# Patient Record
Sex: MALE | Race: WHITE | Employment: OTHER | URBAN - METROPOLITAN AREA
[De-identification: names, ages, dates, MRNs, and addresses within clinical notes are randomized per-mention and may not be internally consistent; named-entity substitution may affect disease eponyms.]

---

## 2021-12-20 ENCOUNTER — HOSPITAL ENCOUNTER (EMERGENCY)
Age: 61
Discharge: HOME | End: 2021-12-20
Attending: EMERGENCY MEDICINE
Payer: MEDICAID

## 2021-12-20 ENCOUNTER — APPOINTMENT (OUTPATIENT)
Dept: GENERAL RADIOLOGY | Age: 61
End: 2021-12-20
Attending: NURSE PRACTITIONER
Payer: MEDICAID

## 2021-12-20 VITALS
SYSTOLIC BLOOD PRESSURE: 167 MMHG | WEIGHT: 135 LBS | HEIGHT: 66 IN | TEMPERATURE: 98.1 F | BODY MASS INDEX: 21.69 KG/M2 | HEART RATE: 84 BPM | RESPIRATION RATE: 16 BRPM | OXYGEN SATURATION: 98 % | DIASTOLIC BLOOD PRESSURE: 75 MMHG

## 2021-12-20 DIAGNOSIS — M79.672 LEFT FOOT PAIN: Primary | ICD-10-CM

## 2021-12-20 DIAGNOSIS — M72.2 PLANTAR FASCIITIS OF LEFT FOOT: ICD-10-CM

## 2021-12-20 PROCEDURE — 99282 EMERGENCY DEPT VISIT SF MDM: CPT

## 2021-12-20 PROCEDURE — 73630 X-RAY EXAM OF FOOT: CPT

## 2021-12-20 NOTE — ED PROVIDER NOTES
EMERGENCY DEPARTMENT HISTORY AND PHYSICAL EXAM    Date: 12/20/2021  Patient Name: Zeke Novoa    History of Presenting Illness     Chief Complaint   Patient presents with    Foot Pain         History Provided By: patient      Additional History (Context): Zeke Novoa is a 11year-old male with no significant past medical history presents to the ER with complaints of ongoing foot pain on the left seems worse first thing in the morning and at the end of the day. He does work as a . He denies any injury, trauma, or fall. No swelling, redness, or overlying skin changes. No paresthesia. Taking ibuprofen with little improvement. PCP: None        Past History     Past Medical History:  History reviewed. No pertinent past medical history. Past Surgical History:  History reviewed. No pertinent surgical history. Family History:  History reviewed. No pertinent family history. Social History:  Social History     Tobacco Use    Smoking status: Light Tobacco Smoker    Smokeless tobacco: Never Used   Substance Use Topics    Alcohol use: Yes    Drug use: Not Currently       Allergies:  No Known Allergies      Review of Systems     Review of Systems   Constitutional: Negative for chills and fever. HENT: Negative for nasal congestion, sore throat, rhinorrhea  Eyes: Negative. Respiratory: Negative for cough and for shortness of breath. Cardiovascular: Negative for chest pain and palpitations. Gastrointestinal: Negative for abdominal pain, constipation, diarrhea, nausea and vomiting. Genitourinary: Negative. Negative for difficulty urinating and flank pain. Musculoskeletal: Positive for left foot pain. Negative for back pain. Negative for gait problem and neck pain. Skin: Negative. Allergic/Immunologic: Negative. Neurological: Negative for dizziness, weakness, numbness and headaches. Psychiatric/Behavioral: Negative. All other systems reviewed and are negative.   All Other Systems Negative    Physical Exam     Vitals:    12/20/21 1034 12/20/21 1038   BP: 92/82 (!) 167/75   Pulse: 84    Resp: 16    Temp: 98.1 °F (36.7 °C)    SpO2: 98%    Weight: 61.2 kg (135 lb)    Height: 5' 6\" (1.676 m)      Physical Exam  Vitals and nursing note reviewed. Constitutional:       General: He is not in acute distress. Appearance: Normal appearance. He is not ill-appearing, toxic-appearing or diaphoretic. HENT:      Head: Normocephalic and atraumatic. Nose: Nose normal. No congestion or rhinorrhea. Mouth/Throat:      Mouth: Mucous membranes are moist.      Pharynx: Oropharynx is clear. No oropharyngeal exudate or posterior oropharyngeal erythema. Eyes:      General: Vision grossly intact. Gaze aligned appropriately. No scleral icterus. Right eye: No discharge. Left eye: No discharge. Conjunctiva/sclera: Conjunctivae normal.   Cardiovascular:      Rate and Rhythm: Normal rate and regular rhythm. Pulses: Normal pulses. Heart sounds: Normal heart sounds. No murmur heard. No gallop. Pulmonary:      Effort: Pulmonary effort is normal. No respiratory distress. Breath sounds: Normal breath sounds. No stridor. No wheezing, rhonchi or rales. Chest:      Chest wall: No tenderness. Abdominal:      General: Abdomen is flat. Palpations: Abdomen is soft. Tenderness: There is no abdominal tenderness. There is no right CVA tenderness, left CVA tenderness, guarding or rebound. Musculoskeletal:         General: Normal range of motion. Cervical back: Full passive range of motion without pain, normal range of motion and neck supple. No rigidity or tenderness. Left foot: Normal range of motion and normal capillary refill. Tenderness (plantar) present. No swelling, deformity or foot drop. Normal pulse. Lymphadenopathy:      Cervical: No cervical adenopathy. Skin:     General: Skin is warm and dry.       Capillary Refill: Capillary refill takes less than 2 seconds. Findings: No rash. Neurological:      General: No focal deficit present. Mental Status: He is alert and oriented to person, place, and time. Psychiatric:         Mood and Affect: Mood normal.           Diagnostic Study Results     Labs -   No results found for this or any previous visit (from the past 12 hour(s)). Radiologic Studies -   XR FOOT LT MIN 3 V   Final Result   Findings/impression:      No osseous coalition. Lisfranc joint preserved. No fracture or dislocation. CT Results  (Last 48 hours)    None        CXR Results  (Last 48 hours)    None            Medical Decision Making   I am the first provider for this patient. I reviewed the vital signs, available nursing notes, past medical history, past surgical history, family history and social history. Vital Signs-Reviewed the patient's vital signs. Records Reviewed: Nursing notes, old medical records and any previous labs, imaging, visits, consultations pertinent to patient care    Procedures:  Procedures    ED Course: Progress Notes, Reevaluation, and Consults:  10:39 AM  Initial assessment performed. The patients presenting problems have been discussed, and they/their family are in agreement with the care plan formulated and outlined with them. I have encouraged them to ask questions as they arise throughout their visit. 12:03 PM  The radiological findings were discussed in detail with the patient  X-ray negative for acute process per my interpretation of images. No evidence of compartment syndrome as pain is not out of proportion and there is no swelling. Appropriate for outpatient management. Will discuss supportive treatment, NSAIDS, RICE and podiatry follow-up. Symptoms consistent with plantar fasciitis. Discussed supportive treatment. Discussed treatment plan, return precautions, symptomatic relief, and expected time to improvement. All questions answered.  Patient is stable for discharge and outpatient management. The patient was educated extensively on mandatory return criteria as well as was instructed both verbally and written to follow-up with podiatry within 1 week. The patient verbalized understanding of all instruction provided and agrees with the plan of care. Provider Notes (Medical Decision Making):     Differential diagnosis: Sprain/strain, dislocation, fracture    Patient is a 26-year-old male who presents with complaints of ongoing foot pain in the plantar region of the left foot for the last 6 months. Seems to wax and wane. He does work as a  wearing boots. He denies any injury, trauma, or fall and is neurovascularly intact. He is significantly tender over the plantar fascia consistent with plantar fasciitis. We will obtain an x-ray. MED RECONCILIATION:  No current facility-administered medications for this encounter. No current outpatient medications on file. Disposition:  Home in stable condition    DISCHARGE NOTE:     Patient has been reexamined. Patient has no new complaints, changes, or physical findings. Care plan outlined and precautions discussed. Discussed proper way to take medications. Discussed treatment plan, return precautions, symptomatic relief, and expected time to improvement. All questions answered. Patient is stable for discharge and outpatient management. Patient is ready to go home. Follow-up Information     Follow up With Specialties Details Why Contact Info    Darwyn Brittle, DPM Podiatry Schedule an appointment as soon as possible for a visit  Follow-up from the Emergency Department [address] [address]  [phone]      [facility] EMERGENCY DEPT Emergency Medicine  As needed, If symptoms worsen [address] [place] [address]  [phone]          There are no discharge medications for this patient. Diagnosis     Clinical Impression:   1. Left foot pain    2.  Plantar fasciitis of left foot          Dictation disclaimer:  Please note that this dictation was completed with [employer], the computer voice recognition software. Quite often unanticipated grammatical, syntax, homophones, and other interpretive errors are inadvertently transcribed by the computer software. Please disregard these errors. Please excuse any errors that have escaped final proofreading.

## 2021-12-20 NOTE — Clinical Note
[address] [place] [address] Dr Antony Martines [facility] EMERGENCY DEPT  [id number] [facility] [address] [address] [phone]    Work/School Note    Date: 12/20/2021    To Whom It May concern:    Vita Martinez was seen and treated today in the emergency room by the following provider(s):  Attending Provider: Janie Polanco MD  Nurse Practitioner: PATTI Knutson. Vita Martinez is excused from work/school on 12/20/21 and 12/21/21. He is medically clear to return to work/school on 12/22/2021.        Sincerely,          PATTI Hunter

## 2022-02-09 ENCOUNTER — TRANSCRIBE ORDER (OUTPATIENT)
Dept: SCHEDULING | Age: 62
End: 2022-02-09

## 2022-02-09 DIAGNOSIS — M72.2 PLANTAR FASCIITIS: Primary | ICD-10-CM

## 2022-02-28 ENCOUNTER — HOSPITAL ENCOUNTER (OUTPATIENT)
Dept: MRI IMAGING | Age: 62
Discharge: HOME | End: 2022-02-28
Attending: PODIATRIST
Payer: MEDICAID

## 2022-02-28 DIAGNOSIS — M72.2 PLANTAR FASCIITIS: ICD-10-CM

## 2022-02-28 PROCEDURE — 73721 MRI JNT OF LWR EXTRE W/O DYE: CPT

## 2022-03-07 ENCOUNTER — OFFICE VISIT (OUTPATIENT)
Dept: INTERNAL MEDICINE CLINIC | Age: 62
End: 2022-03-07
Payer: MEDICAID

## 2022-03-07 ENCOUNTER — HOSPITAL ENCOUNTER (OUTPATIENT)
Dept: LAB | Age: 62
Discharge: HOME | End: 2022-03-07

## 2022-03-07 ENCOUNTER — HOSPITAL ENCOUNTER (OUTPATIENT)
Dept: LAB | Age: 62
Discharge: HOME | End: 2022-03-07
Payer: MEDICAID

## 2022-03-07 VITALS
RESPIRATION RATE: 16 BRPM | WEIGHT: 121.6 LBS | BODY MASS INDEX: 19.54 KG/M2 | SYSTOLIC BLOOD PRESSURE: 174 MMHG | DIASTOLIC BLOOD PRESSURE: 84 MMHG | HEIGHT: 66 IN | OXYGEN SATURATION: 96 % | HEART RATE: 93 BPM | TEMPERATURE: 97.5 F

## 2022-03-07 DIAGNOSIS — B19.20 HEPATITIS C TEST POSITIVE: ICD-10-CM

## 2022-03-07 DIAGNOSIS — Z76.89 ENCOUNTER TO ESTABLISH CARE: Primary | ICD-10-CM

## 2022-03-07 DIAGNOSIS — Z98.49: ICD-10-CM

## 2022-03-07 DIAGNOSIS — I10 PRIMARY HYPERTENSION: ICD-10-CM

## 2022-03-07 DIAGNOSIS — F12.90 MARIJUANA SMOKER: ICD-10-CM

## 2022-03-07 DIAGNOSIS — H26.9 CATARACT, UNSPECIFIED CATARACT TYPE, UNSPECIFIED LATERALITY: ICD-10-CM

## 2022-03-07 DIAGNOSIS — E87.5 HYPERKALEMIA: ICD-10-CM

## 2022-03-07 DIAGNOSIS — F17.200 CURRENT SMOKER: ICD-10-CM

## 2022-03-07 DIAGNOSIS — Z78.9 DAILY CONSUMPTION OF ALCOHOL: ICD-10-CM

## 2022-03-07 DIAGNOSIS — G89.29 CHRONIC PAIN IN LEFT FOOT: ICD-10-CM

## 2022-03-07 DIAGNOSIS — M79.672 CHRONIC PAIN IN LEFT FOOT: ICD-10-CM

## 2022-03-07 DIAGNOSIS — F79: ICD-10-CM

## 2022-03-07 DIAGNOSIS — R79.89 ELEVATED LFTS: ICD-10-CM

## 2022-03-07 LAB — XX-LABCORP SPECIMEN COL,LCBCF: NORMAL

## 2022-03-07 PROCEDURE — 99204 OFFICE O/P NEW MOD 45 MIN: CPT | Performed by: NURSE PRACTITIONER

## 2022-03-07 PROCEDURE — 99001 SPECIMEN HANDLING PT-LAB: CPT

## 2022-03-07 PROCEDURE — 93005 ELECTROCARDIOGRAM TRACING: CPT

## 2022-03-07 RX ORDER — LISINOPRIL 20 MG/1
20 TABLET ORAL DAILY
Qty: 90 TABLET | Refills: 0 | Status: SHIPPED | OUTPATIENT
Start: 2022-03-07 | End: 2022-05-23 | Stop reason: SDUPTHER

## 2022-03-07 NOTE — PROGRESS NOTES
Chief Complaint   Patient presents with   [employer] Establish Care    Foot Pain     pt c/o bottom left foot pain, starting back 6 months ago     1. \"Have you been to the ER, urgent care clinic since your last visit? Hospitalized since your last visit? \" n/a    2. \"Have you seen or consulted any other health care providers outside of the [address] [place] [address] since your last visit? \" n/a     3. For patients aged 39-70: Has the patient had a colonoscopy / FIT/ Cologuard? No      If the patient is female:    4. For patients aged 41-77: Has the patient had a mammogram within the past 2 years? NA - based on age or sex      11. For patients aged 21-65: Has the patient had a pap smear?  NA - based on age or sex

## 2022-03-07 NOTE — PROGRESS NOTES
[facility]   [place], [address]  [phone] [id number]/[phone] fax    3/7/2022    Benjie Aslton 1960 is a pleasant WHITE/NON- male. He is a . He lives with a roommate. He is accompanied by his younger brother, Pierre Johnson. His brother assists him with his needs to include transportation and appointments along with medical management. Past Medical History:   Diagnosis Date    Cataract 3/7/2022    Cataract extraction status of eye, unspecified laterality 3/7/2022    Chronic pain in left foot 3/7/2022    Current smoker 3/7/2022    Daily consumption of alcohol 3/7/2022    Marijuana smoker 3/7/2022    Mental subnormality 3/7/2022    Primary hypertension 3/7/2022     Past Surgical History:   Procedure Laterality Date    HX CATARACT REMOVAL Right     4 years ago     Current Outpatient Medications   Medication Sig    lisinopriL (PRINIVIL, ZESTRIL) 20 mg tablet Take 1 Tablet by mouth daily. For blood pressure     No current facility-administered medications for this visit. Allergies and Intolerances:   No Known Allergies  Family History:   History reviewed. No pertinent family history. Social History:   He  reports that he has been smoking. He started smoking about 40 years ago. He has been smoking about 1.00 pack per day. He has never used smokeless tobacco.   Social History     Substance and Sexual Activity   Alcohol Use Yes    Alcohol/week: 14.0 standard drinks    Types: 14 Cans of beer per week    Comment: 2 beers daily     Immunization History: There is no immunization history on file for this patient. Todays concerns:  1. Establish care  2. Left foot pain. Started 6 months ago. Discomfort located on bottom of foot. Feels like needles, burning at times. Worse in the morning, improves throughout the day. Uses a water bottle daily to roll on his foot to help relieve the pain. Sees Dr Orquidea Hilario, podiatry.  Received 2 cortisone injections recently with no improvement of sx.   3. Cataract. Removal from Rt eye 4 years ago. Developed in left eye. Seeking referral to eye MD.       Review of Systems:   As above included in HPI. Otherwise 11 point review of systems negative including constitutional, skin, HENT, eyes, respiratory, cardiovascular, gastrointestinal, genitourinary, musculoskeletal, endocrine, hematologic, allergy, and neurologic. Review of Data:  Pt going to [facility] lab to have labs drawn and EKG completed    Physical:   Visit Vitals  BP (!) 174/84   Pulse 93   Temp 97.5 °F (36.4 °C) (Temporal)   Resp 16   Ht 5' 6\" (1.676 m)   Wt 121 lb 9.6 oz (55.2 kg)   SpO2 96%   BMI 19.63 kg/m²      Wt Readings from Last 3 Encounters:   03/07/22 121 lb 9.6 oz (55.2 kg)   12/20/21 135 lb (61.2 kg)       Exam:   Physical Exam  Constitutional:       Appearance: Normal appearance. Comments: Thin build   HENT:      Head: Normocephalic and atraumatic. Right Ear: Tympanic membrane, ear canal and external ear normal.      Left Ear: Tympanic membrane, ear canal and external ear normal.   Eyes:      Extraocular Movements: Extraocular movements intact. Conjunctiva/sclera: Conjunctivae normal.   Cardiovascular:      Rate and Rhythm: Normal rate and regular rhythm. Pulses: Normal pulses. Heart sounds: Normal heart sounds. Comments: No edema noted to ender LEs  Pulmonary:      Effort: Pulmonary effort is normal. No respiratory distress. Breath sounds: Normal breath sounds. No wheezing. Abdominal:      General: Abdomen is flat. Bowel sounds are normal. There is no distension. Palpations: Abdomen is soft. Tenderness: There is no abdominal tenderness. Musculoskeletal:         General: Normal range of motion. Cervical back: Normal range of motion. Comments: Gait stable, no limitations noted. Skin:     General: Skin is warm and dry.    Neurological:      Mental Status: He is alert and oriented to person, place, and time. Comments: Brother has to help pt with answering some questions. Pt is alert and oriented, appears to be mentally slow. He is slow to answer questions at times. Psychiatric:         Attention and Perception: Attention normal.         Mood and Affect: Mood normal.         Speech: Speech normal.         Behavior: Behavior normal.      Comments: Appears to be mentally slow (mild)        Body mass index is 19.63 kg/m². Plan:      ICD-10-CM ICD-9-CM    1. Encounter to establish care  Z76.89 V65.8 HEMOGLOBIN A1C WITH EAG      LIPID PANEL      PSA, DIAGNOSTIC (PROSTATE SPECIFIC AG)      CBC WITH AUTOMATED DIFF   2. Primary hypertension  I10 401.9 lisinopriL (PRINIVIL, ZESTRIL) 20 mg tablet      METABOLIC PANEL, COMPREHENSIVE      EKG, 12 LEAD, INITIAL   3. Chronic pain in left foot  M79.672 729.5     G89.29 338.29    4. Cataract extraction status of eye, unspecified laterality  Z98.49 V45.61 REFERRAL TO OPHTHALMOLOGY   5. Cataract, unspecified cataract type, unspecified laterality  H26.9 366.9 REFERRAL TO OPHTHALMOLOGY   6. Mental subnormality  F79 319    7. Current smoker  F17.200 305.1    8. Marijuana smoker  F12.90 305.20    9. Daily consumption of alcohol  Z78.9 V49.89 HEPATITIS PANEL, ACUTE     -Encounter establish care  Unfortunately he has minimal information in EMR. Reviewed previous ED visits  Unable to review records from Dr. Evie Anguiano, podiatry  Patient has not seen a PCP in many years.    -Primary hypertension (new diagnosis)  start, Lisinopril 20 mg daily. Educated patient and brother i.e. possible side effects of medication. Take medical attention at the nearest ED if side effects become evident.   Limit sodium in diet to 2g/d  Avoid pork  Initiate cardio exercise  Weight reduction  Increase water intake  Smoking cessation encouraged  Check BP and report readings greater than 139/89 to office    -Chronic pain left foot  2/2022 MRI revealed normal left foot; MRI revealed possible strain to left ankle. Plantar fasciitis ruled out on MRI  He received 2 cortisone injections they are podiatry, Dr. Radha Rodas to follow-up with podiatrist  Questionable neuropathy-alcohol induced? ?    -History of cataract removal right eye    -Cataract left eye (patient reported)  Referral placed to ophthalmology    -Mental subnormality  Appears to manage his own care  Relies on brother for transportation, appointments, medical care. Chronic use of alcohol/marijuana impacting mentality? ?    -Current smoker  Smoking cessation encouraged  1 pack/day since age 25  Will plan on an LDCT in the near future    -Marijuana smoker  Smoking cessation encouraged    -Daily consumption of alcohol      Reviewed medication and completed medication reconciliation with the patient. Reviewed side effects of medications with the patient. Questions were answered and patient verb understanding. Follow up 3 months no labs      Dr. Nora Nair, JUNP-C, DNP  [facility]       The total time 45 minutes. At least 50% of that time was spent in counseling and/or coordination of care. My summary of patient counseling and coordination of care includes reviewing medical record, assessing patient, placing orders, and discussing plan of care with patient.  Prior to seeing this patient, I reviewed records, including previously completed appointments/records, reviewed specialty records in [facility], and updated visits from other providers since I saw the patient last.

## 2022-03-08 LAB
ATRIAL RATE: 87 BPM
CALCULATED P AXIS, ECG09: 76 DEGREES
CALCULATED R AXIS, ECG10: -37 DEGREES
CALCULATED T AXIS, ECG11: 55 DEGREES
DIAGNOSIS, 93000: NORMAL
P-R INTERVAL, ECG05: 120 MS
Q-T INTERVAL, ECG07: 346 MS
QRS DURATION, ECG06: 72 MS
QTC CALCULATION (BEZET), ECG08: 416 MS
VENTRICULAR RATE, ECG03: 87 BPM

## 2022-03-09 PROBLEM — B19.20 HEPATITIS C TEST POSITIVE: Status: ACTIVE | Noted: 2022-03-09

## 2022-03-09 PROBLEM — R79.89 ELEVATED LFTS: Status: ACTIVE | Noted: 2022-03-09

## 2022-03-09 PROBLEM — E87.5 HYPERKALEMIA: Status: ACTIVE | Noted: 2022-03-09

## 2022-03-09 LAB
ALBUMIN SERPL-MCNC: 4.1 G/DL (ref 3.8–4.9)
ALBUMIN/GLOB SERPL: 1.1 {RATIO} (ref 1.2–2.2)
ALP SERPL-CCNC: 80 IU/L (ref 44–121)
ALT SERPL-CCNC: 77 IU/L (ref 0–44)
AST SERPL-CCNC: 97 IU/L (ref 0–40)
BASOPHILS # BLD AUTO: 0.1 X10E3/UL (ref 0–0.2)
BASOPHILS NFR BLD AUTO: 1 %
BILIRUB SERPL-MCNC: 0.6 MG/DL (ref 0–1.2)
BUN SERPL-MCNC: 12 MG/DL (ref 8–27)
BUN/CREAT SERPL: 14 (ref 10–24)
CALCIUM SERPL-MCNC: 9.8 MG/DL (ref 8.6–10.2)
CHLORIDE SERPL-SCNC: 97 MMOL/L (ref 96–106)
CHOLEST SERPL-MCNC: 203 MG/DL (ref 100–199)
CO2 SERPL-SCNC: 23 MMOL/L (ref 20–29)
CREAT SERPL-MCNC: 0.85 MG/DL (ref 0.76–1.27)
DIAGNOSTIC IMP SPEC-IMP: NORMAL
EGFR: 99 ML/MIN/1.73
EOSINOPHIL # BLD AUTO: 1 X10E3/UL (ref 0–0.4)
EOSINOPHIL NFR BLD AUTO: 12 %
ERYTHROCYTE [DISTWIDTH] IN BLOOD BY AUTOMATED COUNT: 12.6 % (ref 11.6–15.4)
EST. AVERAGE GLUCOSE BLD GHB EST-MCNC: 111 MG/DL
GLOBULIN SER CALC-MCNC: 3.6 G/DL (ref 1.5–4.5)
GLUCOSE SERPL-MCNC: 104 MG/DL (ref 65–99)
HAV IGM SERPL QL IA: NEGATIVE
HBA1C MFR BLD: 5.5 % (ref 4.8–5.6)
HBV CORE IGM SERPL QL IA: NEGATIVE
HBV SURFACE AG SERPL QL IA: NEGATIVE
HCT VFR BLD AUTO: 46.4 % (ref 37.5–51)
HCV AB S/CO SERPL IA: >11 S/CO RATIO (ref 0–0.9)
HCV RNA SERPL NAA+PROBE-ACNC: NORMAL IU/ML
HCV RNA SERPL NAA+PROBE-LOG IU: 6.83 LOG10 IU/ML
HDLC SERPL-MCNC: 67 MG/DL
HGB BLD-MCNC: 16.7 G/DL (ref 13–17.7)
IMM GRANULOCYTES # BLD AUTO: 0 X10E3/UL (ref 0–0.1)
IMM GRANULOCYTES NFR BLD AUTO: 0 %
IMP & REVIEW OF LAB RESULTS: NORMAL
INTERPRETATION: NORMAL
LDLC SERPL CALC-MCNC: 125 MG/DL (ref 0–99)
LYMPHOCYTES # BLD AUTO: 1.5 X10E3/UL (ref 0.7–3.1)
LYMPHOCYTES NFR BLD AUTO: 18 %
MCH RBC QN AUTO: 32.7 PG (ref 26.6–33)
MCHC RBC AUTO-ENTMCNC: 36 G/DL (ref 31.5–35.7)
MCV RBC AUTO: 91 FL (ref 79–97)
MONOCYTES # BLD AUTO: 1 X10E3/UL (ref 0.1–0.9)
MONOCYTES NFR BLD AUTO: 12 %
MORPHOLOGY BLD-IMP: ABNORMAL
NEUTROPHILS # BLD AUTO: 4.7 X10E3/UL (ref 1.4–7)
NEUTROPHILS NFR BLD AUTO: 57 %
PLATELET # BLD AUTO: ABNORMAL X10E3/UL
POTASSIUM SERPL-SCNC: 5.3 MMOL/L (ref 3.5–5.2)
PROT SERPL-MCNC: 7.7 G/DL (ref 6–8.5)
PSA SERPL-MCNC: 1.6 NG/ML (ref 0–4)
RBC # BLD AUTO: 5.1 X10E6/UL (ref 4.14–5.8)
REF LAB TEST REF RANGE: NORMAL
SODIUM SERPL-SCNC: 137 MMOL/L (ref 134–144)
TRIGL SERPL-MCNC: 60 MG/DL (ref 0–149)
VLDLC SERPL CALC-MCNC: 11 MG/DL (ref 5–40)
WBC # BLD AUTO: 8.4 X10E3/UL (ref 3.4–10.8)

## 2022-03-10 ENCOUNTER — TELEPHONE (OUTPATIENT)
Dept: INTERNAL MEDICINE CLINIC | Age: 62
End: 2022-03-10

## 2022-03-10 NOTE — TELEPHONE ENCOUNTER
----- Message from Cherry Fabian DNP sent at 3/9/2022  9:30 PM EST -----  Please inform pt of the following:  +Hep C. I have placed referral to Gastro  Elevated LFTs  Cholesterol slightly elevated. Reduce fried fatty foods. Hyperkalemia.  Needs to have labs drawn week of 3/28/22 [facility] or [facility]  Thank you

## 2022-03-10 NOTE — TELEPHONE ENCOUNTER
Pts brother aware of message below and verbalized understanding. No further questions or concerns from pts brother at this time.

## 2022-03-10 NOTE — PROGRESS NOTES
Please inform pt of the following:  +Hep C. I have placed referral to Gastro  Elevated LFTs  Cholesterol slightly elevated. Reduce fried fatty foods. Hyperkalemia.  Needs to have labs drawn week of 3/28/22 [facility] or [facility]  Thank you

## 2022-03-18 PROBLEM — Z98.49: Status: ACTIVE | Noted: 2022-03-07

## 2022-03-18 PROBLEM — F17.200 CURRENT SMOKER: Status: ACTIVE | Noted: 2022-03-07

## 2022-03-18 PROBLEM — F12.90 MARIJUANA SMOKER: Status: ACTIVE | Noted: 2022-03-07

## 2022-03-19 PROBLEM — F79: Status: ACTIVE | Noted: 2022-03-07

## 2022-03-19 PROBLEM — Z78.9 DAILY CONSUMPTION OF ALCOHOL: Status: ACTIVE | Noted: 2022-03-07

## 2022-03-19 PROBLEM — B19.20 HEPATITIS C TEST POSITIVE: Status: ACTIVE | Noted: 2022-03-09

## 2022-03-19 PROBLEM — H26.9 CATARACT: Status: ACTIVE | Noted: 2022-03-07

## 2022-03-19 PROBLEM — R79.89 ELEVATED LFTS: Status: ACTIVE | Noted: 2022-03-09

## 2022-03-19 PROBLEM — E87.5 HYPERKALEMIA: Status: ACTIVE | Noted: 2022-03-09

## 2022-03-20 PROBLEM — I10 PRIMARY HYPERTENSION: Status: ACTIVE | Noted: 2022-03-07

## 2022-03-20 PROBLEM — G89.29 CHRONIC PAIN IN LEFT FOOT: Status: ACTIVE | Noted: 2022-03-07

## 2022-03-20 PROBLEM — M79.672 CHRONIC PAIN IN LEFT FOOT: Status: ACTIVE | Noted: 2022-03-07

## 2022-03-21 ENCOUNTER — TELEPHONE (OUTPATIENT)
Dept: INTERNAL MEDICINE CLINIC | Age: 62
End: 2022-03-21

## 2022-03-21 NOTE — TELEPHONE ENCOUNTER
----- Message from Kleber Pimentelocco sent at 3/21/2022  2:13 PM EDT -----  Subject: Referral Request    QUESTIONS   Reason for referral request? pt brother Brianna Thomas also wants a referral for a   new foot dr for pt, said current one is not giving him enough care. Is   requesting a neurosurgeon because his pain is from the hip to the foot. Has the physician seen you for this condition before? No   Preferred Specialist (if applicable)? Do you already have an appointment scheduled? No  Additional Information for Provider?   ---------------------------------------------------------------------------  --------------  CALL BACK INFO  What is the best way for the office to contact you? OK to leave message on   voicemail  Preferred Call Back Phone Number?  [phone]

## 2022-03-31 ENCOUNTER — HOSPITAL ENCOUNTER (OUTPATIENT)
Dept: LAB | Age: 62
Discharge: HOME | End: 2022-03-31

## 2022-03-31 LAB — XX-LABCORP SPECIMEN COL,LCBCF: NORMAL

## 2022-03-31 PROCEDURE — 99001 SPECIMEN HANDLING PT-LAB: CPT

## 2022-04-01 LAB
ALBUMIN SERPL-MCNC: 4.3 G/DL (ref 3.8–4.9)
ALBUMIN/GLOB SERPL: 1.3 {RATIO} (ref 1.2–2.2)
ALP SERPL-CCNC: 69 IU/L (ref 44–121)
ALT SERPL-CCNC: 93 IU/L (ref 0–44)
AST SERPL-CCNC: 99 IU/L (ref 0–40)
BILIRUB SERPL-MCNC: 0.6 MG/DL (ref 0–1.2)
BUN SERPL-MCNC: 8 MG/DL (ref 8–27)
BUN/CREAT SERPL: 10 (ref 10–24)
CALCIUM SERPL-MCNC: 9.5 MG/DL (ref 8.6–10.2)
CHLORIDE SERPL-SCNC: 94 MMOL/L (ref 96–106)
CO2 SERPL-SCNC: 24 MMOL/L (ref 20–29)
CREAT SERPL-MCNC: 0.84 MG/DL (ref 0.76–1.27)
EGFR: 100 ML/MIN/1.73
GLOBULIN SER CALC-MCNC: 3.2 G/DL (ref 1.5–4.5)
GLUCOSE SERPL-MCNC: 83 MG/DL (ref 65–99)
POTASSIUM SERPL-SCNC: 5 MMOL/L (ref 3.5–5.2)
PROT SERPL-MCNC: 7.5 G/DL (ref 6–8.5)
SODIUM SERPL-SCNC: 133 MMOL/L (ref 134–144)

## 2022-04-07 ENCOUNTER — TELEPHONE (OUTPATIENT)
Dept: INTERNAL MEDICINE CLINIC | Age: 62
End: 2022-04-07

## 2022-04-07 NOTE — TELEPHONE ENCOUNTER
----- Message from Rosa Chen sent at 4/7/2022  3:55 PM EDT -----  Subject: Refill Request    QUESTIONS  Name of Medication? lisinopriL (PRINIVIL, ZESTRIL) 20 mg tablet  Patient-reported dosage and instructions? one per day   How many days do you have left? 0  Preferred Pharmacy? [id number]. Pharmacy phone number (if available)? [phone]  ---------------------------------------------------------------------------  --------------  CALL BACK INFO  What is the best way for the office to contact you? OK to leave message on   voicemail  Preferred Call Back Phone Number? [phone]  ---------------------------------------------------------------------------  --------------  SCRIPT ANSWERS  Relationship to Patient? Other  Representative Name? Ramandeep Zohra   Is the Representative on the appropriate HIPAA document in Epic?  Yes

## 2022-04-07 NOTE — TELEPHONE ENCOUNTER
LVM for patient to return call. RE:   Pt should have refills left at pharmacy. She sent in 90 d/s on 3/7/2022.

## 2022-04-08 NOTE — TELEPHONE ENCOUNTER
Patient reached and made aware I spoke with pharmacy and he does have additional refills left at pharmacy for lisinopril. Patient verbalized understanding.

## 2022-04-15 ENCOUNTER — TRANSCRIBE ORDER (OUTPATIENT)
Dept: SCHEDULING | Age: 62
End: 2022-04-15

## 2022-04-15 DIAGNOSIS — B19.20 UNSPECIFIED VIRAL HEPATITIS C WITHOUT HEPATIC COMA: Primary | ICD-10-CM

## 2022-04-19 ENCOUNTER — TRANSCRIBE ORDER (OUTPATIENT)
Dept: SCHEDULING | Age: 62
End: 2022-04-19

## 2022-04-19 DIAGNOSIS — B19.20 HEPATITIS C VIRUS INFECTION WITHOUT HEPATIC COMA, UNSPECIFIED CHRONICITY: Primary | ICD-10-CM

## 2022-05-03 ENCOUNTER — HOSPITAL ENCOUNTER (OUTPATIENT)
Dept: ULTRASOUND IMAGING | Age: 62
Discharge: HOME | End: 2022-05-03
Payer: MEDICAID

## 2022-05-03 ENCOUNTER — HOSPITAL ENCOUNTER (OUTPATIENT)
Dept: LAB | Age: 62
Discharge: HOME | End: 2022-05-03

## 2022-05-03 DIAGNOSIS — B19.20 HEPATITIS C VIRUS INFECTION WITHOUT HEPATIC COMA, UNSPECIFIED CHRONICITY: ICD-10-CM

## 2022-05-03 LAB — XX-LABCORP SPECIMEN COL,LCBCF: NORMAL

## 2022-05-03 PROCEDURE — 99001 SPECIMEN HANDLING PT-LAB: CPT

## 2022-05-03 PROCEDURE — 76705 ECHO EXAM OF ABDOMEN: CPT

## 2022-05-23 ENCOUNTER — OFFICE VISIT (OUTPATIENT)
Dept: INTERNAL MEDICINE CLINIC | Age: 62
End: 2022-05-23
Payer: MEDICAID

## 2022-05-23 VITALS
BODY MASS INDEX: 18.32 KG/M2 | HEART RATE: 64 BPM | OXYGEN SATURATION: 100 % | SYSTOLIC BLOOD PRESSURE: 196 MMHG | TEMPERATURE: 97.4 F | HEIGHT: 66 IN | DIASTOLIC BLOOD PRESSURE: 87 MMHG | WEIGHT: 114 LBS | RESPIRATION RATE: 16 BRPM

## 2022-05-23 DIAGNOSIS — G89.29 CHRONIC PAIN IN LEFT FOOT: ICD-10-CM

## 2022-05-23 DIAGNOSIS — R09.89 ABDOMINAL BRUIT: ICD-10-CM

## 2022-05-23 DIAGNOSIS — Z78.9 DAILY CONSUMPTION OF ALCOHOL: ICD-10-CM

## 2022-05-23 DIAGNOSIS — H26.20 CATARACT WITH COMPLICATION OF LEFT EYE, UNSPECIFIED COMPLICATED CATARACT TYPE: ICD-10-CM

## 2022-05-23 DIAGNOSIS — B18.2 CHRONIC HEPATITIS C WITHOUT HEPATIC COMA (HCC): ICD-10-CM

## 2022-05-23 DIAGNOSIS — I70.90 ATHEROSCLEROTIC VASCULAR DISEASE: ICD-10-CM

## 2022-05-23 DIAGNOSIS — E78.5 HYPERLIPIDEMIA, UNSPECIFIED HYPERLIPIDEMIA TYPE: ICD-10-CM

## 2022-05-23 DIAGNOSIS — M79.672 CHRONIC PAIN IN LEFT FOOT: ICD-10-CM

## 2022-05-23 DIAGNOSIS — F17.200 CURRENT SMOKER: ICD-10-CM

## 2022-05-23 DIAGNOSIS — I16.0 HYPERTENSIVE URGENCY: Primary | ICD-10-CM

## 2022-05-23 PROCEDURE — 99214 OFFICE O/P EST MOD 30 MIN: CPT | Performed by: NURSE PRACTITIONER

## 2022-05-23 RX ORDER — MELOXICAM 15 MG/1
TABLET ORAL
COMMUNITY
Start: 2022-05-17 | End: 2022-06-21

## 2022-05-23 RX ORDER — AMLODIPINE BESYLATE 10 MG/1
10 TABLET ORAL DAILY
Qty: 90 TABLET | Refills: 0 | Status: SHIPPED | OUTPATIENT
Start: 2022-05-23 | End: 2022-05-23 | Stop reason: CLARIF

## 2022-05-23 RX ORDER — LISINOPRIL 20 MG/1
20 TABLET ORAL DAILY
Qty: 90 TABLET | Refills: 0 | Status: SHIPPED | OUTPATIENT
Start: 2022-05-23 | End: 2022-05-23 | Stop reason: CLARIF

## 2022-05-23 RX ORDER — LISINOPRIL 40 MG/1
40 TABLET ORAL DAILY
Qty: 90 TABLET | Refills: 0 | Status: SHIPPED | OUTPATIENT
Start: 2022-05-23 | End: 2022-08-12 | Stop reason: SDUPTHER

## 2022-05-23 NOTE — Clinical Note
Call pts brother Nixon Snow and inform him I have sent in atorvastatin. This treats cholesterol and helps with aneurysm if present.  Thank you

## 2022-05-23 NOTE — PROGRESS NOTES
[facility] of [id number] [place] [address], [address]  [phone] [facility]/[phone] fax    5/23/2022    Yue Lobato 1960 is a pleasant WHITE/NON- male. He is a . He lives with a roommate. He is accompanied by his younger brother, Sonya Perry. His brother assists him with his needs to include transportation and appointments along with medical management. Past Medical History:   Diagnosis Date    Cataract 3/7/2022    Cataract extraction status of eye, unspecified laterality 3/7/2022    Chronic pain in left foot 3/7/2022    Current smoker 3/7/2022    Daily consumption of alcohol 3/7/2022    Elevated LFTs 3/9/2022    Hepatitis C test positive 3/9/2022    Hyperkalemia 3/9/2022    Marijuana smoker 3/7/2022    Mental subnormality 3/7/2022    Primary hypertension 3/7/2022     Past Surgical History:   Procedure Laterality Date    HX CATARACT REMOVAL Right     4 years ago     Current Outpatient Medications   Medication Sig    atorvastatin (LIPITOR) 20 mg tablet Take 1 Tablet by mouth daily. Indications: excessive fat in the blood, AAA    meloxicam (MOBIC) 15 mg tablet take 1 tablet by mouth once daily with food    lisinopriL (PRINIVIL, ZESTRIL) 40 mg tablet Take 1 Tablet by mouth daily. Indications: high blood pressure     No current facility-administered medications for this visit. Allergies and Intolerances:   No Known Allergies  Family History:   No family history on file. Social History:   He  reports that he has been smoking. He started smoking about 40 years ago. He has been smoking about 1.00 pack per day. He has never used smokeless tobacco.   Social History     Substance and Sexual Activity   Alcohol Use Yes    Alcohol/week: 14.0 standard drinks    Types: 14 Cans of beer per week    Comment: 2 beers daily     Immunization History: There is no immunization history on file for this patient. Todays concerns:  1.  Left foot pain. Started Nov 2021. Discomfort located on bottom of foot. Feels like needles, burning at times. Worse in the morning, improves throughout the day. Uses a water bottle daily to roll on his foot to help relieve the pain. Sees Dr Darnell Bañuelos, podiatry. Received 3 cortisone injections with no improvement of sx. Completed EMG 4/12/22, awaiting results. 2. Hep C. Seeing YARIEL Malcolm, who order Fibrosure and \"Hep C medication\" Pt has not completed fibrosure test due to confusion on who is to order the test, GI or PCP. Will be obtaining \"Hep C medication\" tomorrow. Unsure whether to start tx or not. Brother to call GI office to confirm. 3. Heart beat in his belly. GI told pt and brother there is something wrong in his belly and it is not their specialty so to follow up with PCP. 4. Hypertension. Taking lisinopril everyday. Not monitoring sodium intake. Denies H/A, CP, Sob, fatigue. 5. Continues to smoke cigs and weed. Consumes 4-5 cans of beer per day. 6. Cataract Left eye. Scheduled for surgery 5/27/2022      Review of Systems:   As above included in HPI. Review of Data:  N/a  Requested results of EMG LLE from Dr Cintia Neely, neuro, completed 4/2022    Physical:   Visit Vitals  BP (!) 196/87   Pulse 64   Temp 97.4 °F (36.3 °C) (Temporal)   Resp 16   Ht 5' 6\" (1.676 m)   Wt 114 lb (51.7 kg)   SpO2 100%   BMI 18.40 kg/m²      Wt Readings from Last 3 Encounters:   05/23/22 114 lb (51.7 kg)   03/07/22 121 lb 9.6 oz (55.2 kg)   12/20/21 135 lb (61.2 kg)       Exam:   Physical Exam  Constitutional:       Appearance: Normal appearance. Comments: Thin build   HENT:      Head: Normocephalic and atraumatic. Right Ear: External ear normal.      Left Ear: External ear normal.   Eyes:      Extraocular Movements: Extraocular movements intact. Conjunctiva/sclera: Conjunctivae normal.   Cardiovascular:      Rate and Rhythm: Normal rate and regular rhythm. Heart sounds: Normal heart sounds. Comments: No edema noted to ender LEs  Pulmonary:      Effort: Pulmonary effort is normal. No respiratory distress. Breath sounds: Normal breath sounds. No wheezing. Abdominal:      General: Abdomen is flat. Bowel sounds are normal. There is no distension. Palpations: Abdomen is soft. Tenderness: There is no abdominal tenderness. Comments: Bruit noted    Musculoskeletal:         General: Normal range of motion. Cervical back: Normal range of motion and neck supple. Skin:     General: Skin is warm and dry. Neurological:      General: No focal deficit present. Mental Status: He is alert and oriented to person, place, and time. Comments: Brother has to help pt with answering some questions. Pt is alert and oriented, appears to be mentally slow. He is slow to answer questions at times. Psychiatric:         Attention and Perception: Attention normal.         Mood and Affect: Mood normal.         Speech: Speech normal.         Behavior: Behavior normal.      Comments: Appears mentally slow (mild)        Body mass index is 18.4 kg/m². Plan:      ICD-10-CM ICD-9-CM    1. Hypertensive urgency  I16.0 401.9 CTA CHEST ABD W WO CONT      lisinopriL (PRINIVIL, ZESTRIL) 40 mg tablet      DISCONTINUED: amLODIPine (NORVASC) 10 mg tablet      DISCONTINUED: lisinopriL (PRINIVIL, ZESTRIL) 20 mg tablet   2. Hyperlipidemia, unspecified hyperlipidemia type  E78.5 272.4 atorvastatin (LIPITOR) 20 mg tablet   3. Abdominal bruit  R09.89 785.9 CTA CHEST ABD W WO CONT      atorvastatin (LIPITOR) 20 mg tablet   4. Chronic pain in left foot  M79.672 729.5     G89.29 338.29    5. Chronic hepatitis C without hepatic coma (HCC)  B18.2 070.54    6. Cataract with complication of left eye, unspecified complicated cataract type  H26.20 366.30    7. Daily consumption of alcohol  Z78.9 V49.89    8.  Current smoker  F17.200 305.1        -Hypertensive Urgency  BP not controlled  Increase Lisinopril 40mg every day  Discussed initiating Amlodipine but will hold off until AAA is ruled out. Limit sodium in diet to 2g/d  Increase water intake  Smoking cessation encouraged  Pt does not have access to a BP machine     -Hyperlipidemia  Initiate atorvastatin  Originally pt was to work on diet until ABD bruit discovered.    -Abdominal Bruit  Discussed possible complications to include death if AAA is not discovered and managed. Instructed pt and brother on the importance of controlling BP  Discussed importance of smoking and alcohol cessation  Initiate statin therapy  Wanted to schedule AAA stat but brother wants to wait til next week because pt has waited sometime to his his Lt cataract removed. Again expressed the importance of obtaining imaging ASAP. Scheduled for 6/1/22.     -Chronic pain left foot  2/2022 MRI revealed normal left foot; MRI revealed possible strain to left ankle. Plantar fasciitis ruled out on MRI  He received 3 cortisone injections they are podiatry, Dr. Pa Luis   4/2022 EMG completed by Dr Amrita Casas, neuro, awaiting results  Continue to follow-up with podiatrist  DDx:  Alcoholic neuropathy  PAD  Spinal radiculopathy     -Cataract left eye (patient reported)  Scheduled for cataract removal on 5/27/22  Strongly discouraged having any procedures done until after AAA testing    -Hep C  F/u with GI  Call GI in reference to getting Fibrosure scan ordered and scheduled  Call GI office and inquire about initiating \"Hep C med. \"    -Current smoker  Smoking cessation encouraged  1 pack/day since age 25    -Daily consumption of alcohol  Reduction/Cessation discussed    Reviewed medication and completed medication reconciliation with the patient. Reviewed side effects of medications with the patient. Questions were answered and patient verb understanding.       Follow up 3 months no labs      Dr. Agustin Cook, AGNP-C, DNP  [facility] of [address] [place] [address]

## 2022-05-23 NOTE — PROGRESS NOTES
Chief Complaint   Patient presents with    Hypertension     3 month f/u     1. \"Have you been to the ER, urgent care clinic since your last visit? Hospitalized since your last visit? \" No    2. \"Have you seen or consulted any other health care providers outside of the [address] [place] [address] since your last visit? \" yes, GI. and podiatry    3. For patients aged 39-70: Has the patient had a colonoscopy / FIT/ Cologuard? No      If the patient is female:    4. For patients aged 41-77: Has the patient had a mammogram within the past 2 years? NA - based on age or sex      11. For patients aged 21-65: Has the patient had a pap smear?  NA - based on age or sex

## 2022-05-24 ENCOUNTER — TELEPHONE (OUTPATIENT)
Dept: INTERNAL MEDICINE CLINIC | Age: 62
End: 2022-05-24

## 2022-05-24 RX ORDER — ATORVASTATIN CALCIUM 20 MG/1
20 TABLET, FILM COATED ORAL DAILY
Qty: 90 TABLET | Refills: 3 | Status: SHIPPED | OUTPATIENT
Start: 2022-05-24 | End: 2022-06-03 | Stop reason: SDUPTHER

## 2022-05-24 NOTE — TELEPHONE ENCOUNTER
Pts brother calling stating pt was instructed to start amlodipine along with lisinopril at appointment yesterday. Pt states pharmacy told them you cancelled the amlodipine rx. Please advise what medication pt should be taking currently.

## 2022-05-24 NOTE — TELEPHONE ENCOUNTER
Patient's brother reached and made aware Lisinopril was increased to 40 mg and atorvastatin were called into pharmacy per Dr. Mayra Rocha. Pt's brother verbalized understanding.

## 2022-05-27 ENCOUNTER — TELEPHONE (OUTPATIENT)
Dept: INTERNAL MEDICINE CLINIC | Age: 62
End: 2022-05-27

## 2022-05-27 NOTE — TELEPHONE ENCOUNTER
GUANAKITOM ezio Carmona to return call to office. Per OV note, Dr. Jackelin Diaz recommended no appt until AAA testing is complete, which pt has scheduled on 5/31/2022.  Pt can be added to schedule anytime after 5/31.        -Cataract left eye (patient reported)  Scheduled for cataract removal on 5/27/22  Strongly discouraged having any procedures done until after AAA testing

## 2022-05-27 NOTE — TELEPHONE ENCOUNTER
Frances Nichole with [facility] Dr Michelle's office for pre opt clearance appt.      Pt is scheduled for left cataract surgery 07/14/2022    Please advise, no appts available    Return call to [phone] x [phone]

## 2022-05-30 DIAGNOSIS — I16.0 HYPERTENSIVE URGENCY: ICD-10-CM

## 2022-05-30 DIAGNOSIS — R09.89 ABDOMINAL BRUIT: ICD-10-CM

## 2022-05-31 ENCOUNTER — HOSPITAL ENCOUNTER (OUTPATIENT)
Dept: CT IMAGING | Age: 62
Discharge: HOME | End: 2022-05-31
Attending: NURSE PRACTITIONER
Payer: MEDICAID

## 2022-05-31 DIAGNOSIS — R09.89 ABDOMINAL BRUIT: ICD-10-CM

## 2022-05-31 DIAGNOSIS — I16.0 HYPERTENSIVE URGENCY: ICD-10-CM

## 2022-05-31 LAB — CREAT UR-MCNC: 1 MG/DL (ref 0.6–1.3)

## 2022-05-31 PROCEDURE — 74011000636 HC RX REV CODE- 636: Performed by: NURSE PRACTITIONER

## 2022-05-31 PROCEDURE — 74174 CTA ABD&PLVS W/CONTRAST: CPT

## 2022-05-31 PROCEDURE — 82565 ASSAY OF CREATININE: CPT

## 2022-05-31 RX ADMIN — IOPAMIDOL 100 ML: 755 INJECTION, SOLUTION INTRAVENOUS at 15:50

## 2022-06-03 ENCOUNTER — TELEPHONE (OUTPATIENT)
Dept: INTERNAL MEDICINE CLINIC | Age: 62
End: 2022-06-03

## 2022-06-03 RX ORDER — ATORVASTATIN CALCIUM 20 MG/1
40 TABLET, FILM COATED ORAL DAILY
Qty: 90 TABLET | Refills: 0
Start: 2022-06-03 | End: 2022-06-13 | Stop reason: SDUPTHER

## 2022-06-03 NOTE — PROGRESS NOTES
Pls inform pt/brother his CTA does not reveal an aneurysm but does reveal extensive atherosclerotic disease. This could be some cause for his left foot pain. He needs to increase atorvastatin to 40mg (2 tabs daily) plus aspirin 81mg every day. Smoking cessation. I have placed a referral to vascular for consult.

## 2022-06-03 NOTE — TELEPHONE ENCOUNTER
----- Message from Lauren Dean DNP sent at 6/3/2022  8:04 AM EDT -----  Pls inform pt/brother his CTA does not reveal an aneurysm but does reveal extensive atherosclerotic disease. This could be some cause for his left foot pain. He needs to increase atorvastatin to 40mg (2 tabs daily) plus aspirin 81mg every day. Smoking cessation. I have placed a referral to vascular for consult.

## 2022-06-06 NOTE — TELEPHONE ENCOUNTER
Patient's brother reached and made aware of results and message per Dr. Shaunna Humphries. Pt's brother verbalized understanding.

## 2022-06-13 DIAGNOSIS — E78.5 HYPERLIPIDEMIA, UNSPECIFIED HYPERLIPIDEMIA TYPE: ICD-10-CM

## 2022-06-13 DIAGNOSIS — R09.89 ABDOMINAL BRUIT: ICD-10-CM

## 2022-06-13 RX ORDER — ATORVASTATIN CALCIUM 40 MG/1
40 TABLET, FILM COATED ORAL DAILY
Qty: 90 TABLET | Refills: 1 | Status: SHIPPED | OUTPATIENT
Start: 2022-06-13 | End: 2022-06-21 | Stop reason: ALTCHOICE

## 2022-06-13 RX ORDER — ATORVASTATIN CALCIUM 20 MG/1
40 TABLET, FILM COATED ORAL DAILY
Qty: 90 TABLET | Refills: 0 | Status: CANCELLED | OUTPATIENT
Start: 2022-06-13

## 2022-06-13 NOTE — TELEPHONE ENCOUNTER
----- Message from [address] sent at 6/13/2022  9:21 AM EDT -----  Subject: Message to Provider    QUESTIONS  Information for Provider? Brother Moises Wayne states patient has not heard from   Vascular doctor yet. Please call to advise  ---------------------------------------------------------------------------  --------------  CALL BACK INFO  What is the best way for the office to contact you? OK to leave message on   voicemail  Preferred Call Back Phone Number? [phone]  ---------------------------------------------------------------------------  --------------  SCRIPT ANSWERS  Relationship to Patient? Other  Representative Name? Phani Tabares  Is the Representative on the appropriate HIPAA document in Epic?  Yes

## 2022-06-13 NOTE — TELEPHONE ENCOUNTER
ICD-10-CM ICD-9-CM    1. Abdominal bruit  R09.89 785.9    2.  Hyperlipidemia, unspecified hyperlipidemia type  E78.5 272.4 atorvastatin (LIPITOR) 40 mg tablet

## 2022-06-15 ENCOUNTER — TELEPHONE (OUTPATIENT)
Dept: INTERNAL MEDICINE CLINIC | Age: 62
End: 2022-06-15

## 2022-06-15 NOTE — TELEPHONE ENCOUNTER
Juan Nicolas, I sent in a refill for atorvastatin 40mg 90 tabs plus 1 refill on 6/13/2022. He needs to reach out to his pharmacy. I referred pt to Dr Bala Banegas. They dont take his insurance. Please correct referral for [employer] vascular.      Thank you

## 2022-06-15 NOTE — TELEPHONE ENCOUNTER
Jaren White, brother called. Eileen Conn he has been trying to get a refill for Atorvastatin sent in. Said AN had pt double up on the dosage and pt ran out on Monday. Also, vascular doctor to whom pt was referred doesn't take patient's insurance.      Please advise Frances Higgins at [phone]

## 2022-06-16 NOTE — TELEPHONE ENCOUNTER
Reached patient and informed him I contacted pharmacy and rx for atorvastatin 40 mg is ready for pickup. Referral was sent to [facility]. Patient states he will call back to write down info because he was out at the moment.     You have been referred to:  Hansel Tesfaye  [address] [place] [address],[address]  [address] [place] [address]  Phone: [phone]

## 2022-06-21 ENCOUNTER — OFFICE VISIT (OUTPATIENT)
Dept: INTERNAL MEDICINE CLINIC | Age: 62
End: 2022-06-21
Payer: MEDICAID

## 2022-06-21 VITALS
BODY MASS INDEX: 18.64 KG/M2 | TEMPERATURE: 97.8 F | WEIGHT: 116 LBS | HEIGHT: 66 IN | RESPIRATION RATE: 16 BRPM | HEART RATE: 67 BPM | OXYGEN SATURATION: 100 % | DIASTOLIC BLOOD PRESSURE: 79 MMHG | SYSTOLIC BLOOD PRESSURE: 182 MMHG

## 2022-06-21 DIAGNOSIS — G89.29 CHRONIC PAIN OF BOTH LOWER EXTREMITIES: ICD-10-CM

## 2022-06-21 DIAGNOSIS — I10 PRIMARY HYPERTENSION: Primary | ICD-10-CM

## 2022-06-21 DIAGNOSIS — I70.90 ATHEROSCLEROSIS OF ARTERIES: ICD-10-CM

## 2022-06-21 DIAGNOSIS — F17.200 CURRENT SMOKER: ICD-10-CM

## 2022-06-21 DIAGNOSIS — M79.604 CHRONIC PAIN OF BOTH LOWER EXTREMITIES: ICD-10-CM

## 2022-06-21 DIAGNOSIS — M79.605 CHRONIC PAIN OF BOTH LOWER EXTREMITIES: ICD-10-CM

## 2022-06-21 PROCEDURE — 99213 OFFICE O/P EST LOW 20 MIN: CPT | Performed by: NURSE PRACTITIONER

## 2022-06-21 RX ORDER — TRAMADOL HYDROCHLORIDE 50 MG/1
50 TABLET ORAL
Qty: 30 TABLET | Refills: 0 | Status: SHIPPED | OUTPATIENT
Start: 2022-06-21 | End: 2022-07-07 | Stop reason: SDUPTHER

## 2022-06-21 RX ORDER — ROSUVASTATIN CALCIUM 40 MG/1
40 TABLET, COATED ORAL
Qty: 90 TABLET | Refills: 1 | Status: SHIPPED | OUTPATIENT
Start: 2022-06-21 | End: 2022-08-29 | Stop reason: SDUPTHER

## 2022-06-21 NOTE — Clinical Note
Please call brother to schedule a nurse visit for the patient to check his blood pressure 7/7/2022 if possible. Need to confirm his medication is effective for reducing his blood pressure before clearing him for cataract surgery on 7/14/2022.   Thank you

## 2022-06-21 NOTE — PROGRESS NOTES
Chief Complaint   Patient presents with   Renea France Pre-op Exam     Left Eye Cataract Extraction 7/14 Dr. Ayad Croft     1. \"Have you been to the ER, urgent care clinic since your last visit? Hospitalized since your last visit? \" No    2. \"Have you seen or consulted any other health care providers outside of the [address] [place] [address] since your last visit? \" No     3. For patients aged 39-70: Has the patient had a colonoscopy / FIT/ Cologuard? No      If the patient is female:    4. For patients aged 41-77: Has the patient had a mammogram within the past 2 years? NA - based on age or sex      11. For patients aged 21-65: Has the patient had a pap smear?  NA - based on age or sex

## 2022-06-21 NOTE — Clinical Note
Please call Dr Parker Field office and request April (?) note and EMG results completed that time.  Thank you

## 2022-06-22 ENCOUNTER — TELEPHONE (OUTPATIENT)
Dept: INTERNAL MEDICINE CLINIC | Age: 62
End: 2022-06-22

## 2022-06-22 RX ORDER — AMLODIPINE BESYLATE 10 MG/1
10 TABLET ORAL DAILY
Qty: 90 TABLET | Refills: 1 | Status: SHIPPED | OUTPATIENT
Start: 2022-06-22 | End: 2022-08-12 | Stop reason: SDUPTHER

## 2022-06-22 NOTE — TELEPHONE ENCOUNTER
Austin Choi called (is on Perm to Release), he has some questions about medications that he wants to clarify the instructions for. He also wanted to clarify if Patient is supposed to be taking amlodipine or not, he states he has a note about it but there is not an active prescription for it. He is requesting a return call from a nurse and can be reached at [phone].   Please advise, thank you

## 2022-06-22 NOTE — TELEPHONE ENCOUNTER
Patient's brother reached and made aware to double up on the atorvastatin 40 mg until he runs out and then to start taking the rosuvastatin. He is to also take lisinopril 40 mg daily along with the amlodipine 10 mg she has sent into today. Pt's brother verbalized understanding.

## 2022-06-23 NOTE — PROGRESS NOTES
Subjective:   I am seeing Brigette Griffith 1960  is a pleasant WHITE/NON- male for preop exam.     Planned surgery: ***Cataract ***Glaucoma {RIGHT/LEFT:20690}. Date ***. Provider ***.    ROS: No recent infections, has been feeling well other than presenting surgical complaint. No CNS, cardiorespiratory or GI symptoms otherwise. Past Medical History:   Diagnosis Date    Cataract 3/7/2022    Cataract extraction status of eye, unspecified laterality 3/7/2022    Chronic pain in left foot 3/7/2022    Current smoker 3/7/2022    Daily consumption of alcohol 3/7/2022    Elevated LFTs 3/9/2022    Hepatitis C test positive 3/9/2022    Hyperkalemia 3/9/2022    Marijuana smoker 3/7/2022    Mental subnormality 3/7/2022    Primary hypertension 3/7/2022         Current Outpatient Medications on File Prior to Visit   Medication Sig Dispense Refill    lisinopriL (PRINIVIL, ZESTRIL) 40 mg tablet Take 1 Tablet by mouth daily. Indications: high blood pressure 90 Tablet 0     No current facility-administered medications on file prior to visit. No Known Allergies      Objective:   Visit Vitals  Visit Vitals  BP (!) 182/79   Pulse 67   Temp 97.8 °F (36.6 °C) (Temporal)   Resp 16   Ht 5' 6\" (1.676 m)   Wt 116 lb (52.6 kg)   SpO2 100%   BMI 18.72 kg/m²        The physical exam is unremarkable. Patient appears well, alert and oriented, pleasant and cooperative. Vitals as noted. Lungs are clear to auscultation. Heart sounds are normal.      No preop labs or imaging required. Assessment/Plan:       ICD-10-CM ICD-9-CM    1. Chronic pain of both lower extremities  M79.604 729.5 traMADoL (ULTRAM) 50 mg tablet    M79.605 338.29     G89.29     2. Atherosclerosis of arteries  I70.90 440.8 rosuvastatin (CRESTOR) 40 mg tablet   3. Primary hypertension  I10 401.9 amLODIPine (NORVASC) 10 mg tablet       Felt to be average risk for the planned procedure. No contraindications noted.   Routine post prophylaxis as per protocol. Patient {Actions; may/not:30491} proceed with planned surgery. All questions to be answered, risks and benefits to be explained, and consent to be obtained via surgeon and their staff. Total time: 20 minutes spent with the patient on counseling, answering questions, and/or coordination of care.        Dr Martha Burris, [facility]  [facility] of [address] [place] [address]

## 2022-06-23 NOTE — PROGRESS NOTES
[facility] of [id number] [place] [address], [address] [facility]  [phone] CRXMNG/[phone] fax    6/21/2022    Paige Malave 1960 is a pleasant WHITE/NON- male. He is a . He lives with a roommate. He is accompanied by his younger brother, Romeo Cervantes. His brother assists him with his needs to include transportation and appointments along with medical management. Past Medical History:   Diagnosis Date    Cataract 3/7/2022    Cataract extraction status of eye, unspecified laterality 3/7/2022    Chronic pain in left foot 3/7/2022    Current smoker 3/7/2022    Daily consumption of alcohol 3/7/2022    Elevated LFTs 3/9/2022    Hepatitis C test positive 3/9/2022    Hyperkalemia 3/9/2022    Marijuana smoker 3/7/2022    Mental subnormality 3/7/2022    Primary hypertension 3/7/2022     Past Surgical History:   Procedure Laterality Date    HX CATARACT REMOVAL Right     4 years ago     Current Outpatient Medications   Medication Sig    amLODIPine (NORVASC) 10 mg tablet Take 1 Tablet by mouth daily.  traMADoL (ULTRAM) 50 mg tablet Take 1 Tablet by mouth nightly as needed for Pain for up to 30 days. Max Daily Amount: 50 mg. Indications: neuropathic pain    rosuvastatin (CRESTOR) 40 mg tablet Take 1 Tablet by mouth nightly.  lisinopriL (PRINIVIL, ZESTRIL) 40 mg tablet Take 1 Tablet by mouth daily. Indications: high blood pressure     No current facility-administered medications for this visit. Allergies and Intolerances:   No Known Allergies  Family History:   History reviewed. No pertinent family history. Social History:   He  reports that he has been smoking. He started smoking about 40 years ago. He has been smoking about 1.00 pack per day.  He has never used smokeless tobacco.   Social History     Substance and Sexual Activity   Alcohol Use Yes    Alcohol/week: 14.0 standard drinks    Types: 14 Cans of beer per week    Comment: 2 beers daily Immunization History: There is no immunization history on file for this patient. Todays concerns:  1. Left foot pain/Ender Leg pain. Left foot pain started Nov 2021. Discomfort located on bottom of foot. Feels like needles, burning at times. Worse in the morning, improves throughout the day. Sometimes pain will start when walking but will resolve with rest. Saw Dr Erika David, podiatry. Received 3 cortisone injections with no improvement of sx. Completed EMG 4/12/22 with Dr Hipolito Brock. 2. Hypertension. Taking lisinopril everyday. Denies H/A, CP, Sob, fatigue. 3. Continues to smoke cigs and weed. Consumes 4-5 cans of beer per day. 4. Cataract Left eye. 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 Scheduled for surgery 7/14/22. Needs preop today. 5. Arthrosclerosis. Taking lipitor 40mg 2 tabs daily. Vascular does not take insurance. Have not rec'd an alternate vascular provider name or number. 6. Hep C. Seeing [facility]. Review of Systems:   As above included in HPI. Review of Data:  N/a    Physical:   Visit Vitals  BP (!) 182/79   Pulse 67   Temp 97.8 °F (36.6 °C) (Temporal)   Resp 16   Ht 5' 6\" (1.676 m)   Wt 116 lb (52.6 kg)   SpO2 100%   BMI 18.72 kg/m²      Wt Readings from Last 3 Encounters:   06/21/22 116 lb (52.6 kg)   05/23/22 114 lb (51.7 kg)   03/07/22 121 lb 9.6 oz (55.2 kg)       Exam:   Physical Exam  Constitutional:       Appearance: Normal appearance. Comments: Thin build   HENT:      Head: Normocephalic and atraumatic. Right Ear: External ear normal.      Left Ear: External ear normal.   Eyes:      Extraocular Movements: Extraocular movements intact. Conjunctiva/sclera: Conjunctivae normal.   Neck:      Vascular: No carotid bruit. Cardiovascular:      Rate and Rhythm: Normal rate and regular rhythm. Heart sounds: Normal heart sounds.       Comments: No edema noted to ender LEs  Pulmonary:      Effort: Pulmonary effort is normal. No respiratory distress. Breath sounds: Normal breath sounds. No wheezing. Musculoskeletal:         General: Normal range of motion. Cervical back: Normal range of motion and neck supple. Skin:     General: Skin is warm and dry. Neurological:      General: No focal deficit present. Mental Status: He is alert and oriented to person, place, and time. Comments: Brother has to help pt with answering some questions. Pt is alert and oriented, appears to be mentally slow. He is slow to answer questions at times. Psychiatric:         Attention and Perception: Attention normal.         Mood and Affect: Mood normal.         Speech: Speech normal.         Behavior: Behavior normal.      Comments: Appears mentally slow (mild)        Body mass index is 18.72 kg/m². Plan:      ICD-10-CM ICD-9-CM    1. Primary hypertension  I10 401.9 amLODIPine (NORVASC) 10 mg tablet   2. Chronic pain of both lower extremities  M79.604 729.5 traMADoL (ULTRAM) 50 mg tablet    M79.605 338.29     G89.29     3. Atherosclerosis of arteries  I70.90 440.8 rosuvastatin (CRESTOR) 40 mg tablet   4. Current smoker  F17.200 305.1        -Hypertension  BP not controlled  Continue Lisinopril 40mg every day  Initiate amlodipine 10mg every day   Limit sodium in diet to 2g/d  Increase water intake  Smoking and alcohol cessation encouraged  Pt does not have access to a BP machine     -Abdominal Bruit  Ruled out by CTA    -Arthrosclerosis  Continue Lipitor 40mg 2 tabs til all gone then start crestor 40mg.  Pts insurance does not cover lipitor 80mg   Continue ASA therapy daily  Smoking cessation discussed  I suspect pt may be experiencing claudication to his LEs  Pts brother was given contact info for [facility]    Considered treatment with Pletal for possible claudication, in hopes to relieve some of patient's pain to his bilateral lower extremities, until patient consults with vascular but hesitant to do this as we did not know the name of patient's hep C treatment medication. Attempting to obtain this information but unable to locate under Care Everywhere. We will hold off on this treatment until patient sees vascular. 5/31/2022 CTA impression:  1. Normal caliber thoracic and abdominal aorta. No evidence of aneurysm or dissection. Measurements provided above.   2.  Diffuse peripheral predominantly wall calcification with mild degree of atheromatous plaque of the aorta. -Mild celiac trunk narrowing with mild to moderate proximal splenic artery narrowing.  -Moderate diameter segmental stenosis proximal superior mesenteric artery as above. -Mild left and at least mild right proximal renal artery narrowing as above. 3. Extensive atherosclerotic irregularities of the iliac and partially included femoral vessels as above. -Multifocal common iliac stenosis. -Severe proximal right external iliac and at least moderate left external iliac stenosis. -Additional variable internal and external iliac arterial stenosis multifocal  otherwise as above. -Mild to moderate right and multifocal moderate left superficial femoral artery narrowing due to mixed atherosclerotic disease. 4. Hepatic steatosis. -Chronic pain Ariel LEs  2/2022 MRI revealed normal left foot; possible strain to left ankle. Plantar fasciitis ruled out on MRI  He received 3 cortisone injections via podiatry, Dr. Angel Garcia   4/2022 EMG completed by Dr Ceasar Anguiano, neuro, requested results again. Continue to follow-up with podiatrist   Initiate Tramadol. Instructed brother and pt ie scheduled 2 drug. Discussed addiction properties if taken for an extended period of time. Will initiate for short term, until pt sees vascular. Dont consume alcohol and take tramadol due to possible side effects.      DDx:  Alcoholic neuropathy  PAD  Claudication  Spinal radiculopathy     -Cataract left eye (patient reported)  Scheduled for cataract removal on 7/14/22  Need to get BP under control  Patient needs to return to the office for nurse visit in approximately 2 weeks for a blood pressure recheck.     -Current smoker  Smoking cessation encouraged    -Hepatitis C  Unsure what treatment patient is currently under. Brother does not know the name of medication. I reviewed GI notes available in [address] [place] [address], unable to locate name of medication. Reviewed medication and completed medication reconciliation with the patient. Reviewed side effects of medications with the patient. Questions were answered and patient verb understanding. Follow up 3 months no labs      UPDATE 7/7/2022:  BP today (nurse visit) 139/71. Pt is cleared for Left eye cataract with Topical/Mac anesthesia for Phaco w/IOL under Dr Dilcia Stephens on 7/14/2022.       Dr. Deon Thapa, AGNP-C, DNP  [facility] of [address] [place] [address]

## 2022-06-24 ENCOUNTER — TELEPHONE (OUTPATIENT)
Dept: INTERNAL MEDICINE CLINIC | Age: 62
End: 2022-06-24

## 2022-06-24 NOTE — TELEPHONE ENCOUNTER
----- Message from Vanideanna Rudd sent at 6/24/2022 12:10 PM EDT -----  Subject: Appointment Request    Reason for Call: Routine Existing Condition Follow Up    QUESTIONS  Type of Appointment? Established Patient  Reason for appointment request? No appointments available during search  Additional Information for Provider? Pt needs a blood pressure check before 07/14 due to his surgery and he needs to be cleared would like to be seen on 07/12, they have him scheduled 08/12 but that is after his surgery. ---------------------------------------------------------------------------  --------------  Cuauhtemoc Pitman INFO  What is the best way for the office to contact you? OK to leave message on   voicemail  Preferred Call Back Phone Number? [phone]  ---------------------------------------------------------------------------  --------------  SCRIPT ANSWERS  Relationship to Patient? Other  Representative Name? Yolanda Alfonso   Additional information verified (besides Name and Date of Birth)? Address  Is this follow up request related to routine Diabetes Management? No  Have you been diagnosed with COVID-19 in the past 10 days? No  (Service Expert - click yes below to proceed with Yi De As Usual   Scheduling)?  Yes

## 2022-06-24 NOTE — TELEPHONE ENCOUNTER
Patient's brother reached and made aware he needs to be taking the lisinopril and amlodipine daily and recording the readings. Pt's brother states he doesn't live with his brother nor does his brother own a BP cuff. Informed brother he could buy a wrist cuff for pt to take and monitor BP. Patients brother stated he will records his reading whenever he can and let us know to see if the meds are effective. He wants to be sure his brother is going to be cleared for surgery on next month.

## 2022-06-24 NOTE — TELEPHONE ENCOUNTER
----- Message from Kavitha Shukla sent at 6/24/2022  9:22 AM EDT -----  Subject: Message to Provider    QUESTIONS  Information for Provider? Pt brotheer called in wanting to know what his   blood pressure has to be in order for him to get surgery. Yesterday it was   177/80. Please call them back to let them know. He just started taking   amlodipine he is taking it 1x in the morning.  ---------------------------------------------------------------------------  --------------  CALL BACK INFO  What is the best way for the office to contact you? OK to leave message on   voicemail  Preferred Call Back Phone Number? [phone]  ---------------------------------------------------------------------------  --------------  SCRIPT ANSWERS  Relationship to Patient? Sibling  Representative Name? Christen  Is the Representative on the appropriate HIPAA document in Epic?  Yes

## 2022-07-07 ENCOUNTER — CLINICAL SUPPORT (OUTPATIENT)
Dept: INTERNAL MEDICINE CLINIC | Age: 62
End: 2022-07-07

## 2022-07-07 VITALS — HEART RATE: 80 BPM | DIASTOLIC BLOOD PRESSURE: 71 MMHG | OXYGEN SATURATION: 99 % | SYSTOLIC BLOOD PRESSURE: 139 MMHG

## 2022-07-07 DIAGNOSIS — M79.605 CHRONIC PAIN OF BOTH LOWER EXTREMITIES: ICD-10-CM

## 2022-07-07 DIAGNOSIS — G89.29 CHRONIC PAIN OF BOTH LOWER EXTREMITIES: ICD-10-CM

## 2022-07-07 DIAGNOSIS — M79.604 CHRONIC PAIN OF BOTH LOWER EXTREMITIES: ICD-10-CM

## 2022-07-07 DIAGNOSIS — I10 PRIMARY HYPERTENSION: Primary | ICD-10-CM

## 2022-07-07 RX ORDER — TRAMADOL HYDROCHLORIDE 50 MG/1
50 TABLET ORAL
Qty: 30 TABLET | Refills: 0 | Status: SHIPPED | OUTPATIENT
Start: 2022-07-07 | End: 2022-07-08 | Stop reason: SDUPTHER

## 2022-07-07 NOTE — PROGRESS NOTES
Pt continues to suffer with ender leg pain. EMG was negative. He was given tramadol 50mg 6/21/22 to take 1 tab at night prn. Due to pain level, pt doubled the dose to 2 tabs at night. He is seeking a refill of tramadol. Will give an additional 30 tabs to take 1 tab NIGHTLY as needed. No more tramadol will be prescribed. He has a f/u appt with vascular 8/9/22. Requested Prescriptions     Signed Prescriptions Disp Refills    traMADoL (ULTRAM) 50 mg tablet 30 Tablet 0     Sig: Take 1 Tablet by mouth nightly as needed for Pain for up to 30 days. Max Daily Amount: 50 mg.  Indications: neuropathic pain

## 2022-07-08 ENCOUNTER — DOCUMENTATION ONLY (OUTPATIENT)
Dept: INTERNAL MEDICINE CLINIC | Age: 62
End: 2022-07-08

## 2022-07-08 ENCOUNTER — TELEPHONE (OUTPATIENT)
Dept: INTERNAL MEDICINE CLINIC | Age: 62
End: 2022-07-08

## 2022-07-08 DIAGNOSIS — M79.604 CHRONIC PAIN OF BOTH LOWER EXTREMITIES: ICD-10-CM

## 2022-07-08 DIAGNOSIS — M79.605 CHRONIC PAIN OF BOTH LOWER EXTREMITIES: ICD-10-CM

## 2022-07-08 DIAGNOSIS — G89.29 CHRONIC PAIN OF BOTH LOWER EXTREMITIES: ICD-10-CM

## 2022-07-08 RX ORDER — TRAMADOL HYDROCHLORIDE 50 MG/1
50 TABLET ORAL
Qty: 30 TABLET | Refills: 0 | Status: SHIPPED | OUTPATIENT
Start: 2022-07-08 | End: 2022-08-03 | Stop reason: SDUPTHER

## 2022-07-08 NOTE — PROGRESS NOTES
Alisha Hensley [place] Madelyn Mow Pls forward note to eye MD-surgical clearance. Thank you     -------------    Pre op Noted faxed to [facility] with confirmation received.

## 2022-07-08 NOTE — TELEPHONE ENCOUNTER
Increased tramadol to 2x/d prn. Pt has an appt with vascular in Aug. Will refill x1 if pt runs out of med prior to his vascular appt. Requested Prescriptions     Signed Prescriptions Disp Refills    traMADoL (ULTRAM) 50 mg tablet 30 Tablet 0     Sig: Take 1 Tablet by mouth two (2) times daily as needed for Pain for up to 30 days. Max Daily Amount: 100 mg.  Indications: neuropathic pain     Authorizing Provider: Sheeba Magdaleno

## 2022-07-08 NOTE — TELEPHONE ENCOUNTER
Brother calling says pharmacy won't fill the tramadol saying it is too early. Wants to know if AN will rewrite the rx to say 2 a day. Says he has been taking 2 a day since pain is so bad.

## 2022-07-19 ENCOUNTER — OFFICE VISIT (OUTPATIENT)
Dept: VASCULAR SURGERY | Age: 62
End: 2022-07-19
Payer: MEDICAID

## 2022-07-19 VITALS
SYSTOLIC BLOOD PRESSURE: 122 MMHG | WEIGHT: 115.96 LBS | HEIGHT: 66 IN | DIASTOLIC BLOOD PRESSURE: 62 MMHG | BODY MASS INDEX: 18.64 KG/M2 | OXYGEN SATURATION: 98 % | HEART RATE: 70 BPM

## 2022-07-19 DIAGNOSIS — I73.9 PAD (PERIPHERAL ARTERY DISEASE) (HCC): Primary | ICD-10-CM

## 2022-07-19 DIAGNOSIS — F17.200 SMOKER: ICD-10-CM

## 2022-07-19 PROCEDURE — 99204 OFFICE O/P NEW MOD 45 MIN: CPT | Performed by: SURGERY

## 2022-07-19 NOTE — PROGRESS NOTES
1. Have you been to the ER, urgent care clinic since your last visit? No      Hospitalized since your last visit? No     2. Have you seen or consulted any other health care providers outside of the [address] [place] [address] since your last visit? Include any pap smears or colon screening.   No

## 2022-07-19 NOTE — PROGRESS NOTES
Vascular Surgery Office Visit    61 y/o M referred by his PCM initially for an abdominal bruit, found to have extensive aortoiliac disease on CTA. He reports LLE rest pain. He works as a  and is unable to work due to pain and need for frequent breaks. No wounds. He is a smoker. He is accompanied by his brother who assists him with his medical care. The pt recently lost his job and his brother has been assisting with possibly obtaining disability benefits. Past Medical History:   Diagnosis Date    Cataract 3/7/2022    Cataract extraction status of eye, unspecified laterality 3/7/2022    Chronic pain in left foot 3/7/2022    Current smoker 3/7/2022    Daily consumption of alcohol 3/7/2022    Elevated LFTs 3/9/2022    Hepatitis C test positive 3/9/2022    Hyperkalemia 3/9/2022    Marijuana smoker 3/7/2022    Mental subnormality 3/7/2022    Primary hypertension 3/7/2022     Past Surgical History:   Procedure Laterality Date    HX CATARACT REMOVAL Right     4 years ago       No current facility-administered medications on file prior to encounter. Current Outpatient Medications on File Prior to Encounter   Medication Sig Dispense Refill    traMADoL (ULTRAM) 50 mg tablet Take 1 Tablet by mouth two (2) times daily as needed for Pain for up to 30 days. Max Daily Amount: 100 mg. Indications: neuropathic pain 30 Tablet 0    amLODIPine (NORVASC) 10 mg tablet Take 1 Tablet by mouth daily. 90 Tablet 1    rosuvastatin (CRESTOR) 40 mg tablet Take 1 Tablet by mouth nightly. 90 Tablet 1    lisinopriL (PRINIVIL, ZESTRIL) 40 mg tablet Take 1 Tablet by mouth daily. Indications: high blood pressure 90 Tablet 0     No Known Allergies  Social History     Tobacco Use    Smoking status: Every Day     Packs/day: 1.00     Types: Cigarettes     Start date: 1982    Smokeless tobacco: Never   Substance Use Topics    Alcohol use:  Yes     Alcohol/week: 14.0 standard drinks     Types: 14 Cans of beer per week     Comment: 2 beers daily Drug use: Yes     Types: Marijuana     History reviewed. No pertinent family history. PE:   Height 5' 6\" (1.676 m), weight 111 lb 11.2 oz (50.7 kg). NAD  Thin M  A&O x 3  BLE warm  Palpable, diminished, R femoral pulse  No pedal pulses appreciated  L foot with dependent rubor  No wounds or tissue loss. There is atrophy bilaterally    Imaging:   LLE arterial duplex: Likely tandem stenoses of the SFA. AK pop short segment occlusion with reconstitution. TP trunk occlusion. L NAVJOT 0.31, toe pressure 14. CTA a/p: Extensive aortoiliac disease with tandem moderate stenoses of the bilateral JENNY and EIA. IMPRESSION:   Critical LLE ischemia with progressive rest pain, with expected multilevel aortoiliac, fempop, and tibial disease. Active smoker. PLAN:   Aortogram with LLE angiography, possible intervention. Risks, benefits and alternatives were discussed with the patient including but not limited to bleeding, infection, injury to surrounding structures including nerves, distal embolization requiring further procedures, recurrent disease requiring repeat intervention especially in the setting of continued smoking. The patient expressed understanding after the opportunity to ask questions, and consented to the procedure. Chao Santizo MD  Vascular Surgeon      I spent approximately 45 minutes on this patient encounter, which includes but is not limited to performing a history and physical exam, reviewing primary care and consultant documentation, reviewing imaging/studies, and speaking with the patient and his brother regarding my impression and plan.

## 2022-07-21 ENCOUNTER — TELEPHONE (OUTPATIENT)
Dept: INTERNAL MEDICINE CLINIC | Age: 62
End: 2022-07-21

## 2022-07-21 RX ORDER — VELPATASVIR AND SOFOSBUVIR 100; 400 MG/1; MG/1
TABLET, FILM COATED ORAL
COMMUNITY
Start: 2022-06-13 | End: 2022-08-30

## 2022-07-21 NOTE — TELEPHONE ENCOUNTER
Pt's brother César Lemos on phi calling to check pt's medication list.     He said pt is also taking the following hepititis C medication prescribe about 2 months ago by [facility] Dr Coleen Garcia. It you want to add it to pt's med list    Kentmarybel taking the generic.  He things the mg is 400mg/100mg

## 2022-07-22 ENCOUNTER — HOSPITAL ENCOUNTER (OUTPATIENT)
Age: 62
Setting detail: OBSERVATION
Discharge: HOME | End: 2022-07-23
Attending: SURGERY | Admitting: SURGERY
Payer: MEDICAID

## 2022-07-22 DIAGNOSIS — I73.9 PAD (PERIPHERAL ARTERY DISEASE) (HCC): ICD-10-CM

## 2022-07-22 LAB
ACT BLD: 196 SECS (ref 79–138)
ACT BLD: 289 SECS (ref 79–138)
ACT BLD: 294 SECS (ref 79–138)
GLUCOSE BLD STRIP.AUTO-MCNC: 105 MG/DL (ref 70–110)
HISTORY CHECKED?,CKHIST: NORMAL

## 2022-07-22 PROCEDURE — 85347 COAGULATION TIME ACTIVATED: CPT

## 2022-07-22 PROCEDURE — 74011000250 HC RX REV CODE- 250: Performed by: SURGERY

## 2022-07-22 PROCEDURE — G0378 HOSPITAL OBSERVATION PER HR: HCPCS

## 2022-07-22 PROCEDURE — 99152 MOD SED SAME PHYS/QHP 5/>YRS: CPT | Performed by: SURGERY

## 2022-07-22 PROCEDURE — 77030004561 HC CATH ANGI DX COBRA ANGI -B: Performed by: SURGERY

## 2022-07-22 PROCEDURE — 37223 HC PLC STNT ILIAC +/- PTA ADDL: CPT | Performed by: SURGERY

## 2022-07-22 PROCEDURE — C1887 CATHETER, GUIDING: HCPCS | Performed by: SURGERY

## 2022-07-22 PROCEDURE — 96374 THER/PROPH/DIAG INJ IV PUSH: CPT

## 2022-07-22 PROCEDURE — C1725 CATH, TRANSLUMIN NON-LASER: HCPCS | Performed by: SURGERY

## 2022-07-22 PROCEDURE — 74011250636 HC RX REV CODE- 250/636: Performed by: SURGERY

## 2022-07-22 PROCEDURE — 96376 TX/PRO/DX INJ SAME DRUG ADON: CPT

## 2022-07-22 PROCEDURE — 37224 HC PTA FEMPOP UNI: CPT | Performed by: SURGERY

## 2022-07-22 PROCEDURE — C1894 INTRO/SHEATH, NON-LASER: HCPCS | Performed by: SURGERY

## 2022-07-22 PROCEDURE — 77030013519 HC DEV INFL BASIX MRTM -B: Performed by: SURGERY

## 2022-07-22 PROCEDURE — C1760 CLOSURE DEV, VASC: HCPCS | Performed by: SURGERY

## 2022-07-22 PROCEDURE — 77030013744: Performed by: SURGERY

## 2022-07-22 PROCEDURE — 74011250637 HC RX REV CODE- 250/637: Performed by: SURGERY

## 2022-07-22 PROCEDURE — 74011000636 HC RX REV CODE- 636: Performed by: SURGERY

## 2022-07-22 PROCEDURE — C1769 GUIDE WIRE: HCPCS | Performed by: SURGERY

## 2022-07-22 PROCEDURE — 75710 ARTERY X-RAYS ARM/LEG: CPT | Performed by: SURGERY

## 2022-07-22 PROCEDURE — C1874 STENT, COATED/COV W/DEL SYS: HCPCS | Performed by: SURGERY

## 2022-07-22 PROCEDURE — 99153 MOD SED SAME PHYS/QHP EA: CPT | Performed by: SURGERY

## 2022-07-22 PROCEDURE — 86920 COMPATIBILITY TEST SPIN: CPT

## 2022-07-22 PROCEDURE — 76937 US GUIDE VASCULAR ACCESS: CPT | Performed by: SURGERY

## 2022-07-22 PROCEDURE — 82962 GLUCOSE BLOOD TEST: CPT

## 2022-07-22 PROCEDURE — 75625 CONTRAST EXAM ABDOMINL AORTA: CPT | Performed by: SURGERY

## 2022-07-22 PROCEDURE — 86900 BLOOD TYPING SEROLOGIC ABO: CPT

## 2022-07-22 PROCEDURE — 37221 HC PLC STNT ILIAC +/- PTA INIT: CPT | Performed by: SURGERY

## 2022-07-22 DEVICE — VIABAHN BX BALLOON EXP ENDO 8MMX39MM 7FR 135CMCATH HEPARIN
Type: IMPLANTABLE DEVICE | Status: FUNCTIONAL
Brand: GORE VIABAHN VBX BALLOON EXPANDABLE ENDO

## 2022-07-22 DEVICE — VIABAHN SX ENDO HEPARIN 18 RO 7MMX10CM 7FR120CM CATH
Type: IMPLANTABLE DEVICE | Status: FUNCTIONAL
Brand: GORE VIABAHN ENDOPROSTHESIS WITH HEPARIN

## 2022-07-22 DEVICE — VIABAHN SX ENDO HEPARIN 18 RO 7MMX5CM 7FR 120CM CATH
Type: IMPLANTABLE DEVICE | Status: FUNCTIONAL
Brand: GORE VIABAHN ENDOPROSTHESIS WITH HEPARIN

## 2022-07-22 DEVICE — VIABAHN BX BALLOON EXP ENDO 7MMX39MM 7FR 135CMCATH HEPARIN
Type: IMPLANTABLE DEVICE | Status: FUNCTIONAL
Brand: GORE VIABAHN VBX BALLOON EXPANDABLE ENDO

## 2022-07-22 RX ORDER — NITROGLYCERIN 40 MG/100ML
INJECTION INTRAVENOUS
Status: COMPLETED | OUTPATIENT
Start: 2022-07-22 | End: 2022-07-22

## 2022-07-22 RX ORDER — CEFAZOLIN SODIUM 1 G/3ML
INJECTION, POWDER, FOR SOLUTION INTRAMUSCULAR; INTRAVENOUS AS NEEDED
Status: DISCONTINUED | OUTPATIENT
Start: 2022-07-22 | End: 2022-07-22 | Stop reason: HOSPADM

## 2022-07-22 RX ORDER — HYDROMORPHONE HYDROCHLORIDE 2 MG/ML
0.2 INJECTION, SOLUTION INTRAMUSCULAR; INTRAVENOUS; SUBCUTANEOUS
Status: DISCONTINUED | OUTPATIENT
Start: 2022-07-22 | End: 2022-07-22

## 2022-07-22 RX ORDER — HYDROMORPHONE HYDROCHLORIDE 2 MG/ML
1 INJECTION, SOLUTION INTRAMUSCULAR; INTRAVENOUS; SUBCUTANEOUS
Status: DISCONTINUED | OUTPATIENT
Start: 2022-07-22 | End: 2022-07-23 | Stop reason: HOSPADM

## 2022-07-22 RX ORDER — ROSUVASTATIN CALCIUM 20 MG/1
40 TABLET, COATED ORAL
Status: DISCONTINUED | OUTPATIENT
Start: 2022-07-22 | End: 2022-07-23 | Stop reason: HOSPADM

## 2022-07-22 RX ORDER — HYDROMORPHONE HYDROCHLORIDE 2 MG/ML
1 INJECTION, SOLUTION INTRAMUSCULAR; INTRAVENOUS; SUBCUTANEOUS ONCE
Status: COMPLETED | OUTPATIENT
Start: 2022-07-22 | End: 2022-07-22

## 2022-07-22 RX ORDER — FENTANYL CITRATE 50 UG/ML
INJECTION, SOLUTION INTRAMUSCULAR; INTRAVENOUS AS NEEDED
Status: DISCONTINUED | OUTPATIENT
Start: 2022-07-22 | End: 2022-07-22 | Stop reason: HOSPADM

## 2022-07-22 RX ORDER — HEPARIN SODIUM 200 [USP'U]/100ML
INJECTION, SOLUTION INTRAVENOUS
Status: COMPLETED | OUTPATIENT
Start: 2022-07-22 | End: 2022-07-22

## 2022-07-22 RX ORDER — GUAIFENESIN 100 MG/5ML
81 LIQUID (ML) ORAL DAILY
Status: DISCONTINUED | OUTPATIENT
Start: 2022-07-23 | End: 2022-07-23 | Stop reason: HOSPADM

## 2022-07-22 RX ORDER — AMLODIPINE BESYLATE 10 MG/1
10 TABLET ORAL DAILY
Status: DISCONTINUED | OUTPATIENT
Start: 2022-07-22 | End: 2022-07-23 | Stop reason: HOSPADM

## 2022-07-22 RX ORDER — CLOPIDOGREL 300 MG/1
300 TABLET, FILM COATED ORAL ONCE
Status: COMPLETED | OUTPATIENT
Start: 2022-07-22 | End: 2022-07-22

## 2022-07-22 RX ORDER — SODIUM CHLORIDE 9 MG/ML
125 INJECTION, SOLUTION INTRAVENOUS CONTINUOUS
Status: DISPENSED | OUTPATIENT
Start: 2022-07-22 | End: 2022-07-23

## 2022-07-22 RX ORDER — VELPATASVIR AND SOFOSBUVIR 100; 400 MG/1; MG/1
1 TABLET, FILM COATED ORAL
Status: DISCONTINUED | OUTPATIENT
Start: 2022-07-22 | End: 2022-07-23 | Stop reason: HOSPADM

## 2022-07-22 RX ORDER — CLOPIDOGREL BISULFATE 75 MG/1
75 TABLET ORAL DAILY
Status: DISCONTINUED | OUTPATIENT
Start: 2022-07-23 | End: 2022-07-23 | Stop reason: HOSPADM

## 2022-07-22 RX ORDER — MIDAZOLAM HYDROCHLORIDE 1 MG/ML
INJECTION, SOLUTION INTRAMUSCULAR; INTRAVENOUS AS NEEDED
Status: DISCONTINUED | OUTPATIENT
Start: 2022-07-22 | End: 2022-07-22 | Stop reason: HOSPADM

## 2022-07-22 RX ORDER — LIDOCAINE HYDROCHLORIDE 10 MG/ML
INJECTION, SOLUTION EPIDURAL; INFILTRATION; INTRACAUDAL; PERINEURAL AS NEEDED
Status: DISCONTINUED | OUTPATIENT
Start: 2022-07-22 | End: 2022-07-22 | Stop reason: HOSPADM

## 2022-07-22 RX ORDER — MORPHINE SULFATE 10 MG/ML
INJECTION, SOLUTION INTRAMUSCULAR; INTRAVENOUS AS NEEDED
Status: DISCONTINUED | OUTPATIENT
Start: 2022-07-22 | End: 2022-07-22 | Stop reason: HOSPADM

## 2022-07-22 RX ORDER — OXYCODONE HYDROCHLORIDE 5 MG/1
5 TABLET ORAL
Status: DISCONTINUED | OUTPATIENT
Start: 2022-07-22 | End: 2022-07-23 | Stop reason: HOSPADM

## 2022-07-22 RX ORDER — HEPARIN SODIUM 1000 [USP'U]/ML
INJECTION, SOLUTION INTRAVENOUS; SUBCUTANEOUS AS NEEDED
Status: DISCONTINUED | OUTPATIENT
Start: 2022-07-22 | End: 2022-07-22 | Stop reason: HOSPADM

## 2022-07-22 RX ORDER — LABETALOL HYDROCHLORIDE 5 MG/ML
INJECTION, SOLUTION INTRAVENOUS AS NEEDED
Status: DISCONTINUED | OUTPATIENT
Start: 2022-07-22 | End: 2022-07-22 | Stop reason: HOSPADM

## 2022-07-22 RX ADMIN — HYDROMORPHONE HYDROCHLORIDE 1 MG: 2 INJECTION, SOLUTION INTRAMUSCULAR; INTRAVENOUS; SUBCUTANEOUS at 17:08

## 2022-07-22 RX ADMIN — HYDROMORPHONE HYDROCHLORIDE 0.2 MG: 2 INJECTION, SOLUTION INTRAMUSCULAR; INTRAVENOUS; SUBCUTANEOUS at 12:42

## 2022-07-22 RX ADMIN — HYDROMORPHONE HYDROCHLORIDE 1 MG: 2 INJECTION, SOLUTION INTRAMUSCULAR; INTRAVENOUS; SUBCUTANEOUS at 13:40

## 2022-07-22 RX ADMIN — ROSUVASTATIN CALCIUM 40 MG: 20 TABLET, COATED ORAL at 21:16

## 2022-07-22 RX ADMIN — CLOPIDOGREL BISULFATE 300 MG: 300 TABLET, FILM COATED ORAL at 13:40

## 2022-07-22 RX ADMIN — SODIUM CHLORIDE 125 ML/HR: 9 INJECTION, SOLUTION INTRAVENOUS at 17:00

## 2022-07-22 RX ADMIN — VELPATASVIR AND SOFOSBUVIR 1 TABLET: 100; 400 TABLET, FILM COATED ORAL at 21:17

## 2022-07-22 RX ADMIN — HYDROMORPHONE HYDROCHLORIDE 1 MG: 2 INJECTION, SOLUTION INTRAMUSCULAR; INTRAVENOUS; SUBCUTANEOUS at 21:24

## 2022-07-22 NOTE — PROGRESS NOTES
Bedside and Verbal shift change report received from [address] [place] [address] (offgoing nurse). Report included the following information SBAR, Kardex, Intake/Output, Recent Results, and Cardiac Rhythm NSR .      2000: AOX4, on room air, resting in bed, IVF infusing well; roa discontinued as ordered; v/s checked, shift assessment done; dressing on right groin dry and intact, no swelling, no bleeding noted; instructed to call at all times with needs    2124: pain med given as requested for c/o left foot 8/10, characterized as aching    2309: v/s checked; reassessment done; no changes noted from previous assessment    0230: sleeping; needs within reach    0356: pain med given- see flowsheet    0420: resting comfortably    0620: needs within reach; left leg warm, pulses noted    0700: Bedside and Verbal shift change report given to Gigi Michaud RN (oncoming nurse) by Tc Jean Baptiste RN (offgoing nurse). Report included the following information SBAR, Kardex, Procedure Summary, Recent Results, and Cardiac Rhythm NSR .       Wound Prevention Checklist    Patient: Jeannie Mojica (53 y.o. male)  Date: 7/23/2022  Diagnosis: PAD (peripheral artery disease) ([facility]) [I73.9]  PVD (peripheral vascular disease) ([facility] [I73.9] <principal problem not specified>    Precautions:         []  Heel prevention boots placed on patient    [x]  Patient turned q2h during shift    []  Lift team ordered    [x]  Patient on [facility] bed/Specialty bed    []  Each Wound is documented during shift (Stage, Color, drainage, odor, measurements, and dressings)    [x]  Dual skin checks done at bedside during shift report with Gigi Jean Baptiste RN

## 2022-07-22 NOTE — Clinical Note
Left femoral artery. Accessed unsuccessfully. Micropuncture needle used. Using fluoro and ultrasound guidance. Number of attempts =  1. Attempted access on left femoral artery but had a hard time advancing 4F Micropuncture sheath.

## 2022-07-22 NOTE — Clinical Note
Vessel: left iliac, CFA, PFA, SFA, popliteal, PTA, peroneal, EDDIE and dorsalis pedis. Power injection to the artery. Multiple views taken. PSI = 800. Rate of rise = 0.5 sec. Injection rate = 4 mL/sec. Total injected volume = 8 mL.

## 2022-07-22 NOTE — Clinical Note
Contrast Dose Calculator:   Patient's age: 62.   Patient's sex: Male. Patient weight (kg) = 51. Creatinine level (mg/dL) = 1.12. Creatinine clearance (mL/min): 50.6. Max Contrast dose per Creatinine Cl calculator = 113.84 mL.

## 2022-07-22 NOTE — PROGRESS NOTES
TRANSFER - IN REPORT:    Verbal report received from GUILLERMO Barkley(name) on Lety Half  being received from Cath Lab(unit) for routine post - op      Report consisted of patients Situation, Background, Assessment and   Recommendations(SBAR). Information from the following report(s) SBAR, Procedure Summary, Intake/Output, and MAR was reviewed with the receiving nurse. Opportunity for questions and clarification was provided. Assessment completed upon patients arrival to unit and care assumed.

## 2022-07-22 NOTE — Clinical Note
Vessel: left popliteal, PTA, peroneal, EDDIE and dorsalis pedis. Hand injection to the artery. Multiple views taken.

## 2022-07-22 NOTE — ROUTINE PROCESS
[id number]  Cath holding summary     Patient escorted to cath holding from waiting area ambulatory, alert and oriented x 4, voicing no complaints. Changed into gown and placed on monitor. NPO since MN. Lab results, med rec and H&P reviewed on chart. PIV x 1 inserted without difficulty. Family to bedside. <-------- Click here to INCLUDE CoVID-19 Discharge Instructions

## 2022-07-22 NOTE — Clinical Note
Vessel: bilateral iliac, CFA and PFAiliac, CFA and PFA. Hand injection to the artery. Single view taken.

## 2022-07-22 NOTE — PROGRESS NOTES
Problem:  Moderate Sedation (Adult)  Goal: *Patent airway  Outcome: Resolved/Met  Goal: *Adequate oxygenation  Outcome: Resolved/Met  Goal: *Absence of aspiration  Outcome: Resolved/Met  Goal: *Hemodynamically stable  Outcome: Resolved/Met  Goal: *Optimal pain control at patient's stated goal  Outcome: Resolved/Met  Goal: *Absence of nausea/vomiting  Outcome: Resolved/Met  Goal: *Anxiety reduced or absent  Outcome: Resolved/Met  Goal: *Absence of injury  Outcome: Resolved/Met  Goal: *Level of consciousness returns to baseline  Outcome: Resolved/Met  Goal: Interventions  Outcome: Resolved/Met

## 2022-07-22 NOTE — Clinical Note
Sheath #1: Sheath: inserted. Sheath inserted/placed in the right femoral  artery. Upon evaluation of the common femoral artery stick using fluoroscopy, the access site puncture was within the safe zone.  4FMicropuncture sheath inserted

## 2022-07-22 NOTE — Clinical Note
Sheath #1: sheath exchanged for [facility] W/GDWIRE [id number] -- BRITE TIP - ORDER AS EA. Upon evaluation of the common femoral artery stick using fluoroscopy, the access site puncture was within the safe zone.  4F Micropuncture sheath removed

## 2022-07-22 NOTE — Clinical Note
Left groin prepped with ChloraPrep Wet prep elapsed drying time: 3 mins.  Re-prepping for second access

## 2022-07-22 NOTE — Clinical Note
Power injection to the AO, artery. Single view taken. PSI = 1000. Rate of rise = 0.5 sec. Injection rate = 8 mL/sec. Total injected volume = 12 mL.

## 2022-07-22 NOTE — PROGRESS NOTES
TRANSFER - OUT REPORT:    Verbal report given to April, RN(name) on Max Olivares  being transferred to CVT Stepdown(unit) for routine progression of care       Report consisted of patients Situation, Background, Assessment and   Recommendations(SBAR). Information from the following report(s) SBAR, Procedure Summary, Intake/Output, and MAR was reviewed with the receiving nurse. Lines:       Opportunity for questions and clarification was provided.       Patient transported with:   [facility]

## 2022-07-22 NOTE — Clinical Note
TRANSFER - IN REPORT:     Verbal report received from: Jose F Panda RN. Report consisted of patient's Situation, Background, Assessment and   Recommendations(SBAR). Opportunity for questions and clarification was provided. Assessment completed upon patient's arrival to unit and care assumed. Patient transported with a Cardiac Cath Tech / Patient Care Tech.

## 2022-07-22 NOTE — Clinical Note
Sheath #1: sheath exchanged for SHEATH GUID 7FR L45CM HYDRPHLC COAT L35CM S STL COIL STR. Upon evaluation of the common femoral artery stick using fluoroscopy, the access site puncture was within the safe zone.

## 2022-07-22 NOTE — H&P
Vascular Surgery    61 y/o M with LLE rest pain. He works as a  and is unable to work due to pain and need for frequent breaks. No wounds. He is a smoker. Past Medical History:   Diagnosis Date    Cataract 3/7/2022    Cataract extraction status of eye, unspecified laterality 3/7/2022    Chronic pain in left foot 3/7/2022    Current smoker 3/7/2022    Daily consumption of alcohol 3/7/2022    Elevated LFTs 3/9/2022    Hepatitis C test positive 3/9/2022    Hyperkalemia 3/9/2022    Marijuana smoker 3/7/2022    Mental subnormality 3/7/2022    Primary hypertension 3/7/2022     Past Surgical History:   Procedure Laterality Date    HX CATARACT REMOVAL Right     4 years ago       No current facility-administered medications on file prior to encounter. Current Outpatient Medications on File Prior to Encounter   Medication Sig Dispense Refill    traMADoL (ULTRAM) 50 mg tablet Take 1 Tablet by mouth two (2) times daily as needed for Pain for up to 30 days. Max Daily Amount: 100 mg. Indications: neuropathic pain 30 Tablet 0    amLODIPine (NORVASC) 10 mg tablet Take 1 Tablet by mouth daily. 90 Tablet 1    rosuvastatin (CRESTOR) 40 mg tablet Take 1 Tablet by mouth nightly. 90 Tablet 1    lisinopriL (PRINIVIL, ZESTRIL) 40 mg tablet Take 1 Tablet by mouth daily. Indications: high blood pressure 90 Tablet 0     No Known Allergies  Social History     Tobacco Use    Smoking status: Every Day     Packs/day: 1.00     Types: Cigarettes     Start date: 1982    Smokeless tobacco: Never   Substance Use Topics    Alcohol use: Yes     Alcohol/week: 14.0 standard drinks     Types: 14 Cans of beer per week     Comment: 2 beers daily    Drug use: Yes     Types: Marijuana     History reviewed. No pertinent family history. PE:   Height 5' 6\" (1.676 m), weight 111 lb 11.2 oz (50.7 kg).   NAD  Thin M  A&O x 3  BLE warm  Palpable, diminished, R femoral pulse  No pedal pulses appreciated  L foot with dependent rubor  No wounds or tissue loss.   There is atrophy bilaterally    Imaging:   LLE arterial duplex: Likely tandem stenoses of the SFA. AK pop short segment occlusion with reconstitution. TP trunk occlusion. L NAVJOT 0.31, toe pressure 14. CTA a/p: Extensive aortoiliac disease with tandem moderate stenoses of the bilateral JENNY and EIA. IMPRESSION:   Critical LLE ischemia with progressive rest pain, with expected multilevel aortoiliac, fempop, and tibial disease. Active smoker. PLAN:   Aortogram with LLE angiography, possible intervention. Risks, benefits and alternatives were discussed with the patient including but not limited to bleeding, infection, injury to surrounding structures including nerves, distal embolization requiring further procedures, recurrent disease requiring repeat intervention especially in the setting of continued smoking. The patient expressed understanding after the opportunity to ask questions, and consented to the procedure.      Nichole Liriano MD  Vascular Surgeon

## 2022-07-22 NOTE — PROGRESS NOTES
Vascular Surgery    R groin sheath removed at bedside. ACT was 194. After removal, pt was in severe pain and was bearing down. I was holding pressure, however a moderate-sized cutaneous hematoma began to develop. We administered 1 mg dilaudid and tried to calm the patient. His SBP was in the 70-90s for about 5 minutes. HR remained normal, however he did receive a dose of labetalol intraop. Once he relaxed I was able to inspect the groin. No further hematoma was developing. It all seemed cutaneous and was resolving with manual pressure. His SBP returned to the 120s, and then 150s within a few minutes. A type and crossmatch for 1 unit PRBC was obtained. I held pressure for 35 minutes. No further hematoma was visible, although the previous cutaneous blood will likely track to the scrotal tissue and cause an ecchymosis. There was a strong palpable R femoral pulse. A DSD was applied. He will remain flat for 6 hours. After this, the roa will be removed and he is to ambulate as tolerated overnight.      Alina Teresa MD  Vascular Surgeon

## 2022-07-22 NOTE — PROGRESS NOTES
Patient's brother Miri Rowe has home medication for patient Hep C treatment. Very insistant patient receives medications immediately. This nurse has educated brother multiple times today that we cannot administer home meds with out an order and without the pharmacy verifying medications. Dr. Jc Flores to place an order for med and receiving nurse advised of brothers urgency. Brother reminded once again he will need to provide med to floor RN and it will be verified by pharmacy before being administered.

## 2022-07-22 NOTE — PROGRESS NOTES
Pt arrived on unit.         Wound Prevention Checklist    Patient: Christianne Espitia (89 y.o. male)  Date: 7/22/2022  Diagnosis: PAD (peripheral artery disease) ([facility]) [I73.9]  PVD (peripheral vascular disease) ([facility] [address] [I73.9] <principal problem not specified>    Precautions:         []  Heel prevention boots placed on patient    []  Patient turned q2h during shift    []  Lift team ordered    []  Patient on [place] bed/Specialty bed    []  Each Wound is documented during shift (Stage, Color, drainage, odor, measurements, and dressings)    [x]  Dual skin checks done at bedside during shift report with Vidya Rodriguez RN

## 2022-07-22 NOTE — BRIEF OP NOTE
Brief Postoperative Note    Patient: Nita Streeter  YOB: 1960  MRN: [id number]    Date of Procedure: 7/22/2022     Pre-Op Diagnosis: LLE Critical limb ischemia with rest pain    Post-Op Diagnosis: Same as preoperative diagnosis. Procedure(s):  Ultrasound guided access of the R CFA  Aortogram  LLE angioagraphy  Re-cannalization of a chronic popliteal artery occlusion  PTA of popliteal artery and tandem mid-distal SFA stenoses using a 4 x 15mm PTA balloon for the popliteal artery and SFA, followed by a 4 x 15 DCB and a 5 x 200 DCB. Placement of a 7 x 10 Viabahn stent in the L EIA, covering the occluded L hypogastric artery  Placement of an 8 x 39 VBX stent in the L JENNY  Placement of a 7 x 39 VBX stent in the R JENNY, post-dilated to 8mm  Placement of a 7 x 5 mm Viabahn stent in the R EIA, preserving the R hypogastric artery  7F sheath remained in the R groin at completion of the operation, with plans to remove when the ACT has normalized in recovery. Surgeon(s):  Alvino Foster MD    Surgical Assistant: None    Anesthesia: Con-Sed     Estimated Blood Loss (mL): 40 mL    IV contrast: 150 mL    UOP: 1 L (Oliva placed at completion of procedure)    Complications: None    Specimens: * No specimens in log *     Implants:   Implant Name Type Inv.  Item Serial No.  Lot No. LRB No. Used Action   GRAFT STNT EXP [id number] -- VIABAHN VBX - [de-identified]  GRAFT STNT EXP [id number] -- VIABAHN VBX   [employer]  Right 1 Implanted   STENT PERIPH L100MM DIA7MM CATH L120CM DIA7FR 0.035IN HEP - [id number]  STENT PERIPH L100MM DIA7MM CATH L120CM DIA7FR 0.035IN HEP   [employer]_  Right 1 Implanted   GRAFT STNT EXP [id number] -- VIABAHN VBX - [de-identified]  GRAFT STNT EXP [id number] -- VIABAHN VBX   [employer]  Right 1 Implanted   STENT ENDOPROS [id number] -- HPRNCOAT VIABAHN - [de-identified]  STENT ENDOPROS [id number] -- HPRNCOAT VIABAHN  WL [employer]  Right 1 Implanted       Drains: * No LDAs found *    Findings: Extensive aortoiliac disease with > 75% stenoses of the bilateral common iliac arteries. Approx 50% tandem stenoses of the bilateral EIAs. Occlusion of the L IIA. Patent but diseased R IIA. L CFA, PFA and proximal SFA patent with mild-moderate disease. Tandem stenoses of the L SFA with occlusion of prox-mid pop and recannalization at the distal pop artery. Patent proximal AT with occlusion in the mid-distal leg. Patent TP trunk with approx 50% stenosis. Patent PT which is the dominant artery onto the foot. Patent proxmial peroneal which appeared patent to the ankle, with slow flow. No change in runoff after recannalization of the popliteal artery. Following aortoiliac stent placement, there was robust flow in bilateral JENNY, EIA, and the R IIA also demonstrated improved flow. Disposition: to recovery in stable condition. Pt will be admitted overnight for observation for groin checks following sheath removal, and IV hydration.       Ulices Patel MD  Vascular Surgeon            Electronically Signed by Leeanna Carlisle MD on 7/22/2022 at 11:37 AM

## 2022-07-22 NOTE — PROGRESS NOTES
TRANSFER - OUT REPORT:    Verbal report given to GUILLERMO Eagle(name) on David Billing  being transferred to Cath Lab(unit) for ordered procedure       Report consisted of patients Situation, Background, Assessment and   Recommendations(SBAR). Information from the following report(s) SBAR, Intake/Output, MAR, and Pre Procedure Checklist was reviewed with the receiving nurse. Lines:       Opportunity for questions and clarification was provided.       Patient transported with:   [employer]

## 2022-07-22 NOTE — Clinical Note
TRANSFER - OUT REPORT:     Verbal report given to: Ludwig Danielle RN. Report consisted of patient's Situation, Background, Assessment and   Recommendations(SBAR). Opportunity for questions and clarification was provided. Patient transported with a Cardiac Cath Tech / Patient Care Tech. Patient transported to: holding area.

## 2022-07-22 NOTE — Clinical Note
Vessel: left SFA, popliteal, PTA, peroneal and EDDIE. Hand injection to the artery. Multiple views taken.

## 2022-07-23 VITALS
WEIGHT: 121 LBS | HEIGHT: 66 IN | TEMPERATURE: 98.2 F | BODY MASS INDEX: 19.44 KG/M2 | HEART RATE: 82 BPM | DIASTOLIC BLOOD PRESSURE: 65 MMHG | OXYGEN SATURATION: 97 % | RESPIRATION RATE: 15 BRPM | SYSTOLIC BLOOD PRESSURE: 131 MMHG

## 2022-07-23 PROCEDURE — 77030018842 HC SOL IRR SOD CL 9% BAXT -A

## 2022-07-23 PROCEDURE — G0378 HOSPITAL OBSERVATION PER HR: HCPCS

## 2022-07-23 PROCEDURE — 96376 TX/PRO/DX INJ SAME DRUG ADON: CPT

## 2022-07-23 PROCEDURE — 74011250637 HC RX REV CODE- 250/637: Performed by: SURGERY

## 2022-07-23 PROCEDURE — 74011250636 HC RX REV CODE- 250/636: Performed by: SURGERY

## 2022-07-23 PROCEDURE — 2709999900 HC NON-CHARGEABLE SUPPLY

## 2022-07-23 RX ORDER — CLOPIDOGREL BISULFATE 75 MG/1
75 TABLET ORAL DAILY
Qty: 90 TABLET | Refills: 3 | Status: SHIPPED | OUTPATIENT
Start: 2022-07-23

## 2022-07-23 RX ORDER — GUAIFENESIN 100 MG/5ML
81 LIQUID (ML) ORAL DAILY
Qty: 90 TABLET | Refills: 3 | Status: SHIPPED | OUTPATIENT
Start: 2022-07-23

## 2022-07-23 RX ADMIN — CLOPIDOGREL BISULFATE 75 MG: 75 TABLET ORAL at 09:26

## 2022-07-23 RX ADMIN — SODIUM CHLORIDE 125 ML/HR: 9 INJECTION, SOLUTION INTRAVENOUS at 02:00

## 2022-07-23 RX ADMIN — HYDROMORPHONE HYDROCHLORIDE 1 MG: 2 INJECTION, SOLUTION INTRAMUSCULAR; INTRAVENOUS; SUBCUTANEOUS at 03:56

## 2022-07-23 RX ADMIN — HYDROMORPHONE HYDROCHLORIDE 1 MG: 2 INJECTION, SOLUTION INTRAMUSCULAR; INTRAVENOUS; SUBCUTANEOUS at 09:27

## 2022-07-23 RX ADMIN — AMLODIPINE BESYLATE 10 MG: 10 TABLET ORAL at 09:26

## 2022-07-23 RX ADMIN — ASPIRIN 81 MG CHEWABLE TABLET 81 MG: 81 TABLET CHEWABLE at 09:26

## 2022-07-23 NOTE — DISCHARGE INSTRUCTIONS
Procedure Performed: Left lower extremity angiography and balloon angioplasty, stent placement both common and external iliac arteries. Surgeon: Dr. Jason Shin MD    Discharge Instructions  1. You may shower as needed. Allow the soap and water to run over your left leg incisions and gently pat dry. No tub bathing for 6-8 weeks. 2. Do not apply cream or ointment to the incisions. Keep a dry gauze over the access site if there is mild drainage. 3. If the access site becomes red or has worsening drainage, call the vascular clinic or seek emergency care immediately. 4. Follow up in vascular surgery clinic with Dr. Jaylen Sneed as scheduled. 5.  Call our clinic and/or seek emergency care if you develop a fever, chills, shortness of breath, worsening groin swelling, severe left foot pain, wound drainage, wound redness, or any other concerns. 6. You are being discharged with ASPIRIN and PLAVIX as new medications. Do not stop taking these unless you speak with your vascular surgeon. They will help keep your stents open.      Vascular Clinic Phone Number: [phone]

## 2022-07-23 NOTE — PROGRESS NOTES
Problem: Pressure Injury - Risk of  Goal: *Prevention of pressure injury  Description: Document Jacky Scale and appropriate interventions in the flowsheet.   Outcome: Progressing Towards Goal  Note: Pressure Injury Interventions:  Sensory Interventions: Assess changes in LOC, Check visual cues for pain, Keep linens dry and wrinkle-free, Monitor skin under medical devices, Pressure redistribution bed/mattress (bed type)    Moisture Interventions: Absorbent underpads, Maintain skin hydration (lotion/cream), Limit adult briefs    Activity Interventions: Pressure redistribution bed/mattress(bed type)    Mobility Interventions: HOB 30 degrees or less, Pressure redistribution bed/mattress (bed type)    Nutrition Interventions: Document food/fluid/supplement intake, Offer support with meals,snacks and hydration                     Problem: Patient Education: Go to Patient Education Activity  Goal: Patient/Family Education  Outcome: Progressing Towards Goal     Problem: Pain  Goal: *Control of Pain  Outcome: Progressing Towards Goal     Problem: Patient Education: Go to Patient Education Activity  Goal: Patient/Family Education  Outcome: Progressing Towards Goal

## 2022-07-23 NOTE — PROGRESS NOTES
Bedside and Verbal shift change report given to Casey Araiza (oncoming nurse) by Roula Pat RN (offgoing nurse). Report included the following information SBAR, Kardex, Intake/Output, MAR, and Cardiac Rhythm NSR/tachy .       Wound Prevention Checklist    Patient: Max Olivares (85 y.o. male)  Date: 7/23/2022  Diagnosis: PAD (peripheral artery disease) ([facility]) [I73.9]  PVD [facility]peripheral vascular [facility]) [facility] [I73.9] <principal problem not specified>    Precautions:         []  Heel prevention boots placed on patient    []  Patient turned q2h during shift    []  Lift team ordered    []  Patient on [place] bed/Specialty bed    [x]  Each Wound is documented during shift (Stage, Color, drainage, odor, measurements, and dressings) post procedure site    [x]  Dual skin checks done at bedside during shift report with Nikki Hazy, RN Zina Dubin, RN

## 2022-07-25 LAB
ABO + RH BLD: NORMAL
BLD PROD TYP BPU: NORMAL
BLOOD GROUP ANTIBODIES SERPL: NORMAL
BPU ID: NORMAL
CROSSMATCH RESULT,%XM: NORMAL
SPECIMEN EXP DATE BLD: NORMAL
STATUS OF UNIT,%ST: NORMAL
UNIT DIVISION, %UDIV: 0

## 2022-08-02 DIAGNOSIS — G89.29 CHRONIC PAIN OF BOTH LOWER EXTREMITIES: ICD-10-CM

## 2022-08-02 DIAGNOSIS — M79.605 CHRONIC PAIN OF BOTH LOWER EXTREMITIES: ICD-10-CM

## 2022-08-02 DIAGNOSIS — M79.604 CHRONIC PAIN OF BOTH LOWER EXTREMITIES: ICD-10-CM

## 2022-08-02 NOTE — TELEPHONE ENCOUNTER
----- Message from Chris Holding sent at 8/2/2022  9:27 AM EDT -----  Subject: Refill Request    QUESTIONS  Name of Medication? traMADoL (ULTRAM) 50 mg tablet  Patient-reported dosage and instructions? Twice a day   How many days do you have left? 0  Preferred Pharmacy? [facility] in [place]  Pharmacy phone number (if available)? [phone]  ---------------------------------------------------------------------------  --------------  CALL BACK INFO  What is the best way for the office to contact you? OK to leave message on   voicemail  Preferred Call Back Phone Number? [phone]  ---------------------------------------------------------------------------  --------------  SCRIPT ANSWERS  Relationship to Patient? Sibling  Representative Name? Moody Ham   Is the Representative on the appropriate HIPAA document in Epic?  Yes

## 2022-08-03 NOTE — TELEPHONE ENCOUNTER
Last Visit: 6/21/22 with Dr. Roscoe Torres  Next Appointment: 8/16/22 with Dr. Roscoe Torres  Previous Refill Encounter(s): 7/8/22 #30    Requested Prescriptions     Pending Prescriptions Disp Refills    traMADoL (ULTRAM) 50 mg tablet 30 Tablet 0     Sig: Take 1 Tablet by mouth two (2) times daily as needed for Pain for up to 30 days. Max Daily Amount: 100 mg. Indications: neuropathic pain         For Pharmacy Admin Tracking Only    CPA in place:   Recommendation Provided To:    Intervention Detail: New Rx: 1, reason: Patient Preference  Gap Closed?:   Intervention Accepted By:   Time Spent (min): 5

## 2022-08-04 RX ORDER — TRAMADOL HYDROCHLORIDE 50 MG/1
50 TABLET ORAL
Qty: 30 TABLET | Refills: 0 | Status: SHIPPED | OUTPATIENT
Start: 2022-08-04 | End: 2022-09-03

## 2022-08-08 NOTE — PROGRESS NOTES
Vascular Surgery Office Visit    Sukhjinder Travis  Chief Complaint   Patient presents with    Leg Pain       History and Physical    63 y/o M smoker with PAD returns to clinic s/p LLE SFA PTA with DCB and bilateral JENNY/EIA stenting on 7/22/22 for LLE resting ischemic pain and severe RLE claudication. Postoperatively he developed a small R groin access site hematoma following sheath removal (no closure device used) and he was observed overnight. He was discharged on POD 1 without event, with ASA and plavix. He returns today with his brother, who assists him at home and with his medical care. The patient notes LLE swelling and pain of his R toe. He walked into clinic today with a cane, whereas preop he was pushed in a wheelchair by his brother. The pt is not sure if he is taking ASA/plavix, however he indicates that he is taking all of the medications he was sent home with upon discharge. He is cutting back on cigarettes. The pt's brother inquired about disability paperwork.      Past Medical History:   Diagnosis Date    Cataract 3/7/2022    Cataract extraction status of eye, unspecified laterality 3/7/2022    Chronic pain in left foot 3/7/2022    Current smoker 3/7/2022    Daily consumption of alcohol 3/7/2022    Elevated LFTs 3/9/2022    Hepatitis C test positive 3/9/2022    Hyperkalemia 3/9/2022    Marijuana smoker 3/7/2022    Mental subnormality 3/7/2022    Primary hypertension 3/7/2022     Patient Active Problem List   Diagnosis Code    Cataract extraction status of eye, unspecified laterality Z98.49    Cataract H26.9    Daily consumption of alcohol Z78.9    Marijuana smoker F12.90    Smoker F17.200    Chronic pain in left foot M79.672, G89.29    Primary hypertension I10    Mental subnormality F79    Elevated LFTs R79.89    Hepatitis C test positive B19.20    Hyperkalemia E87.5    Chronic hepatitis C without hepatic coma (HCC) B18.2    Abdominal bruit R09.89    PAD (peripheral artery disease) ([facility]) I73.9    PVD (peripheral vascular disease) ([facility]) I73.9     Past Surgical History:   Procedure Laterality Date    HX CATARACT REMOVAL Right     4 years ago     Current Outpatient Medications   Medication Sig Dispense Refill    traMADoL (ULTRAM) 50 mg tablet Take 1 Tablet by mouth two (2) times daily as needed for Pain for up to 30 days. Max Daily Amount: 100 mg. Indications: neuropathic pain 30 Tablet 0    aspirin 81 mg chewable tablet Take 1 Tablet by mouth in the morning. Indications: treatment to prevent peripheral artery thromboembolism 90 Tablet 3    clopidogreL (PLAVIX) 75 mg tab Take 1 Tablet by mouth in the morning. Indications: treatment to prevent peripheral artery thromboembolism 90 Tablet 3    sofosbuvir-velpatasvir (EPCLUSA) 400-100 mg tablet       rosuvastatin (CRESTOR) 40 mg tablet Take 1 Tablet by mouth nightly. 90 Tablet 1    amLODIPine (NORVASC) 10 mg tablet Take 1 Tablet by mouth in the morning. 90 Tablet 1    lisinopriL (PRINIVIL, ZESTRIL) 40 mg tablet Take 1 Tablet by mouth in the morning. Indications: high blood pressure 90 Tablet 1    citalopram (CeleXA) 10 mg tablet Take 1 Tablet by mouth in the morning. Indications: anxiousness associated with depression 90 Tablet 1     No Known Allergies   Social History     Tobacco Use    Smoking status: Every Day     Packs/day: 1.00     Types: Cigarettes     Start date: 1982    Smokeless tobacco: Never   Substance Use Topics    Alcohol use: Yes     Alcohol/week: 14.0 standard drinks     Types: 14 Cans of beer per week     Comment: 2 beers daily    Drug use: Yes     Types: Marijuana     History reviewed. No pertinent family history.   Review of Systems    General: negative for fever   Eyes: negative for vision loss   HENT: negative for cold symptoms   Respiratory negative for shortness of breath   Cardiac: negative for chest pain   Vascular negative for foot pain at night    Gastrointestinal: negative for abdominal pain   Genitourinary: negative for dysuria    Endocrine: negative for excessive thirst   Skin: negative for rash   Neurological: negative for paralysis   Psychiatric: negative for depression        Physical Exam:    Visit Vitals  /60 (BP 1 Location: Right arm, BP Patient Position: Sitting)   Pulse 67   Ht 5' 6\" (1.676 m)   Wt 121 lb 0.5 oz (54.9 kg)   SpO2 98%   BMI 19.54 kg/m²        Constitutional:  Patient is well developed, well nourished, and not distressed. HEENT: Atraumatic, normocephalic, wearing a mask. Eyes:   Cunjunctivae clear, no scleral icterus  Cardiovascular:  Normal rate, regular rhythm, normal heart sounds. Pulmonary/Chest: Effort normal and breath sounds normal.  he has no wheezes or no rales. Abdominal:   Soft, non-distended. No tenderness to palpation. Extremities:   BLE warm. LLE DP pulse palpable. There is moderate edema of hte L foot, extending to the mid leg. Motor and sensory are intact. Neurological:  he  is alert and oriented x3. Motor & sensory grossly intact in all 4 limbs. Psych: Appropriate mood and affect. Imaging/Studies:   NAVJOT from today: LLE 0.97, RLE 0.89. L toe pressure 78 (inc from 14) and RLE toe pressure 75mmHg. Impression:  S/p LLE SFA and popliteal artery recannalization with bilateral JENNY and EIA stent placement 2 weeks ago for LLE CLI and severe RLE claudication. Clinically doing well. LLE edema is due to reperfusion. RLE focal great toe pain of unclear etiology, however ddx includes gout and MSK diagnoses. Continued smoking. Plan:  Follow up in 3 mos with LLE arterial duplex, Aortoiliac duplex, and NAVJOT. Continue ASA and plavix indefinitely    Will refer to podiatry for eval of R great toe    Advised pt and his brother to follow up with PCM regarding possible initiation of disability paperwork. I will be available to help with additional paperwork if indicated. Encouraged complete smoking cessation.      Nichole Liriano MD  Vascular Surgeon      I spent approximately 35 minutes on this patient encounter, which includes but is not limited to performing a history and physical exam, reviewing primary care and consultant documentation, reviewing imaging/studies, and speaking with the patient and his brother regarding my impression and plan.

## 2022-08-09 ENCOUNTER — OFFICE VISIT (OUTPATIENT)
Dept: VASCULAR SURGERY | Age: 62
End: 2022-08-09
Payer: MEDICAID

## 2022-08-09 VITALS
HEART RATE: 67 BPM | DIASTOLIC BLOOD PRESSURE: 60 MMHG | SYSTOLIC BLOOD PRESSURE: 118 MMHG | HEIGHT: 66 IN | WEIGHT: 121.03 LBS | BODY MASS INDEX: 19.45 KG/M2 | OXYGEN SATURATION: 98 %

## 2022-08-09 DIAGNOSIS — F17.200 SMOKER: ICD-10-CM

## 2022-08-09 DIAGNOSIS — I73.9 PAD (PERIPHERAL ARTERY DISEASE) (HCC): Primary | ICD-10-CM

## 2022-08-09 DIAGNOSIS — M79.674 TOE PAIN, RIGHT: ICD-10-CM

## 2022-08-09 PROCEDURE — 99214 OFFICE O/P EST MOD 30 MIN: CPT | Performed by: SURGERY

## 2022-08-09 NOTE — PROGRESS NOTES
1. Have you been to the ER, urgent care clinic since your last visit? No      Hospitalized since your last visit? No     2. Have you seen or consulted any other health care providers outside of the [address] [place] [address] since your last visit? Include any pap smears or colon screening.   No

## 2022-08-12 ENCOUNTER — OFFICE VISIT (OUTPATIENT)
Dept: INTERNAL MEDICINE CLINIC | Age: 62
End: 2022-08-12
Payer: MEDICAID

## 2022-08-12 VITALS
OXYGEN SATURATION: 98 % | WEIGHT: 114.6 LBS | HEART RATE: 72 BPM | HEIGHT: 66 IN | SYSTOLIC BLOOD PRESSURE: 138 MMHG | DIASTOLIC BLOOD PRESSURE: 75 MMHG | BODY MASS INDEX: 18.42 KG/M2 | TEMPERATURE: 97.7 F | RESPIRATION RATE: 16 BRPM

## 2022-08-12 DIAGNOSIS — F41.9 ANXIETY AND DEPRESSION: ICD-10-CM

## 2022-08-12 DIAGNOSIS — F32.A ANXIETY AND DEPRESSION: ICD-10-CM

## 2022-08-12 DIAGNOSIS — R01.1 MURMUR: ICD-10-CM

## 2022-08-12 DIAGNOSIS — M72.2 PLANTAR FASCIITIS OF LEFT FOOT: ICD-10-CM

## 2022-08-12 DIAGNOSIS — I73.9 PVD (PERIPHERAL VASCULAR DISEASE) (HCC): ICD-10-CM

## 2022-08-12 DIAGNOSIS — I10 PRIMARY HYPERTENSION: Primary | ICD-10-CM

## 2022-08-12 DIAGNOSIS — F12.90 MARIJUANA SMOKER: ICD-10-CM

## 2022-08-12 DIAGNOSIS — M21.611 BUNION OF GREAT TOE OF RIGHT FOOT: ICD-10-CM

## 2022-08-12 DIAGNOSIS — B18.2 CHRONIC HEPATITIS C WITHOUT HEPATIC COMA (HCC): ICD-10-CM

## 2022-08-12 DIAGNOSIS — F17.200 CURRENT SMOKER: ICD-10-CM

## 2022-08-12 DIAGNOSIS — Z78.9 DAILY CONSUMPTION OF ALCOHOL: ICD-10-CM

## 2022-08-12 DIAGNOSIS — R94.31 ABNORMAL ECG: ICD-10-CM

## 2022-08-12 PROCEDURE — 99214 OFFICE O/P EST MOD 30 MIN: CPT | Performed by: NURSE PRACTITIONER

## 2022-08-12 RX ORDER — CITALOPRAM 20 MG/1
20 TABLET, FILM COATED ORAL DAILY
Qty: 90 TABLET | Refills: 1 | Status: CANCELLED | OUTPATIENT
Start: 2022-08-12

## 2022-08-12 RX ORDER — CITALOPRAM 10 MG/1
10 TABLET ORAL DAILY
Qty: 90 TABLET | Refills: 1 | Status: SHIPPED | OUTPATIENT
Start: 2022-08-12 | End: 2022-09-06 | Stop reason: SDUPTHER

## 2022-08-12 RX ORDER — AMLODIPINE BESYLATE 10 MG/1
10 TABLET ORAL DAILY
Qty: 90 TABLET | Refills: 1 | Status: SHIPPED | OUTPATIENT
Start: 2022-08-12

## 2022-08-12 RX ORDER — LISINOPRIL 40 MG/1
40 TABLET ORAL DAILY
Qty: 90 TABLET | Refills: 1 | Status: SHIPPED | OUTPATIENT
Start: 2022-08-12

## 2022-08-12 RX ORDER — MELOXICAM 15 MG/1
15 TABLET ORAL DAILY
COMMUNITY
End: 2022-08-12

## 2022-08-12 NOTE — PROGRESS NOTES
Chief Complaint   Patient presents with    Hypertension     Follow up        Foot Pain     Pt c/o bilateral foot pain     1. \"Have you been to the ER, urgent care clinic since your last visit? Hospitalized since your last visit? \" No    2. \"Have you seen or consulted any other health care providers outside of the [address] [place] [address] since your last visit? \"  Yes, Vascular and dentist      3. For patients aged 39-70: Has the patient had a colonoscopy / FIT/ Cologuard? N/A      If the patient is female:    4. For patients aged 41-77: Has the patient had a mammogram within the past 2 years? NA - based on age or sex      11. For patients aged 21-65: Has the patient had a pap smear?  NA - based on age or sex

## 2022-08-12 NOTE — PROGRESS NOTES
[facility]   Mohsen soto, [address]  [phone] QRACAW/[phone] fax    8/16/2022    Sukhjinder Travis 1960 is a pleasant WHITE/NON- male. Accompanied by his brother. Past Medical History:   Diagnosis Date    Cataract 3/7/2022    Cataract extraction status of eye, unspecified laterality 3/7/2022    Chronic pain in left foot 3/7/2022    Current smoker 3/7/2022    Daily consumption of alcohol 3/7/2022    Elevated LFTs 3/9/2022    Hepatitis C test positive 3/9/2022    Hyperkalemia 3/9/2022    Marijuana smoker 3/7/2022    Mental subnormality 3/7/2022    Primary hypertension 3/7/2022     Past Surgical History:   Procedure Laterality Date    HX CATARACT REMOVAL Right     4 years ago     Current Outpatient Medications   Medication Sig    amLODIPine (NORVASC) 10 mg tablet Take 1 Tablet by mouth in the morning. lisinopriL (PRINIVIL, ZESTRIL) 40 mg tablet Take 1 Tablet by mouth in the morning. Indications: high blood pressure    citalopram (CeleXA) 10 mg tablet Take 1 Tablet by mouth in the morning. Indications: anxiousness associated with depression    traMADoL (ULTRAM) 50 mg tablet Take 1 Tablet by mouth two (2) times daily as needed for Pain for up to 30 days. Max Daily Amount: 100 mg. Indications: neuropathic pain    aspirin 81 mg chewable tablet Take 1 Tablet by mouth in the morning. Indications: treatment to prevent peripheral artery thromboembolism    clopidogreL (PLAVIX) 75 mg tab Take 1 Tablet by mouth in the morning. Indications: treatment to prevent peripheral artery thromboembolism    sofosbuvir-velpatasvir (EPCLUSA) 400-100 mg tablet     rosuvastatin (CRESTOR) 40 mg tablet Take 1 Tablet by mouth nightly. No current facility-administered medications for this visit. Allergies and Intolerances:   No Known Allergies  Family History:   History reviewed. No pertinent family history.   Social History:   He  reports that he has been smoking cigarettes. He started smoking about 40 years ago. He has been smoking an average of 1 pack per day. He has never used smokeless tobacco.   Social History     Substance and Sexual Activity   Alcohol Use Yes    Alcohol/week: 14.0 standard drinks    Types: 14 Cans of beer per week    Comment: 2 beers daily     Immunization History: There is no immunization history on file for this patient. Todays concerns/HPI:  Ariel leg pain improved after having 4 stents placed in his aorta  Drinks 2 beers per week. Still smokes cigs/marijuana. Ariel foot pain. Rt foot bunion of Rt great toe. Left foot from heel to ball of foot. He rec'd 2 injections in his heels by Dr Nilsa Hinojosa with no relief. He is requiring a cane for stabilization while walking due to ariel foot pain. Taking Tramadol as needed. Mouth pain. Will have remaining 7 teeth pulled out of bottom jaw 9/6/22-[facility]. Not eating well due to mouth pain. Has gained 3lbs in the last week due to consuming protein shakes. Hep C. Continues treatment via GI. Anxiety/depression. Brother is seeking disability for patient due to pt not being able to continue to work with his poor health. Pt can no longer climb ladders or stand on a roof safely. [employer]ai is the only work he knows. Review of Systems:  Pertinent items are noted in HPI. Review of Data:  N/a    Physical:   Visit Vitals  /75   Pulse 72   Temp 97.7 °F (36.5 °C) (Temporal)   Resp 16   Ht 5' 6\" (1.676 m)   Wt 114 lb 9.6 oz (52 kg)   SpO2 98%   BMI 18.50 kg/m²      Wt Readings from Last 3 Encounters:   08/12/22 114 lb 9.6 oz (52 kg)   08/09/22 121 lb 0.5 oz (54.9 kg)   07/23/22 121 lb (54.9 kg)       Exam:   Physical Exam  Constitutional:       Appearance: Normal appearance. HENT:      Head: Normocephalic and atraumatic. Cardiovascular:      Rate and Rhythm: Normal rate and regular rhythm. Heart sounds: Murmur (?) heard.    Pulmonary:      Effort: Pulmonary effort is normal. No respiratory distress. Breath sounds: Normal breath sounds. No wheezing. Abdominal:      General: Abdomen is flat. Bowel sounds are normal.      Palpations: Abdomen is soft. Musculoskeletal:      Comments: Amb with standard cane. Rt bunion noted. No anomalies noted to Left foot  PPP ender feet   Skin:     General: Skin is warm and dry. Neurological:      General: No focal deficit present. Mental Status: He is alert and oriented to person, place, and time. Psychiatric:         Mood and Affect: Mood normal.         Behavior: Behavior normal.      Body mass index is 18.5 kg/m². Plan:    1. Primary hypertension  BP well controlled under current therapy    - amLODIPine (NORVASC) 10 mg tablet; Take 1 Tablet by mouth in the morning. Dispense: 90 Tablet; Refill: 1  - lisinopriL (PRINIVIL, ZESTRIL) 40 mg tablet; Take 1 Tablet by mouth in the morning. Indications: high blood pressure  Dispense: 90 Tablet; Refill: 1    2. Murmur (?)    - ECHO ADULT COMPLETE; Future    3. Anxiety and depression  Initiate low dose therapy  Check Hep panel in 1 week to verify med is not impacting liver.    -Conferenced with Dr Saritha Alejandro for second opinion on appropriate anti-depressant tx for those with active Hep C. He recommended lowest dose of Celexa and recheck LFTs 1 week. - citalopram (CeleXA) 10 mg tablet; Take 1 Tablet by mouth in the morning. Indications: anxiousness associated with depression  Dispense: 90 Tablet; Refill: 1  - HEPATIC FUNCTION PANEL; Future    4. PVD (peripheral vascular disease) ([facility] [address]  Had 4 stents placed in his aorta  Mush success  Pain free to his legs    7/22/2022 CLEMENTE Salazar (Vascular)   Pre-Op Diagnosis: LLE Critical limb ischemia with rest pain  Left lower extremity angiography and balloon angioplasty, stent placement both common and external iliac arteries.      Specialist managed condition is being evaluated/managed by Dr. Dr Jc Flores No acute findings today meriting change in management plan. 5. Chronic hepatitis C without hepatic coma [facility])  Specialist managed condition is being evaluated/managed by YARIEL Lopez. No acute findings today meriting change in management plan. 6. Bunion of great toe of right foot    7. Plantar fasciitis of left foot  Plantar fasciitis is inflammation tissue that runs across the bottom of the foot. Symptoms include stabbing pain near the heel. Pain might be worst in the morning and lessens as the morning goes on. Treatments include home therapy (Every morning take a tennis ball, place on the floor and run your foot across the ball to allow it to roll/massage the sole of the foot from the toes to the heel), shoe inserts, steroid injections, and surgery if the case is severe. 8. Current smoker  Strongly encouraged pt to quit smoking due to vascular complications. 9. Marijuana smoker    10. Daily consumption of alcohol  Strongly encouraged pt to stop consuming alcohol as this can impact Hep C therapy. Reviewed medication and completed medication reconciliation with the patient. Reviewed side effects of medications with the patient. Questions were answered and patient verb understanding.       Follow up 6m w/ labs (CMP, Lipid)      Dr. Mark Amaya, AGNP-C, DNP  [facility] of [address] [place] [address]

## 2022-08-12 NOTE — PROGRESS NOTES
Reason for Visit:    Chief Complaint   Patient presents with    Hypertension     Follow up        Foot Pain     Pt c/o bilateral foot pain     HPI:  Marly Baker presents today for CPE. Problem List:  Patient Active Problem List   Diagnosis Code    Cataract extraction status of eye, unspecified laterality Z98.49    Cataract H26.9    Daily consumption of alcohol Z78.9    Marijuana smoker F12.90    Smoker F17.200    Chronic pain in left foot M79.672, G89.29    Primary hypertension I10    Mental subnormality F79    Elevated LFTs R79.89    Hepatitis C test positive B19.20    Hyperkalemia E87.5    Chronic hepatitis C without hepatic coma (HCC) B18.2    Abdominal bruit R09.89    PAD (peripheral artery disease) (HCC) I73.9    PVD (peripheral vascular disease) ([address] I73.9       Past Medical History:    Past Medical History:   Diagnosis Date    Cataract 3/7/2022    Cataract extraction status of eye, unspecified laterality 3/7/2022    Chronic pain in left foot 3/7/2022    Current smoker 3/7/2022    Daily consumption of alcohol 3/7/2022    Elevated LFTs 3/9/2022    Hepatitis C test positive 3/9/2022    Hyperkalemia 3/9/2022    Marijuana smoker 3/7/2022    Mental subnormality 3/7/2022    Primary hypertension 3/7/2022       Past Surgical History:  Past Surgical History:   Procedure Laterality Date    HX CATARACT REMOVAL Right     4 years ago       Family History:  No family history on file. Immunizations: There is no immunization history on file for this patient. Allergies:  No Known Allergies    Current Medications:   Current Outpatient Medications:     meloxicam (MOBIC) 15 mg tablet, Take 15 mg by mouth in the morning., Disp: , Rfl:     traMADoL (ULTRAM) 50 mg tablet, Take 1 Tablet by mouth two (2) times daily as needed for Pain for up to 30 days. Max Daily Amount: 100 mg. Indications: neuropathic pain, Disp: 30 Tablet, Rfl: 0    aspirin 81 mg chewable tablet, Take 1 Tablet by mouth in the morning.  Indications: treatment to prevent peripheral artery thromboembolism, Disp: 90 Tablet, Rfl: 3    clopidogreL (PLAVIX) 75 mg tab, Take 1 Tablet by mouth in the morning. Indications: treatment to prevent peripheral artery thromboembolism, Disp: 90 Tablet, Rfl: 3    sofosbuvir-velpatasvir (EPCLUSA) 400-100 mg tablet, , Disp: , Rfl:     amLODIPine (NORVASC) 10 mg tablet, Take 1 Tablet by mouth daily. , Disp: 90 Tablet, Rfl: 1    rosuvastatin (CRESTOR) 40 mg tablet, Take 1 Tablet by mouth nightly., Disp: 90 Tablet, Rfl: 1    lisinopriL (PRINIVIL, ZESTRIL) 40 mg tablet, Take 1 Tablet by mouth daily. Indications: high blood pressure, Disp: 90 Tablet, Rfl: 0    Review of Systems:   {ros master:323296}    Vitals:  Visit Vitals  Temp 97.7 °F (36.5 °C) (Temporal)   Ht 5' 6\" (1.676 m)   Wt 114 lb 9.6 oz (52 kg)   BMI 18.50 kg/m²       Physical Examination:  {male adult master:781200}    Assessment:  Orders Placed This Encounter    meloxicam (MOBIC) 15 mg tablet       Plan:  1. Continue current meds as prescribed. Follow up:    2.   Colonoscopy due:  ***  3.  PSA due: ***  4.  Immunizations due: ***    Luis Ho DNP

## 2022-08-15 ENCOUNTER — TELEPHONE (OUTPATIENT)
Dept: INTERNAL MEDICINE CLINIC | Age: 62
End: 2022-08-15

## 2022-08-15 NOTE — TELEPHONE ENCOUNTER
Ryan Ajas, brother, called. Wanted to know if lab ordered is fasting and I told him no. He said on the paperwork he was giving it states it is fasting.     Please verify with him at [phone]

## 2022-08-15 NOTE — TELEPHONE ENCOUNTER
Spoke with patients brother he was confused I explained that the labs in 6 months will be fasting. He does not have to fast for the hepatic function test he will be doing later this week.

## 2022-08-17 PROBLEM — M79.674 TOE PAIN, RIGHT: Status: ACTIVE | Noted: 2022-08-17

## 2022-08-18 ENCOUNTER — TELEPHONE (OUTPATIENT)
Dept: INTERNAL MEDICINE CLINIC | Age: 62
End: 2022-08-18

## 2022-08-18 NOTE — TELEPHONE ENCOUNTER
Anna Garcia (is on PHI) called to request if Dr. Vinny Avila can put a referral for patient to see a podiatrist?     He states that Patient's vascular doctor, Dr Krystal Velasquez, had been trying to find a podiatrist for patient but couldn't get one that accepted his insurance. Mr. Kelley Burch is requesting a return call and can be reached at [phone].     Please advise ,thank you

## 2022-08-18 NOTE — TELEPHONE ENCOUNTER
Patient's brother reached, he states that he ended up reaching out to the podiatrist that Dr. Nicky Bai referred patient to and they said they would accept Patient there. He states that he will call us back if there ends up being an issue, he is aware that he would need to reach out to insurance to see what other podiatrists patient can see if needed. He has no further questions at this time. Thank you.

## 2022-08-18 NOTE — TELEPHONE ENCOUNTER
----- Message from Chio Huizarpatricia sent at 8/17/2022 12:08 PM EDT -----  Subject: Message to Provider    QUESTIONS  Information for Provider? Patient's brother is calling to get an update of   the podiatrist for the patient. Please call back to advise and discuss   ---------------------------------------------------------------------------  --------------  Ghazala Olivares INFO  [phone]; OK to leave message on voicemail  ---------------------------------------------------------------------------  --------------  SCRIPT ANSWERS  Relationship to Patient? Other  Representative Name? Claritza English  Is the Representative on the appropriate HIPAA document in Epic?  Yes

## 2022-08-18 NOTE — TELEPHONE ENCOUNTER
I have previously referred patient to a podiatrist for which they were not happy with the services. I am unaware of another podiatrist in the area that will take patient's insurance. Brother needs to reach out to the insurance to confirm which podiatrist patient can see. Brother needs to call this office with the name, phone number, address of the podiatrist that will accept his insurance.   Thank you

## 2022-08-19 ENCOUNTER — HOSPITAL ENCOUNTER (OUTPATIENT)
Dept: LAB | Age: 62
Discharge: HOME | End: 2022-08-19

## 2022-08-19 LAB — XX-LABCORP SPECIMEN COL,LCBCF: NORMAL

## 2022-08-19 PROCEDURE — 99001 SPECIMEN HANDLING PT-LAB: CPT

## 2022-08-20 LAB
ALBUMIN SERPL-MCNC: 4.4 G/DL (ref 3.8–4.9)
ALP SERPL-CCNC: 61 IU/L (ref 44–121)
ALT SERPL-CCNC: 22 IU/L (ref 0–44)
AST SERPL-CCNC: 29 IU/L (ref 0–40)
BILIRUB DIRECT SERPL-MCNC: 0.11 MG/DL (ref 0–0.4)
BILIRUB SERPL-MCNC: 0.3 MG/DL (ref 0–1.2)
PROT SERPL-MCNC: 7.9 G/DL (ref 6–8.5)

## 2022-08-24 ENCOUNTER — TELEPHONE (OUTPATIENT)
Dept: INTERNAL MEDICINE CLINIC | Age: 62
End: 2022-08-24

## 2022-08-24 NOTE — TELEPHONE ENCOUNTER
----- Message from [address] [facility] sent at 8/24/2022 12:30 PM EDT -----  Subject: Message to Provider    QUESTIONS  Information for Provider? Ericka Castañeda needs nurse to call him back regarding   help with referrals for pain management and rheumatologist. Fernando Anne wants   him to go to these providers. Ericka Castañeda wants to talk to nurse about these   referrals, he has names of providers. He would like help  ---------------------------------------------------------------------------  --------------  Deannie Sandhoff INFO  [phone]; OK to leave message on voicemail  ---------------------------------------------------------------------------  --------------  SCRIPT ANSWERS  Relationship to Patient? Other  Representative Name? Shayy Samuel  Is the Representative on the appropriate HIPAA document in Epic?  Yes

## 2022-08-25 NOTE — TELEPHONE ENCOUNTER
Patient's brother returned call, he is requesting to speak with Nurse Kim Cevallos at your earliest convenience. He can be reached at [phone].   Thank you

## 2022-08-25 NOTE — TELEPHONE ENCOUNTER
Patient reached and he states his podiatrist requested Dr. Robbin Veloz put a referral in for pain management and rheumatology. Patient's brother states the podiatrist did xray but cant explain why the pain is in 1 of his feet. I advised patient's brother to have the podiatrist fax over the office notes and xray results to our office so Dr. Robbin Veloz can review. Patient's brother also requesting that pt's lab results be faxed over to [facility] per there request. Results faxed over with confirmation received.

## 2022-08-29 ENCOUNTER — TELEPHONE (OUTPATIENT)
Dept: INTERNAL MEDICINE CLINIC | Age: 62
End: 2022-08-29

## 2022-08-29 DIAGNOSIS — I70.90 ATHEROSCLEROSIS OF ARTERIES: ICD-10-CM

## 2022-08-29 NOTE — TELEPHONE ENCOUNTER
Pt needs new rx for this sent to [facility] on [address]    He doesn't use [facility] [address] any longer

## 2022-08-29 NOTE — TELEPHONE ENCOUNTER
Travis Mackey, brother called. He needs to know what medication does the patient need to come off of because he is getting teeth pulled in about 7 days. He said he know needs to stop Cliopidogrel but anything else?     Please advise him at [phone]

## 2022-08-29 NOTE — TELEPHONE ENCOUNTER
Pt ,s brother Olaf Lea is calling afain he is asking if his brother should stop his blood thinner he is asking idf someone would call him back before the end of the day today   [phone]

## 2022-08-30 RX ORDER — ROSUVASTATIN CALCIUM 40 MG/1
40 TABLET, COATED ORAL
Qty: 90 TABLET | Refills: 1 | Status: SHIPPED | OUTPATIENT
Start: 2022-08-30

## 2022-08-30 NOTE — TELEPHONE ENCOUNTER
[facility] uses [facility] and [facility] there. Last Visit: 8/12/22 with Dr. Saundra Lea  Next Appointment: 2/7/23 with Dr. Saundra Lea    Requested Prescriptions     Pending Prescriptions Disp Refills    rosuvastatin (CRESTOR) 40 mg tablet 90 Tablet 1     Sig: Take 1 Tablet by mouth nightly. For [address] [facility] in place:   Recommendation Provided To:    Intervention Detail: New Rx: 1, reason: Patient Preference  Gap Closed?:   Intervention Accepted By:   Time Spent (min): 5

## 2022-09-01 ENCOUNTER — TELEPHONE (OUTPATIENT)
Dept: INTERNAL MEDICINE CLINIC | Age: 62
End: 2022-09-01

## 2022-09-01 ENCOUNTER — TELEPHONE (OUTPATIENT)
Dept: VASCULAR SURGERY | Age: 62
End: 2022-09-01

## 2022-09-01 NOTE — TELEPHONE ENCOUNTER
----- Message from Soham Singh sent at 9/1/2022  1:23 PM EDT -----  Foots very swollen turning black and blue  Says they cant come in until after October 6th, please call and advise   [phone] brother

## 2022-09-01 NOTE — TELEPHONE ENCOUNTER
Pt's brother Pao Raymundo called. Wanted to know why he hasn't heard from a rheumatologist and pain mgmt doctor. I told him that I don't see where we referred him to those specialists. He said Dr Neeru Dan at [address] [place] [address] was going to send info to [facility] so she could refer pt. I asked him if he has called their office to see if they referred pt or faxed us information. He said he would call them but he wants nurse to call him anyway. I provided him with our fax number.       Please call Pao Raymundo at [phone]

## 2022-09-01 NOTE — TELEPHONE ENCOUNTER
Called the brother back and per brother , the podiatrist stated that he needed a leg arterial preformed. Advised he just had one done and would fax to [facility], with last office note. Pt states that his leg is reddened and hot to touch advised that this is normal from a vascular standpoint , advised of symptoms he needed to look out for for compromised blood flow . He understood and would call .      Results faxed to [facility] foot and [facility]

## 2022-09-02 NOTE — TELEPHONE ENCOUNTER
Reached patients brother and informed him that Dr. Ruiz Sanabria will review the note when she returns next week and if appropriate will order the referrals to pain mgmt & rheumatology. Patient's brother verbalized understanding.

## 2022-09-06 ENCOUNTER — TELEPHONE (OUTPATIENT)
Dept: INTERNAL MEDICINE CLINIC | Age: 62
End: 2022-09-06

## 2022-09-06 DIAGNOSIS — G89.29 CHRONIC PAIN OF BOTH LOWER EXTREMITIES: ICD-10-CM

## 2022-09-06 DIAGNOSIS — G60.9 IDIOPATHIC PERIPHERAL NEUROPATHY: ICD-10-CM

## 2022-09-06 DIAGNOSIS — M79.672 CHRONIC PAIN IN LEFT FOOT: Primary | ICD-10-CM

## 2022-09-06 DIAGNOSIS — F41.9 ANXIETY AND DEPRESSION: ICD-10-CM

## 2022-09-06 DIAGNOSIS — B18.2 CHRONIC HEPATITIS C WITHOUT HEPATIC COMA (HCC): Primary | ICD-10-CM

## 2022-09-06 DIAGNOSIS — F32.A ANXIETY AND DEPRESSION: ICD-10-CM

## 2022-09-06 DIAGNOSIS — M79.604 CHRONIC PAIN OF BOTH LOWER EXTREMITIES: ICD-10-CM

## 2022-09-06 DIAGNOSIS — M79.89 SWELLING OF LEFT FOOT: ICD-10-CM

## 2022-09-06 DIAGNOSIS — M79.605 CHRONIC PAIN OF BOTH LOWER EXTREMITIES: ICD-10-CM

## 2022-09-06 DIAGNOSIS — G89.29 CHRONIC PAIN IN LEFT FOOT: Primary | ICD-10-CM

## 2022-09-06 RX ORDER — CITALOPRAM 10 MG/1
10 TABLET ORAL DAILY
Qty: 90 TABLET | Refills: 1 | Status: SHIPPED | OUTPATIENT
Start: 2022-09-06

## 2022-09-06 NOTE — TELEPHONE ENCOUNTER
Previous Rx was sent to [facility]. Patient is now asking for this to be sent to [facility].    Last Visit: 8/12/22 with Dr. Webster Shows  Next Appointment: 2/7/23 with Dr. Webster Shows    Requested Prescriptions     Pending Prescriptions Disp Refills    citalopram (CeleXA) 10 mg tablet 90 Tablet 1     Sig: Take 1 Tablet by mouth daily. Indications: anxiousness associated with depression         For Pharmacy [address] [address] in place:   Recommendation Provided To:    Intervention Detail: New Rx: 1, reason: Patient Preference  Gap Closed?:   Intervention Accepted By:   Time Spent (min): 5

## 2022-09-06 NOTE — TELEPHONE ENCOUNTER
Pt brother request can he have refill on this meds  citalopram (CeleXA) 10 mg tablet [[id number]]     Order Details  Dose: 10 mg Route: Oral.    Pharmacy: [facility] [address] [facility]. Also brother would like to know can he have sooner appt the 2/7/23? PT # [phone]    Please and Thank you.

## 2022-09-06 NOTE — TELEPHONE ENCOUNTER
Last Visit: 8/12/22 with Dr. Veronique White  Next Appointment: 2/7/23 with Dr. Veronique White  Previous Refill Encounter(s): 8/4/22 #30    Requested Prescriptions     Pending Prescriptions Disp Refills    traMADoL (ULTRAM) 50 mg tablet 30 Tablet 0     Sig: Take 1 Tablet by mouth two (2) times daily as needed for Pain for up to 30 days. Max Daily Amount: 100 mg. Indications: neuropathic pain         For Pharmacy Admin Tracking Only    CPA in place:   Recommendation Provided To:    Intervention Detail: New Rx: 1, reason: Patient Preference  Gap Closed?:   Intervention Accepted By:   Time Spent (min): 5

## 2022-09-06 NOTE — TELEPHONE ENCOUNTER
Requested Prescriptions     Signed Prescriptions Disp Refills    citalopram (CeleXA) 10 mg tablet 90 Tablet 1     Sig: Take 1 Tablet by mouth daily.  Indications: anxiousness associated with depression     Authorizing Provider: Greg Mcdonald

## 2022-09-07 RX ORDER — TRAMADOL HYDROCHLORIDE 50 MG/1
50 TABLET ORAL
Qty: 30 TABLET | Refills: 0 | Status: SHIPPED | OUTPATIENT
Start: 2022-09-07 | End: 2022-10-07

## 2022-09-12 ENCOUNTER — TELEPHONE (OUTPATIENT)
Dept: INTERNAL MEDICINE CLINIC | Age: 62
End: 2022-09-12

## 2022-09-12 NOTE — TELEPHONE ENCOUNTER
PA submitted awaiting approval.    [facility]ani has coupons available:  Tramadol 50 mg 30 tabs    [facility] - $8  [facility] - $8  [facility] - $10  [facility] - $15

## 2022-09-12 NOTE — TELEPHONE ENCOUNTER
Driss Bullard called requesting to speak with Nurse Madison Delaney regarding the prescription for Tramadol. He states that the pharmacy said they were waiting on something from the doctor. Called the pharmacy and they said they needed a prior auth. He is requesting a return call once done and can be reached at [phone].   Thank you

## 2022-09-15 NOTE — TELEPHONE ENCOUNTER
Pt's brother Clau stein. Stated they want to  the Tramadol as self pay at [address] [place] [address]. He said [facility] [address] told him they did not have the RX. He is now aware per July with the pharmacy there that they have the RX (it was on hold for PA) and will process as self pay with discount card.

## 2022-09-19 ENCOUNTER — TELEPHONE (OUTPATIENT)
Dept: INTERNAL MEDICINE CLINIC | Age: 62
End: 2022-09-19

## 2022-09-19 ENCOUNTER — TELEPHONE (OUTPATIENT)
Dept: VASCULAR SURGERY | Age: 62
End: 2022-09-19

## 2022-09-19 NOTE — TELEPHONE ENCOUNTER
Brother, Denise Yeboah called and wanted to know if we prescribed patient with B1 complex, it was not his PCP. Need refill.

## 2022-09-19 NOTE — TELEPHONE ENCOUNTER
Brother calling trying to refill b complex. he doesn't know the strength but says he thinks he has gotten it from [facility] before.  He is not with brother to have bottle

## 2022-09-19 NOTE — TELEPHONE ENCOUNTER
Patient's brother reached and made aware that Dr. Ryder Monday does not fill supplements, pt stated his Podiatrist did end up prescribing this. Patients brother also inquiring about his hepatic function lab results drawn on 8/19.

## 2022-09-20 NOTE — TELEPHONE ENCOUNTER
Spoke to brother and stated that he was able to get in touch with his podiatrist for the medication.

## 2022-10-11 DIAGNOSIS — I73.9 PAD (PERIPHERAL ARTERY DISEASE) (HCC): ICD-10-CM

## 2022-10-11 DIAGNOSIS — F17.200 SMOKER: ICD-10-CM

## 2022-11-11 ENCOUNTER — HOSPITAL ENCOUNTER (OUTPATIENT)
Dept: NON INVASIVE DIAGNOSTICS | Age: 62
Discharge: HOME | End: 2022-11-11
Attending: NURSE PRACTITIONER
Payer: MEDICAID

## 2022-11-11 VITALS
SYSTOLIC BLOOD PRESSURE: 138 MMHG | HEIGHT: 66 IN | WEIGHT: 114 LBS | BODY MASS INDEX: 18.32 KG/M2 | DIASTOLIC BLOOD PRESSURE: 75 MMHG

## 2022-11-11 DIAGNOSIS — R94.31 ABNORMAL ECG: Primary | ICD-10-CM

## 2022-11-11 PROCEDURE — 93306 TTE W/DOPPLER COMPLETE: CPT | Performed by: INTERNAL MEDICINE

## 2022-11-11 PROCEDURE — 93306 TTE W/DOPPLER COMPLETE: CPT

## 2022-11-15 DIAGNOSIS — I10 PRIMARY HYPERTENSION: ICD-10-CM

## 2022-11-15 RX ORDER — AMLODIPINE BESYLATE 10 MG/1
TABLET ORAL
Qty: 90 TABLET | Refills: 1 | OUTPATIENT
Start: 2022-11-15

## 2022-11-15 NOTE — TELEPHONE ENCOUNTER
8/12/22 amlodipine 90 d/s +1 refill.      Requested Prescriptions     Refused Prescriptions Disp Refills    amLODIPine (NORVASC) 10 mg tablet [Pharmacy Med Name: AMLODIPINE BESYLATE 10 MG TAB] 90 Tablet 1     Sig: TAKE 1 TABLET BY MOUTH EVERY DAY IN THE MORNING     Refused By: Shazia Thorne     Reason for Refusal: Patient has requested refill too soon

## 2022-11-16 DIAGNOSIS — I10 PRIMARY HYPERTENSION: ICD-10-CM

## 2022-11-16 RX ORDER — LISINOPRIL 40 MG/1
40 TABLET ORAL DAILY
Qty: 90 TABLET | Refills: 1 | OUTPATIENT
Start: 2022-11-16

## 2022-11-16 NOTE — TELEPHONE ENCOUNTER
Please reach out to patient's pharmacy as he should have refills until February 2023. Thank you    Requested Prescriptions     Refused Prescriptions Disp Refills    lisinopriL (PRINIVIL, ZESTRIL) 40 mg tablet 90 Tablet 1     Sig: Take 1 Tablet by mouth daily.  Indications: high blood pressure     Refused By: Sree Wray     Reason for Refusal: Patient has requested refill too soon

## 2022-11-16 NOTE — TELEPHONE ENCOUNTER
Brother calling to refill lisinopril. Says he cannot reach the pharmacy to refill so he needs a new rx called in. Then he stated they say the number on his bottle is no good. Requested Prescriptions     Pending Prescriptions Disp Refills    lisinopriL (PRINIVIL, ZESTRIL) 40 mg tablet 90 Tablet 1     Sig: Take 1 Tablet by mouth daily.  Indications: high blood pressure

## 2022-12-27 RX ORDER — VITAMIN B COMPLEX
1 CAPSULE ORAL DAILY
Qty: 90 CAPSULE | Refills: 1 | Status: SHIPPED | OUTPATIENT
Start: 2022-12-27

## 2022-12-27 NOTE — TELEPHONE ENCOUNTER
----- Message from Griselda Pence sent at 12/27/2022 10:05 AM EST -----  Subject: Refill Request    QUESTIONS  Name of Medication? Other - B-complex  Patient-reported dosage and instructions? 1x daily  How many days do you have left? 0  [id number] Pharmacy phone number (if available)? [phone]  ---------------------------------------------------------------------------  --------------  CALL BACK INFO  What is the best way for the office to contact you? OK to leave message on   voicemail  Preferred Call Back Phone Number? [phone]  ---------------------------------------------------------------------------  --------------  SCRIPT ANSWERS  Relationship to Patient? Other  Representative Name? Dinesh Gomez   Is the Representative on the appropriate HIPAA document in Epic?  Yes

## 2023-01-20 DIAGNOSIS — I10 PRIMARY HYPERTENSION: ICD-10-CM

## 2023-01-20 RX ORDER — LISINOPRIL 40 MG/1
TABLET ORAL
Qty: 90 TABLET | Refills: 1 | OUTPATIENT
Start: 2023-01-20

## 2023-01-20 RX ORDER — AMLODIPINE BESYLATE 10 MG/1
TABLET ORAL
Qty: 90 TABLET | Refills: 1 | OUTPATIENT
Start: 2023-01-20

## 2023-01-20 NOTE — TELEPHONE ENCOUNTER
Gave 6 months refills for lisinopril and amlodipine 8/12/2022. He should have enough medication to hold him until his appointment with me on 2/7/2023.   Thank you

## 2023-01-24 ENCOUNTER — TELEPHONE (OUTPATIENT)
Dept: INTERNAL MEDICINE CLINIC | Age: 63
End: 2023-01-24

## 2023-01-24 DIAGNOSIS — I10 PRIMARY HYPERTENSION: ICD-10-CM

## 2023-01-24 DIAGNOSIS — I73.9 PAD (PERIPHERAL ARTERY DISEASE) (HCC): ICD-10-CM

## 2023-01-24 DIAGNOSIS — Z78.9 DAILY CONSUMPTION OF ALCOHOL: ICD-10-CM

## 2023-01-24 DIAGNOSIS — Z78.9 DAILY CONSUMPTION OF ALCOHOL: Primary | ICD-10-CM

## 2023-01-24 NOTE — TELEPHONE ENCOUNTER
----- Message from Dasha Suarez sent at 1/24/2023 10:11 AM EST -----  Subject: Referral Request    Reason for referral request? Lab work that needs to be completed before   his visit on 2/7/23  Provider patient wants to be referred to(if known):     Provider Phone Number(if known): Additional Information for Provider?  Pls give Diony Arango a call  ---------------------------------------------------------------------------  --------------  Sol Tonychild INFO    [phone]; OK to leave message on voicemail  ---------------------------------------------------------------------------  --------------

## 2023-02-06 RX ORDER — AMLODIPINE BESYLATE 10 MG/1
10 TABLET ORAL DAILY
Qty: 90 TABLET | Refills: 1 | Status: CANCELLED | OUTPATIENT
Start: 2023-02-06

## 2023-02-07 ENCOUNTER — OFFICE VISIT (OUTPATIENT)
Dept: INTERNAL MEDICINE CLINIC | Age: 63
End: 2023-02-07
Payer: MEDICAID

## 2023-02-07 VITALS
OXYGEN SATURATION: 97 % | RESPIRATION RATE: 18 BRPM | WEIGHT: 122.2 LBS | DIASTOLIC BLOOD PRESSURE: 56 MMHG | HEIGHT: 66 IN | HEART RATE: 75 BPM | SYSTOLIC BLOOD PRESSURE: 114 MMHG | TEMPERATURE: 98.9 F | BODY MASS INDEX: 19.64 KG/M2

## 2023-02-07 DIAGNOSIS — M79.672 CHRONIC PAIN IN LEFT FOOT: ICD-10-CM

## 2023-02-07 DIAGNOSIS — Z23 ENCOUNTER FOR IMMUNIZATION: ICD-10-CM

## 2023-02-07 DIAGNOSIS — I70.90 ATHEROSCLEROSIS OF ARTERIES: ICD-10-CM

## 2023-02-07 DIAGNOSIS — F32.A ANXIETY AND DEPRESSION: ICD-10-CM

## 2023-02-07 DIAGNOSIS — F41.9 ANXIETY AND DEPRESSION: ICD-10-CM

## 2023-02-07 DIAGNOSIS — I10 PRIMARY HYPERTENSION: Primary | ICD-10-CM

## 2023-02-07 DIAGNOSIS — G89.29 CHRONIC PAIN IN LEFT FOOT: ICD-10-CM

## 2023-02-07 DIAGNOSIS — R76.8 ELEVATED RHEUMATOID FACTOR: ICD-10-CM

## 2023-02-07 DIAGNOSIS — B18.2 CHRONIC HEPATITIS C WITHOUT HEPATIC COMA (HCC): ICD-10-CM

## 2023-02-07 DIAGNOSIS — I73.9 PAD (PERIPHERAL ARTERY DISEASE) (HCC): ICD-10-CM

## 2023-02-07 PROBLEM — R79.89 ELEVATED LFTS: Status: RESOLVED | Noted: 2022-03-09 | Resolved: 2023-02-07

## 2023-02-07 PROBLEM — H26.9 CATARACT: Status: RESOLVED | Noted: 2022-03-07 | Resolved: 2023-02-07

## 2023-02-07 PROBLEM — E87.5 HYPERKALEMIA: Status: RESOLVED | Noted: 2022-03-09 | Resolved: 2023-02-07

## 2023-02-07 PROBLEM — Z98.49: Status: RESOLVED | Noted: 2022-03-07 | Resolved: 2023-02-07

## 2023-02-07 PROBLEM — B19.20 HEPATITIS C TEST POSITIVE: Status: RESOLVED | Noted: 2022-03-09 | Resolved: 2023-02-07

## 2023-02-07 PROCEDURE — 3074F SYST BP LT 130 MM HG: CPT | Performed by: NURSE PRACTITIONER

## 2023-02-07 PROCEDURE — 99214 OFFICE O/P EST MOD 30 MIN: CPT | Performed by: NURSE PRACTITIONER

## 2023-02-07 PROCEDURE — 90670 PCV13 VACCINE IM: CPT | Performed by: NURSE PRACTITIONER

## 2023-02-07 PROCEDURE — 3078F DIAST BP <80 MM HG: CPT | Performed by: NURSE PRACTITIONER

## 2023-02-07 RX ORDER — LISINOPRIL 40 MG/1
40 TABLET ORAL DAILY
Qty: 90 TABLET | Refills: 1 | Status: SHIPPED | OUTPATIENT
Start: 2023-02-07

## 2023-02-07 RX ORDER — CITALOPRAM 10 MG/1
10 TABLET ORAL DAILY
Qty: 90 TABLET | Refills: 1 | Status: SHIPPED | OUTPATIENT
Start: 2023-02-07

## 2023-02-07 RX ORDER — ROSUVASTATIN CALCIUM 40 MG/1
40 TABLET, COATED ORAL
Qty: 90 TABLET | Refills: 1 | Status: SHIPPED | OUTPATIENT
Start: 2023-02-07

## 2023-02-07 RX ORDER — AMLODIPINE BESYLATE 5 MG/1
5 TABLET ORAL DAILY
Qty: 90 TABLET | Refills: 1 | Status: SHIPPED | OUTPATIENT
Start: 2023-02-07

## 2023-02-07 NOTE — PROGRESS NOTES
[facility] [address]   [place], [address]  [phone] ANEIVB/[phone] fax    2/7/2023    Nilsa Peterson 1960 is a pleasant WHITE/NON- male. Todays concern/HPI:  Hep C. Completed tx  RA. Pain in legs. Vascular said no vein issues. RA labs elevated. Appt with RA specialist 2/14/23. HTN. Denies dizziness, nausea. No complaints or concerns. Denies CP, SOB, GI/ issues, dizziness, fatigue. Tolerating medications w/o side effects. Past Medical History:   Diagnosis Date    Cataract 3/7/2022    Cataract extraction status of eye, unspecified laterality 3/7/2022    Chronic pain in left foot 3/7/2022    Current smoker 3/7/2022    Daily consumption of alcohol 3/7/2022    Elevated LFTs 3/9/2022    Hepatitis C test positive 3/9/2022    Hyperkalemia 3/9/2022    Marijuana smoker 3/7/2022    Mental subnormality 3/7/2022    Primary hypertension 3/7/2022     Current Outpatient Medications   Medication Sig    citalopram (CeleXA) 10 mg tablet Take 1 Tablet by mouth daily. Indications: anxiousness associated with depression    lisinopriL (PRINIVIL, ZESTRIL) 40 mg tablet Take 1 Tablet by mouth daily. Indications: high blood pressure    rosuvastatin (CRESTOR) 40 mg tablet Take 1 Tablet by mouth nightly. amLODIPine (NORVASC) 5 mg tablet Take 1 Tablet by mouth daily. vitamin B complex (Vitamins B Complex) capsule Take 1 Capsule by mouth daily. aspirin 81 mg chewable tablet Take 1 Tablet by mouth in the morning. Indications: treatment to prevent peripheral artery thromboembolism    clopidogreL (PLAVIX) 75 mg tab Take 1 Tablet by mouth in the morning. Indications: treatment to prevent peripheral artery thromboembolism     No current facility-administered medications for this visit.        Physical:   Visit Vitals  BP (!) 114/56   Pulse 75   Temp 98.9 °F (37.2 °C) (Temporal)   Resp 18   Ht 5' 6\" (1.676 m)   Wt 122 lb 3.2 oz (55.4 kg)   SpO2 97%   BMI 19.72 kg/m²      Wt Readings from Last 3 Encounters:   02/07/23 122 lb 3.2 oz (55.4 kg)   11/11/22 114 lb (51.7 kg)   08/12/22 114 lb 9.6 oz (52 kg)       Exam:  Physical Exam  Constitutional:       Appearance: Normal appearance. Neck:      Vascular: No carotid bruit. Cardiovascular:      Rate and Rhythm: Normal rate and regular rhythm. Pulmonary:      Effort: Pulmonary effort is normal.      Breath sounds: Normal breath sounds. Musculoskeletal:      Comments: Amb with standard cane   Neurological:      Mental Status: He is alert and oriented to person, place, and time. Psychiatric:         Mood and Affect: Mood normal.      Comments: Quiet       Body mass index is 19.72 kg/m². Review of Data:  Did not complete labs prior to appt. Plan:    1. Primary hypertension  Assessment & Plan:  Noted BP low today  Cont lisinopril 40mg every day  Decrease amlodipine to 5mg every day  Check BP and report if 3 consecutive readings greater than 139/89 to office  Orders:  -     lisinopriL (PRINIVIL, ZESTRIL) 40 mg tablet; Take 1 Tablet by mouth daily. Indications: high blood pressure, Normal, Disp-90 Tablet, R-1  -     amLODIPine (NORVASC) 5 mg tablet; Take 1 Tablet by mouth daily. , Normal, Disp-90 Tablet, R-1  -     METABOLIC PANEL, BASIC; Future  2. Anxiety and depression  -     citalopram (CeleXA) 10 mg tablet; Take 1 Tablet by mouth daily. Indications: anxiousness associated with depression, Normal, Disp-90 Tablet, R-1  3. Atherosclerosis of arteries  -     rosuvastatin (CRESTOR) 40 mg tablet; Take 1 Tablet by mouth nightly., Normal, Disp-90 Tablet, R-1  -     LIPID PANEL; Future  4. Elevated rheumatoid factor  Assessment & Plan:   monitored by specialist. No acute findings meriting change in the plan   ? RA causing sx  Sed rate and RA factor elevated  Appt with RA 2/14/23  5. Encounter for immunization  -     PNEUMOCOCCAL, PCV-13, (AGE 6 WKS+), IM  -     UT IM ADM PRQ ID SUBQ/IM NJXS 1 VACCINE  6.  Chronic hepatitis C without hepatic coma ([facility] [address]  Assessment & Plan:   monitored by specialist. No acute findings meriting change in the plan   Completed Therapy  Reviewed labs-Hep C PCR neg  7. Chronic pain in left foot  Assessment & Plan:   monitored by specialist. No acute findings meriting change in the plan   ? RA causing sx  Sed rate and RA factor elevated  Appt with RA 2/14/23  8. PAD (peripheral artery disease) ([facility] [address]  Assessment & Plan:   monitored by specialist. No acute findings meriting change in the plan   Cont statin therapy  Smoking cessation needed    Reviewed medication and completed medication reconciliation with the patient. Reviewed side effects of medications with the patient. Questions were answered and patient verb understanding. Past Surgical History:   Procedure Laterality Date    HX CATARACT REMOVAL Right     4 years ago     Allergies and Intolerances:   No Known Allergies  Family History:   History reviewed. No pertinent family history. Social History:   He  reports that he has been smoking cigarettes. He started smoking about 41 years ago. He has been smoking an average of 1 pack per day. He has never used smokeless tobacco.   Social History     Substance and Sexual Activity   Alcohol Use Yes    Alcohol/week: 14.0 standard drinks    Types: 14 Cans of beer per week    Comment: 2 beers daily     Immunization History: There is no immunization history for the selected administration types on file for this patient. Follow-up and Dispositions    Return in about 6 months (around 8/7/2023) for Hypertension, Cholesterol, Lab fasting (CMP, lipid).          Dr. Juice Pickard, AGNP-C, DNP  [facility] [address] [place] [address]

## 2023-02-07 NOTE — PROGRESS NOTES
Chief Complaint   Patient presents with    Hypertension     6 month f/u     1. \"Have you been to the ER, urgent care clinic since your last visit? Hospitalized since your last visit? \" No    2. \"Have you seen or consulted any other health care providers outside of the [address] [place] [address] since your last visit? \"  Yes, Rheumatology and GI      3. For patients aged 39-70: Has the patient had a colonoscopy / FIT/ Cologuard? No      If the patient is female:    4. For patients aged 41-77: Has the patient had a mammogram within the past 2 years? NA - based on age or sex      11. For patients aged 21-65: Has the patient had a pap smear?  NA - based on age or sex

## 2023-02-07 NOTE — ASSESSMENT & PLAN NOTE
monitored by specialist. No acute findings meriting change in the plan   Cont statin therapy  Smoking cessation needed

## 2023-02-07 NOTE — ASSESSMENT & PLAN NOTE
Noted BP low today  Cont lisinopril 40mg every day  Decrease amlodipine to 5mg every day  Check BP and report if 3 consecutive readings greater than 139/89 to office

## 2023-02-07 NOTE — ASSESSMENT & PLAN NOTE
monitored by specialist. No acute findings meriting change in the plan   ? RA causing sx  Sed rate and RA factor elevated  Appt with RA 2/14/23

## 2023-02-07 NOTE — PATIENT INSTRUCTIONS
Vaccine Information Statement    Pneumococcal Conjugate Vaccine: What You Need to Know    Many vaccine information statements are available in [place] and other languages. See www.immunize.org/vis. Hojas de información sobre vacunas están disponibles en español y en muchos otros idiomas. Visite www.immunize.org/vis. 1. Why get vaccinated? Pneumococcal conjugate vaccine can prevent pneumococcal disease. Pneumococcal disease refers to any illness caused by pneumococcal bacteria. These bacteria can cause many types of illnesses, including pneumonia, which is an infection of the lungs. Pneumococcal bacteria are one of the most common causes of pneumonia. Besides pneumonia, pneumococcal bacteria can also cause:  Ear infections  Sinus infections  Meningitis (infection of the tissue covering the brain and spinal cord)  Bacteremia (infection of the blood)    Anyone can get pneumococcal disease, but children under 3years old, people with certain medical conditions or other risk factors, and adults 72 years or older are at the highest risk. Most pneumococcal infections are mild. However, some can result in long-term problems, such as brain damage or hearing loss. Meningitis, bacteremia, and pneumonia caused by pneumococcal disease can be fatal.     2. Pneumococcal conjugate vaccine     Pneumococcal conjugate vaccine helps protect against bacteria that cause pneumococcal disease. There are three pneumococcal conjugate vaccines (PCV13, PCV15, and PCV20). The different vaccines are recommended for different people based on their age and medical status. PCV13  Infants and young children usually need 4 doses of PCV13, at ages 3, 3, 10, and 12-15 months. Older children (through age 62 months) may be vaccinated with PCV13 if they did not receive the recommended doses.   Children and adolescents 7-21 years of age with certain medical conditions should receive a single dose of PCV13 if they did not already receive PCV13.    PCV15 or PCV20  Adults 23 through 59years old with certain medical conditions or other risk factors who have not already received a pneumococcal conjugate vaccine should receive either:  a single dose of PCV15 followed by a dose of pneumococcal polysaccharide vaccine (PPSV23), or   a single dose of PCV20. Adults 72 years or older who have not already received a pneumococcal conjugate vaccine should receive either:  a single dose of PCV15 followed by a dose of PPSV23, or   a single dose of PCV20. Your health care provider can give you more information. 3. Talk with your health care provider    Tell your vaccination provider if the person getting the vaccine:  Has had an allergic reaction after a previous dose of any type of pneumococcal conjugate vaccine (PCV13, PCV15, PCV20, or an earlier pneumococcal conjugate vaccine known as PCV7), or to any vaccine containing diphtheria toxoid (for example, DTaP), or has any severe, life-threatening allergies    In some cases, your health care provider may decide to postpone pneumococcal conjugate vaccination until a future visit. People with minor illnesses, such as a cold, may be vaccinated. People who are moderately or severely ill should usually wait until they recover. Your health care provider can give you more information. 4. Risks of a vaccine reaction    Redness, swelling, pain, or tenderness where the shot is given, and fever, loss of appetite, fussiness (irritability), feeling tired, headache, muscle aches, joint pain, and chills can happen after pneumococcal conjugate vaccination. [place] children may be at increased risk for seizures caused by fever after PCV13 if it is administered at the same time as inactivated influenza vaccine. Ask your health care provider for more information. People sometimes faint after medical procedures, including vaccination.  Tell your provider if you feel dizzy or have vision changes or ringing in the ears. As with any medicine, there is a very remote chance of a vaccine causing a severe allergic reaction, other serious injury, or death. 5. What if there is a serious problem? An allergic reaction could occur after the vaccinated person leaves the clinic. If you see signs of a severe allergic reaction (hives, swelling of the face and throat, difficulty breathing, a fast heartbeat, dizziness, or weakness), call 9-1-1 and get the person to the nearest hospital.    For other signs that concern you, call your health care provider. Adverse reactions should be reported to the Vaccine Adverse Event Reporting System (VAERS). Your health care provider will usually file this report, or you can do it yourself. Visit the VAERS website at www.vaers. hhs.gov or call 1-477.938.2367. VAERS is only for reporting reactions, and VAERS staff members do not give medical advice. 6. The National Vaccine Injury Compensation Program    The [facility] Vaccine Injury Compensation Program (VICP) is a federal program that was created to compensate people who may have been injured by certain vaccines. Claims regarding alleged injury or death due to vaccination have a time limit for filing, which may be as short as two years. Visit the VICP website at www.[facility].gov/vaccinecompensation or call 2-444.249.6319 to learn about the program and about filing a claim. 7. How can I learn more? Ask your health care provider. Call your local or state health department. Visit the website of the Food and Drug Administration (FDA) for vaccine package inserts and additional information at www.fda.gov/vaccines-blood-biologics/vaccines. Contact the Centers for Disease Control and Prevention (CDC): Call 4-166.485.6825 (1-800-CDC-INFO) or  Visit CDCs website at www.cdc.gov/vaccines. Vaccine Information Statement (Interim)  Pneumococcal Conjugate Vaccine  2/4/2022  42 . [facility] [id number]   Department of Health and Human Services  Centers for Disease Control and Prevention

## 2023-02-07 NOTE — ASSESSMENT & PLAN NOTE
monitored by specialist. No acute findings meriting change in the plan   Completed Therapy  Reviewed labs-Hep C PCR neg

## 2023-02-07 NOTE — PROGRESS NOTES
Alejandra Mares 1960 male who presents for routine immunizations. Patient denies any symptoms , reactions or allergies that would exclude them from being immunized today. Risks and adverse reactions were discussed and the VIS was given to them. All questions were addressed. Order placed for PCV13 in RIGHT deltoid, per Verbal Order from Arlyn Escalante, [address], with read back. Patient was observed for 15 min post injection. There were no reactions observed.     Ginette NAIK

## 2023-02-15 RX ORDER — AMLODIPINE BESYLATE 10 MG/1
TABLET ORAL
Qty: 90 TABLET | OUTPATIENT
Start: 2023-02-15

## 2023-02-20 DIAGNOSIS — I73.9 PVD (PERIPHERAL VASCULAR DISEASE) (HCC): Primary | ICD-10-CM

## 2023-02-20 RX ORDER — AMLODIPINE BESYLATE 10 MG/1
10 TABLET ORAL DAILY
COMMUNITY
End: 2023-03-22

## 2023-02-20 RX ORDER — AMLODIPINE BESYLATE 10 MG/1
TABLET ORAL
Qty: 90 TABLET | OUTPATIENT
Start: 2023-02-20

## 2023-02-21 ENCOUNTER — OFFICE VISIT (OUTPATIENT)
Age: 63
End: 2023-02-21
Payer: MEDICAID

## 2023-02-21 VITALS
WEIGHT: 122.14 LBS | DIASTOLIC BLOOD PRESSURE: 72 MMHG | OXYGEN SATURATION: 98 % | HEART RATE: 77 BPM | BODY MASS INDEX: 19.63 KG/M2 | HEIGHT: 66 IN | SYSTOLIC BLOOD PRESSURE: 122 MMHG

## 2023-02-21 DIAGNOSIS — G60.9 IDIOPATHIC PERIPHERAL NEUROPATHY: ICD-10-CM

## 2023-02-21 DIAGNOSIS — I73.9 PERIPHERAL VASCULAR DISEASE, UNSPECIFIED (HCC): Primary | ICD-10-CM

## 2023-02-21 PROCEDURE — 3078F DIAST BP <80 MM HG: CPT | Performed by: PHYSICIAN ASSISTANT

## 2023-02-21 PROCEDURE — 99214 OFFICE O/P EST MOD 30 MIN: CPT | Performed by: PHYSICIAN ASSISTANT

## 2023-02-21 PROCEDURE — 3074F SYST BP LT 130 MM HG: CPT | Performed by: PHYSICIAN ASSISTANT

## 2023-02-21 RX ORDER — GABAPENTIN 100 MG/1
100 CAPSULE ORAL 2 TIMES DAILY
Qty: 180 CAPSULE | Refills: 1 | Status: SHIPPED | OUTPATIENT
Start: 2023-02-21 | End: 2023-08-20

## 2023-02-21 ASSESSMENT — PATIENT HEALTH QUESTIONNAIRE - PHQ9
SUM OF ALL RESPONSES TO PHQ QUESTIONS 1-9: 0
SUM OF ALL RESPONSES TO PHQ9 QUESTIONS 1 & 2: 0
2. FEELING DOWN, DEPRESSED OR HOPELESS: 0
1. LITTLE INTEREST OR PLEASURE IN DOING THINGS: 0

## 2023-02-21 NOTE — PROGRESS NOTES
1. Have you been to the ER, urgent care clinic since your last visit? No     Hospitalized since your last visit? No     2. Have you seen or consulted any other health care providers outside of the [address] [place] [address] since your last visit? Include any pap smears or colon screening.   No

## 2023-02-23 DIAGNOSIS — I10 PRIMARY HYPERTENSION: Primary | ICD-10-CM

## 2023-02-24 NOTE — PROGRESS NOTES
Masoud [place]      Chief Complaint   Patient presents with    Leg Pain         History and Physical    Shauna Bergman is a pleasant 61 y/o male smoker with PAD returns to clinic s/p LLE SFA PTA with DCB and bilateral LAILA/EIA stenting on 7/22/22 for LLE resting ischemic pain and severe RLE claudication. Postoperatively he developed a small R groin access site hematoma following sheath removal (no closure device used) and he was observed overnight. He was discharged on POD 1 without event, with ASA and plavix. He returns today with his wife, who assists him at home and with his medical care. The patient notes LLE swelling and pain of his left foot and ankle. He walked into clinic today with a cane, whereas preop he was pushed in a wheelchair by his brother. Continues to increase his daily ambulation, denies any leg pain with short distance ambulation but does start to get discomfort at greater than 2-3 blocks. Denies any leg pain at rest.  Does continue to get some toe pain, episodic, and does occur at rest at times. The pt states that he is taking his aspirin as recommended from last visit. Rick Weber He continues cutting back on cigarettes, states that he is down to 4 a day. Overall patient states that since his last visit he has become more active, states that he is trying to make better lifestyle choices and is trying to increase his activity as tolerated.           Past Medical History:   Diagnosis Date    Cataract 3/7/2022    Cataract extraction status of eye, unspecified laterality 3/7/2022    Chronic pain in left foot 3/7/2022    Current smoker 3/7/2022    Daily consumption of alcohol 3/7/2022    Elevated LFTs 3/9/2022    Hepatitis C test positive 3/9/2022    Hyperkalemia 3/9/2022    Marijuana smoker 3/7/2022    Mental subnormality 3/7/2022    Primary hypertension 3/7/2022           Patient Active Problem List   Diagnosis Code    Cataract extraction status of eye, unspecified laterality Z98.49    Cataract H26.9 Daily consumption of alcohol Z78.9    Marijuana smoker F12.90    Smoker F17.200    Chronic pain in left foot M79.672, G89.29    Primary hypertension I10    Mental subnormality F79    Elevated LFTs R79.89    Hepatitis C test positive B19.20    Hyperkalemia E87.5    Chronic hepatitis C without hepatic coma (HCC) B18.2    Abdominal bruit R09.89    PAD (peripheral artery disease) (HCC) I73.9    PVD (peripheral vascular disease) (HCC) I73.9            Past Surgical History:   Procedure Laterality Date    HX CATARACT REMOVAL Right       4 years ago             Current Outpatient Medications   Medication Sig Dispense Refill    traMADoL (ULTRAM) 50 mg tablet Take 1 Tablet by mouth two (2) times daily as needed for Pain for up to 30 days. Max Daily Amount: 100 mg. Indications: neuropathic pain 30 Tablet 0    aspirin 81 mg chewable tablet Take 1 Tablet by mouth in the morning. Indications: treatment to prevent peripheral artery thromboembolism 90 Tablet 3    clopidogreL (PLAVIX) 75 mg tab Take 1 Tablet by mouth in the morning. Indications: treatment to prevent peripheral artery thromboembolism 90 Tablet 3    sofosbuvir-velpatasvir (EPCLUSA) 400-100 mg tablet          rosuvastatin (CRESTOR) 40 mg tablet Take 1 Tablet by mouth nightly. 90 Tablet 1    amLODIPine (NORVASC) 10 mg tablet Take 1 Tablet by mouth in the morning. 90 Tablet 1    lisinopriL (PRINIVIL, ZESTRIL) 40 mg tablet Take 1 Tablet by mouth in the morning. Indications: high blood pressure 90 Tablet 1    citalopram (CeleXA) 10 mg tablet Take 1 Tablet by mouth in the morning. Indications: anxiousness associated with depression 90 Tablet 1      No Known Allergies   Social History            Tobacco Use    Smoking status: Every Day       Packs/day: 1.00       Types: Cigarettes       Start date: 1982    Smokeless tobacco: Never   Substance Use Topics    Alcohol use:  Yes       Alcohol/week: 14.0 standard drinks       Types: 14 Cans of beer per week       Comment: 2 beers daily    Drug use: Yes       Types: Marijuana      History reviewed. No pertinent family history. Review of Systems    General: negative for fever   Eyes: negative for vision loss   HENT: negative for cold symptoms   Respiratory negative for shortness of breath   Cardiac: negative for chest pain   Vascular negative for foot pain at night    Gastrointestinal: negative for abdominal pain   Genitourinary: negative for dysuria    Endocrine: negative for excessive thirst   Skin: negative for rash   Neurological: negative for paralysis   Psychiatric: negative for depression         Physical Exam:    Visit Vitals  /60 (BP 1 Location: Right arm, BP Patient Position: Sitting)   Pulse 67   Ht 5' 6\" (1.676 m)   Wt 121 lb 0.5 oz (54.9 kg)   SpO2 98%   BMI 19.54 kg/m²         Constitutional:  Patient is well developed, well nourished, and not distressed. HEENT: Atraumatic, normocephalic, wearing a mask. Eyes:   Cunjunctivae clear, no scleral icterus  Cardiovascular:  Normal rate, regular rhythm, normal heart sounds. Pulmonary/Chest: Effort normal and breath sounds normal.  he has no wheezes or no rales. Abdominal:   Soft, non-distended. No tenderness to palpation. Extremities:   BLE warm. LLE DP pulse palpable. There is mild edema of left foot, extending to the mid leg. Motor and sensory are intact. No calf tenderness to palpation bilaterally. Easily obtain Doppler signals noted bilaterally. Neurological:  he  is alert and oriented x3. Motor & sensory grossly intact in all 4 limbs. Psych: Appropriate mood and affect.       Imaging/Studies:      PVD: Peripheral Vascular DZ, Unspecified   Dx: PVD (peripheral vascular disease) (HCC) [I73.9 (ICD-10-CM)]     Vitals    Weight Height BSA (Calculated - sq m) BP Pulse   122 lb 2.2 oz (55.4 kg) 5' 6\" (1.676 m) 1.61 sq meters 122/72 77     PACS Images     Show images for Vascular duplex aorta/IVC/iliac/bypass graft complete  Interpretation Summary         Patent bilateral common and external iliac arteries with stents with no evidence of stent stenosis. Patent distal aorta with no evidence of stenosis or aneurysmal change. Procedure Details    A gray scale, color Doppler imaging and spectral Doppler analysis ultrasound was performed. During the study longitudinal and transverse views were obtained. Pulsed wave doppler was performed. Overall the study quality was poor visualization. Study was technically difficult due to: acoustic shadowing and bowel gas. Abdominal Findings    Abdominal Aorta    Invasive Procedure History:   The patient has an invasive procedure history of left iliac PTA with stent and right iliac PTA with stent. The left iliac PTA with stent is patent. The right iliac PTA with stent is patent. Distal Aorta: Patent.      Abdominal Aorta Measurements     PSV AP TR   Dist Ao 81.8 cm/s        1.85 cm        1.67 cm            Iliac Artery Measurements     PSV Loco Ratio   Rt LAILA Prox 103.4 cm/s           Rt LAILA Mid 68.7 cm/s           Rt LAILA Dist 85.1 cm/s           Rt EIA Prox 125.3 cm/s        1.5          Rt EIA Mid 125.3 cm/s        1          Rt EIA Dist 216.6 cm/s        1.7          Lt EIA Dist 149.3 cm/s              Rt IIA 97.9 cm/s             Right Graft/Stent 1 Measurements     PSV   Name RCIA Stent          Inflow Artery 82 cm/s          Prox 103.4 cm/s          Mid 107 cm/s          Dist 83.3 cm/s           Right Graft/Stent 2 Measurements     PSV   Name REIA Stent          Inflow Artery 83 cm/s          Prox 118 cm/s          Mid 121.7 cm/s          Dist 193.3 cm/s                     Left Graft/Stent 1 Measurements     PSV   Name LCIA Stent          Inflow Artery 82 cm/s          Prox 92 cm/s          Mid 102 cm/s          Dist 83 cm/s          Outflow Vessel 114.4 cm/s           Left Graft/Stent 2 Measurements     PSV   Name HERMILA Stent          Inflow Artery 83 cm/s          Prox 114.4 cm/s          Mid 86.4 cm/s          Dist 141.5 cm/s          Outflow Vessel 146.7 cm/s                     Procedure Staff    Technologist/Clinician: Dwight Willis  Supporting Staff: None  Performing Physician/Midlevel: None     Exam Completion Date/Time: 2/21/23  8:19 AM    Above findings we will continue with observation and surveillance. Impression:  S/p LLE SFA and popliteal artery recannalization with bilateral LAILA and EIA stent placement 2 weeks ago for LLE CLI and  mild RLE claudication. Clinically doing well. LLE edema due to venous insufficiency. RLE focal great toe pain of unclear etiology, however ddx includes gout and MSK diagnoses. Continued smoking. Plan:  Follow up in 1 mos to check and see if the patient is getting any effect from new medication    Gabapentin 100 mg p.o. 3 times daily for neuropathic left lower extremity leg pain     Continue ASA and plavix indefinitely     Continue with podiatry for eval of R great toe     Encouraged complete smoking cessation. Patient educated on the major sequelae such as stroke, MI, acute limb loss if he continues to smoke in the face of vascular disease. Patient offered pharmacological and psychological assistance but declines and states he will continue to cut back on his own. Patient encouraged to do so. Patient states she understands above, is more than willing to proceed as planned. I will discuss details with my attending.

## 2023-03-21 ENCOUNTER — OFFICE VISIT (OUTPATIENT)
Age: 63
End: 2023-03-21
Payer: MEDICAID

## 2023-03-21 VITALS
SYSTOLIC BLOOD PRESSURE: 130 MMHG | HEIGHT: 66 IN | HEART RATE: 77 BPM | BODY MASS INDEX: 19.63 KG/M2 | DIASTOLIC BLOOD PRESSURE: 72 MMHG | WEIGHT: 122.14 LBS | OXYGEN SATURATION: 99 %

## 2023-03-21 DIAGNOSIS — I73.9 PVD (PERIPHERAL VASCULAR DISEASE) (HCC): Primary | ICD-10-CM

## 2023-03-21 PROCEDURE — 99214 OFFICE O/P EST MOD 30 MIN: CPT | Performed by: PHYSICIAN ASSISTANT

## 2023-03-21 PROCEDURE — 3075F SYST BP GE 130 - 139MM HG: CPT | Performed by: PHYSICIAN ASSISTANT

## 2023-03-21 PROCEDURE — 3078F DIAST BP <80 MM HG: CPT | Performed by: PHYSICIAN ASSISTANT

## 2023-03-21 ASSESSMENT — PATIENT HEALTH QUESTIONNAIRE - PHQ9
SUM OF ALL RESPONSES TO PHQ QUESTIONS 1-9: 0
SUM OF ALL RESPONSES TO PHQ QUESTIONS 1-9: 0
SUM OF ALL RESPONSES TO PHQ9 QUESTIONS 1 & 2: 0
SUM OF ALL RESPONSES TO PHQ QUESTIONS 1-9: 0
2. FEELING DOWN, DEPRESSED OR HOPELESS: 0
1. LITTLE INTEREST OR PLEASURE IN DOING THINGS: 0
SUM OF ALL RESPONSES TO PHQ QUESTIONS 1-9: 0

## 2023-03-21 NOTE — PROGRESS NOTES
1. Have you been to the ER, urgent care clinic since your last visit? No      Hospitalized since your last visit? No     2. Have you seen or consulted any other health care providers outside of the [address] [place] [address] since your last visit? Include any pap smears or colon screening.   No
Imaging/Studies:      PVD: Peripheral Vascular DZ, Unspecified   Dx: PVD (peripheral vascular disease) ([facility]) [I73.9 (ICD-10-CM)]     Vitals    Weight Height BSA (Calculated - sq m) BP Pulse   122 lb 2.2 oz (55.4 kg) 5' 6\" (1.676 m) 1.61 sq meters 122/72 77     PACS Images     Show images for Vascular duplex aorta/IVC/iliac/bypass graft complete  Interpretation Summary         Patent bilateral common and external iliac arteries with stents with no evidence of stent stenosis. Patent distal aorta with no evidence of stenosis or aneurysmal change. Procedure Details    A gray scale, color Doppler imaging and spectral Doppler analysis ultrasound was performed. During the study longitudinal and transverse views were obtained. Pulsed wave doppler was performed. Overall the study quality was poor visualization. Study was technically difficult due to: acoustic shadowing and bowel gas. Abdominal Findings    Abdominal Aorta    Invasive Procedure History:   The patient has an invasive procedure history of left iliac PTA with stent and right iliac PTA with stent. The left iliac PTA with stent is patent. The right iliac PTA with stent is patent. Distal Aorta: Patent.      Abdominal Aorta Measurements     PSV AP TR   Dist Ao 81.8 cm/s        1.85 cm        1.67 cm            Iliac Artery Measurements     PSV Loco Ratio   Rt LAILA Prox 103.4 cm/s           Rt LAILA Mid 68.7 cm/s           Rt LAILA Dist 85.1 cm/s           Rt EIA Prox 125.3 cm/s        1.5          Rt EIA Mid 125.3 cm/s        1          Rt EIA Dist 216.6 cm/s        1.7          Lt EIA Dist 149.3 cm/s              Rt IIA 97.9 cm/s             Right Graft/Stent 1 Measurements     PSV   Name RCIA Stent          Inflow Artery 82 cm/s          Prox 103.4 cm/s          Mid 107 cm/s          Dist 83.3 cm/s           Right Graft/Stent 2 Measurements     PSV   Name REIA Stent          Inflow Artery 83 cm/s          Prox 118 cm/s          Mid 121.7 cm/s

## 2023-03-22 RX ORDER — AMLODIPINE BESYLATE 10 MG/1
TABLET ORAL
Qty: 90 TABLET | Refills: 1 | Status: SHIPPED | OUTPATIENT
Start: 2023-03-22 | End: 2023-03-30 | Stop reason: DRUGHIGH

## 2023-03-29 ENCOUNTER — TELEPHONE (OUTPATIENT)
Age: 63
End: 2023-03-29

## 2023-03-29 DIAGNOSIS — I10 PRIMARY HYPERTENSION: Primary | ICD-10-CM

## 2023-03-30 RX ORDER — AMLODIPINE BESYLATE 5 MG/1
5 TABLET ORAL DAILY
Qty: 90 TABLET | Refills: 1 | Status: SHIPPED | OUTPATIENT
Start: 2023-03-30

## 2023-05-09 NOTE — Clinical Note
Diagnostic wire removed. Guidewire tip is intact. Consent (Scalp)/Introductory Paragraph: The rationale for Mohs was explained to the patient and consent was obtained. The risks, benefits and alternatives to therapy were discussed in detail. Specifically, the risks of changes in hair growth pattern secondary to repair, infection, scarring, bleeding, prolonged wound healing, incomplete removal, allergy to anesthesia, nerve injury and recurrence were addressed. Prior to the procedure, the treatment site was clearly identified and confirmed by the patient. All components of Universal Protocol/PAUSE Rule completed.

## 2023-05-23 ENCOUNTER — TELEPHONE (OUTPATIENT)
Age: 63
End: 2023-05-23

## 2023-05-23 RX ORDER — VITAMIN B COMPLEX
1 CAPSULE ORAL DAILY
Qty: 93 CAPSULE | Refills: 3 | Status: SHIPPED | OUTPATIENT
Start: 2023-05-23

## 2023-05-23 NOTE — TELEPHONE ENCOUNTER
Requested refill:    - vitamin B complex (Vitamins B Complex) capsule    Pharmacy: [facility] [address]

## 2023-05-30 ENCOUNTER — TELEPHONE (OUTPATIENT)
Age: 63
End: 2023-05-30

## 2023-05-30 DIAGNOSIS — I10 ESSENTIAL (PRIMARY) HYPERTENSION: ICD-10-CM

## 2023-05-30 DIAGNOSIS — I10 PRIMARY HYPERTENSION: Primary | ICD-10-CM

## 2023-05-30 RX ORDER — LISINOPRIL 40 MG/1
TABLET ORAL
Qty: 90 TABLET | Refills: 1 | OUTPATIENT
Start: 2023-05-30

## 2023-05-30 RX ORDER — LISINOPRIL 40 MG/1
40 TABLET ORAL DAILY
Qty: 90 TABLET | Refills: 0 | Status: SHIPPED | OUTPATIENT
Start: 2023-05-30

## 2023-05-30 RX ORDER — LISINOPRIL 40 MG/1
TABLET ORAL
Qty: 90 TABLET | Refills: 0 | OUTPATIENT
Start: 2023-05-30

## 2023-05-30 NOTE — TELEPHONE ENCOUNTER
I sent enough med with refills for 6 months for lisinopril in Feb. Not sure what the problem is. I sent another refill.     Requested Prescriptions     Signed Prescriptions Disp Refills    lisinopril (PRINIVIL;ZESTRIL) 40 MG tablet 90 tablet 0     Sig: Take 1 tablet by mouth daily     Authorizing Provider: Juana Ramirez

## 2023-05-30 NOTE — TELEPHONE ENCOUNTER
Please refill if appropriate or refuse medication if not appropriate.     PCP: Андрей Hyde DNP    Last appt: 2/7/2023  Last refill: 2/7/2023  90 tabs 1 refill    Future Appointments   Date Time Provider Sophia Adina   8/8/2023  9:00 AM Андрей Hyde DNP [facility] BS AMB   9/26/2023  9:00 AM [facility] IMAGING 1 [facility] BS AMB   9/26/2023 10:00 AM [facility] NONIMAGING [facility] BS AMB       Requested Prescriptions     Pending Prescriptions Disp Refills    lisinopril (PRINIVIL;ZESTRIL) 40 MG tablet [Pharmacy Med Name: LISINOPRIL 40 MG TABLET] 90 tablet 1     Sig: TAKE 1 TABLET BY MOUTH EVERY DAY FOR HIGH BLOOD PRESSURE

## 2023-05-30 NOTE — TELEPHONE ENCOUNTER
On 2/7/23 he was given Lisinopril 90 day plus 1 refill. He needs to call pharmacy for refill. I refilled Vit B on 5/23/23. Josh Dias sent this message today:     Patient brother called in stating that the pharmacy will no longer fill prescription because it is over the counter. Im assuming the medication she is referring is Vit B Complex as she did not note a medication name.

## 2023-05-30 NOTE — TELEPHONE ENCOUNTER
Called and left message for brother to verify what prescription he is talking about and if they are not able to obtain it over the counter.

## 2023-05-30 NOTE — TELEPHONE ENCOUNTER
Patient brother called in stating that the pharmacy will no longer fill prescription because it is over the counter. Please Advise      (brother)-[phone]

## 2023-05-31 ENCOUNTER — TELEPHONE (OUTPATIENT)
Age: 63
End: 2023-05-31

## 2023-05-31 NOTE — TELEPHONE ENCOUNTER
----- Message from Acunamagdalene Vazquez sent at 5/30/2023  4:34 PM EDT -----  Subject: Message to Provider    QUESTIONS  Information for Provider? Patients stats that he is having a hard time   getting his lisinopril, [facility]   [facility].  Please call patient on Thursday.  ---------------------------------------------------------------------------  --------------  Gregory SNEED  [phone]; OK to leave message on voicemail  ---------------------------------------------------------------------------  --------------  SCRIPT ANSWERS  undefined

## 2023-07-24 ENCOUNTER — HOSPITAL ENCOUNTER (OUTPATIENT)
Facility: HOSPITAL | Age: 63
Discharge: HOME | End: 2023-07-27

## 2023-07-24 LAB — LABCORP SPECIMEN COLLECTION: NORMAL

## 2023-07-25 LAB
BUN SERPL-MCNC: 14 MG/DL (ref 8–27)
BUN/CREAT SERPL: 13 (ref 10–24)
CHLORIDE SERPL-SCNC: 94 MMOL/L (ref 96–106)
CO2 SERPL-SCNC: 20 MMOL/L (ref 20–29)
CREAT SERPL-MCNC: 1.12 MG/DL (ref 0.76–1.27)
EGFRCR SERPLBLD CKD-EPI 2021: 75 ML/MIN/1.73
GLUCOSE SERPL-MCNC: 84 MG/DL (ref 70–99)
POTASSIUM SERPL-SCNC: 5 MMOL/L (ref 3.5–5.2)
SODIUM SERPL-SCNC: 134 MMOL/L (ref 134–144)

## 2023-08-08 ENCOUNTER — OFFICE VISIT (OUTPATIENT)
Age: 63
End: 2023-08-08
Payer: MEDICAID

## 2023-08-08 VITALS
OXYGEN SATURATION: 98 % | SYSTOLIC BLOOD PRESSURE: 194 MMHG | RESPIRATION RATE: 12 BRPM | HEIGHT: 66 IN | HEART RATE: 65 BPM | BODY MASS INDEX: 18.35 KG/M2 | WEIGHT: 114.2 LBS | TEMPERATURE: 98 F | DIASTOLIC BLOOD PRESSURE: 83 MMHG

## 2023-08-08 DIAGNOSIS — E78.5 DYSLIPIDEMIA: ICD-10-CM

## 2023-08-08 DIAGNOSIS — R63.4 WEIGHT LOSS: ICD-10-CM

## 2023-08-08 DIAGNOSIS — I10 PRIMARY HYPERTENSION: Primary | ICD-10-CM

## 2023-08-08 DIAGNOSIS — F10.10 ALCOHOL ABUSE: ICD-10-CM

## 2023-08-08 DIAGNOSIS — R09.89 BILATERAL CAROTID BRUITS: ICD-10-CM

## 2023-08-08 DIAGNOSIS — I70.90 ATHEROSCLEROSIS: ICD-10-CM

## 2023-08-08 DIAGNOSIS — F41.8 ANXIETY ASSOCIATED WITH DEPRESSION: ICD-10-CM

## 2023-08-08 DIAGNOSIS — I73.9 PAD (PERIPHERAL ARTERY DISEASE) (HCC): ICD-10-CM

## 2023-08-08 PROCEDURE — 3079F DIAST BP 80-89 MM HG: CPT | Performed by: NURSE PRACTITIONER

## 2023-08-08 PROCEDURE — 99214 OFFICE O/P EST MOD 30 MIN: CPT | Performed by: NURSE PRACTITIONER

## 2023-08-08 PROCEDURE — 3077F SYST BP >= 140 MM HG: CPT | Performed by: NURSE PRACTITIONER

## 2023-08-08 RX ORDER — ROSUVASTATIN CALCIUM 40 MG/1
40 TABLET, COATED ORAL DAILY
Qty: 90 TABLET | Refills: 0 | Status: SHIPPED | OUTPATIENT
Start: 2023-08-08

## 2023-08-08 RX ORDER — AMLODIPINE BESYLATE 10 MG/1
10 TABLET ORAL DAILY
Qty: 90 TABLET | Refills: 0 | Status: SHIPPED | OUTPATIENT
Start: 2023-08-08

## 2023-08-08 RX ORDER — LISINOPRIL 40 MG/1
40 TABLET ORAL DAILY
Qty: 90 TABLET | Refills: 0 | Status: SHIPPED | OUTPATIENT
Start: 2023-08-08

## 2023-08-08 RX ORDER — CITALOPRAM HYDROBROMIDE 10 MG/1
10 TABLET ORAL DAILY
Qty: 90 TABLET | Refills: 0 | Status: SHIPPED | OUTPATIENT
Start: 2023-08-08

## 2023-08-08 SDOH — ECONOMIC STABILITY: HOUSING INSECURITY
IN THE LAST 12 MONTHS, WAS THERE A TIME WHEN YOU DID NOT HAVE A STEADY PLACE TO SLEEP OR SLEPT IN A SHELTER (INCLUDING NOW)?: YES

## 2023-08-08 SDOH — ECONOMIC STABILITY: INCOME INSECURITY: HOW HARD IS IT FOR YOU TO PAY FOR THE VERY BASICS LIKE FOOD, HOUSING, MEDICAL CARE, AND HEATING?: HARD

## 2023-08-08 SDOH — ECONOMIC STABILITY: FOOD INSECURITY: WITHIN THE PAST 12 MONTHS, YOU WORRIED THAT YOUR FOOD WOULD RUN OUT BEFORE YOU GOT MONEY TO BUY MORE.: SOMETIMES TRUE

## 2023-08-08 SDOH — ECONOMIC STABILITY: FOOD INSECURITY: WITHIN THE PAST 12 MONTHS, THE FOOD YOU BOUGHT JUST DIDN'T LAST AND YOU DIDN'T HAVE MONEY TO GET MORE.: SOMETIMES TRUE

## 2023-08-08 ASSESSMENT — PATIENT HEALTH QUESTIONNAIRE - PHQ9
5. POOR APPETITE OR OVEREATING: 0
SUM OF ALL RESPONSES TO PHQ QUESTIONS 1-9: 0
8. MOVING OR SPEAKING SO SLOWLY THAT OTHER PEOPLE COULD HAVE NOTICED. OR THE OPPOSITE, BEING SO FIGETY OR RESTLESS THAT YOU HAVE BEEN MOVING AROUND A LOT MORE THAN USUAL: 0
9. THOUGHTS THAT YOU WOULD BE BETTER OFF DEAD, OR OF HURTING YOURSELF: 0
6. FEELING BAD ABOUT YOURSELF - OR THAT YOU ARE A FAILURE OR HAVE LET YOURSELF OR YOUR FAMILY DOWN: 0
7. TROUBLE CONCENTRATING ON THINGS, SUCH AS READING THE NEWSPAPER OR WATCHING TELEVISION: 0
SUM OF ALL RESPONSES TO PHQ QUESTIONS 1-9: 0
SUM OF ALL RESPONSES TO PHQ QUESTIONS 1-9: 0
1. LITTLE INTEREST OR PLEASURE IN DOING THINGS: 0
SUM OF ALL RESPONSES TO PHQ QUESTIONS 1-9: 0
SUM OF ALL RESPONSES TO PHQ9 QUESTIONS 1 & 2: 0
2. FEELING DOWN, DEPRESSED OR HOPELESS: 0
3. TROUBLE FALLING OR STAYING ASLEEP: 0
4. FEELING TIRED OR HAVING LITTLE ENERGY: 0

## 2023-08-08 NOTE — ASSESSMENT & PLAN NOTE
Discussed findings with pt and .   Placed order for duplex carotid bilateral  Cessation of alcohol and smoking needed

## 2023-08-08 NOTE — ASSESSMENT & PLAN NOTE
Tx for Hep C in the past  Continues to consume at least a 6 pack per day  Discussed importance of reducing/cessation of alcohol consumption

## 2023-08-08 NOTE — ASSESSMENT & PLAN NOTE
Down 6lbs in 6m  Weight fluctuates between 114-122.   I suspect weight loss cause from lack of food  Applying for food stamps today  Alcohol may also be a factor  Instructed pt on the importance of reducing alcohol intake

## 2023-08-08 NOTE — ASSESSMENT & PLAN NOTE
Monitored by specialist- no acute findings meriting change in the plan   Cont Plavix and ASA therapies  F/u with vascular

## 2023-08-08 NOTE — ASSESSMENT & PLAN NOTE
Uncontrolled, continue current medications, medication adherence emphasized and lifestyle modifications recommended   Instructed pt to take BP meds qd    2/2023 dc'd amlodipine 10mg d/t low BP reading  Dc amlodipine 5mg  Initiate amlodipine 10mg qd  Cont lisinopril 40mg qd    Monitor BP and report readings below 110/60 or above 139/89 to office.

## 2023-08-08 NOTE — PROGRESS NOTES
1. \"Have you been to the ER, urgent care clinic since your last visit? Hospitalized since your last visit? \" No    2. \"Have you seen or consulted any other health care providers outside of the [address] [place] [address] since your last visit? \"  Yes  , liver specialist, podiatrist, eye center    3. For patients aged 43-73: Has the patient had a colonoscopy / FIT/ Cologuard?  No
understanding. Past Surgical History:   Procedure Laterality Date    CATARACT REMOVAL Right     4 years ago     Allergies and Intolerances:   No Known Allergies  Family History:   No family history on file. Social History:   He  reports that he has been smoking cigarettes. He started smoking about 41 years ago. He has been smoking an average of 1 pack per day. He has never used smokeless tobacco.   Social History     Substance and Sexual Activity   Alcohol Use Yes    Alcohol/week: 14.0 standard drinks     Immunization History:  Immunization History   Administered Date(s) Administered    Pneumococcal, PCV-13, PREVNAR 13, (age 6w+), IM, 0.5mL 02/07/2023         Return in about 3 months (around 11/8/2023) for HTN, Chol, Lab fasting (lipid, CMP).         Dr. Donya Roberts, AGNP-C, DNP  [facility]

## 2023-08-09 RX ORDER — CLOPIDOGREL BISULFATE 75 MG/1
TABLET ORAL
Qty: 30 TABLET | Refills: 9 | Status: SHIPPED | OUTPATIENT
Start: 2023-08-09

## 2023-08-11 ENCOUNTER — TELEPHONE (OUTPATIENT)
Age: 63
End: 2023-08-11

## 2023-08-11 NOTE — TELEPHONE ENCOUNTER
----- Message from Mariluz Naidu sent at 8/11/2023  2:23 PM EDT -----  Subject: Message to Provider    QUESTIONS  Information for Provider? Brother calling in to see if the provider wanted   the neck US done STAT or if it can wait a month till patients next   specialty visit. PLEASE CALL.   ---------------------------------------------------------------------------  --------------  Jackson ANGEL  [phone]; OK to leave message on voicemail  ---------------------------------------------------------------------------  --------------  SCRIPT ANSWERS  Relationship to Patient? Other/Third Party  Representative Name? Dru Morrell  Is the representative on the Communication Release of Information (NABIL)   form in Epic?  Yes

## 2023-08-14 ENCOUNTER — TELEPHONE (OUTPATIENT)
Age: 63
End: 2023-08-14

## 2023-08-14 DIAGNOSIS — I10 PRIMARY HYPERTENSION: ICD-10-CM

## 2023-08-14 RX ORDER — AMLODIPINE BESYLATE 10 MG/1
TABLET ORAL
Qty: 90 TABLET | Refills: 0 | OUTPATIENT
Start: 2023-08-14

## 2023-08-14 NOTE — TELEPHONE ENCOUNTER
Patient's brother, Young Estrada, is calling in regards to the vascular study Dr. Ary Augustin ordered. Stating he has an appt with the vascular doctor on 9/20/23. That office has down that it will be done that day but they want to know if Dr. Ary Augustin is wanting him to have it done sooner.     [phone]

## 2023-08-23 DIAGNOSIS — I10 PRIMARY HYPERTENSION: ICD-10-CM

## 2023-08-23 RX ORDER — AMLODIPINE BESYLATE 10 MG/1
TABLET ORAL
Qty: 90 TABLET | Refills: 0 | OUTPATIENT
Start: 2023-08-23

## 2023-09-06 ENCOUNTER — TELEPHONE (OUTPATIENT)
Age: 63
End: 2023-09-06

## 2023-09-06 NOTE — TELEPHONE ENCOUNTER
Spoke with pt emergency contact his brother Triny Watson, Verified name and  for HIPAA. Informed him that 1 year supply was sent in to pharmacy in May 2023 and he is to call the pharmacy for refills.

## 2023-09-08 DIAGNOSIS — I10 PRIMARY HYPERTENSION: ICD-10-CM

## 2023-09-08 RX ORDER — AMLODIPINE BESYLATE 10 MG/1
TABLET ORAL
Qty: 90 TABLET | Refills: 0 | OUTPATIENT
Start: 2023-09-08

## 2023-09-14 DIAGNOSIS — I10 PRIMARY HYPERTENSION: ICD-10-CM

## 2023-09-15 RX ORDER — AMLODIPINE BESYLATE 10 MG/1
10 TABLET ORAL DAILY
Qty: 90 TABLET | Refills: 0 | OUTPATIENT
Start: 2023-09-15

## 2023-10-11 DIAGNOSIS — I10 PRIMARY HYPERTENSION: ICD-10-CM

## 2023-10-12 NOTE — TELEPHONE ENCOUNTER
Please refill if appropriate or refuse medication if not appropriate.     PCP: Flor Holloway DNP     Last appt: 8/8/23    Last refill:8/8/23      Future Appointments   Date Time Provider [address]   11/14/2023  8:30 AM Kristi Lagos IOC BS AMB   11/21/2023 12:45 PM [facility] IMAGING 1 [facility] BS AMB   11/21/2023  1:45 PM [facility] NONIMAGING [facility] BS AMB   11/21/2023  2:45 PM [facility] IMAG[facility] BS AMB   12/1/2023  1:15 PM Mary Domingo PA-C [facility] BS AMB         Requested Prescriptions     Pending Prescriptions Disp Refills    amLODIPine (NORVASC) 10 MG tablet [Pharmacy Med Name: AMLODIPINE BESYLATE 10 MG TAB] 90 tablet 0     Sig: TAKE 1 TABLET BY MOUTH EVERY DAY

## 2023-10-13 RX ORDER — AMLODIPINE BESYLATE 10 MG/1
10 TABLET ORAL DAILY
Qty: 90 TABLET | Refills: 0 | Status: SHIPPED | OUTPATIENT
Start: 2023-10-13

## 2023-10-24 DIAGNOSIS — I10 PRIMARY HYPERTENSION: ICD-10-CM

## 2023-10-24 DIAGNOSIS — I70.90 ATHEROSCLEROSIS: ICD-10-CM

## 2023-10-24 DIAGNOSIS — F41.8 ANXIETY ASSOCIATED WITH DEPRESSION: ICD-10-CM

## 2023-10-24 RX ORDER — CITALOPRAM HYDROBROMIDE 10 MG/1
10 TABLET ORAL DAILY
Qty: 90 TABLET | Refills: 0 | Status: SHIPPED | OUTPATIENT
Start: 2023-10-24

## 2023-10-24 RX ORDER — ROSUVASTATIN CALCIUM 40 MG/1
40 TABLET, COATED ORAL DAILY
Qty: 90 TABLET | Refills: 0 | Status: SHIPPED | OUTPATIENT
Start: 2023-10-24

## 2023-10-24 RX ORDER — LISINOPRIL 40 MG/1
40 TABLET ORAL DAILY
Qty: 90 TABLET | Refills: 0 | Status: SHIPPED | OUTPATIENT
Start: 2023-10-24

## 2023-11-01 DIAGNOSIS — I70.90 ATHEROSCLEROSIS: ICD-10-CM

## 2023-11-01 DIAGNOSIS — I73.9 PAD (PERIPHERAL ARTERY DISEASE) (HCC): ICD-10-CM

## 2023-11-06 ENCOUNTER — HOSPITAL ENCOUNTER (OUTPATIENT)
Facility: HOSPITAL | Age: 63
Discharge: HOME | End: 2023-11-09

## 2023-11-06 LAB — LABCORP SPECIMEN COLLECTION: NORMAL

## 2023-11-07 LAB
CHOLEST SERPL-MCNC: 157 MG/DL (ref 100–199)
HDLC SERPL-MCNC: 92 MG/DL
IMP & REVIEW OF LAB RESULTS: NORMAL
LDLC SERPL CALC-MCNC: 53 MG/DL (ref 0–99)
SPECIMEN STATUS REPORT: NORMAL
TRIGL SERPL-MCNC: 62 MG/DL (ref 0–149)
VLDLC SERPL CALC-MCNC: 12 MG/DL (ref 5–40)

## 2023-11-14 ENCOUNTER — OFFICE VISIT (OUTPATIENT)
Age: 63
End: 2023-11-14
Payer: MEDICAID

## 2023-11-14 VITALS
WEIGHT: 121.8 LBS | DIASTOLIC BLOOD PRESSURE: 78 MMHG | HEIGHT: 66 IN | OXYGEN SATURATION: 99 % | BODY MASS INDEX: 19.58 KG/M2 | RESPIRATION RATE: 16 BRPM | SYSTOLIC BLOOD PRESSURE: 132 MMHG | TEMPERATURE: 98.1 F | HEART RATE: 74 BPM

## 2023-11-14 DIAGNOSIS — E78.5 DYSLIPIDEMIA: ICD-10-CM

## 2023-11-14 DIAGNOSIS — Z23 ENCOUNTER FOR IMMUNIZATION: ICD-10-CM

## 2023-11-14 DIAGNOSIS — I73.9 PVD (PERIPHERAL VASCULAR DISEASE) (HCC): ICD-10-CM

## 2023-11-14 DIAGNOSIS — F41.8 ANXIETY ASSOCIATED WITH DEPRESSION: ICD-10-CM

## 2023-11-14 DIAGNOSIS — I70.90 ATHEROSCLEROSIS: ICD-10-CM

## 2023-11-14 DIAGNOSIS — R09.89 BILATERAL CAROTID BRUITS: ICD-10-CM

## 2023-11-14 DIAGNOSIS — I10 PRIMARY HYPERTENSION: Primary | ICD-10-CM

## 2023-11-14 DIAGNOSIS — B18.2 CHRONIC HEPATITIS C WITHOUT HEPATIC COMA (HCC): ICD-10-CM

## 2023-11-14 PROBLEM — G89.29 CHRONIC PAIN IN LEFT FOOT: Status: RESOLVED | Noted: 2022-03-07 | Resolved: 2023-11-14

## 2023-11-14 PROBLEM — M79.674 TOE PAIN, RIGHT: Status: RESOLVED | Noted: 2022-08-17 | Resolved: 2023-11-14

## 2023-11-14 PROBLEM — R63.4 WEIGHT LOSS: Status: RESOLVED | Noted: 2023-08-08 | Resolved: 2023-11-14

## 2023-11-14 PROBLEM — M79.672 CHRONIC PAIN IN LEFT FOOT: Status: RESOLVED | Noted: 2022-03-07 | Resolved: 2023-11-14

## 2023-11-14 PROCEDURE — 3075F SYST BP GE 130 - 139MM HG: CPT | Performed by: NURSE PRACTITIONER

## 2023-11-14 PROCEDURE — PBSHW TDAP, BOOSTRIX, (AGE 10 YRS+), IM: Performed by: NURSE PRACTITIONER

## 2023-11-14 PROCEDURE — 3078F DIAST BP <80 MM HG: CPT | Performed by: NURSE PRACTITIONER

## 2023-11-14 PROCEDURE — 99213 OFFICE O/P EST LOW 20 MIN: CPT | Performed by: NURSE PRACTITIONER

## 2023-11-14 PROCEDURE — 99211 OFF/OP EST MAY X REQ PHY/QHP: CPT

## 2023-11-14 PROCEDURE — 90715 TDAP VACCINE 7 YRS/> IM: CPT | Performed by: NURSE PRACTITIONER

## 2023-11-14 RX ORDER — CITALOPRAM HYDROBROMIDE 10 MG/1
10 TABLET ORAL DAILY
Qty: 93 TABLET | Refills: 1 | Status: SHIPPED | OUTPATIENT
Start: 2023-11-14

## 2023-11-14 RX ORDER — LISINOPRIL 40 MG/1
40 TABLET ORAL DAILY
Qty: 93 TABLET | Refills: 1 | Status: SHIPPED | OUTPATIENT
Start: 2023-11-14

## 2023-11-14 RX ORDER — AMLODIPINE BESYLATE 10 MG/1
10 TABLET ORAL DAILY
Qty: 93 TABLET | Refills: 1 | Status: SHIPPED | OUTPATIENT
Start: 2023-11-14

## 2023-11-14 RX ORDER — ROSUVASTATIN CALCIUM 40 MG/1
40 TABLET, COATED ORAL DAILY
Qty: 93 TABLET | Refills: 3 | Status: SHIPPED | OUTPATIENT
Start: 2023-11-14

## 2023-11-14 NOTE — ASSESSMENT & PLAN NOTE
Well-controlled, continue current medications   Cont Lisinopril 40mg qd  Cont amlodipine 10mg qd  Smoking cessation encouraged    2/2023 dc'd amlodipine 10mg d/t low BP reading  Dc amlodipine 5mg  Initiate amlodipine 10mg qd  Cont lisinopril 40mg qd

## 2023-11-24 DIAGNOSIS — I65.23 BILATERAL CAROTID ARTERY STENOSIS: Primary | ICD-10-CM

## 2023-12-01 ENCOUNTER — TELEPHONE (OUTPATIENT)
Age: 63
End: 2023-12-01

## 2023-12-01 NOTE — TELEPHONE ENCOUNTER
----- Message from Yi Goldsmith DNP sent at 11/24/2023  7:43 AM EST -----  [facility] nurses, pls contact patient with the following: (Please do not send message via [place], contact them via phone)    Rt carotid shows mod stenosis and left shows mild. Please send copy of report to vascular Kraen Domingo PA-C. Cont statin and plavix therapies.     Thank you

## 2023-12-15 ENCOUNTER — OFFICE VISIT (OUTPATIENT)
Age: 63
End: 2023-12-15
Payer: MEDICAID

## 2023-12-15 VITALS
RESPIRATION RATE: 20 BRPM | DIASTOLIC BLOOD PRESSURE: 70 MMHG | HEART RATE: 73 BPM | OXYGEN SATURATION: 99 % | SYSTOLIC BLOOD PRESSURE: 120 MMHG

## 2023-12-15 DIAGNOSIS — G60.9 IDIOPATHIC PERIPHERAL NEUROPATHY: ICD-10-CM

## 2023-12-15 DIAGNOSIS — I73.9 PVD (PERIPHERAL VASCULAR DISEASE) (HCC): Primary | ICD-10-CM

## 2023-12-15 DIAGNOSIS — I65.23 BILATERAL CAROTID ARTERY STENOSIS: ICD-10-CM

## 2023-12-15 DIAGNOSIS — R09.89 BILATERAL CAROTID BRUITS: ICD-10-CM

## 2023-12-15 DIAGNOSIS — F17.210 CIGARETTE NICOTINE DEPENDENCE WITHOUT COMPLICATION: ICD-10-CM

## 2023-12-15 PROCEDURE — 3078F DIAST BP <80 MM HG: CPT | Performed by: PHYSICIAN ASSISTANT

## 2023-12-15 PROCEDURE — 3074F SYST BP LT 130 MM HG: CPT | Performed by: PHYSICIAN ASSISTANT

## 2023-12-15 PROCEDURE — 99214 OFFICE O/P EST MOD 30 MIN: CPT | Performed by: PHYSICIAN ASSISTANT

## 2023-12-15 RX ORDER — VELPATASVIR AND SOFOSBUVIR 100; 400 MG/1; MG/1
1 TABLET, FILM COATED ORAL DAILY
COMMUNITY

## 2023-12-15 RX ORDER — VITAMIN B COMPLEX
1 CAPSULE ORAL DAILY
COMMUNITY
Start: 2022-12-27

## 2024-01-14 ENCOUNTER — HOSPITAL ENCOUNTER (EMERGENCY)
Facility: HOSPITAL | Age: 64
Discharge: HOME | End: 2024-01-14
Payer: MEDICAID

## 2024-01-14 ENCOUNTER — APPOINTMENT (OUTPATIENT)
Facility: HOSPITAL | Age: 64
End: 2024-01-14
Payer: MEDICAID

## 2024-01-14 VITALS
RESPIRATION RATE: 20 BRPM | DIASTOLIC BLOOD PRESSURE: 63 MMHG | SYSTOLIC BLOOD PRESSURE: 93 MMHG | HEART RATE: 91 BPM | TEMPERATURE: 97.5 F | OXYGEN SATURATION: 98 %

## 2024-01-14 DIAGNOSIS — S22.41XA CLOSED FRACTURE OF MULTIPLE RIBS OF RIGHT SIDE, INITIAL ENCOUNTER: Primary | ICD-10-CM
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
## 2024-01-14 DIAGNOSIS — R07.81 RIB PAIN: ICD-10-CM

## 2024-01-14 PROCEDURE — 6370000000 HC RX 637 (ALT 250 FOR IP): Performed by: NURSE PRACTITIONER

## 2024-01-14 PROCEDURE — 71101 X-RAY EXAM UNILAT RIBS/CHEST: CPT

## 2024-01-14 PROCEDURE — 99283 EMERGENCY DEPT VISIT LOW MDM: CPT

## 2024-01-14 RX ORDER — HYDROCODONE BITARTRATE AND ACETAMINOPHEN 5; 325 MG/1; MG/1
1 TABLET ORAL EVERY 6 HOURS PRN
Qty: 10 TABLET | Refills: 0 | Status: SHIPPED | OUTPATIENT
Start: 2024-01-14 | End: 2024-01-17

## 2024-01-14 RX ORDER — HYDROCODONE BITARTRATE AND ACETAMINOPHEN 5; 325 MG/1; MG/1
1 TABLET ORAL
Status: COMPLETED | OUTPATIENT
Start: 2024-01-14 | End: 2024-01-14

## 2024-01-14 RX ADMIN — HYDROCODONE BITARTRATE AND ACETAMINOPHEN 1 TABLET: 5; 325 TABLET ORAL at 16:05

## 2024-01-14 NOTE — ED TRIAGE NOTES
Pt arrived in ER complaining of fall from one story and hit his ribs on right side and hit his back.

## 2024-01-14 NOTE — DISCHARGE INSTRUCTIONS
Take medication as prescribed. Follow-up with your primary care physician within 2 days for reassessment. Bring the results from this visit with you for their review. Return to the ED immediately for any new, worsening, or persistent symptoms, including fever, vomiting, chest pain, shortness of breath, or any other medical concerns.

## 2024-01-14 NOTE — ED PROVIDER NOTES
EMERGENCY DEPARTMENT HISTORY AND PHYSICAL EXAM    3:58 PM      Date: 1/14/2024  Patient Name: Gio Khalil    History of Presenting Illness     Chief Complaint   Patient presents with    Fall    Rib Injury         History Provided By: Patient and medical chart review.    Additional History (Context): Gio Khalil is a 63 y.o. male with   Past Medical History:   Diagnosis Date    Anxiety associated with depression 08/08/2023    Atherosclerosis 08/08/2023    Bilateral carotid artery stenosis 11/24/2023    Rt mod (50-69%); Lt mild (<50%)    Bilateral carotid bruits 05/23/2022    Cataract 03/07/2022    Cataract extraction status of eye, unspecified laterality 03/07/2022    Chronic hepatitis C without hepatic coma (HCC) 05/23/2022    Chronic pain in left foot 03/07/2022    Current smoker 03/07/2022    Daily consumption of alcohol 03/07/2022    Dyslipidemia 08/08/2023    Elevated LFTs 03/09/2022    Hepatitis C test positive 03/09/2022    Hyperkalemia 03/09/2022    Marijuana smoker 03/07/2022    Mental subnormality 03/07/2022    PAD (peripheral artery disease) (HCC)     Primary hypertension 03/07/2022    PVD (peripheral vascular disease) (HCC) 07/22/2022   who presents with complaints of right rib pain.  States on Thursday he fell off the ladder and it was about 12 feet and landed directly onto his right side onto his ribs.  Complaining of pain rating it 9 out of 10 states he tried Tylenol 3 which helped some.  Patient states a little painful to take big deep breaths.  Denies any chest pain or shortness of breath.  No nausea vomiting diarrhea.  Did not hit head or lose consciousness.     PCP: Mary Russell, DNP    Current Facility-Administered Medications   Medication Dose Route Frequency Provider Last Rate Last Admin    HYDROcodone-acetaminophen (NORCO) 5-325 MG per tablet 1 tablet  1 tablet Oral NOW Lazara Richard APRN - NP         Current Outpatient Medications   Medication Sig Dispense Refill

## 2024-01-22 ENCOUNTER — TELEPHONE (OUTPATIENT)
Age: 64
End: 2024-01-22

## 2024-01-22 NOTE — TELEPHONE ENCOUNTER
Pts brother calling in stating he thinks his brother has pneumonia I advised his brother to go to the ER he was requesting a well visit I advised him to have his brother go to the ER to be evaluated first then call back to scheduled f/u

## 2024-01-22 NOTE — TELEPHONE ENCOUNTER
----- Message from Jenny Pereyra sent at 1/22/2024  1:53 PM EST -----  Subject: Referral Request    Reason for referral request? Imaging   Provider patient wants to be referred to(if known):     Provider Phone Number(if known):    Additional Information for Provider? x-ray [facility], in   [place], is the location that he would like this sent to please.   Patient will not go to ER. Please return the call to let him know it has   been sent.  ---------------------------------------------------------------------------  --------------  CALL BACK INFO    [phone]; OK to leave message on voicemail  ---------------------------------------------------------------------------  --------------

## 2024-01-23 ENCOUNTER — HOSPITAL ENCOUNTER (EMERGENCY)
Facility: HOSPITAL | Age: 64
Discharge: HOME | End: 2024-01-24
Attending: EMERGENCY MEDICINE
Payer: MEDICAID

## 2024-01-23 ENCOUNTER — APPOINTMENT (OUTPATIENT)
Facility: HOSPITAL | Age: 64
End: 2024-01-23
Payer: MEDICAID

## 2024-01-23 ENCOUNTER — TELEPHONE (OUTPATIENT)
Age: 64
End: 2024-01-23

## 2024-01-23 DIAGNOSIS — S22.41XA CLOSED FRACTURE OF MULTIPLE RIBS OF RIGHT SIDE, INITIAL ENCOUNTER: ICD-10-CM
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
## 2024-01-23 DIAGNOSIS — S32.009A CLOSED FRACTURE OF SPINOUS PROCESS OF LUMBAR VERTEBRA, INITIAL ENCOUNTER (HCC): ICD-10-CM

## 2024-01-23 DIAGNOSIS — N17.9 AKI (ACUTE KIDNEY INJURY) (HCC): ICD-10-CM

## 2024-01-23 LAB
ALBUMIN SERPL-MCNC: 2.8 G/DL (ref 3.4–5)
ALBUMIN SERPL-MCNC: 3.4 G/DL (ref 3.4–5)
ALBUMIN/GLOB SERPL: 0.7 (ref 0.8–1.7)
ALBUMIN/GLOB SERPL: 0.8 (ref 0.8–1.7)
ALP SERPL-CCNC: 59 U/L (ref 45–117)
ALP SERPL-CCNC: 65 U/L (ref 45–117)
ALT SERPL-CCNC: 24 U/L (ref 16–61)
ALT SERPL-CCNC: 28 U/L (ref 16–61)
ANION GAP SERPL CALC-SCNC: 2 MMOL/L (ref 3–18)
ANION GAP SERPL CALC-SCNC: 3 MMOL/L (ref 3–18)
APPEARANCE UR: CLEAR
AST SERPL-CCNC: 22 U/L (ref 10–38)
AST SERPL-CCNC: 22 U/L (ref 10–38)
BACTERIA URNS QL MICRO: ABNORMAL /HPF
BASOPHILS # BLD: 0 K/UL (ref 0–0.1)
BASOPHILS NFR BLD: 0 % (ref 0–2)
BILIRUB SERPL-MCNC: 0.3 MG/DL (ref 0.2–1)
BILIRUB SERPL-MCNC: 0.4 MG/DL (ref 0.2–1)
BILIRUB UR QL: NEGATIVE
BUN SERPL-MCNC: 27 MG/DL (ref 7–18)
BUN SERPL-MCNC: 31 MG/DL (ref 7–18)
BUN/CREAT SERPL: 12 (ref 12–20)
BUN/CREAT SERPL: 13 (ref 12–20)
CALCIUM SERPL-MCNC: 8.5 MG/DL (ref 8.5–10.1)
CALCIUM SERPL-MCNC: 9.6 MG/DL (ref 8.5–10.1)
CHLORIDE SERPL-SCNC: 103 MMOL/L (ref 100–111)
CHLORIDE SERPL-SCNC: 111 MMOL/L (ref 100–111)
CO2 SERPL-SCNC: 20 MMOL/L (ref 21–32)
CO2 SERPL-SCNC: 25 MMOL/L (ref 21–32)
COLOR UR: YELLOW
CREAT SERPL-MCNC: 2.2 MG/DL (ref 0.6–1.3)
CREAT SERPL-MCNC: 2.43 MG/DL (ref 0.6–1.3)
DIFFERENTIAL METHOD BLD: ABNORMAL
EOSINOPHIL # BLD: 1.1 K/UL (ref 0–0.4)
EOSINOPHIL NFR BLD: 8 % (ref 0–5)
EPITH CASTS URNS QL MICRO: ABNORMAL /LPF (ref 0–5)
ERYTHROCYTE [DISTWIDTH] IN BLOOD BY AUTOMATED COUNT: 13 % (ref 11.6–14.5)
FLUAV RNA SPEC QL NAA+PROBE: NOT DETECTED
FLUBV RNA SPEC QL NAA+PROBE: NOT DETECTED
GLOBULIN SER CALC-MCNC: 4.1 G/DL (ref 2–4)
GLOBULIN SER CALC-MCNC: 4.5 G/DL (ref 2–4)
GLUCOSE SERPL-MCNC: 83 MG/DL (ref 74–99)
GLUCOSE SERPL-MCNC: 85 MG/DL (ref 74–99)
GLUCOSE UR STRIP.AUTO-MCNC: NEGATIVE MG/DL
HCT VFR BLD AUTO: 35.6 % (ref 36–48)
HGB BLD-MCNC: 11.9 G/DL (ref 13–16)
HGB UR QL STRIP: ABNORMAL
IMM GRANULOCYTES # BLD AUTO: 0 K/UL (ref 0–0.04)
IMM GRANULOCYTES NFR BLD AUTO: 0 % (ref 0–0.5)
KETONES UR QL STRIP.AUTO: NEGATIVE MG/DL
LEUKOCYTE ESTERASE UR QL STRIP.AUTO: NEGATIVE
LYMPHOCYTES # BLD: 2.4 K/UL (ref 0.9–3.6)
LYMPHOCYTES NFR BLD: 18 % (ref 21–52)
MCH RBC QN AUTO: 30.3 PG (ref 24–34)
MCHC RBC AUTO-ENTMCNC: 33.4 G/DL (ref 31–37)
MCV RBC AUTO: 90.6 FL (ref 78–100)
MONOCYTES # BLD: 1.9 K/UL (ref 0.05–1.2)
MONOCYTES NFR BLD: 14 % (ref 3–10)
NEUTS SEG # BLD: 8 K/UL (ref 1.8–8)
NEUTS SEG NFR BLD: 60 % (ref 40–73)
NITRITE UR QL STRIP.AUTO: NEGATIVE
NRBC # BLD: 0 K/UL (ref 0–0.01)
NRBC BLD-RTO: 0 PER 100 WBC
PH UR STRIP: 5.5 (ref 5–8)
PLATELET # BLD AUTO: 354 K/UL (ref 135–420)
PLATELET COMMENT: ABNORMAL
PMV BLD AUTO: 9.5 FL (ref 9.2–11.8)
POTASSIUM SERPL-SCNC: 4.4 MMOL/L (ref 3.5–5.5)
POTASSIUM SERPL-SCNC: 4.7 MMOL/L (ref 3.5–5.5)
PROT SERPL-MCNC: 6.9 G/DL (ref 6.4–8.2)
PROT SERPL-MCNC: 7.9 G/DL (ref 6.4–8.2)
PROT UR STRIP-MCNC: 30 MG/DL
RBC # BLD AUTO: 3.93 M/UL (ref 4.35–5.65)
RBC #/AREA URNS HPF: NEGATIVE /HPF (ref 0–5)
RBC MORPH BLD: ABNORMAL
SARS-COV-2 RNA RESP QL NAA+PROBE: NOT DETECTED
SODIUM SERPL-SCNC: 130 MMOL/L (ref 136–145)
SODIUM SERPL-SCNC: 134 MMOL/L (ref 136–145)
SP GR UR REFRACTOMETRY: 1.01 (ref 1–1.03)
UROBILINOGEN UR QL STRIP.AUTO: 1 EU/DL (ref 0.2–1)
WBC # BLD AUTO: 13.4 K/UL (ref 4.6–13.2)
WBC URNS QL MICRO: ABNORMAL /HPF (ref 0–4)

## 2024-01-23 PROCEDURE — 96361 HYDRATE IV INFUSION ADD-ON: CPT

## 2024-01-23 PROCEDURE — 71045 X-RAY EXAM CHEST 1 VIEW: CPT

## 2024-01-23 PROCEDURE — 99284 EMERGENCY DEPT VISIT MOD MDM: CPT

## 2024-01-23 PROCEDURE — 74176 CT ABD & PELVIS W/O CONTRAST: CPT

## 2024-01-23 PROCEDURE — 85025 COMPLETE CBC W/AUTO DIFF WBC: CPT

## 2024-01-23 PROCEDURE — 2580000003 HC RX 258

## 2024-01-23 PROCEDURE — 80053 COMPREHEN METABOLIC PANEL: CPT

## 2024-01-23 PROCEDURE — 71250 CT THORAX DX C-: CPT

## 2024-01-23 PROCEDURE — 6370000000 HC RX 637 (ALT 250 FOR IP)

## 2024-01-23 PROCEDURE — 6360000002 HC RX W HCPCS

## 2024-01-23 PROCEDURE — 87636 SARSCOV2 & INF A&B AMP PRB: CPT

## 2024-01-23 PROCEDURE — 96374 THER/PROPH/DIAG INJ IV PUSH: CPT

## 2024-01-23 PROCEDURE — 81001 URINALYSIS AUTO W/SCOPE: CPT

## 2024-01-23 PROCEDURE — 70450 CT HEAD/BRAIN W/O DYE: CPT

## 2024-01-23 PROCEDURE — 72125 CT NECK SPINE W/O DYE: CPT

## 2024-01-23 RX ORDER — OXYCODONE HYDROCHLORIDE AND ACETAMINOPHEN 5; 325 MG/1; MG/1
1 TABLET ORAL EVERY 6 HOURS PRN
Qty: 20 TABLET | Refills: 0 | Status: SHIPPED | OUTPATIENT
Start: 2024-01-23 | End: 2024-01-28

## 2024-01-23 RX ORDER — ACETAMINOPHEN 500 MG
1000 TABLET ORAL
Status: COMPLETED | OUTPATIENT
Start: 2024-01-23 | End: 2024-01-23

## 2024-01-23 RX ORDER — LIDOCAINE 50 MG/G
1 PATCH TOPICAL DAILY
Qty: 30 PATCH | Refills: 0 | Status: SHIPPED | OUTPATIENT
Start: 2024-01-23 | End: 2024-02-22

## 2024-01-23 RX ORDER — METHOCARBAMOL 750 MG/1
750 TABLET, FILM COATED ORAL 4 TIMES DAILY
Qty: 40 TABLET | Refills: 0 | Status: SHIPPED | OUTPATIENT
Start: 2024-01-23 | End: 2024-02-02

## 2024-01-23 RX ORDER — 0.9 % SODIUM CHLORIDE 0.9 %
500 INTRAVENOUS SOLUTION INTRAVENOUS ONCE
Status: COMPLETED | OUTPATIENT
Start: 2024-01-23 | End: 2024-01-23

## 2024-01-23 RX ORDER — KETOROLAC TROMETHAMINE 15 MG/ML
15 INJECTION, SOLUTION INTRAMUSCULAR; INTRAVENOUS
Status: COMPLETED | OUTPATIENT
Start: 2024-01-23 | End: 2024-01-23

## 2024-01-23 RX ORDER — 0.9 % SODIUM CHLORIDE 0.9 %
500 INTRAVENOUS SOLUTION INTRAVENOUS ONCE
Status: DISCONTINUED | OUTPATIENT
Start: 2024-01-23 | End: 2024-01-24 | Stop reason: HOSPADM

## 2024-01-23 RX ORDER — 0.9 % SODIUM CHLORIDE 0.9 %
1000 INTRAVENOUS SOLUTION INTRAVENOUS ONCE
Status: COMPLETED | OUTPATIENT
Start: 2024-01-23 | End: 2024-01-23

## 2024-01-23 RX ADMIN — KETOROLAC TROMETHAMINE 15 MG: 15 INJECTION, SOLUTION INTRAMUSCULAR; INTRAVENOUS at 13:52

## 2024-01-23 RX ADMIN — SODIUM CHLORIDE 500 ML: 9 INJECTION, SOLUTION INTRAVENOUS at 15:02

## 2024-01-23 RX ADMIN — ACETAMINOPHEN 1000 MG: 500 TABLET ORAL at 13:53

## 2024-01-23 RX ADMIN — SODIUM CHLORIDE 1000 ML: 9 INJECTION, SOLUTION INTRAVENOUS at 16:21

## 2024-01-23 ASSESSMENT — LIFESTYLE VARIABLES
HOW OFTEN DO YOU HAVE A DRINK CONTAINING ALCOHOL: NEVER
HOW MANY STANDARD DRINKS CONTAINING ALCOHOL DO YOU HAVE ON A TYPICAL DAY: PATIENT DOES NOT DRINK

## 2024-01-23 NOTE — TELEPHONE ENCOUNTER
EBR 's office called to report death, body released per Jazmine w/EBR 's office. MARY called; pt ruled out due to age, infection, and medical history.  [facility] in [place] called for pt . ETA 2030. Family at bedside and aware of all calls made.  Pt's belongings gathered and brought home with son (Orion Mckeon).    Pankaj called into the office stated patient he will not go into the ER due to waiting hours last time when he went. He thinks his brother has pneumonia I advised him yesterday to take him straight there so he could be evaluated. The brother stated he has no discoloration in the chest, a lot of coughing, no fever. He is [facility] and his niece will call and schedule the appt. Please advise. I will call pts brother back and let him know what we decide.

## 2024-01-23 NOTE — TELEPHONE ENCOUNTER
Spoke with Mr. Khalil he is aware patient he will need to have an appointment in the office, at the urgent care or ER.      Ms. Khalil is aware COLLETTE Russell DNP does not order medication or test without being seen.  Mr. Khalil verbalizes understanding.

## 2024-01-23 NOTE — ED PROVIDER NOTES
EMERGENCY DEPARTMENT HISTORY AND PHYSICAL EXAM      Patient Name: Gio Khalil  MRN: [id number]  YOB: 1960  Provider: Ronald Thao DO  PCP: Mary Russell DNP   Time/Date of evaluation: 11:20 AM EST on 1/23/24    History of Presenting Illness     Chief Complaint   Patient presents with    Fall       History Provided By: Patient     History (Narative):   Gio Khalil is a 63 y.o. male with past medical history HLD, hypertension, PAD, and recent ED evaluation on 1/14/2024 for fall initially described as 12 feet from ladder resulting in fracture of anterior right ribs 9 and 10 who presents to the emergency department  by POV C/O right anterior inferior pleuritic chest wall pain that has persisted since his previous ED visit.  Pain is exacerbated with deep inspiration and varying movements.  Pain improves with home prescription of Norco.  Associated symptoms include intermittently productive cough with sputum described as \"phlegm.\"  He denies fevers, chills, other chest pain, diaphoresis, nausea, vomiting, weakness, numbness, lightheadedness, palpitations, syncope, leg pain, leg swelling, or other associated symptoms.  He notes some discomfort with ADLs, but states he has been able to complete these and has been active.  Chart review demonstrates telephone encounter between patient's brother and primary care provider, wherein brother expressed concern for decreased and pneumonia. Patient states that he himself spoke with his primary care physician, who advised present to the ED for chest x-ray to evaluate for pneumonia.       Past History     Past Medical History:  Past Medical History:   Diagnosis Date    Anxiety associated with depression 08/08/2023    Atherosclerosis 08/08/2023    Bilateral carotid artery stenosis 11/24/2023    Rt mod (50-69%); Lt mild (<50%)    Bilateral carotid bruits 05/23/2022    Cataract 03/07/2022    Cataract extraction status of eye, unspecified laterality 03/07/2022

## 2024-01-23 NOTE — TELEPHONE ENCOUNTER
Spoke with NAM Xiong, and she states pts brother declined virtual appt. I suggest going to  if pt does not wish to schedule a virtual w/ me or go to the ED.

## 2024-01-23 NOTE — TELEPHONE ENCOUNTER
Addendum to previous note: Patients brother was advised to take him to the ED, Urgent care, or schedule a VV with Dr. Russell. Pts brother declined VV but was advised to call back if he changes his mind about scheduling appt.

## 2024-01-23 NOTE — ED TRIAGE NOTES
Fell from a ladder 2 weeks ago, broke multiple ribs, since then, has laid in bed. PCP sent for x-rays to check for pneumonia.

## 2024-01-24 ENCOUNTER — OFFICE VISIT (OUTPATIENT)
Age: 64
End: 2024-01-24
Payer: MEDICAID

## 2024-01-24 ENCOUNTER — HOSPITAL ENCOUNTER (OUTPATIENT)
Facility: HOSPITAL | Age: 64
Discharge: HOME | End: 2024-01-27

## 2024-01-24 VITALS
SYSTOLIC BLOOD PRESSURE: 110 MMHG | HEIGHT: 66 IN | TEMPERATURE: 98.4 F | DIASTOLIC BLOOD PRESSURE: 80 MMHG | WEIGHT: 117 LBS | RESPIRATION RATE: 16 BRPM | BODY MASS INDEX: 18.8 KG/M2 | OXYGEN SATURATION: 94 % | HEART RATE: 84 BPM

## 2024-01-24 VITALS
HEART RATE: 75 BPM | OXYGEN SATURATION: 99 % | BODY MASS INDEX: 18.96 KG/M2 | HEIGHT: 66 IN | TEMPERATURE: 98.8 F | SYSTOLIC BLOOD PRESSURE: 165 MMHG | DIASTOLIC BLOOD PRESSURE: 96 MMHG | WEIGHT: 118 LBS | RESPIRATION RATE: 20 BRPM

## 2024-01-24 DIAGNOSIS — S22.009A FRACTURE OF THORACIC TRANSVERSE PROCESS, CLOSED, INITIAL ENCOUNTER (HCC): ICD-10-CM

## 2024-01-24 DIAGNOSIS — N17.9 AKI (ACUTE KIDNEY INJURY) (HCC): Primary | ICD-10-CM

## 2024-01-24 LAB — SENTARA SPECIMEN COLLECTION: NORMAL

## 2024-01-24 PROCEDURE — 3074F SYST BP LT 130 MM HG: CPT | Performed by: NURSE PRACTITIONER

## 2024-01-24 PROCEDURE — 99213 OFFICE O/P EST LOW 20 MIN: CPT | Performed by: NURSE PRACTITIONER

## 2024-01-24 PROCEDURE — 3079F DIAST BP 80-89 MM HG: CPT | Performed by: NURSE PRACTITIONER

## 2024-01-24 RX ORDER — POLYETHYLENE GLYCOL 3350 17 G/17G
17 POWDER, FOR SOLUTION ORAL DAILY
Qty: 116 G | Refills: 0 | COMMUNITY
Start: 2024-01-24 | End: 2024-02-23

## 2024-01-24 ASSESSMENT — PATIENT HEALTH QUESTIONNAIRE - PHQ9
2. FEELING DOWN, DEPRESSED OR HOPELESS: 0
10. IF YOU CHECKED OFF ANY PROBLEMS, HOW DIFFICULT HAVE THESE PROBLEMS MADE IT FOR YOU TO DO YOUR WORK, TAKE CARE OF THINGS AT HOME, OR GET ALONG WITH OTHER PEOPLE: 0
7. TROUBLE CONCENTRATING ON THINGS, SUCH AS READING THE NEWSPAPER OR WATCHING TELEVISION: 0
3. TROUBLE FALLING OR STAYING ASLEEP: 0
SUM OF ALL RESPONSES TO PHQ QUESTIONS 1-9: 0
6. FEELING BAD ABOUT YOURSELF - OR THAT YOU ARE A FAILURE OR HAVE LET YOURSELF OR YOUR FAMILY DOWN: 0
1. LITTLE INTEREST OR PLEASURE IN DOING THINGS: 0
4. FEELING TIRED OR HAVING LITTLE ENERGY: 0
SUM OF ALL RESPONSES TO PHQ QUESTIONS 1-9: 0
SUM OF ALL RESPONSES TO PHQ QUESTIONS 1-9: 0
8. MOVING OR SPEAKING SO SLOWLY THAT OTHER PEOPLE COULD HAVE NOTICED. OR THE OPPOSITE, BEING SO FIGETY OR RESTLESS THAT YOU HAVE BEEN MOVING AROUND A LOT MORE THAN USUAL: 0
5. POOR APPETITE OR OVEREATING: 0
9. THOUGHTS THAT YOU WOULD BE BETTER OFF DEAD, OR OF HURTING YOURSELF: 0
SUM OF ALL RESPONSES TO PHQ9 QUESTIONS 1 & 2: 0
SUM OF ALL RESPONSES TO PHQ QUESTIONS 1-9: 0

## 2024-01-24 NOTE — ASSESSMENT & PLAN NOTE
Obtain Percocet from pharmacy today  Take tylenol in between to help if pain prn  Stay active but take rest breaks  Possible side effects of opioids is constipation.  With fx ribs and spine this may cause difficulty with Bms  Suggested Miralax BID and/or stool softeners.   Referral placed to spine specialist

## 2024-01-24 NOTE — PROGRESS NOTES
1 tablet by mouth 4 times daily for 10 days    b complex vitamins capsule Take 1 capsule by mouth daily    Sofosbuvir-Velpatasvir (EPCLUSA) 400-100 MG TABS Take 1 tablet by mouth daily    amLODIPine (NORVASC) 10 MG tablet Take 1 tablet by mouth daily    citalopram (CELEXA) 10 MG tablet Take 1 tablet by mouth daily    lisinopril (PRINIVIL;ZESTRIL) 40 MG tablet Take 1 tablet by mouth daily    rosuvastatin (CRESTOR) 40 MG tablet Take 1 tablet by mouth daily    clopidogrel (PLAVIX) 75 MG tablet TAKE 1 TABLET BY MOUTH EVERY DAY TO PREVENT BLOOD CLOTS    b complex vitamins capsule Take 1 capsule by mouth daily    aspirin 81 MG chewable tablet Take 1 tablet by mouth daily    gabapentin (NEURONTIN) 100 MG capsule Take 1 capsule by mouth 2 times daily for 180 days. Intended supply: 90 days Max Daily Amount: 200 mg     No current facility-administered medications for this visit.       Past Surgical History:   Procedure Laterality Date    CATARACT REMOVAL Right     4 years ago     Allergies and Intolerances:   No Known Allergies  Family History:   No family history on file.  Social History:   He  reports that he has been smoking cigarettes. He started smoking about 42 years ago. He has a 42.1 pack-year smoking history. He has never used smokeless tobacco.   Social History     Substance and Sexual Activity   Alcohol Use Yes    Alcohol/week: 14.0 standard drinks of alcohol     Immunization History:  Immunization History   Administered Date(s) Administered    Pneumococcal, PCV-13, PREVNAR 13, (age 6w+), IM, 0.5mL 02/07/2023    TDaP, ADACEL (age 10y-64y), BOOSTRIX (age 10y+), IM, 0.5mL 11/14/2023         Return if symptoms worsen or fail to improve.        Dr. Mary Russell, AGNP-C, DNP  [facility]

## 2024-01-24 NOTE — ED NOTES
I received the patient turnover from Dr. Willie amanda at the end of his shift.  The patient is a 63-year-old male with past medical history significant for hyperlipidemia, hypertension, peripheral artery disease, and a 20 foot fall fall from a ladder on January 14, 2024.  On the day of his fall, he did come to the ED where he had a chest x-ray.  It showed anterior right rib fractures of ribs 9 and 10.  It also showed a a patchy right perihilar density that could represent atelectasis or contusion.    Today, the patient returns because of ongoing pain.  At the time of turnover, we are awaiting a CT of his chest abdomen and pelvis.    Recent Results (from the past 24 hour(s))   CBC with Auto Differential    Collection Time: 01/23/24  1:16 PM   Result Value Ref Range    WBC 13.4 (H) 4.6 - 13.2 K/uL    RBC 3.93 (L) 4.35 - 5.65 M/uL    Hemoglobin 11.9 (L) 13.0 - 16.0 g/dL    Hematocrit 35.6 (L) 36.0 - 48.0 %    MCV 90.6 78.0 - 100.0 FL    MCH 30.3 24.0 - 34.0 PG    MCHC 33.4 31.0 - 37.0 g/dL    RDW 13.0 11.6 - 14.5 %    Platelets 354 135 - 420 K/uL    MPV 9.5 9.2 - 11.8 FL    Nucleated RBCs 0.0 0  WBC    nRBC 0.00 0.00 - 0.01 K/uL    Neutrophils % 60 40 - 73 %    Lymphocytes % 18 (L) 21 - 52 %    Monocytes % 14 (H) 3 - 10 %    Eosinophils % 8 (H) 0 - 5 %    Basophils % 0 0 - 2 %    Immature Granulocytes 0 0.0 - 0.5 %    Neutrophils Absolute 8.0 1.8 - 8.0 K/UL    Lymphocytes Absolute 2.4 0.9 - 3.6 K/UL    Monocytes Absolute 1.9 (H) 0.05 - 1.2 K/UL    Eosinophils Absolute 1.1 (H) 0.0 - 0.4 K/UL    Basophils Absolute 0.0 0.0 - 0.1 K/UL    Absolute Immature Granulocyte 0.0 0.00 - 0.04 K/UL    Differential Type MANUAL      Platelet Comment ADEQUATE PLATELETS      RBC Comment NORMOCYTIC, NORMOCHROMIC     Comprehensive Metabolic Panel    Collection Time: 01/23/24  1:55 PM   Result Value Ref Range    Sodium 130 (L) 136 - 145 mmol/L    Potassium 4.7 3.5 - 5.5 mmol/L    Chloride 103 100 - 111 mmol/L    CO2 25 21 - 32 mmol/L

## 2024-01-24 NOTE — ED NOTES
Pt alert,   family at bedside.   Pt denied pain,  but grabbed area under right under arm when he moved to sit up.  Daughter asked pt if he wanted anything to eat or drink.  Pt said he would eat crackers.  Pt brought marcia crackers and ginger ale

## 2024-01-24 NOTE — ASSESSMENT & PLAN NOTE
ED creat 2.4-2.2  eGFR 29-33  After receiving 2.5L of IV fluids  Adam level today  Instructed pt and spouse on the importance of staying well hydrated.   Will assess lab when available

## 2024-01-24 NOTE — DISCHARGE INSTRUCTIONS
You will need to continue to rehydrate.  You also need to use your incentive spirometer 10 times over an hour.

## 2024-01-25 ENCOUNTER — TELEPHONE (OUTPATIENT)
Age: 64
End: 2024-01-25

## 2024-01-25 LAB
ANION GAP SERPL CALCULATED.3IONS-SCNC: 14 MMOL/L (ref 3–15)
BUN BLDV-MCNC: 24 MG/DL (ref 6–22)
CALCIUM SERPL-MCNC: 9.4 MG/DL (ref 8.4–10.5)
CHLORIDE BLD-SCNC: 102 MMOL/L (ref 98–110)
CO2: 20 MMOL/L (ref 20–32)
CREAT SERPL-MCNC: 2 MG/DL (ref 0.8–1.6)
GLOMERULAR FILTRATION RATE: 36.7 ML/MIN/1.73 SQ.M.
GLUCOSE: 89 MG/DL (ref 70–99)
POTASSIUM SERPL-SCNC: 5 MMOL/L (ref 3.5–5.5)
SODIUM BLD-SCNC: 136 MMOL/L (ref 133–145)

## 2024-01-26 ENCOUNTER — TELEPHONE (OUTPATIENT)
Age: 64
End: 2024-01-26

## 2024-01-26 NOTE — RESULT ENCOUNTER NOTE
[facility] nurses, pls contact patient with the following: (Please do not send message via [place], contact them via phone)    CURTIS still evident but improving.  Creatinine 2.2-2.0; EGFR 33-36.7  Continue to increase fluids.  Avoid alcohol as this can also negatively impact the kidneys.  Recheck levels 4 weeks.  Patient can go to [facility] or [facility] as the orders will be available the week of 2/19/2024.    Thank you

## 2024-01-26 NOTE — TELEPHONE ENCOUNTER
Spoke with patient he is aware CURTIS is still evident but improving. Advised him of his creatinine level and EGFR. Made pt aware to increase his fluid intake and avoid alcohol as it can also negatively impact the kidneys. Told him we want to recheck levels in 4 weeks. Pt aware the orders will be available the week of 2/19/2024 for him to take to [facility] or [facility] to get them done

## 2024-01-26 NOTE — TELEPHONE ENCOUNTER
----- Message from Mary Russell DNP sent at 1/26/2024  7:48 AM EST -----  [facility] nurses, pls contact patient with the following: (Please do not send message via [employer], contact them via phone)    CURTIS still evident but improving.  Creatinine 2.2-2.0; EGFR 33-36.7  Continue to increase fluids.  Avoid alcohol as this can also negatively impact the kidneys.  Recheck levels 4 weeks.  Patient can go to [facility] or [facility] as the orders will be available the week of 2/19/2024.    Thank you

## 2024-03-27 ENCOUNTER — OFFICE VISIT (OUTPATIENT)
Age: 64
End: 2024-03-27
Payer: MEDICAID

## 2024-03-27 DIAGNOSIS — M54.16 LUMBAR RADICULOPATHY: Primary | ICD-10-CM
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
## 2024-03-27 DIAGNOSIS — M54.50 LUMBAR PAIN: ICD-10-CM

## 2024-03-27 DIAGNOSIS — M79.18 MYOFASCIAL PAIN: ICD-10-CM

## 2024-03-27 PROCEDURE — 72100 X-RAY EXAM L-S SPINE 2/3 VWS: CPT | Performed by: PHYSICAL MEDICINE & REHABILITATION

## 2024-03-27 PROCEDURE — 99204 OFFICE O/P NEW MOD 45 MIN: CPT | Performed by: PHYSICAL MEDICINE & REHABILITATION

## 2024-03-27 RX ORDER — PREGABALIN 75 MG/1
75 CAPSULE ORAL NIGHTLY
Qty: 60 CAPSULE | Refills: 3 | Status: SHIPPED | OUTPATIENT
Start: 2024-03-27 | End: 2024-04-26

## 2024-03-27 RX ORDER — LIDOCAINE 50 MG/G
1 PATCH TOPICAL DAILY
Qty: 30 PATCH | Refills: 0 | Status: SHIPPED | OUTPATIENT
Start: 2024-03-27 | End: 2024-04-26

## 2024-03-27 ASSESSMENT — ENCOUNTER SYMPTOMS
TROUBLE SWALLOWING: 0
VOMITING: 0
SHORTNESS OF BREATH: 0
BACK PAIN: 1
DIARRHEA: 0
NAUSEA: 0

## 2024-03-27 NOTE — PATIENT INSTRUCTIONS
Ciro Pritchett's Big three exercises link:  [email or web address]

## 2024-04-15 DIAGNOSIS — I10 PRIMARY HYPERTENSION: ICD-10-CM

## 2024-04-30 ENCOUNTER — HOSPITAL ENCOUNTER (OUTPATIENT)
Facility: HOSPITAL | Age: 64
Discharge: HOME | End: 2024-05-03

## 2024-04-30 LAB — SENTARA SPECIMEN COLLECTION: NORMAL

## 2024-04-30 PROCEDURE — 99001 SPECIMEN HANDLING PT-LAB: CPT

## 2024-05-03 NOTE — PROGRESS NOTES
Disproportionate discogenic degenerative change at L2-L3 and L5-S1 with  broad-based disc osteophyte complex. Dextroconvex curvature of the lumbar spine     IMPRESSION:  Multiple right rib fracture deformities as described. Mild right pleural  effusion. No pneumothorax.     Nondisplaced and/or mildly right first, second, third and fourth vertebral body  transverse process fracture deformities.     Advanced areas of atherosclerotic disease.     2V AP/LAT Lumbosacral XR images taken on 03/27/24 personally reviewed with patient:  Lumbar facet sclerosis. Some pelvic obliquity, where left hip sits slightly higher than right hip. Cortical thickening of right hip. Significant spondylosis. Diffuse endplate osteophytes throughout, with some bridging. 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 Advanced areas of atherosclerotic disease.      Right Ribs XR taken on 1/14/24:  FINDINGS: Multiple views of the right ribs and frontal view of the chest.  Compared with CT chest 5/31/2022     Heart size and mediastinal contours within normal limits. No effusion or  pneumothorax. There is subtle increased patchy right perihilar density. There is slight angulation anterior right rib 10. There is also fracture of the posterior lateral right rib 10. There is also fracture of posterior lateral right rib 9     IMPRESSION:     Fractures anterior right rib 9 and 10.     Fracture anterior right rib 10.     No effusion or pneumothorax.     Patchy right perihilar density could represent atelectasis or contusion.      20 minutes of face-to-face contact were spent with the patient during today's visit extensively discussing symptoms and treatment plan.  All questions were answered. More than half of this visit today was spent on counseling.      Written by Destinee Austin, as dictated by Dr. Kevin Aguiar.

## 2024-05-06 ENCOUNTER — OFFICE VISIT (OUTPATIENT)
Age: 64
End: 2024-05-06
Payer: MEDICAID

## 2024-05-06 VITALS
OXYGEN SATURATION: 98 % | WEIGHT: 116 LBS | HEIGHT: 66 IN | DIASTOLIC BLOOD PRESSURE: 85 MMHG | TEMPERATURE: 98.4 F | BODY MASS INDEX: 18.64 KG/M2 | HEART RATE: 82 BPM | SYSTOLIC BLOOD PRESSURE: 102 MMHG

## 2024-05-06 DIAGNOSIS — R94.4 DECREASED GFR: ICD-10-CM

## 2024-05-06 DIAGNOSIS — I73.9 PAD (PERIPHERAL ARTERY DISEASE) (HCC): ICD-10-CM

## 2024-05-06 DIAGNOSIS — F41.8 ANXIETY ASSOCIATED WITH DEPRESSION: ICD-10-CM

## 2024-05-06 DIAGNOSIS — I10 PRIMARY HYPERTENSION: Primary | ICD-10-CM

## 2024-05-06 DIAGNOSIS — F17.200 SMOKER: ICD-10-CM

## 2024-05-06 DIAGNOSIS — E78.5 DYSLIPIDEMIA: ICD-10-CM

## 2024-05-06 DIAGNOSIS — Z12.5 SPECIAL SCREENING FOR MALIGNANT NEOPLASM OF PROSTATE: ICD-10-CM

## 2024-05-06 DIAGNOSIS — I65.23 BILATERAL CAROTID ARTERY STENOSIS: ICD-10-CM

## 2024-05-06 DIAGNOSIS — I73.9 PVD (PERIPHERAL VASCULAR DISEASE) (HCC): ICD-10-CM

## 2024-05-06 PROBLEM — R09.89 BILATERAL CAROTID BRUITS: Status: RESOLVED | Noted: 2022-05-23 | Resolved: 2024-05-06

## 2024-05-06 PROBLEM — Z78.9 DAILY CONSUMPTION OF ALCOHOL: Status: RESOLVED | Noted: 2022-03-07 | Resolved: 2024-05-06

## 2024-05-06 PROBLEM — S22.009A FRACTURE OF THORACIC TRANSVERSE PROCESS, CLOSED, INITIAL ENCOUNTER (HCC): Status: RESOLVED | Noted: 2024-01-24 | Resolved: 2024-05-06

## 2024-05-06 PROBLEM — N17.9 AKI (ACUTE KIDNEY INJURY) (HCC): Status: RESOLVED | Noted: 2024-01-24 | Resolved: 2024-05-06

## 2024-05-06 PROBLEM — B18.2 CHRONIC HEPATITIS C WITHOUT HEPATIC COMA (HCC): Status: RESOLVED | Noted: 2022-05-23 | Resolved: 2024-05-06

## 2024-05-06 PROBLEM — F79 INTELLECTUAL DISABILITY: Status: RESOLVED | Noted: 2022-03-07 | Resolved: 2024-05-06

## 2024-05-06 PROCEDURE — 90677 PCV20 VACCINE IM: CPT | Performed by: NURSE PRACTITIONER

## 2024-05-06 PROCEDURE — 90471 IMMUNIZATION ADMIN: CPT | Performed by: NURSE PRACTITIONER

## 2024-05-06 PROCEDURE — 3074F SYST BP LT 130 MM HG: CPT | Performed by: NURSE PRACTITIONER

## 2024-05-06 PROCEDURE — 3079F DIAST BP 80-89 MM HG: CPT | Performed by: NURSE PRACTITIONER

## 2024-05-06 PROCEDURE — 99213 OFFICE O/P EST LOW 20 MIN: CPT | Performed by: NURSE PRACTITIONER

## 2024-05-06 RX ORDER — CITALOPRAM HYDROBROMIDE 10 MG/1
10 TABLET ORAL DAILY
Qty: 93 TABLET | Refills: 1 | Status: SHIPPED | OUTPATIENT
Start: 2024-05-06

## 2024-05-06 RX ORDER — LISINOPRIL 40 MG/1
40 TABLET ORAL DAILY
Qty: 93 TABLET | Refills: 1 | Status: SHIPPED | OUTPATIENT
Start: 2024-05-06

## 2024-05-06 RX ORDER — AMLODIPINE BESYLATE 10 MG/1
10 TABLET ORAL DAILY
Qty: 93 TABLET | Refills: 1 | Status: SHIPPED | OUTPATIENT
Start: 2024-05-06

## 2024-05-06 NOTE — ASSESSMENT & PLAN NOTE
Acute kidney injury 1/2024  Kidney function is improving.  eGFR 55.2; creatinine 1.4  Encourage decrease alcohol consumption increase water consumption

## 2024-05-06 NOTE — PROGRESS NOTES
[facility] [address]  [address]  [place] [id number]  [phone] office/[phone] fax    5/6/2024    Gio Khalil 1960 is a pleasant White (non-) male.       History of Present Illness  The patient presents for evaluation of multiple medical concerns. He is accompanied by an adult female.    The patient continues his regimen of amlodipine 10 mg and lisinopril 40 mg for blood pressure management.    For his anxiety and depression, he continues his regimen of Celexa 10 mg.    Regarding his peripheral neuropathy, he continues to take pregabalin 75 mg. He reports an improvement in his back and leg pain following his transition from gabapentin to Lyrica. He is seeing Dr. Joseph for his back. He has a follow-up appointment with him in 6 months.    He is under the care of a vascular specialist for carotid artery stenosis.    In 01/2024, he was diagnosed with acute kidney injury. His kidney function tests have shown improvement.    The patient continues to smoke half a pack of cigarettes daily, a habit he has been smoking since he was 22 years old. He has expressed interest in undergoing a low-dose CT scan for lung cancer screening.  This was completed in January 2024.    Supplemental Information  He has not had any more falls. He is not climbing ladders anymore.      He drinks about 3 beers a week.      Physical Exam  Vital Signs  The patient's blood pressure is 102/85.  Affect is appropriate.  Mood stable  No apparent distress  Lungs --Clear to auscultation.  Heart --Regular rate and rhythm, no murmurs, rubs, gallops, or clicks.  Normal looking digits, ROM intact  MS --Gait stable. No Amb device    Vitals:    05/06/24 0919   BP: 102/85   Site: Right Upper Arm   Position: Sitting   Cuff Size: Small Adult   Pulse: 82   Temp: 98.4 °F (36.9 °C)   TempSrc: Temporal   SpO2: 98%   Weight: 52.6 kg (116 lb)   Height: 1.676 m (5' 6\")       Body mass index is 18.72 kg/m².

## 2024-05-19 DIAGNOSIS — M79.18 MYOFASCIAL PAIN: ICD-10-CM
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
## 2024-05-19 DIAGNOSIS — M54.50 LUMBAR PAIN: ICD-10-CM

## 2024-05-20 RX ORDER — LIDOCAINE 50 MG/G
PATCH TOPICAL
Qty: 30 PATCH | Refills: 0 | Status: SHIPPED | OUTPATIENT
Start: 2024-05-20

## 2024-05-21 ENCOUNTER — OFFICE VISIT (OUTPATIENT)
Age: 64
End: 2024-05-21
Payer: MEDICAID

## 2024-05-21 VITALS
OXYGEN SATURATION: 97 % | HEIGHT: 66 IN | DIASTOLIC BLOOD PRESSURE: 60 MMHG | BODY MASS INDEX: 18.96 KG/M2 | SYSTOLIC BLOOD PRESSURE: 124 MMHG | HEART RATE: 64 BPM | WEIGHT: 118 LBS

## 2024-05-21 DIAGNOSIS — M54.50 LUMBAR PAIN: Primary | ICD-10-CM

## 2024-05-21 DIAGNOSIS — M54.16 LUMBAR RADICULOPATHY: ICD-10-CM

## 2024-05-21 DIAGNOSIS — M79.18 MYOFASCIAL PAIN: ICD-10-CM
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
## 2024-05-21 PROCEDURE — 3074F SYST BP LT 130 MM HG: CPT | Performed by: PHYSICAL MEDICINE & REHABILITATION

## 2024-05-21 PROCEDURE — 99213 OFFICE O/P EST LOW 20 MIN: CPT | Performed by: PHYSICAL MEDICINE & REHABILITATION

## 2024-05-21 PROCEDURE — 3078F DIAST BP <80 MM HG: CPT | Performed by: PHYSICAL MEDICINE & REHABILITATION

## 2024-05-21 RX ORDER — PREGABALIN 75 MG/1
75 CAPSULE ORAL 2 TIMES DAILY
Qty: 60 CAPSULE | Refills: 2 | Status: SHIPPED | OUTPATIENT
Start: 2024-05-21 | End: 2024-07-20

## 2024-05-21 RX ORDER — LIDOCAINE 50 MG/G
1 PATCH TOPICAL DAILY
Qty: 30 PATCH | Refills: 5 | Status: SHIPPED | OUTPATIENT
Start: 2024-05-21 | End: 2024-11-17

## 2024-05-21 ASSESSMENT — ENCOUNTER SYMPTOMS
VOMITING: 0
NAUSEA: 0
TROUBLE SWALLOWING: 0
SHORTNESS OF BREATH: 0
DIARRHEA: 0
BACK PAIN: 1

## 2024-06-06 ENCOUNTER — HOSPITAL ENCOUNTER (OUTPATIENT)
Facility: HOSPITAL | Age: 64
Discharge: HOME | End: 2024-06-06
Attending: PHYSICAL MEDICINE & REHABILITATION
Payer: MEDICAID
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
## 2024-06-06 DIAGNOSIS — M54.50 LUMBAR PAIN: ICD-10-CM

## 2024-06-06 DIAGNOSIS — M54.16 LUMBAR RADICULOPATHY: ICD-10-CM

## 2024-06-06 DIAGNOSIS — M79.18 MYOFASCIAL PAIN: ICD-10-CM

## 2024-06-06 PROCEDURE — 72148 MRI LUMBAR SPINE W/O DYE: CPT

## 2024-06-13 NOTE — PROGRESS NOTES
Nondisplaced and/or mildly right first, second, third and fourth vertebral body transverse process fracture deformities. Advanced areas of atherosclerotic disease.   Pt's family notes that pt has surgical history of stents placed in his heart; pt's family notes that they are followed by PCP and other specialists for monitoring of his heart.   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 Notes numbness and tingling of left foot, as well as his left pinky. Pt's family member notes that the first time they went to the ER, the pt was not properly evaluated and pt had returned home being unable to move, eat or urinate properly. Pt's family notes they returned to the ER, where pt was then evaluated with imaging and labwork.   Pt has previously tried Gabapentin.   PLAN: Lumbar MRI, BMP, Lyrica 75 mg QHS, Lidocaine patches    5/21/24  Continues with lumbar pain. Maintains HEP  Pt was not able to obtain MRI as they did not receive a call to schedule. Pt obtained BMP.  Takes Lyrica 75 mg QHS with mild benefit.  Uses Lidocaine patches with benefit.    Pt's daughter notes PCP says pt should apply for disability. Pt is currently living with family as he has been unable to work   Plan: Increase Lyrica 75 mg BID from Lyrica 75 mg QHS; Refill of Lidocaine Patches     Gio Khalil was seen virtually today for diagnostic follow up. Pt takes Lyrica 75mg BID.    Lumbar MRI images dated 6/10/24 were reviewed. Per report, Advanced lumbar spine degenerative disease. Neural foraminal stenosis is worst at L4-L5 on the right where it is severe. Additional areas of mild to moderate foraminal stenosis throughout the lumbar spine as detailed above. Spinal canal stenosis is worst at L2-L3 and L3-L4 where it is mild.      Pain Scale: 5/10     PmHx: CURTIS (cannot take NSAIDs), bilateral carotid artery stenosis, Hx shingles         reviewed.     REVIEW OF SYSTEMS  Review of Systems   Constitutional:

## 2024-06-14 ENCOUNTER — OFFICE VISIT (OUTPATIENT)
Age: 64
End: 2024-06-14
Payer: MEDICAID

## 2024-06-14 VITALS
RESPIRATION RATE: 18 BRPM | HEART RATE: 58 BPM | HEIGHT: 66 IN | WEIGHT: 118 LBS | OXYGEN SATURATION: 98 % | BODY MASS INDEX: 18.96 KG/M2 | DIASTOLIC BLOOD PRESSURE: 74 MMHG | SYSTOLIC BLOOD PRESSURE: 132 MMHG

## 2024-06-14 DIAGNOSIS — F17.210 CIGARETTE NICOTINE DEPENDENCE WITHOUT COMPLICATION: ICD-10-CM

## 2024-06-14 DIAGNOSIS — R09.89 BILATERAL CAROTID BRUITS: ICD-10-CM

## 2024-06-14 DIAGNOSIS — G60.9 IDIOPATHIC PERIPHERAL NEUROPATHY: ICD-10-CM

## 2024-06-14 DIAGNOSIS — I73.9 PVD (PERIPHERAL VASCULAR DISEASE) (HCC): Primary | ICD-10-CM

## 2024-06-14 PROCEDURE — 99214 OFFICE O/P EST MOD 30 MIN: CPT | Performed by: PHYSICIAN ASSISTANT

## 2024-06-14 PROCEDURE — 3078F DIAST BP <80 MM HG: CPT | Performed by: PHYSICIAN ASSISTANT

## 2024-06-14 PROCEDURE — 3075F SYST BP GE 130 - 139MM HG: CPT | Performed by: PHYSICIAN ASSISTANT

## 2024-06-14 RX ORDER — CLOPIDOGREL BISULFATE 75 MG/1
TABLET ORAL
Qty: 30 TABLET | Refills: 9 | Status: SHIPPED | OUTPATIENT
Start: 2024-06-14

## 2024-06-14 RX ORDER — GABAPENTIN 100 MG/1
100 CAPSULE ORAL 3 TIMES DAILY
Qty: 180 CAPSULE | Refills: 1 | Status: SHIPPED | OUTPATIENT
Start: 2024-06-14 | End: 2024-12-11

## 2024-06-14 ASSESSMENT — PATIENT HEALTH QUESTIONNAIRE - PHQ9
2. FEELING DOWN, DEPRESSED OR HOPELESS: NOT AT ALL
SUM OF ALL RESPONSES TO PHQ QUESTIONS 1-9: 0
SUM OF ALL RESPONSES TO PHQ9 QUESTIONS 1 & 2: 0
SUM OF ALL RESPONSES TO PHQ QUESTIONS 1-9: 0
1. LITTLE INTEREST OR PLEASURE IN DOING THINGS: NOT AT ALL

## 2024-06-18 ENCOUNTER — TELEMEDICINE (OUTPATIENT)
Age: 64
End: 2024-06-18
Payer: MEDICAID

## 2024-06-18 DIAGNOSIS — M54.16 LUMBAR RADICULOPATHY: Primary | ICD-10-CM
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
## 2024-06-18 DIAGNOSIS — M47.816 LUMBAR FACET ARTHROPATHY: ICD-10-CM

## 2024-06-18 DIAGNOSIS — M54.50 LUMBAR PAIN: ICD-10-CM

## 2024-06-18 DIAGNOSIS — M79.18 MYOFASCIAL PAIN: ICD-10-CM

## 2024-06-18 PROCEDURE — 99442 PR PHYS/QHP TELEPHONE EVALUATION 11-20 MIN: CPT | Performed by: PHYSICAL MEDICINE & REHABILITATION

## 2024-06-18 RX ORDER — PREGABALIN 150 MG/1
150 CAPSULE ORAL 2 TIMES DAILY
Qty: 60 CAPSULE | Refills: 2 | Status: SHIPPED | OUTPATIENT
Start: 2024-06-18 | End: 2024-09-16

## 2024-06-18 ASSESSMENT — ENCOUNTER SYMPTOMS
BACK PAIN: 1
DIARRHEA: 0
VOMITING: 0
SHORTNESS OF BREATH: 0
TROUBLE SWALLOWING: 0
NAUSEA: 0

## 2024-06-18 NOTE — PROGRESS NOTES
Aorta    Invasive Procedure History:   The patient has an invasive procedure history of left iliac PTA with stent and right iliac PTA with stent.   The left iliac PTA with stent is patent. The right iliac PTA with stent is patent.   Distal Aorta: Patent.     Abdominal Aorta Measurements     PSV AP TR   Dist Ao 56 cm/s       1.63 cm       1.79 cm           Iliac Artery Measurements     Right PSV Left PSV   EIA Dist 193.7 cm/s       125.3 cm/s         .1 cm/s               Right Graft/Stent 1 Measurements     PSV   Name REIA stent         Inflow Artery 114.6 cm/s         Prox 99.4 cm/s         Mid 68 cm/s         Dist 70.8 cm/s         Outflow Vessel 105.8 cm/s          Right Graft/Stent 2 Measurements     PSV   Name RCIA stent         Inflow Artery 56 cm/s         Prox 115.8 cm/s         Mid 76.5 cm/s         Dist 85 cm/s         Outflow Vessel 114.6 cm/s                    Left Graft/Stent 1 Measurements     PSV   Name HERMILA stent         Inflow Artery 119.8 cm/s         Prox 117.9 cm/s         Mid 125.2 cm/s         Dist 94.2 cm/s         Outflow Vessel 125.3 cm/s          Left Graft/Stent 2 Measurements     PSV   Name LCIA stent         Inflow Artery 56 cm/s         Prox 78.3 cm/s         Mid 73.9 cm/s         Dist 65 cm/s         Outflow Vessel 99.7 cm/s                    All Reviewers List    Jimi Domingo PA-C on 11/22/2023 13:38     Signed    Electronically signed by Nida Mckinney MD on 11/22/23 at 0857 EST     Printable Results Report      Electronically signed by Nida Mckinney MD on 11/22/23 at 0857 EST     Patient instructed that given above findings we will continue with observation and surveillance and follow-up in 6 months with repeat carotid imaging and at that time we will obtain bilateral lower extremity ABIs.  Long discussion with the patient about imaging still within normal limits but because of his smoking history we are seeing some decrease in his values.

## 2024-06-24 RX ORDER — VITAMIN B COMPLEX
1 CAPSULE ORAL DAILY
Qty: 90 CAPSULE | Refills: 1 | Status: SHIPPED | OUTPATIENT
Start: 2024-06-24

## 2024-06-27 ENCOUNTER — HOSPITAL ENCOUNTER (OUTPATIENT)
Facility: HOSPITAL | Age: 64
End: 2024-06-27
Payer: MEDICAID

## 2024-06-27 ENCOUNTER — HOSPITAL ENCOUNTER (OUTPATIENT)
Facility: HOSPITAL | Age: 64
Discharge: HOME | End: 2024-06-27
Payer: MEDICAID

## 2024-06-27 VITALS
SYSTOLIC BLOOD PRESSURE: 118 MMHG | TEMPERATURE: 98.2 F | HEART RATE: 67 BPM | RESPIRATION RATE: 16 BRPM | OXYGEN SATURATION: 95 % | DIASTOLIC BLOOD PRESSURE: 67 MMHG

## 2024-06-27 PROCEDURE — 6370000000 HC RX 637 (ALT 250 FOR IP): Performed by: PHYSICAL MEDICINE & REHABILITATION

## 2024-06-27 RX ORDER — DIAZEPAM 5 MG/1
2.5 TABLET ORAL ONCE
Status: COMPLETED | OUTPATIENT
Start: 2024-06-27 | End: 2024-06-27

## 2024-06-27 RX ORDER — LIDOCAINE HYDROCHLORIDE 10 MG/ML
30 INJECTION, SOLUTION EPIDURAL; INFILTRATION; INTRACAUDAL; PERINEURAL ONCE
Status: DISCONTINUED | OUTPATIENT
Start: 2024-06-27 | End: 2024-07-01 | Stop reason: HOSPADM

## 2024-06-27 RX ORDER — DIAZEPAM 5 MG/1
10 TABLET ORAL ONCE
Status: COMPLETED | OUTPATIENT
Start: 2024-06-27 | End: 2024-06-27

## 2024-06-27 RX ORDER — DIAZEPAM 5 MG/1
5 TABLET ORAL ONCE
Status: COMPLETED | OUTPATIENT
Start: 2024-06-27 | End: 2024-06-27

## 2024-06-27 RX ORDER — IOPAMIDOL 408 MG/ML
4 INJECTION, SOLUTION INTRATHECAL
Status: DISCONTINUED | OUTPATIENT
Start: 2024-06-27 | End: 2024-07-01 | Stop reason: HOSPADM

## 2024-06-27 RX ORDER — DEXAMETHASONE SODIUM PHOSPHATE 10 MG/ML
10 INJECTION, SOLUTION INTRAMUSCULAR; INTRAVENOUS ONCE
Status: DISCONTINUED | OUTPATIENT
Start: 2024-06-27 | End: 2024-07-01 | Stop reason: HOSPADM

## 2024-06-27 RX ADMIN — DIAZEPAM 10 MG: 5 TABLET ORAL at 11:57

## 2024-06-27 ASSESSMENT — PAIN - FUNCTIONAL ASSESSMENT: PAIN_FUNCTIONAL_ASSESSMENT: 0-10

## 2024-06-27 ASSESSMENT — PAIN DESCRIPTION - DESCRIPTORS: DESCRIPTORS: TEARING

## 2024-07-11 ENCOUNTER — HOSPITAL ENCOUNTER (OUTPATIENT)
Facility: HOSPITAL | Age: 64
Discharge: HOME | End: 2024-07-11
Payer: MEDICAID

## 2024-07-11 ENCOUNTER — HOSPITAL ENCOUNTER (OUTPATIENT)
Facility: HOSPITAL | Age: 64
End: 2024-07-11
Payer: MEDICAID

## 2024-07-11 VITALS
DIASTOLIC BLOOD PRESSURE: 64 MMHG | HEART RATE: 60 BPM | RESPIRATION RATE: 16 BRPM | OXYGEN SATURATION: 96 % | TEMPERATURE: 97.5 F | SYSTOLIC BLOOD PRESSURE: 120 MMHG

## 2024-07-11 PROBLEM — M54.16 LUMBAR RADICULOPATHY: Status: ACTIVE | Noted: 2024-07-11

## 2024-07-11 PROCEDURE — 6370000000 HC RX 637 (ALT 250 FOR IP): Performed by: PHYSICAL MEDICINE & REHABILITATION

## 2024-07-11 PROCEDURE — 64483 NJX AA&/STRD TFRM EPI L/S 1: CPT

## 2024-07-11 PROCEDURE — 64483 NJX AA&/STRD TFRM EPI L/S 1: CPT | Performed by: PHYSICAL MEDICINE & REHABILITATION

## 2024-07-11 PROCEDURE — 2500000003 HC RX 250 WO HCPCS: Performed by: PHYSICAL MEDICINE & REHABILITATION

## 2024-07-11 PROCEDURE — 6360000002 HC RX W HCPCS: Performed by: PHYSICAL MEDICINE & REHABILITATION

## 2024-07-11 PROCEDURE — 64484 NJX AA&/STRD TFRM EPI L/S EA: CPT | Performed by: PHYSICAL MEDICINE & REHABILITATION

## 2024-07-11 PROCEDURE — 64484 NJX AA&/STRD TFRM EPI L/S EA: CPT

## 2024-07-11 RX ORDER — DEXAMETHASONE SODIUM PHOSPHATE 10 MG/ML
10 INJECTION, SOLUTION INTRAMUSCULAR; INTRAVENOUS ONCE
Status: COMPLETED | OUTPATIENT
Start: 2024-07-11 | End: 2024-07-11

## 2024-07-11 RX ORDER — DIAZEPAM 5 MG/1
5 TABLET ORAL ONCE
Status: COMPLETED | OUTPATIENT
Start: 2024-07-11 | End: 2024-07-11

## 2024-07-11 RX ORDER — LIDOCAINE HYDROCHLORIDE 10 MG/ML
30 INJECTION, SOLUTION EPIDURAL; INFILTRATION; INTRACAUDAL; PERINEURAL ONCE
Status: COMPLETED | OUTPATIENT
Start: 2024-07-11 | End: 2024-07-11

## 2024-07-11 RX ORDER — DIAZEPAM 5 MG/1
10 TABLET ORAL ONCE
Status: COMPLETED | OUTPATIENT
Start: 2024-07-11 | End: 2024-07-11

## 2024-07-11 RX ORDER — DIAZEPAM 5 MG/1
2.5 TABLET ORAL ONCE
Status: COMPLETED | OUTPATIENT
Start: 2024-07-11 | End: 2024-07-11

## 2024-07-11 RX ORDER — IOPAMIDOL 408 MG/ML
4 INJECTION, SOLUTION INTRATHECAL
Status: DISPENSED | OUTPATIENT
Start: 2024-07-11

## 2024-07-11 RX ADMIN — LIDOCAINE HYDROCHLORIDE 12 ML: 10 INJECTION, SOLUTION EPIDURAL; INFILTRATION; INTRACAUDAL; PERINEURAL at 15:07

## 2024-07-11 RX ADMIN — DEXAMETHASONE SODIUM PHOSPHATE 10 MG: 10 INJECTION, SOLUTION INTRAMUSCULAR; INTRAVENOUS at 15:08

## 2024-07-11 RX ADMIN — DIAZEPAM 5 MG: 5 TABLET ORAL at 13:56

## 2024-07-11 ASSESSMENT — PAIN SCALES - GENERAL: PAINLEVEL_OUTOF10: 5

## 2024-07-11 ASSESSMENT — PAIN - FUNCTIONAL ASSESSMENT: PAIN_FUNCTIONAL_ASSESSMENT: 0-10

## 2024-07-11 NOTE — OP NOTE
PROCEDURE NOTE      Patient Name: Gio Khalil    Date of Procedure: July 11, 2024    Preoperative Diagnosis:  M54.16    Post Operative Diagnosis:  same    Procedure :    right L4 Selective Nerve Root Block  right L5 Selective Nerve Root Block      Consent:  Informed consent was obtained prior to the procedure.  The patient was given the opportunity to ask questions regarding the procedure and its associated risks.  In addition to the potential risks associated with the procedure itself, the patient was informed both verbally and in writing of the potential side effects of the use of glucocorticoid.  The patient appeared to comprehend the informed consent and desired to have the procedure performed.        Procedure:  The patient was placed in the prone position on the fluoroscopy table and the back was prepped and draped in the usual sterile manner.  The exact spinal level was  identified using fluoroscopy, and Lidocaine 1 % was injected locally, a # 22 gauge spinal needle was passed to the transverse process.  The depth was noted and the needle redirected to pass inferior and approximately one cm anterior to the transverse process.    N0  1 cc of Isovue M-200 was used to verify positioning in the epidural and paravertebral space and outlined the course of the spinal nerve into the epidural space.  The same procedure was repeated at each spinal level indicated above.    A total of 10 mg of preservative free Dexamethasone and 2 cc of Lidocaine was slowly injected.   The patient tolerated the procedure well.  The injection area was cleaned and bandaids applied.  Not excessive bleeding was noted.   Patient dressed and discharged to home with instructions.    Discussion: The patient tolerated the procedure well.                                              GLADYS HAINES III, MD  July 11, 2024

## 2024-08-05 NOTE — PROGRESS NOTES
[facility]  [facility] [facility], [address]  [place] [address]  Phone: [phone]  Fax: [phone]      Gio Khalil  : 1960  PCP: Mary Russell, DNP  2024    PROGRESS NOTE    HISTORY OF PRESENT ILLNESS       3/27/24  C/O lumbar pain and right abdominal pain, that started after he fell off of a ladder in 2024. He also notes of chronic lumbar pain (L>R) that has persisted for several years, as well as intermittent shooting pain of right leg. He notes he is able to stand for 30 minutes; his sxs worsen when walking for long periods of time though.   Seen in the ER on 24, because pt sustained rib fractures when fell off of a ladder.   Right Ribs XR dated 24, revealed Fractures anterior right rib 9 and 10. Fracture anterior right rib 10. No effusion or pneumothorax. Patchy right perihilar density could represent atelectasis or contusion.   Abdomen CT dated 24 revealed multiple right rib fracture deformities as described. Mild right pleural effusion. No pneumothorax. Nondisplaced and/or mildly right first, second, third and fourth vertebral body transverse process fracture deformities. Advanced areas of atherosclerotic disease.   Pt's family notes that pt has surgical history of stents placed in his heart; pt's family notes that they are followed by PCP and other specialists for monitoring of his heart.   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 Notes numbness and tingling of left foot, as well as his left pinky. Pt's family member notes that the first time they went to the ER, the pt was not properly evaluated and pt had returned home being unable to move, eat or urinate properly. Pt's family notes they returned to the ER, where pt was then evaluated with imaging and labwork.   Pt has previously tried Gabapentin.   PLAN: Lumbar MRI, BMP, Lyrica 75 mg QHS, Lidocaine patches

## 2024-08-07 ENCOUNTER — OFFICE VISIT (OUTPATIENT)
Age: 64
End: 2024-08-07
Payer: MEDICAID

## 2024-08-07 ENCOUNTER — PATIENT MESSAGE (OUTPATIENT)
Facility: CLINIC | Age: 64
End: 2024-08-07

## 2024-08-07 VITALS — OXYGEN SATURATION: 92 % | BODY MASS INDEX: 19.29 KG/M2 | TEMPERATURE: 97.1 F | HEIGHT: 66 IN | WEIGHT: 120 LBS

## 2024-08-07 DIAGNOSIS — Z72.0 SMOKING TRYING TO QUIT: ICD-10-CM

## 2024-08-07 DIAGNOSIS — M47.816 LUMBAR FACET ARTHROPATHY: Primary | ICD-10-CM

## 2024-08-07 DIAGNOSIS — M54.16 LUMBAR RADICULOPATHY: ICD-10-CM
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
## 2024-08-07 DIAGNOSIS — M54.50 LUMBAR PAIN: ICD-10-CM

## 2024-08-07 DIAGNOSIS — M79.18 MYOFASCIAL PAIN: ICD-10-CM

## 2024-08-07 PROCEDURE — 99214 OFFICE O/P EST MOD 30 MIN: CPT | Performed by: PHYSICAL MEDICINE & REHABILITATION

## 2024-08-07 RX ORDER — NICOTINE 21 MG/24HR
1 PATCH, TRANSDERMAL 24 HOURS TRANSDERMAL DAILY
Qty: 14 PATCH | Refills: 5 | Status: SHIPPED | OUTPATIENT
Start: 2024-08-07 | End: 2024-10-30

## 2024-08-07 ASSESSMENT — ENCOUNTER SYMPTOMS
SHORTNESS OF BREATH: 0
VOMITING: 0
DIARRHEA: 0
NAUSEA: 0
TROUBLE SWALLOWING: 0
BACK PAIN: 1

## 2024-08-07 NOTE — TELEPHONE ENCOUNTER
From: Gio Khalil  To: Mary Russell  Sent: 8/7/2024 1:17 PM EDT  Subject: Blood pressure and pulse    154/52   151/66 pulse 49  158/64 pulse 50  Is this something to be concerned about   He takes his pills at night

## 2024-08-07 NOTE — TELEPHONE ENCOUNTER
I have availability tomorrow morning, pls call pt and see if he can come in to be evaluated for his BP and pulse. Thank you

## 2024-08-07 NOTE — PROGRESS NOTES
Gio Cotaens presents today for   Chief Complaint   Patient presents with    Back Pain     LUMBAR PAIN       Is someone accompanying this pt? YES     Is the patient using any DME equipment during OV? NO    Depression Screenin/14/2024     2:16 PM 2024    12:44 PM 2023     8:47 AM 3/21/2023     9:39 AM 2023     8:09 AM 2023     9:22 AM 2022     9:53 AM   PHQ-9 Questionaire   Little interest or pleasure in doing things 0 0 0 0 0 0 0   Feeling down, depressed, or hopeless 0 0 0 0 0 0 0   Trouble falling or staying asleep, or sleeping too much  0 0   0    Feeling tired or having little energy  0 0   0    Poor appetite or overeating  0 0   0    Feeling bad about yourself - or that you are a failure or have let yourself or your family down  0 0   0    Trouble concentrating on things, such as reading the newspaper or watching television  0 0   0    Moving or speaking so slowly that other people could have noticed. Or the opposite - being so fidgety or restless that you have been moving around a lot more than usual  0 0   0    Thoughts that you would be better off dead, or of hurting yourself in some way  0 0       PHQ-9 Total Score 0 0 0 0 0 0 0   If you checked off any problems, how difficult have these problems made it for you to do your work, take care of things at home, or get along with other people?  0              2024     2:16 PM 2024    12:44 PM 2023     8:47 AM 3/21/2023     9:39 AM 2023     8:09 AM 2023     9:22 AM 2022     9:53 AM   PHQ Scores   PHQ2 Score 0 0 0 0 0 0 0   PHQ9 Score 0 0 0 0 0 0 0       Learning Assessment:  No question data found.    Abuse Screening:       No data to display                Fall Risk       No data to display                OPIOID RISK TOOL       No data to display                Coordination of Care:  1. Have you been to the ER, urgent care clinic since your last visit? NO  Hospitalized since your last visit? NO    2.

## 2024-08-08 ENCOUNTER — OFFICE VISIT (OUTPATIENT)
Facility: CLINIC | Age: 64
End: 2024-08-08
Payer: MEDICAID

## 2024-08-08 VITALS
BODY MASS INDEX: 19.44 KG/M2 | TEMPERATURE: 97.3 F | HEART RATE: 61 BPM | SYSTOLIC BLOOD PRESSURE: 100 MMHG | OXYGEN SATURATION: 94 % | WEIGHT: 121 LBS | DIASTOLIC BLOOD PRESSURE: 58 MMHG | HEIGHT: 66 IN | RESPIRATION RATE: 16 BRPM

## 2024-08-08 DIAGNOSIS — I10 PRIMARY HYPERTENSION: Primary | ICD-10-CM

## 2024-08-08 PROCEDURE — 99213 OFFICE O/P EST LOW 20 MIN: CPT | Performed by: NURSE PRACTITIONER

## 2024-08-08 PROCEDURE — 3078F DIAST BP <80 MM HG: CPT | Performed by: NURSE PRACTITIONER

## 2024-08-08 PROCEDURE — 3074F SYST BP LT 130 MM HG: CPT | Performed by: NURSE PRACTITIONER

## 2024-08-08 NOTE — PROGRESS NOTES
Gio Khalil presents today for   Chief Complaint   Patient presents with    Follow-up           \"Have you been to the ER, urgent care clinic since your last visit?  Hospitalized since your last visit?\"    NO    “Have you seen or consulted any other health care providers outside of [facility] since your last visit?”    NO           
Current smoker 03/07/2022    Daily consumption of alcohol 03/07/2022    Dyslipidemia 08/08/2023    Elevated LFTs 03/09/2022    Fracture of thoracic transverse process, closed, initial encounter ([facility]) 01/24/2024    Hepatitis C test positive 03/09/2022    Hyperkalemia 03/09/2022    Marijuana smoker 03/07/2022    Mental subnormality 03/07/2022    PAD (peripheral artery disease) ([facility])     Primary hypertension 03/07/2022    PVD (peripheral vascular disease) ([facility]) 07/22/2022     Current Outpatient Medications   Medication Sig    nicotine (NICODERM CQ) 14 MG/24HR Place 1 patch onto the skin daily    B Complex Vitamins (VITAMIN B COMPLEX) CAPS TAKE 1 CAPSULE BY MOUTH EVERY DAY    clopidogrel (PLAVIX) 75 MG tablet TAKE 1 TABLET BY MOUTH EVERY DAY TO PREVENT BLOOD CLOTS    gabapentin (NEURONTIN) 100 MG capsule Take 1 capsule by mouth 3 times daily for 180 days. Intended supply: 90 days Max Daily Amount: 300 mg    pregabalin (LYRICA) 75 MG capsule Take 1 capsule by mouth 2 times daily for 60 days. Max Daily Amount: 150 mg    lidocaine (LIDODERM) 5 % APPLY 1 PATCH ONTO THE SKIN DAILY FOR 12 HOURS ON, 12 HOURS OFF    amLODIPine (NORVASC) 10 MG tablet Take 1 tablet by mouth daily    lisinopril (PRINIVIL;ZESTRIL) 40 MG tablet Take 1 tablet by mouth daily    citalopram (CELEXA) 10 MG tablet Take 1 tablet by mouth daily    pregabalin (LYRICA) 75 MG capsule Take 1 capsule by mouth at bedtime for 30 days. Max Daily Amount: 75 mg    rosuvastatin (CRESTOR) 40 MG tablet Take 1 tablet by mouth daily    aspirin 81 MG chewable tablet Take 1 tablet by mouth daily     No current facility-administered medications for this visit.       Past Surgical History:   Procedure Laterality Date    CATARACT REMOVAL Right     4 years ago    VASCULAR SURGERY Left 06/01/2023    Stents     Allergies and Intolerances:   No Known Allergies  Family History:   No family history on file.  Social History:   He  reports that he has been smoking cigarettes. He

## 2024-08-08 NOTE — ASSESSMENT & PLAN NOTE
BP at Ortho appointment yesterday was elevated 150s diastolic over 60s with heart rate 49-50.  Patient had not taken his blood pressure medication prior to his appointment.    Today his blood pressure is 100/58 with a heart rate of 61.  Patient's niece, Ruth, informed me that patient took his blood pressure medication yesterday at 330 and again this morning which may have caused his blood pressure to drop too low.    Will not adjust BP medication at this time.  Patient to monitor his blood pressure and if his blood pressure drops below 110 systolic or goes above 140 systolic, patient is to call this office for possible adjustment of medication.    Educated patient on the importance of increasing his water intake.    5/6/2024 OV note:   Well-controlled, continue current medications   Cont Lisinopril 40mg qd  Cont amlodipine 10mg qd  Smoking cessation encouraged    2/2023 dc'd amlodipine 10mg d/t low BP reading  Dc amlodipine 5mg  Initiate amlodipine 10mg qd  Cont lisinopril 40mg qd

## 2024-08-22 ENCOUNTER — HOSPITAL ENCOUNTER (OUTPATIENT)
Facility: HOSPITAL | Age: 64
End: 2024-08-22
Payer: MEDICAID

## 2024-08-22 ENCOUNTER — HOSPITAL ENCOUNTER (OUTPATIENT)
Facility: HOSPITAL | Age: 64
Discharge: HOME | End: 2024-08-22
Payer: MEDICAID

## 2024-08-22 VITALS
OXYGEN SATURATION: 98 % | TEMPERATURE: 98 F | RESPIRATION RATE: 16 BRPM | SYSTOLIC BLOOD PRESSURE: 105 MMHG | DIASTOLIC BLOOD PRESSURE: 56 MMHG | HEART RATE: 56 BPM

## 2024-08-22 PROBLEM — M47.816 LUMBAR SPONDYLOSIS: Status: ACTIVE | Noted: 2024-08-22

## 2024-08-22 PROCEDURE — 2500000003 HC RX 250 WO HCPCS: Performed by: PHYSICAL MEDICINE & REHABILITATION

## 2024-08-22 PROCEDURE — 6360000002 HC RX W HCPCS: Performed by: PHYSICAL MEDICINE & REHABILITATION

## 2024-08-22 PROCEDURE — 6370000000 HC RX 637 (ALT 250 FOR IP): Performed by: PHYSICAL MEDICINE & REHABILITATION

## 2024-08-22 PROCEDURE — 64484 NJX AA&/STRD TFRM EPI L/S EA: CPT

## 2024-08-22 PROCEDURE — 64483 NJX AA&/STRD TFRM EPI L/S 1: CPT

## 2024-08-22 RX ORDER — DEXAMETHASONE SODIUM PHOSPHATE 10 MG/ML
10 INJECTION, SOLUTION INTRAMUSCULAR; INTRAVENOUS ONCE
Status: COMPLETED | OUTPATIENT
Start: 2024-08-22 | End: 2024-08-22

## 2024-08-22 RX ORDER — DIAZEPAM 5 MG
2.5 TABLET ORAL ONCE
Status: COMPLETED | OUTPATIENT
Start: 2024-08-22 | End: 2024-08-22

## 2024-08-22 RX ORDER — DIAZEPAM 5 MG
10 TABLET ORAL ONCE
Status: COMPLETED | OUTPATIENT
Start: 2024-08-22 | End: 2024-08-22

## 2024-08-22 RX ORDER — DIAZEPAM 5 MG
5 TABLET ORAL ONCE
Status: COMPLETED | OUTPATIENT
Start: 2024-08-22 | End: 2024-08-22

## 2024-08-22 RX ORDER — IOPAMIDOL 408 MG/ML
4 INJECTION, SOLUTION INTRATHECAL
Status: DISCONTINUED | OUTPATIENT
Start: 2024-08-22 | End: 2024-08-26 | Stop reason: HOSPADM

## 2024-08-22 RX ORDER — LIDOCAINE HYDROCHLORIDE 10 MG/ML
30 INJECTION, SOLUTION EPIDURAL; INFILTRATION; INTRACAUDAL; PERINEURAL ONCE
Status: COMPLETED | OUTPATIENT
Start: 2024-08-22 | End: 2024-08-22

## 2024-08-22 RX ADMIN — DEXAMETHASONE SODIUM PHOSPHATE 10 MG: 10 INJECTION, SOLUTION INTRAMUSCULAR; INTRAVENOUS at 13:29

## 2024-08-22 RX ADMIN — DIAZEPAM 10 MG: 5 TABLET ORAL at 12:12

## 2024-08-22 RX ADMIN — LIDOCAINE HYDROCHLORIDE 12 ML: 10 INJECTION, SOLUTION EPIDURAL; INFILTRATION; INTRACAUDAL; PERINEURAL at 13:28

## 2024-08-22 ASSESSMENT — PAIN - FUNCTIONAL ASSESSMENT: PAIN_FUNCTIONAL_ASSESSMENT: 0-10

## 2024-08-22 ASSESSMENT — PAIN SCALES - GENERAL: PAINLEVEL_OUTOF10: 5

## 2024-08-22 ASSESSMENT — PAIN DESCRIPTION - DESCRIPTORS: DESCRIPTORS: ACHING;BURNING;CRAMPING;SPASM

## 2024-08-22 NOTE — OP NOTE
PROCEDURE NOTE      Patient Name: Gio Khalil    Date of Procedure: August 22, 2024    Preoperative Diagnosis:  M47.816    Post Operative Diagnosis: same    Procedure :    right L4 Selective Nerve Root Block  right L5 Selective Nerve Root Block      Consent:  Informed consent was obtained prior to the procedure.  The patient was given the opportunity to ask questions regarding the procedure and its associated risks.  In addition to the potential risks associated with the procedure itself, the patient was informed both verbally and in writing of the potential side effects of the use of glucocorticoid.  The patient appeared to comprehend the informed consent and desired to have the procedure performed.        Procedure:  The patient was placed in the prone position on the fluoroscopy table and the back was prepped and draped in the usual sterile manner.  The exact spinal level was  identified using fluoroscopy, and Lidocaine 1 % was injected locally, a # 22 gauge spinal needle was passed to the transverse process.  The depth was noted and the needle redirected to pass inferior and approximately one cm anterior to the transverse process.    No 1 cc of Isovue M-200 was used to verify positioning in the epidural and paravertebral space and outlined the course of the spinal nerve into the epidural space.  The same procedure was repeated at each spinal level indicated above.    A total of 10 mg of preservative free Dexamethasone and 2 cc of Lidocaine was slowly injected.   The patient tolerated the procedure well.  The injection area was cleaned and bandaids applied.  Not excessive bleeding was noted.   Patient dressed and discharged to home with instructions.    Discussion: The patient tolerated the procedure well.                                              GLADYS HAINES III, MD  August 22, 2024

## 2024-09-02 DIAGNOSIS — M54.50 LUMBAR PAIN: ICD-10-CM

## 2024-09-02 DIAGNOSIS — M79.18 MYOFASCIAL PAIN: ICD-10-CM

## 2024-09-02 DIAGNOSIS — M54.16 LUMBAR RADICULOPATHY: ICD-10-CM

## 2024-09-03 DIAGNOSIS — F41.8 ANXIETY ASSOCIATED WITH DEPRESSION: ICD-10-CM

## 2024-09-03 DIAGNOSIS — M54.50 LUMBAR PAIN: ICD-10-CM
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
## 2024-09-03 DIAGNOSIS — M54.16 LUMBAR RADICULOPATHY: ICD-10-CM

## 2024-09-03 DIAGNOSIS — M79.18 MYOFASCIAL PAIN: ICD-10-CM

## 2024-09-03 RX ORDER — CLOPIDOGREL BISULFATE 75 MG/1
75 TABLET ORAL DAILY
Qty: 30 TABLET | Refills: 9 | Status: SHIPPED | OUTPATIENT
Start: 2024-09-03

## 2024-09-03 RX ORDER — CITALOPRAM HYDROBROMIDE 10 MG/1
10 TABLET ORAL DAILY
Qty: 93 TABLET | Refills: 1 | Status: CANCELLED | OUTPATIENT
Start: 2024-09-03

## 2024-09-04 RX ORDER — PREGABALIN 75 MG/1
75 CAPSULE ORAL 2 TIMES DAILY
Qty: 60 CAPSULE | Refills: 0 | Status: SHIPPED | OUTPATIENT
Start: 2024-09-04 | End: 2024-10-04

## 2024-09-05 NOTE — TELEPHONE ENCOUNTER
Last OV: 8/8/2024  Next OV: 11/7/2024  Last refill: 5/6/2024    Please refuse if medication will be filled at appointment.

## 2024-09-06 NOTE — PROGRESS NOTES
Celexa was last refilled for 6 months and may 2024. Patient needs to reach out to his pharmacy for refills. His Lyrica is prescribed by his orthopedist. He will need to reach out to them to get a refill. Thank you

## 2024-09-09 DIAGNOSIS — I10 PRIMARY HYPERTENSION: ICD-10-CM

## 2024-09-09 DIAGNOSIS — M54.50 LUMBAR PAIN: ICD-10-CM

## 2024-09-09 DIAGNOSIS — I70.90 ATHEROSCLEROSIS: ICD-10-CM

## 2024-09-09 DIAGNOSIS — R09.89 BILATERAL CAROTID BRUITS: ICD-10-CM

## 2024-09-09 DIAGNOSIS — G60.9 IDIOPATHIC PERIPHERAL NEUROPATHY: ICD-10-CM

## 2024-09-09 DIAGNOSIS — M79.18 MYOFASCIAL PAIN: ICD-10-CM
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
## 2024-09-09 DIAGNOSIS — I73.9 PVD (PERIPHERAL VASCULAR DISEASE) (HCC): ICD-10-CM

## 2024-09-10 RX ORDER — GABAPENTIN 100 MG/1
100 CAPSULE ORAL 3 TIMES DAILY
Qty: 180 CAPSULE | Refills: 1 | Status: SHIPPED | OUTPATIENT
Start: 2024-09-10 | End: 2025-03-09

## 2024-09-10 RX ORDER — ROSUVASTATIN CALCIUM 40 MG/1
40 TABLET, COATED ORAL DAILY
Qty: 93 TABLET | Refills: 3 | OUTPATIENT
Start: 2024-09-10

## 2024-09-10 RX ORDER — VITAMIN B COMPLEX
1 CAPSULE ORAL DAILY
Qty: 90 CAPSULE | Refills: 1 | OUTPATIENT
Start: 2024-09-10

## 2024-09-10 RX ORDER — LISINOPRIL 40 MG/1
40 TABLET ORAL DAILY
Qty: 93 TABLET | Refills: 1 | OUTPATIENT
Start: 2024-09-10

## 2024-09-10 NOTE — TELEPHONE ENCOUNTER
All refills are good until November.  Patient needs to call his pharmacy and request refills.  Thank you

## 2024-09-10 NOTE — TELEPHONE ENCOUNTER
Last OV: 8/8/2024  Next OV: 11/7/2024  Last refill:   6/24/2024 -B12   5/6/2024 -- Lisinopril  11/14/2023 -- rosuvastatin

## 2024-09-11 RX ORDER — LIDOCAINE 50 MG/G
1 PATCH TOPICAL EVERY 24 HOURS
Qty: 30 PATCH | Refills: 0 | Status: SHIPPED | OUTPATIENT
Start: 2024-09-11

## 2024-09-20 ENCOUNTER — OFFICE VISIT (OUTPATIENT)
Age: 64
End: 2024-09-20
Payer: MEDICAID

## 2024-09-20 VITALS
SYSTOLIC BLOOD PRESSURE: 142 MMHG | OXYGEN SATURATION: 95 % | WEIGHT: 120 LBS | HEART RATE: 66 BPM | BODY MASS INDEX: 19.29 KG/M2 | DIASTOLIC BLOOD PRESSURE: 60 MMHG | HEIGHT: 66 IN

## 2024-09-20 DIAGNOSIS — I73.9 PVD (PERIPHERAL VASCULAR DISEASE) (HCC): Primary | ICD-10-CM

## 2024-09-20 DIAGNOSIS — R09.89 BILATERAL CAROTID BRUITS: ICD-10-CM

## 2024-09-20 PROCEDURE — 3077F SYST BP >= 140 MM HG: CPT | Performed by: PHYSICIAN ASSISTANT

## 2024-09-20 PROCEDURE — 99214 OFFICE O/P EST MOD 30 MIN: CPT | Performed by: PHYSICIAN ASSISTANT

## 2024-09-20 PROCEDURE — 3078F DIAST BP <80 MM HG: CPT | Performed by: PHYSICIAN ASSISTANT

## 2024-09-23 RX ORDER — VITAMIN B COMPLEX
1 CAPSULE ORAL DAILY
Qty: 90 CAPSULE | Refills: 1 | OUTPATIENT
Start: 2024-09-23

## 2024-10-07 ENCOUNTER — HOSPITAL ENCOUNTER (OUTPATIENT)
Facility: HOSPITAL | Age: 64
Discharge: HOME | End: 2024-10-09
Payer: MEDICAID

## 2024-10-07 DIAGNOSIS — G60.9 IDIOPATHIC PERIPHERAL NEUROPATHY: ICD-10-CM

## 2024-10-07 DIAGNOSIS — I73.9 PVD (PERIPHERAL VASCULAR DISEASE) (HCC): ICD-10-CM

## 2024-10-07 DIAGNOSIS — F17.210 CIGARETTE NICOTINE DEPENDENCE WITHOUT COMPLICATION: ICD-10-CM

## 2024-10-07 LAB
VAS LEFT ABI: 0.71
VAS LEFT ARM BP: 148 MMHG
VAS LEFT ATA DIST PSV: 52 CM/S
VAS LEFT ATA MID PSV: 38.2 CM/S
VAS LEFT ATA PROX PSV: 72.1 CM/S
VAS LEFT CFA DIST PSV: 292.4 CM/S
VAS LEFT DORSALIS PEDIS BP: 100 MMHG
VAS LEFT PERONEAL DIST PSV: 26.3 CM/S
VAS LEFT PERONEAL MID PSV: 27.4 CM/S
VAS LEFT PERONEAL PROX PSV: 58.2 CM/S
VAS LEFT PFA PROX PSV: 238 CM/S
VAS LEFT POP A DIST PSV: 66.8 CM/S
VAS LEFT POP A DIST VEL RATIO: 0.63
VAS LEFT POP A MID PSV: 105.6 CM/S
VAS LEFT POP A MID VEL RATIO: 0.9
VAS LEFT POP A PROX PSV: 116.8 CM/S
VAS LEFT POP A PROX VEL RATIO: 1.06
VAS LEFT PTA BP: 110 MMHG
VAS LEFT PTA DIST PSV: 48.3 CM/S
VAS LEFT PTA MID PSV: 64.7 CM/S
VAS LEFT PTA PROX PSV: 55.9 CM/S
VAS LEFT SFA DIST PSV: 110.7 CM/S
VAS LEFT SFA DIST VEL RATIO: 0.27
VAS LEFT SFA MID PSV: 417.2 CM/S
VAS LEFT SFA MID VEL RATIO: 1.88
VAS LEFT SFA PROX PSV: 222.4 CM/S
VAS LEFT SFA PROX VEL RATIO: 0.76
VAS LEFT TBI: 0.65
VAS LEFT TOE PRESSURE: 100 MMHG
VAS RIGHT ABI: 0.44
VAS RIGHT ARM BP: 154 MMHG
VAS RIGHT ATA DIST PSV: 18.2 CM/S
VAS RIGHT ATA MID PSV: 28.7 CM/S
VAS RIGHT ATA PROX PSV: 47.9 CM/S
VAS RIGHT CCA DIST EDV: 0 CM/S
VAS RIGHT CCA DIST PSV: 122.5 CM/S
VAS RIGHT CFA DIST PSV: 121.6 CM/S
VAS RIGHT DORSALIS PEDIS BP: 68 MMHG
VAS RIGHT ECA EDV: 34.53 CM/S
VAS RIGHT ECA PSV: 420.8 CM/S
VAS RIGHT PERONEAL DIST PSV: 16.7 CM/S
VAS RIGHT PERONEAL MID PSV: 25.1 CM/S
VAS RIGHT PERONEAL PROX PSV: 27.7 CM/S
VAS RIGHT PFA PROX PSV: 265.2 CM/S
VAS RIGHT POP A DIST PSV: 63.4 CM/S
VAS RIGHT POP A DIST VEL RATIO: 1.11
VAS RIGHT POP A MID PSV: 57 CM/S
VAS RIGHT POP A MID VEL RATIO: 0.76
VAS RIGHT POP A PROX PSV: 74.7 CM/S
VAS RIGHT POP A PROX VEL RATIO: 1.15
VAS RIGHT PTA BP: 67 MMHG
VAS RIGHT PTA DIST PSV: 22 CM/S
VAS RIGHT PTA MID PSV: 30.8 CM/S
VAS RIGHT PTA PROX PSV: 35.2 CM/S
VAS RIGHT SFA DIST PSV: 65 CM/S
VAS RIGHT SFA DIST VEL RATIO: 0.46
VAS RIGHT SFA MID PSV: 140 CM/S
VAS RIGHT SFA MID VEL RATIO: 0.3
VAS RIGHT SFA PROX PSV: 412.1 CM/S
VAS RIGHT SFA PROX VEL RATIO: 3.4
VAS RIGHT TBI: 0.73
VAS RIGHT TOE PRESSURE: 112 MMHG

## 2024-10-07 PROCEDURE — 93925 LOWER EXTREMITY STUDY: CPT

## 2024-10-07 PROCEDURE — 93922 UPR/L XTREMITY ART 2 LEVELS: CPT

## 2024-10-07 PROCEDURE — 93922 UPR/L XTREMITY ART 2 LEVELS: CPT | Performed by: SURGERY

## 2024-10-07 PROCEDURE — 93925 LOWER EXTREMITY STUDY: CPT | Performed by: SURGERY

## 2024-10-10 ENCOUNTER — HOSPITAL ENCOUNTER (OUTPATIENT)
Facility: HOSPITAL | Age: 64
Discharge: HOME | End: 2024-10-12
Payer: MEDICAID

## 2024-10-10 DIAGNOSIS — R09.89 BILATERAL CAROTID BRUITS: ICD-10-CM

## 2024-10-10 DIAGNOSIS — I73.9 PVD (PERIPHERAL VASCULAR DISEASE) (HCC): ICD-10-CM

## 2024-10-10 LAB
VAS LEFT BULB EDV: 22.4 CM/S
VAS LEFT BULB PSV: 89.7 CM/S
VAS LEFT CCA DIST EDV: 25 CM/S
VAS LEFT CCA DIST PSV: 102.6 CM/S
VAS LEFT CCA MID EDV: 21.48 CM/S
VAS LEFT CCA MID PSV: 91.37 CM/S
VAS LEFT CCA PROX EDV: 17.7 CM/S
VAS LEFT CCA PROX PSV: 92.9 CM/S
VAS LEFT ECA EDV: 14.87 CM/S
VAS LEFT ECA PSV: 247.7 CM/S
VAS LEFT ICA DIST EDV: 24.5 CM/S
VAS LEFT ICA DIST PSV: 82.8 CM/S
VAS LEFT ICA MID EDV: 25.6 CM/S
VAS LEFT ICA MID PSV: 85 CM/S
VAS LEFT ICA PROX EDV: 32.1 CM/S
VAS LEFT ICA PROX PSV: 144.5 CM/S
VAS LEFT ICA/CCA PSV: 1.41 NO UNITS
VAS LEFT VERTEBRAL EDV: 18.89 CM/S
VAS LEFT VERTEBRAL PSV: 72 CM/S
VAS RIGHT BULB EDV: 20 CM/S
VAS RIGHT BULB PSV: 128.2 CM/S
VAS RIGHT CCA DIST EDV: 19 CM/S
VAS RIGHT CCA DIST PSV: 72.5 CM/S
VAS RIGHT CCA MID EDV: 17.74 CM/S
VAS RIGHT CCA MID PSV: 80.35 CM/S
VAS RIGHT CCA PROX EDV: 21.3 CM/S
VAS RIGHT CCA PROX PSV: 94.3 CM/S
VAS RIGHT ECA EDV: 4.42 CM/S
VAS RIGHT ECA PSV: 162.5 CM/S
VAS RIGHT ICA DIST EDV: 27.9 CM/S
VAS RIGHT ICA DIST PSV: 101.5 CM/S
VAS RIGHT ICA MID EDV: 20.6 CM/S
VAS RIGHT ICA MID PSV: 115.9 CM/S
VAS RIGHT ICA PROX EDV: 38.2 CM/S
VAS RIGHT ICA PROX PSV: 152.7 CM/S
VAS RIGHT ICA/CCA PSV: 2.1 NO UNITS
VAS RIGHT VERTEBRAL EDV: 8.03 CM/S
VAS RIGHT VERTEBRAL PSV: 45.5 CM/S

## 2024-10-10 PROCEDURE — 93880 EXTRACRANIAL BILAT STUDY: CPT

## 2024-10-10 PROCEDURE — 93880 EXTRACRANIAL BILAT STUDY: CPT | Performed by: INTERNAL MEDICINE

## 2024-10-15 DIAGNOSIS — E78.5 DYSLIPIDEMIA: ICD-10-CM

## 2024-10-15 DIAGNOSIS — R94.4 DECREASED GFR: ICD-10-CM

## 2024-10-15 DIAGNOSIS — Z12.5 SPECIAL SCREENING FOR MALIGNANT NEOPLASM OF PROSTATE: ICD-10-CM

## 2024-10-15 DIAGNOSIS — I10 PRIMARY HYPERTENSION: ICD-10-CM

## 2024-10-16 ENCOUNTER — OFFICE VISIT (OUTPATIENT)
Age: 64
End: 2024-10-16
Payer: MEDICAID

## 2024-10-16 VITALS
OXYGEN SATURATION: 99 % | HEART RATE: 69 BPM | HEIGHT: 66 IN | WEIGHT: 120 LBS | SYSTOLIC BLOOD PRESSURE: 120 MMHG | DIASTOLIC BLOOD PRESSURE: 80 MMHG | BODY MASS INDEX: 19.29 KG/M2

## 2024-10-16 DIAGNOSIS — R01.1 HEART MURMUR: ICD-10-CM

## 2024-10-16 DIAGNOSIS — I73.9 PVD (PERIPHERAL VASCULAR DISEASE) (HCC): Primary | ICD-10-CM

## 2024-10-16 DIAGNOSIS — I65.23 CAROTID STENOSIS, ASYMPTOMATIC, BILATERAL: ICD-10-CM

## 2024-10-16 DIAGNOSIS — I73.9 CLAUDICATION, INTERMITTENT (HCC): ICD-10-CM

## 2024-10-16 PROCEDURE — 3079F DIAST BP 80-89 MM HG: CPT | Performed by: SURGERY

## 2024-10-16 PROCEDURE — 99214 OFFICE O/P EST MOD 30 MIN: CPT | Performed by: SURGERY

## 2024-10-16 PROCEDURE — 3074F SYST BP LT 130 MM HG: CPT | Performed by: SURGERY

## 2024-10-16 NOTE — PROGRESS NOTES
smoker  Asymptomatic moderate bilateral carotid stenosis.     Plan:  Will obtain a CTA a/p with BLE runoff to further characterize aortoiliac/inflow disease.   Cardiology referral. TTE also ordered  RTC after CTA to discuss findings and recommendations.  Strongly advised complete smoking cessation  Repeat carotid duplex in 6 months (ordered)      Nida Mckinney MD  Vascular Surgeon  [facility]      I spent approximately 30 minutes on this patient encounter, which includes but is not limited to performing a history and physical exam, reviewing primary care and consultant documentation, reviewing imaging/studies, and speaking with him regarding my impression and plan.       Past Medical History:   Diagnosis Date    CURTIS (acute kidney injury) ([facility]) 01/24/2024    Anxiety associated with depression 08/08/2023    Atherosclerosis 08/08/2023    Bilateral carotid artery stenosis 11/24/2023    Rt mod (50-69%); Lt mild (<50%)    Bilateral carotid bruits 05/23/2022    Cataract 03/07/2022    Cataract extraction status of eye, unspecified laterality 03/07/2022    Chronic hepatitis C without hepatic coma ([facility]) 05/23/2022    Chronic pain in left foot 03/07/2022    Current smoker 03/07/2022    Daily consumption of alcohol 03/07/2022    Dyslipidemia 08/08/2023    Elevated LFTs 03/09/2022    Fracture of thoracic transverse process, closed, initial encounter ([facility]) 01/24/2024    Hepatitis C test positive 03/09/2022    Hyperkalemia 03/09/2022    Marijuana smoker 03/07/2022    Mental subnormality 03/07/2022    PAD (peripheral artery disease) ([facility])     Primary hypertension 03/07/2022    PVD (peripheral vascular disease) ([facility]) 07/22/2022     Patient Active Problem List   Diagnosis    Marijuana smoker    Primary hypertension    PAD (peripheral artery disease) ([facility])    Smoker    PVD (peripheral vascular disease) ([facility])    Elevated rheumatoid factor    Atherosclerosis    Alcohol abuse    Anxiety

## 2024-10-16 NOTE — PROGRESS NOTES
[facility]  [facility], [address]  [place] [address]  Phone: [phone]  Fax: [phone]      Gio Khalil  : 1960  PCP: Mary Russell, DNP  10/24/2024    PROGRESS NOTE    HISTORY OF PRESENT ILLNESS    3/27/24  C/O lumbar pain and right abdominal pain, that started after he fell off of a ladder in 2024. He also notes of chronic lumbar pain (L>R) that has persisted for several years, as well as intermittent shooting pain of right leg. He notes he is able to stand for 30 minutes; his sxs worsen when walking for long periods of time though.   Seen in the ER on 24, because pt sustained rib fractures when fell off of a ladder.   Right Ribs XR dated 24, revealed Fractures anterior right rib 9 and 10. Fracture anterior right rib 10. No effusion or pneumothorax. Patchy right perihilar density could represent atelectasis or contusion.   Abdomen CT dated 24 revealed multiple right rib fracture deformities as described. Mild right pleural effusion. No pneumothorax. Nondisplaced and/or mildly right first, second, third and fourth vertebral body transverse process fracture deformities. Advanced areas of atherosclerotic disease.   Pt's family notes that pt has surgical history of stents placed in his heart; pt's family notes that they are followed by PCP and other specialists for monitoring of his heart.   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 Notes numbness and tingling of left foot, as well as his left pinky. Pt's family member notes that the first time they went to the ER, the pt was not properly evaluated and pt had returned home being unable to move, eat or urinate properly. Pt's family notes they returned to the ER, where pt was then evaluated with imaging and labwork.   Pt has previously tried Gabapentin.   PLAN: Lumbar MRI, BMP, Lyrica 75 mg QHS, Lidocaine patches

## 2024-10-24 ENCOUNTER — OFFICE VISIT (OUTPATIENT)
Age: 64
End: 2024-10-24
Payer: MEDICAID

## 2024-10-24 VITALS — BODY MASS INDEX: 19.37 KG/M2 | HEIGHT: 66 IN

## 2024-10-24 DIAGNOSIS — G89.29 CHRONIC PRIMARY MUSCULOSKELETAL PAIN: ICD-10-CM

## 2024-10-24 DIAGNOSIS — M54.50 LUMBAR PAIN: ICD-10-CM

## 2024-10-24 DIAGNOSIS — M54.16 LUMBAR RADICULOPATHY: Primary | ICD-10-CM

## 2024-10-24 DIAGNOSIS — M47.816 LUMBAR FACET ARTHROPATHY: ICD-10-CM

## 2024-10-24 DIAGNOSIS — M79.18 CHRONIC PRIMARY MUSCULOSKELETAL PAIN: ICD-10-CM

## 2024-10-24 PROCEDURE — 99214 OFFICE O/P EST MOD 30 MIN: CPT | Performed by: PHYSICAL MEDICINE & REHABILITATION

## 2024-10-24 RX ORDER — PREGABALIN 150 MG/1
150 CAPSULE ORAL 2 TIMES DAILY
Qty: 180 CAPSULE | Refills: 1 | Status: SHIPPED | OUTPATIENT
Start: 2024-10-24 | End: 2025-04-22

## 2024-10-24 ASSESSMENT — ENCOUNTER SYMPTOMS
BACK PAIN: 1
VOMITING: 0
TROUBLE SWALLOWING: 0
DIARRHEA: 0
NAUSEA: 0
SHORTNESS OF BREATH: 0
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
## 2024-10-28 DIAGNOSIS — M79.18 MYOFASCIAL PAIN: ICD-10-CM

## 2024-10-28 DIAGNOSIS — M54.16 LUMBAR RADICULOPATHY: ICD-10-CM

## 2024-10-28 DIAGNOSIS — M54.50 LUMBAR PAIN: ICD-10-CM

## 2024-10-29 RX ORDER — PREGABALIN 75 MG/1
CAPSULE ORAL
Qty: 60 CAPSULE | Refills: 0 | OUTPATIENT
Start: 2024-10-29

## 2024-10-30 ENCOUNTER — HOSPITAL ENCOUNTER (OUTPATIENT)
Facility: HOSPITAL | Age: 64
Discharge: HOME | End: 2024-11-02
Attending: SURGERY
Payer: MEDICAID

## 2024-10-30 DIAGNOSIS — I73.9 CLAUDICATION, INTERMITTENT (HCC): ICD-10-CM

## 2024-10-30 DIAGNOSIS — I73.9 PVD (PERIPHERAL VASCULAR DISEASE) (HCC): ICD-10-CM

## 2024-10-30 LAB — CREAT UR-MCNC: 1.8 MG/DL (ref 0.6–1.3)

## 2024-10-30 PROCEDURE — 82565 ASSAY OF CREATININE: CPT

## 2024-11-05 ENCOUNTER — HOSPITAL ENCOUNTER (OUTPATIENT)
Facility: HOSPITAL | Age: 64
Setting detail: SPECIMEN
Discharge: HOME | End: 2024-11-08

## 2024-11-05 LAB
A/G RATIO: 1.8 RATIO (ref 1.1–2.6)
ALBUMIN: 4.4 G/DL (ref 3.5–5)
ALP BLD-CCNC: 49 U/L (ref 40–125)
ALT SERPL-CCNC: 17 U/L (ref 5–40)
ANION GAP SERPL CALCULATED.3IONS-SCNC: 11 MMOL/L (ref 3–15)
AST SERPL-CCNC: 17 U/L (ref 10–37)
BASOPHILS ABSOLUTE: 0.1 K/UL (ref 0–0.2)
BASOPHILS RELATIVE PERCENT: 1 % (ref 0–2)
BILIRUB SERPL-MCNC: 0.2 MG/DL (ref 0.2–1.2)
BUN BLDV-MCNC: 20 MG/DL (ref 6–22)
CALCIUM SERPL-MCNC: 9.6 MG/DL (ref 8.4–10.5)
CHLORIDE BLD-SCNC: 102 MMOL/L (ref 98–110)
CHOLESTEROL, TOTAL: 118 MG/DL (ref 110–200)
CHOLESTEROL/HDL RATIO: 1.6 (ref 0–5)
CO2: 26 MMOL/L (ref 20–32)
CREAT SERPL-MCNC: 1.8 MG/DL (ref 0.8–1.6)
EOSINOPHIL # BLD: 12 % (ref 0–6)
EOSINOPHILS ABSOLUTE: 1.3 K/UL (ref 0–0.5)
GFR, ESTIMATED: 41.4 ML/MIN/1.73 SQ.M.
GLOBULIN: 2.4 G/DL (ref 2–4)
GLUCOSE: 85 MG/DL (ref 70–99)
HCT VFR BLD CALC: 25 % (ref 39.3–51.6)
HDLC SERPL-MCNC: 73 MG/DL
HEMOGLOBIN: 7.9 G/DL (ref 13.1–17.2)
LDL CHOLESTEROL: 35 MG/DL (ref 50–99)
LDL/HDL RATIO: 0.5
LYMPHOCYTES # BLD: 20 % (ref 20–45)
LYMPHOCYTES ABSOLUTE: 2.2 K/UL (ref 1–4.8)
MCH RBC QN AUTO: 31 PG (ref 26–34)
MCHC RBC AUTO-ENTMCNC: 32 G/DL (ref 31–36)
MCV RBC AUTO: 97 FL (ref 80–95)
MONOCYTES ABSOLUTE: 1.1 K/UL (ref 0.1–1)
MONOCYTES: 10 % (ref 3–12)
NEUTROPHILS ABSOLUTE: 6.5 K/UL (ref 1.8–7.7)
NEUTROPHILS: 58 % (ref 40–75)
NON-HDL CHOLESTEROL: 45 MG/DL
PDW BLD-RTO: 14.3 % (ref 10–15.5)
PLATELET # BLD: 174 K/UL (ref 140–440)
PMV BLD AUTO: 12.8 FL (ref 9–13)
POTASSIUM SERPL-SCNC: 4.2 MMOL/L (ref 3.5–5.5)
PROSTATE SPECIFIC ANTIGEN: 1.24 NG/ML
RBC # BLD: 2.57 M/UL (ref 3.8–5.8)
SENTARA SPECIMEN COLLECTION: NORMAL
SODIUM BLD-SCNC: 139 MMOL/L (ref 133–145)
TOTAL PROTEIN: 6.8 G/DL (ref 6.2–8.1)
TRIGL SERPL-MCNC: 46 MG/DL (ref 40–149)
VLDLC SERPL CALC-MCNC: 9 MG/DL (ref 8–30)
WBC # BLD: 11.2 K/UL (ref 4–11)

## 2024-11-05 PROCEDURE — 99001 SPECIMEN HANDLING PT-LAB: CPT

## 2024-11-06 ENCOUNTER — TELEPHONE (OUTPATIENT)
Facility: CLINIC | Age: 64
End: 2024-11-06

## 2024-11-06 NOTE — TELEPHONE ENCOUNTER
Called to confirm appt with Pt niece on HIPAA she states she is concerned about Pts memory and recall. She would also like a print out of medications Pt is supposed to be taking.     Please advise.     Appt confirmed for 11/7/2024

## 2024-11-07 ENCOUNTER — HOSPITAL ENCOUNTER (OUTPATIENT)
Facility: HOSPITAL | Age: 64
Setting detail: SPECIMEN
Discharge: HOME | End: 2024-11-10

## 2024-11-07 ENCOUNTER — OFFICE VISIT (OUTPATIENT)
Facility: CLINIC | Age: 64
End: 2024-11-07
Payer: MEDICAID

## 2024-11-07 VITALS
BODY MASS INDEX: 19.93 KG/M2 | DIASTOLIC BLOOD PRESSURE: 66 MMHG | SYSTOLIC BLOOD PRESSURE: 139 MMHG | OXYGEN SATURATION: 97 % | TEMPERATURE: 98.5 F | HEART RATE: 67 BPM | HEIGHT: 66 IN | WEIGHT: 124 LBS | RESPIRATION RATE: 14 BRPM

## 2024-11-07 DIAGNOSIS — F10.10 ALCOHOL ABUSE: ICD-10-CM

## 2024-11-07 DIAGNOSIS — K92.1 MELENA: ICD-10-CM

## 2024-11-07 DIAGNOSIS — I73.9 PVD (PERIPHERAL VASCULAR DISEASE) (HCC): ICD-10-CM

## 2024-11-07 DIAGNOSIS — D50.9 IRON DEFICIENCY ANEMIA, UNSPECIFIED IRON DEFICIENCY ANEMIA TYPE: Primary | ICD-10-CM

## 2024-11-07 DIAGNOSIS — I10 PRIMARY HYPERTENSION: ICD-10-CM

## 2024-11-07 DIAGNOSIS — K92.1 MELENA: Primary | ICD-10-CM

## 2024-11-07 DIAGNOSIS — J30.89 ENVIRONMENTAL AND SEASONAL ALLERGIES: ICD-10-CM

## 2024-11-07 DIAGNOSIS — I65.23 BILATERAL CAROTID ARTERY STENOSIS: ICD-10-CM

## 2024-11-07 DIAGNOSIS — D50.0 ANEMIA, BLOOD LOSS: ICD-10-CM

## 2024-11-07 DIAGNOSIS — I70.90 ATHEROSCLEROSIS: ICD-10-CM

## 2024-11-07 DIAGNOSIS — R94.4 DECREASED GFR: ICD-10-CM

## 2024-11-07 DIAGNOSIS — E78.5 DYSLIPIDEMIA: ICD-10-CM

## 2024-11-07 DIAGNOSIS — R41.3 MEMORY PROBLEM: ICD-10-CM

## 2024-11-07 DIAGNOSIS — F41.8 ANXIETY ASSOCIATED WITH DEPRESSION: ICD-10-CM

## 2024-11-07 LAB — SENTARA SPECIMEN COLLECTION: NORMAL

## 2024-11-07 PROCEDURE — 3078F DIAST BP <80 MM HG: CPT | Performed by: NURSE PRACTITIONER

## 2024-11-07 PROCEDURE — 3075F SYST BP GE 130 - 139MM HG: CPT | Performed by: NURSE PRACTITIONER

## 2024-11-07 PROCEDURE — 99214 OFFICE O/P EST MOD 30 MIN: CPT | Performed by: NURSE PRACTITIONER

## 2024-11-07 PROCEDURE — 99001 SPECIMEN HANDLING PT-LAB: CPT

## 2024-11-07 RX ORDER — VITAMIN B COMPLEX
1 CAPSULE ORAL DAILY
Qty: 90 CAPSULE | Refills: 1 | Status: SHIPPED | OUTPATIENT
Start: 2024-11-07

## 2024-11-07 RX ORDER — ROSUVASTATIN CALCIUM 40 MG/1
40 TABLET, COATED ORAL DAILY
Qty: 90 TABLET | Refills: 3 | Status: SHIPPED | OUTPATIENT
Start: 2024-11-07

## 2024-11-07 RX ORDER — CITALOPRAM HYDROBROMIDE 10 MG/1
10 TABLET ORAL DAILY
Qty: 93 TABLET | Refills: 1 | Status: SHIPPED | OUTPATIENT
Start: 2024-11-07

## 2024-11-07 RX ORDER — LISINOPRIL 40 MG/1
40 TABLET ORAL DAILY
Qty: 93 TABLET | Refills: 1 | Status: SHIPPED | OUTPATIENT
Start: 2024-11-07

## 2024-11-07 SDOH — ECONOMIC STABILITY: FOOD INSECURITY: WITHIN THE PAST 12 MONTHS, THE FOOD YOU BOUGHT JUST DIDN'T LAST AND YOU DIDN'T HAVE MONEY TO GET MORE.: NEVER TRUE

## 2024-11-07 SDOH — ECONOMIC STABILITY: FOOD INSECURITY: WITHIN THE PAST 12 MONTHS, YOU WORRIED THAT YOUR FOOD WOULD RUN OUT BEFORE YOU GOT MONEY TO BUY MORE.: NEVER TRUE

## 2024-11-07 SDOH — ECONOMIC STABILITY: INCOME INSECURITY: HOW HARD IS IT FOR YOU TO PAY FOR THE VERY BASICS LIKE FOOD, HOUSING, MEDICAL CARE, AND HEATING?: NOT HARD AT ALL

## 2024-11-07 NOTE — ASSESSMENT & PLAN NOTE
Creatinine 1.8; EGFR 55-41  Patient is not consuming much if any water throughout the day  Encourage patient to increase water intake to help with kidney function    5/6/2024 OV note:   Acute kidney injury 1/2024  Kidney function is improving.  eGFR 55.2; creatinine 1.4  Encourage decrease alcohol consumption increase water consumption

## 2024-11-07 NOTE — ASSESSMENT & PLAN NOTE
BP well-controlled today.  Continue amlodipine 10 mg daily  Continue lisinopril 40 mg daily    8/8/2024 OV note:   BP at Ortho appointment yesterday was elevated 150s diastolic over 60s with heart rate 49-50.  Patient had not taken his blood pressure medication prior to his appointment.    Today his blood pressure is 100/58 with a heart rate of 61.  Patient's niece, Ruth, informed me that patient took his blood pressure medication yesterday at 330 and again this morning which may have caused his blood pressure to drop too low.    Will not adjust BP medication at this time.  Patient to monitor his blood pressure and if his blood pressure drops below 110 systolic or goes above 140 systolic, patient is to call this office for possible adjustment of medication.    Educated patient on the importance of increasing his water intake.    5/6/2024 OV note:   Well-controlled, continue current medications   Cont Lisinopril 40mg qd  Cont amlodipine 10mg qd  Smoking cessation encouraged    11/14/23  Cont Lisinopril 40mg qd  Cont amlodipine 10mg qd  Smoking cessation encouraged    2/2023   dc'd amlodipine 10mg d/t low BP reading  Dc amlodipine 5mg  Initiate amlodipine 10mg qd  Cont lisinopril 40mg qd

## 2024-11-07 NOTE — ASSESSMENT & PLAN NOTE
His hemoglobin level is low at 7.9, and a decrease in his complete blood count has been observed. He reported black stools two weeks ago, which is concerning for a GI bleed. He is currently on Plavix, which could potentially cause internal bleeding.     He is advised to discontinue Ex-Lax and fiber as they may be causing diarrhea.     A colonoscopy is recommended to investigate the source of bleeding.     A referral to a gastroenterologist will be made.  According to the niece patient saw a GI specialist in [place] but at the time he declined colonoscopy so there was no further follow-up appointments made.  Mino is seeking a GI closer than across the [facility].    Additional labs, including ferritin and iron/TIBC, will be ordered.

## 2024-11-07 NOTE — ASSESSMENT & PLAN NOTE
Well-controlled, continue current medications   TG 46; LDL 35  Adam level 12m  Cont statin therapy

## 2024-11-07 NOTE — ASSESSMENT & PLAN NOTE
Monitored by specialist- no acute findings meriting change in the plan   Cont plavix therapy    Bilateral lower extremity stenosis.  Vascular study 10/7/2024.  He has 75% stenosis in his right proximal femoral artery and less than 50% stenosis in his left mid femoral artery.     He has bilateral iliac stents placed in November 2023, which are patent, but other areas in his legs show severe stenosis.    Both legs are experiencing pain.  He is advised to continue his current medications, including Plavix, and to follow up with his vascular specialist.

## 2024-11-07 NOTE — PROGRESS NOTES
[facility] [address]  [address]  [place] [address]  [phone] office/[phone] fax    2024    Gio Khalil 1960 is a pleasant White (non-) male.       History of Present Illness  The patient presents for evaluation of multiple medical concerns. He is accompanied by his niece.    He reports a decrease in alcohol consumption, weekends only.    He has been experiencing difficulty with bowel movements, requiring the use of Ex-Lax and fiber capsules. His stools have been black and dark for the past 2 weeks, but he reports no presence of blood. His last bowel movement was yesterday, which was runny and tan in color since he started taking the Ex-Lax and fiber capsules. He also reports a constant urge to defecate. He is currently taking Benefiber.     Vascular.  He has a history of multiple blockages and is scheduled for a CT scan at [facility] tomorrow. He has previously seen a vascular specialist and had bilateral iliac stents placed in 2023. He is currently on Plavix and has recently increased his gabapentin dosage due to bilateral leg pain. He is also taking Lyrica, Crestor, and aspirin. He does not use Motrin or ibuprofen.    He has been experiencing a cough for the past 3 weeks, producing clear and yellowish phlegm. He occasionally has clear nasal drainage. He reports no fevers.    He has been experiencing memory issues, often forgetting recent conversations and events. He sometimes forgets to take his medication and struggles to remember names. He has also been having difficulty recognizing people and recalling whether he has eaten or not. His conversations are often unclear and difficult to understand.    FAMILY HISTORY  His father had Parkinson's and dementia. His sister  at 63 from COPD and suspected to have had dementia.      Physical Exam      Physical Exam  Constitutional:       Appearance: Normal appearance.   Eyes:      Extraocular

## 2024-11-07 NOTE — PROGRESS NOTES
Gio Khalil is a 63 y.o. male (: 1960) presenting to address:    Chief Complaint   Patient presents with    6 Month Follow-Up    Results    Memory Loss       Vitals:    24 1031   BP: 139/66   Pulse: 67   Resp: 14   Temp: 98.5 °F (36.9 °C)   SpO2: 97%       \"Have you been to the ER, urgent care clinic since your last visit?  Hospitalized since your last visit?\"    NO    “Have you seen or consulted any other health care providers outside of [facility] since your last visit?”    Yes

## 2024-11-07 NOTE — ASSESSMENT & PLAN NOTE
Avoid allergens (strong smells, animals, cigarettes or carpeted areas)  Avoid outdoors when pollen level is high.    According to the niece, patient spends majority of his time outdoors especially in the evenings with the fire pit burning.    Over-the-counter Claritin is recommended for daily use over the next 7 to 10 days for allergy-related symptoms.

## 2024-11-07 NOTE — ASSESSMENT & PLAN NOTE
Follow up with vascular  Cont Plavix and ASA therapy    6/14/2024 carotid duplex    Moderate (50-69%) stenosis in the right internal carotid artery.  Calcific plaque in the right internal carotid artery.    Moderate (50-69%) stenosis in the left internal carotid artery.  Calcific plaque in the left internal carotid artery.    Normal antegrade flow in the bilateral vertebral arteries.    Mild <50% stenosis in the bilateral subclavian arteries with multiphasic signals.    When compared with previous exam dated 11/21/2023, there is progression in the left ICA from <50% to now 50-69%, the right ICA is unchanged.    11/2023 Carotid duplex:    Moderate (50-69%) stenosis in the right internal carotid artery.  Calcific and irregular plaque in the right internal carotid artery.    Mild (<50%) stenosis in the left internal carotid artery.  Calcific and irregular plaque in the left internal carotid artery.    Normal antegrade flow in the bilateral vertebral arteries.    Mild <50% stenosis in the left subclavian artery with multiphasic signals.    Multiphasic right subclavian artery.

## 2024-11-07 NOTE — ASSESSMENT & PLAN NOTE
According to patient and his niece, patient has been experiencing memory issues such as forgetting recent conversations and events.    Placed order for neuropsych [facility].    Needs to contact patient's insurance and obtain a list of neuropsych's within the network.  Niece will then call this office with the name, address, phone number and I will update the referral.    There is family history of dementia

## 2024-11-07 NOTE — ASSESSMENT & PLAN NOTE
Per patient he has reduce his alcohol consumption to weekends only.  He cannot identify the quantity that he consumes.    8/8/2023 OV note:  Tx for Hep C in the past  Continues to consume at least a 6 pack per day  Discussed importance of reducing/cessation of alcohol consumption

## 2024-11-08 ENCOUNTER — HOSPITAL ENCOUNTER (OUTPATIENT)
Facility: HOSPITAL | Age: 64
Discharge: HOME | End: 2024-11-11
Attending: SURGERY
Payer: MEDICAID

## 2024-11-08 LAB — CREAT UR-MCNC: 1.8 MG/DL (ref 0.6–1.3)

## 2024-11-08 PROCEDURE — 82565 ASSAY OF CREATININE: CPT

## 2024-11-08 PROCEDURE — 6360000004 HC RX CONTRAST MEDICATION: Performed by: SURGERY

## 2024-11-08 PROCEDURE — 75635 CT ANGIO ABDOMINAL ARTERIES: CPT

## 2024-11-08 RX ORDER — IOPAMIDOL 755 MG/ML
100 INJECTION, SOLUTION INTRAVASCULAR
Status: DISCONTINUED | OUTPATIENT
Start: 2024-11-08 | End: 2024-11-08

## 2024-11-08 RX ORDER — IOPAMIDOL 755 MG/ML
150 INJECTION, SOLUTION INTRAVASCULAR
Status: COMPLETED | OUTPATIENT
Start: 2024-11-08 | End: 2024-11-08

## 2024-11-08 RX ADMIN — IOPAMIDOL 150 ML: 755 INJECTION, SOLUTION INTRAVENOUS at 10:55

## 2024-11-09 LAB
FERRITIN: 20 NG/ML (ref 22–322)
IRON % SATURATION: 8 % (ref 20–50)
IRON: 32 MCG/DL (ref 45–160)
TOTAL IRON BINDING CAPACITY: 416 MCG/DL (ref 228–428)
UIBC: 384 MCG/DL (ref 110–370)

## 2024-11-12 PROBLEM — D50.9 IRON DEFICIENCY ANEMIA: Status: ACTIVE | Noted: 2024-11-12

## 2024-11-12 RX ORDER — CHOLECALCIFEROL (VITAMIN D3) 10(400)/ML
DROPS ORAL
COMMUNITY
Start: 2024-11-12

## 2024-11-12 RX ORDER — MULTIVIT WITH MINERALS/LUTEIN
250 TABLET ORAL DAILY
Qty: 30 TABLET | Refills: 0 | COMMUNITY
Start: 2024-11-12

## 2024-11-12 NOTE — RESULT ENCOUNTER NOTE
Please call patient/niece with the following:  Ferritin low at 20  Iron low at 32  Iron saturation low at 8  Iron deficiency anemia.  This deficiency may be related to the blood he has been seen in his stools.  Initiate Slow Fe daily with vitamin C 250 mg to help with absorption.  These medications are over-the-counter, not prescription.  Please confirm that patient is making and appointment with GI due to his history of melena.  Thank you

## 2024-11-26 ENCOUNTER — OFFICE VISIT (OUTPATIENT)
Age: 64
End: 2024-11-26
Payer: MEDICAID

## 2024-11-26 VITALS
HEIGHT: 66 IN | OXYGEN SATURATION: 97 % | BODY MASS INDEX: 19.29 KG/M2 | DIASTOLIC BLOOD PRESSURE: 60 MMHG | HEART RATE: 70 BPM | WEIGHT: 120 LBS | SYSTOLIC BLOOD PRESSURE: 132 MMHG

## 2024-11-26 DIAGNOSIS — G89.29 CHRONIC PRIMARY MUSCULOSKELETAL PAIN: ICD-10-CM

## 2024-11-26 DIAGNOSIS — M47.816 LUMBAR FACET ARTHROPATHY: ICD-10-CM

## 2024-11-26 DIAGNOSIS — M79.18 CHRONIC PRIMARY MUSCULOSKELETAL PAIN: ICD-10-CM

## 2024-11-26 PROCEDURE — 3078F DIAST BP <80 MM HG: CPT | Performed by: SURGERY

## 2024-11-26 PROCEDURE — 99214 OFFICE O/P EST MOD 30 MIN: CPT | Performed by: SURGERY

## 2024-11-26 PROCEDURE — 3075F SYST BP GE 130 - 139MM HG: CPT | Performed by: SURGERY

## 2024-11-26 NOTE — PROGRESS NOTES
radiculopathy    Lumbar spondylosis    Melena    Environmental and seasonal allergies    Memory problem    Iron deficiency anemia     Past Surgical History:   Procedure Laterality Date    CATARACT REMOVAL Right     4 years ago    VASCULAR SURGERY Left 06/01/2023    Stents     Current Outpatient Medications   Medication Sig Dispense Refill    ferrous sulfate dried (SLOW IRON) 160 (50 Fe) MG TBCR extended release tablet Take 1 tab daily.  Take with vitamin C 250 mg daily to help with absorption      Ascorbic Acid (VITAMIN C) 250 MG tablet Take 1 tablet by mouth daily Take with iron supplement for better absorption 30 tablet 0    B Complex Vitamins (VITAMIN B COMPLEX) CAPS Take 1 tablet by mouth daily 90 capsule 1    citalopram (CELEXA) 10 MG tablet Take 1 tablet by mouth daily 93 tablet 1    lisinopril (PRINIVIL;ZESTRIL) 40 MG tablet Take 1 tablet by mouth daily 93 tablet 1    rosuvastatin (CRESTOR) 40 MG tablet Take 1 tablet by mouth daily 90 tablet 3    diclofenac sodium (VOLTAREN) 1 % GEL Apply 4 g topically 4 times daily 480 g 2    pregabalin (LYRICA) 150 MG capsule Take 1 capsule by mouth 2 times daily for 180 days. Max Daily Amount: 300 mg 180 capsule 1    lidocaine (LIDODERM) 5 % Place 1 patch onto the skin every 24 hours 12 hours on, 12 hours off. 30 patch 0    gabapentin (NEURONTIN) 100 MG capsule Take 1 capsule by mouth 3 times daily for 180 days. Intended supply: 90 days Max Daily Amount: 300 mg 180 capsule 1    clopidogrel (PLAVIX) 75 MG tablet Take 1 tablet by mouth daily 30 tablet 9    amLODIPine (NORVASC) 10 MG tablet Take 1 tablet by mouth daily 93 tablet 1    aspirin 81 MG chewable tablet Take 1 tablet by mouth daily       No current facility-administered medications for this visit.     No Known Allergies   Social History     Tobacco Use    Smoking status: Every Day     Current packs/day: 1.00     Average packs/day: 1 pack/day for 42.9 years (42.9 ttl pk-yrs)     Types: Cigarettes     Start date:

## 2024-12-04 ENCOUNTER — OFFICE VISIT (OUTPATIENT)
Age: 64
End: 2024-12-04
Payer: MEDICAID

## 2024-12-04 VITALS
BODY MASS INDEX: 19.93 KG/M2 | WEIGHT: 124 LBS | SYSTOLIC BLOOD PRESSURE: 126 MMHG | DIASTOLIC BLOOD PRESSURE: 64 MMHG | HEIGHT: 66 IN | HEART RATE: 76 BPM | OXYGEN SATURATION: 93 %

## 2024-12-04 DIAGNOSIS — E78.5 DYSLIPIDEMIA: ICD-10-CM

## 2024-12-04 DIAGNOSIS — R01.1 HEART MURMUR: Primary | ICD-10-CM

## 2024-12-04 DIAGNOSIS — I73.9 PAD (PERIPHERAL ARTERY DISEASE) (HCC): ICD-10-CM

## 2024-12-04 DIAGNOSIS — I35.0 AORTIC VALVE STENOSIS, ETIOLOGY OF CARDIAC VALVE DISEASE UNSPECIFIED: ICD-10-CM

## 2024-12-04 DIAGNOSIS — I10 PRIMARY HYPERTENSION: ICD-10-CM

## 2024-12-04 PROCEDURE — 3078F DIAST BP <80 MM HG: CPT | Performed by: INTERNAL MEDICINE

## 2024-12-04 PROCEDURE — 99204 OFFICE O/P NEW MOD 45 MIN: CPT | Performed by: INTERNAL MEDICINE

## 2024-12-04 PROCEDURE — 3074F SYST BP LT 130 MM HG: CPT | Performed by: INTERNAL MEDICINE

## 2024-12-04 PROCEDURE — 93000 ELECTROCARDIOGRAM COMPLETE: CPT | Performed by: INTERNAL MEDICINE

## 2024-12-04 NOTE — PROGRESS NOTES
History of Present Illness:  63 YOM referred by Dr. Mckinney for evaluation for murmur.  The patient has been having increasing claudication for a number of months and has known PAD with multiple stents in the past.  He does not see a cardiologist regularly. Back in 2022, when he fell off a roof and he had severe pain and fracture, an echocardiogram showed moderate aortic stenosis with mean gradient of 27 mmHg.  His functional status is limited due to claudication. He denies chest pain or dyspnea.  He has also been found to be anemic with hemoglobin 7.9 in November and 11.9 in January.  He was seen by GI and they wanted to perform a colonoscopy by report December 19th. This was also the day he was supposed to have vascular surgery.  He smokes a half PPD.    Impression:  PAD with consideration for lower extremity angiogram, possible intervention, tentatively scheduled for December 19th by Dr. Mckinney.  Anemia with hemoglobin down to 7.9 November 2024 from 11.9 in January, tentatively scheduled for colonoscopy December 19th as well.  Moderate aortic stenosis with mean gradient 27 mmHg by echo November 11th, 2022, calculated aortic valve area was 1.1 at that time.  Ongoing tobacco use, one PPD.  Hypertension.  CKD.  Dyslipidemia.    Plan:  He has significant late peaking aortic stenosis murmur and known aortic stenosis in 2022.  He is scheduled for an echocardiogram this Friday.  If he does have severe aortic stenosis, I would not proceed with colonoscopy or vascular surgery as any hypotension or anesthesia would be very high risk.  If he does have severe aortic stenosis, I would consider referral to Dr. Rockwell for consideration and workup for possible TAVR.      Wt Readings from Last 3 Encounters:   12/04/24 56.2 kg (124 lb)   11/26/24 54.4 kg (120 lb)   11/07/24 56.2 kg (124 lb)     Past Medical History:   Diagnosis Date    CURTIS (acute kidney injury) (HCC) 01/24/2024    Anxiety associated with depression

## 2024-12-10 ENCOUNTER — TELEPHONE (OUTPATIENT)
Age: 64
End: 2024-12-10

## 2024-12-11 ENCOUNTER — APPOINTMENT (OUTPATIENT)
Facility: HOSPITAL | Age: 64
DRG: 469 | End: 2024-12-11
Payer: MEDICAID

## 2024-12-11 ENCOUNTER — HOSPITAL ENCOUNTER (INPATIENT)
Facility: HOSPITAL | Age: 64
LOS: 12 days | Discharge: HOME HEALTH | DRG: 469 | End: 2024-12-23
Attending: EMERGENCY MEDICINE | Admitting: HOSPITALIST
Payer: MEDICAID

## 2024-12-11 DIAGNOSIS — N17.9 ACUTE RENAL FAILURE, UNSPECIFIED ACUTE RENAL FAILURE TYPE (HCC): ICD-10-CM

## 2024-12-11 DIAGNOSIS — I21.4 NSTEMI (NON-ST ELEVATED MYOCARDIAL INFARCTION) (HCC): ICD-10-CM

## 2024-12-11 DIAGNOSIS — D64.9 ANEMIA, UNSPECIFIED TYPE: ICD-10-CM

## 2024-12-11 DIAGNOSIS — I73.9 PAD (PERIPHERAL ARTERY DISEASE) (HCC): ICD-10-CM

## 2024-12-11 DIAGNOSIS — D50.9 IRON DEFICIENCY ANEMIA, UNSPECIFIED IRON DEFICIENCY ANEMIA TYPE: ICD-10-CM

## 2024-12-11 DIAGNOSIS — E86.0 DEHYDRATION: ICD-10-CM

## 2024-12-11 DIAGNOSIS — G93.41 ACUTE METABOLIC ENCEPHALOPATHY: ICD-10-CM

## 2024-12-11 DIAGNOSIS — J10.1 INFLUENZA A (H1N1): Primary | ICD-10-CM

## 2024-12-11 LAB
ALBUMIN SERPL-MCNC: 3.8 G/DL (ref 3.4–5)
ALBUMIN/GLOB SERPL: 1 (ref 0.8–1.7)
ALP SERPL-CCNC: 47 U/L (ref 45–117)
ALT SERPL-CCNC: 49 U/L (ref 16–61)
AMMONIA PLAS-SCNC: 17 UMOL/L (ref 11–32)
ANION GAP BLD CALC-SCNC: ABNORMAL MMOL/L (ref 10–20)
ANION GAP SERPL CALC-SCNC: 10 MMOL/L (ref 3–18)
APPEARANCE UR: ABNORMAL
APTT PPP: 31.6 SEC (ref 23–36.4)
AST SERPL-CCNC: 120 U/L (ref 10–38)
B PERT DNA SPEC QL NAA+PROBE: NOT DETECTED
BACTERIA URNS QL MICRO: ABNORMAL /HPF
BASE DEFICIT BLD-SCNC: 6.8 MMOL/L
BASOPHILS # BLD: 0 K/UL (ref 0–0.1)
BASOPHILS NFR BLD: 0 % (ref 0–2)
BDY SITE: ABNORMAL
BILIRUB SERPL-MCNC: 0.5 MG/DL (ref 0.2–1)
BILIRUB UR QL: NEGATIVE
BODY TEMPERATURE: 100.4
BORDETELLA PARAPERTUSSIS BY PCR: NOT DETECTED
BUN SERPL-MCNC: 56 MG/DL (ref 7–18)
BUN/CREAT SERPL: 10 (ref 12–20)
C PNEUM DNA SPEC QL NAA+PROBE: NOT DETECTED
CA-I BLD-MCNC: 1.09 MMOL/L (ref 1.15–1.33)
CALCIUM SERPL-MCNC: 8.7 MG/DL (ref 8.5–10.1)
CHLORIDE BLD-SCNC: 105 MMOL/L (ref 98–107)
CHLORIDE SERPL-SCNC: 104 MMOL/L (ref 100–111)
CO2 BLD-SCNC: 18 MMOL/L (ref 22–29)
CO2 SERPL-SCNC: 20 MMOL/L (ref 21–32)
COLOR UR: YELLOW
CREAT BLD-MCNC: 4.62 MG/DL (ref 0.6–1.3)
CREAT SERPL-MCNC: 5.55 MG/DL (ref 0.6–1.3)
DIFFERENTIAL METHOD BLD: ABNORMAL
EKG ATRIAL RATE: 79 BPM
EKG DIAGNOSIS: NORMAL
EKG P AXIS: -13 DEGREES
EKG P-R INTERVAL: 120 MS
EKG Q-T INTERVAL: 390 MS
EKG QRS DURATION: 80 MS
EKG QTC CALCULATION (BAZETT): 447 MS
EKG R AXIS: 92 DEGREES
EKG T AXIS: 29 DEGREES
EKG VENTRICULAR RATE: 79 BPM
EOSINOPHIL # BLD: 0 K/UL (ref 0–0.4)
EOSINOPHIL NFR BLD: 0 % (ref 0–5)
EPITH CASTS URNS QL MICRO: ABNORMAL /LPF (ref 0–5)
ERYTHROCYTE [DISTWIDTH] IN BLOOD BY AUTOMATED COUNT: 13.7 % (ref 11.6–14.5)
FERRITIN SERPL-MCNC: 66 NG/ML (ref 8–388)
FIO2 ON VENT: 28 %
FLUAV H1 2009 PAND RNA SPEC QL NAA+PROBE: DETECTED
FLUBV RNA SPEC QL NAA+PROBE: NOT DETECTED
GAS FLOW.O2 O2 DELIVERY SYS: ABNORMAL
GLOBULIN SER CALC-MCNC: 3.7 G/DL (ref 2–4)
GLUCOSE BLD-MCNC: 114 MG/DL (ref 74–99)
GLUCOSE SERPL-MCNC: 119 MG/DL (ref 74–99)
GLUCOSE UR STRIP.AUTO-MCNC: NEGATIVE MG/DL
HADV DNA SPEC QL NAA+PROBE: NOT DETECTED
HCO3 BLD-SCNC: 18.2 MMOL/L (ref 21–28)
HCOV 229E RNA SPEC QL NAA+PROBE: NOT DETECTED
HCOV HKU1 RNA SPEC QL NAA+PROBE: NOT DETECTED
HCOV NL63 RNA SPEC QL NAA+PROBE: NOT DETECTED
HCOV OC43 RNA SPEC QL NAA+PROBE: NOT DETECTED
HCT VFR BLD AUTO: 24.6 % (ref 36–48)
HEMOCCULT STL QL: NEGATIVE
HGB BLD-MCNC: 7.8 G/DL (ref 13–16)
HGB UR QL STRIP: ABNORMAL
HMPV RNA SPEC QL NAA+PROBE: NOT DETECTED
HPIV1 RNA SPEC QL NAA+PROBE: NOT DETECTED
HPIV2 RNA SPEC QL NAA+PROBE: NOT DETECTED
HPIV3 RNA SPEC QL NAA+PROBE: NOT DETECTED
HPIV4 RNA SPEC QL NAA+PROBE: NOT DETECTED
IMM GRANULOCYTES # BLD AUTO: 0.1 K/UL (ref 0–0.04)
IMM GRANULOCYTES NFR BLD AUTO: 1 % (ref 0–0.5)
INR PPP: 1.1 (ref 0.9–1.1)
IRON SATN MFR SERPL: 5 % (ref 20–50)
IRON SERPL-MCNC: 22 UG/DL (ref 50–175)
KETONES UR QL STRIP.AUTO: NEGATIVE MG/DL
LACTATE BLD-SCNC: 0.88 MMOL/L (ref 0.4–2)
LACTATE BLD-SCNC: 1.57 MMOL/L (ref 0.4–2)
LEUKOCYTE ESTERASE UR QL STRIP.AUTO: NEGATIVE
LIPASE SERPL-CCNC: 104 U/L (ref 13–75)
LYMPHOCYTES # BLD: 0.9 K/UL (ref 0.9–3.6)
LYMPHOCYTES NFR BLD: 9 % (ref 21–52)
M PNEUMO DNA SPEC QL NAA+PROBE: NOT DETECTED
MAGNESIUM SERPL-MCNC: 2.3 MG/DL (ref 1.6–2.6)
MCH RBC QN AUTO: 28.4 PG (ref 24–34)
MCHC RBC AUTO-ENTMCNC: 31.7 G/DL (ref 31–37)
MCV RBC AUTO: 89.5 FL (ref 78–100)
MONOCYTES # BLD: 1.6 K/UL (ref 0.05–1.2)
MONOCYTES NFR BLD: 16 % (ref 3–10)
NEUTS SEG # BLD: 7.4 K/UL (ref 1.8–8)
NEUTS SEG NFR BLD: 74 % (ref 40–73)
NITRITE UR QL STRIP.AUTO: NEGATIVE
NRBC # BLD: 0 K/UL (ref 0–0.01)
NRBC BLD-RTO: 0 PER 100 WBC
NT PRO BNP: 9958 PG/ML (ref 0–900)
PCO2 BLD: 34.9 MMHG (ref 35–48)
PH BLD: 7.33 (ref 7.35–7.45)
PH UR STRIP: 5.5 (ref 5–8)
PLATELET # BLD AUTO: 176 K/UL (ref 135–420)
PMV BLD AUTO: 11.9 FL (ref 9.2–11.8)
PO2 BLD: 77 MMHG (ref 83–108)
POTASSIUM BLD-SCNC: 3.6 MMOL/L (ref 3.5–5.1)
POTASSIUM SERPL-SCNC: 3.5 MMOL/L (ref 3.5–5.5)
PROCALCITONIN SERPL-MCNC: 0.9 NG/ML
PROT SERPL-MCNC: 7.5 G/DL (ref 6.4–8.2)
PROT UR STRIP-MCNC: 300 MG/DL
PROTHROMBIN TIME: 13.9 SEC (ref 11.9–14.9)
RBC # BLD AUTO: 2.75 M/UL (ref 4.35–5.65)
RBC #/AREA URNS HPF: ABNORMAL /HPF (ref 0–5)
RESPIRATORY RATE, POC: 24 (ref 5–40)
RSV RNA SPEC QL NAA+PROBE: NOT DETECTED
RV+EV RNA SPEC QL NAA+PROBE: NOT DETECTED
SAO2 % BLD: 94 % (ref 94–98)
SARS-COV-2 RNA RESP QL NAA+PROBE: NOT DETECTED
SERVICE CMNT-IMP: ABNORMAL
SODIUM BLD-SCNC: 136 MMOL/L (ref 136–145)
SODIUM SERPL-SCNC: 134 MMOL/L (ref 136–145)
SP GR UR REFRACTOMETRY: 1.01 (ref 1–1.03)
SPECIMEN SITE: ABNORMAL
TIBC SERPL-MCNC: 424 UG/DL (ref 250–450)
TROPONIN I SERPL HS-MCNC: 9950 NG/L (ref 0–78)
TROPONIN I SERPL HS-MCNC: ABNORMAL NG/L (ref 0–78)
UROBILINOGEN UR QL STRIP.AUTO: 0.2 EU/DL (ref 0.2–1)
WBC # BLD AUTO: 10 K/UL (ref 4.6–13.2)
WBC URNS QL MICRO: ABNORMAL /HPF (ref 0–4)

## 2024-12-11 PROCEDURE — 84165 PROTEIN E-PHORESIS SERUM: CPT

## 2024-12-11 PROCEDURE — 76770 US EXAM ABDO BACK WALL COMP: CPT

## 2024-12-11 PROCEDURE — 83880 ASSAY OF NATRIURETIC PEPTIDE: CPT

## 2024-12-11 PROCEDURE — 82570 ASSAY OF URINE CREATININE: CPT

## 2024-12-11 PROCEDURE — 85014 HEMATOCRIT: CPT

## 2024-12-11 PROCEDURE — 86923 COMPATIBILITY TEST ELECTRIC: CPT

## 2024-12-11 PROCEDURE — 86850 RBC ANTIBODY SCREEN: CPT

## 2024-12-11 PROCEDURE — 2580000003 HC RX 258: Performed by: INTERNAL MEDICINE

## 2024-12-11 PROCEDURE — 84484 ASSAY OF TROPONIN QUANT: CPT

## 2024-12-11 PROCEDURE — 82947 ASSAY GLUCOSE BLOOD QUANT: CPT

## 2024-12-11 PROCEDURE — 0202U NFCT DS 22 TRGT SARS-COV-2: CPT

## 2024-12-11 PROCEDURE — 99285 EMERGENCY DEPT VISIT HI MDM: CPT

## 2024-12-11 PROCEDURE — 36415 COLL VENOUS BLD VENIPUNCTURE: CPT

## 2024-12-11 PROCEDURE — 80053 COMPREHEN METABOLIC PANEL: CPT

## 2024-12-11 PROCEDURE — 80307 DRUG TEST PRSMV CHEM ANLYZR: CPT

## 2024-12-11 PROCEDURE — 82728 ASSAY OF FERRITIN: CPT

## 2024-12-11 PROCEDURE — 70450 CT HEAD/BRAIN W/O DYE: CPT

## 2024-12-11 PROCEDURE — 2580000003 HC RX 258: Performed by: HOSPITALIST

## 2024-12-11 PROCEDURE — 81001 URINALYSIS AUTO W/SCOPE: CPT

## 2024-12-11 PROCEDURE — 82043 UR ALBUMIN QUANTITATIVE: CPT

## 2024-12-11 PROCEDURE — 96374 THER/PROPH/DIAG INJ IV PUSH: CPT

## 2024-12-11 PROCEDURE — 86037 ANCA TITER EACH ANTIBODY: CPT

## 2024-12-11 PROCEDURE — 85730 THROMBOPLASTIN TIME PARTIAL: CPT

## 2024-12-11 PROCEDURE — 74176 CT ABD & PELVIS W/O CONTRAST: CPT

## 2024-12-11 PROCEDURE — 86038 ANTINUCLEAR ANTIBODIES: CPT

## 2024-12-11 PROCEDURE — 86160 COMPLEMENT ANTIGEN: CPT

## 2024-12-11 PROCEDURE — 99223 1ST HOSP IP/OBS HIGH 75: CPT | Performed by: HOSPITALIST

## 2024-12-11 PROCEDURE — 82595 ASSAY OF CRYOGLOBULIN: CPT

## 2024-12-11 PROCEDURE — 84156 ASSAY OF PROTEIN URINE: CPT

## 2024-12-11 PROCEDURE — 71045 X-RAY EXAM CHEST 1 VIEW: CPT

## 2024-12-11 PROCEDURE — 87449 NOS EACH ORGANISM AG IA: CPT

## 2024-12-11 PROCEDURE — 83540 ASSAY OF IRON: CPT

## 2024-12-11 PROCEDURE — 2580000003 HC RX 258: Performed by: EMERGENCY MEDICINE

## 2024-12-11 PROCEDURE — 87040 BLOOD CULTURE FOR BACTERIA: CPT

## 2024-12-11 PROCEDURE — 82803 BLOOD GASES ANY COMBINATION: CPT

## 2024-12-11 PROCEDURE — 82272 OCCULT BLD FECES 1-3 TESTS: CPT

## 2024-12-11 PROCEDURE — 83690 ASSAY OF LIPASE: CPT

## 2024-12-11 PROCEDURE — 83605 ASSAY OF LACTIC ACID: CPT

## 2024-12-11 PROCEDURE — 93010 ELECTROCARDIOGRAM REPORT: CPT | Performed by: INTERNAL MEDICINE

## 2024-12-11 PROCEDURE — 86900 BLOOD TYPING SEROLOGIC ABO: CPT

## 2024-12-11 PROCEDURE — 86901 BLOOD TYPING SEROLOGIC RH(D): CPT

## 2024-12-11 PROCEDURE — 84295 ASSAY OF SERUM SODIUM: CPT

## 2024-12-11 PROCEDURE — 6360000002 HC RX W HCPCS: Performed by: EMERGENCY MEDICINE

## 2024-12-11 PROCEDURE — 36600 WITHDRAWAL OF ARTERIAL BLOOD: CPT

## 2024-12-11 PROCEDURE — 83550 IRON BINDING TEST: CPT

## 2024-12-11 PROCEDURE — 82784 ASSAY IGA/IGD/IGG/IGM EACH: CPT

## 2024-12-11 PROCEDURE — 83521 IG LIGHT CHAINS FREE EACH: CPT

## 2024-12-11 PROCEDURE — 83516 IMMUNOASSAY NONANTIBODY: CPT

## 2024-12-11 PROCEDURE — 86334 IMMUNOFIX E-PHORESIS SERUM: CPT

## 2024-12-11 PROCEDURE — 85025 COMPLETE CBC W/AUTO DIFF WBC: CPT

## 2024-12-11 PROCEDURE — 2140000001 HC CVICU INTERMEDIATE R&B

## 2024-12-11 PROCEDURE — 83735 ASSAY OF MAGNESIUM: CPT

## 2024-12-11 PROCEDURE — 6360000002 HC RX W HCPCS: Performed by: HOSPITALIST

## 2024-12-11 PROCEDURE — 96361 HYDRATE IV INFUSION ADD-ON: CPT

## 2024-12-11 PROCEDURE — 82140 ASSAY OF AMMONIA: CPT

## 2024-12-11 PROCEDURE — 84132 ASSAY OF SERUM POTASSIUM: CPT

## 2024-12-11 PROCEDURE — 82330 ASSAY OF CALCIUM: CPT

## 2024-12-11 PROCEDURE — 87324 CLOSTRIDIUM AG IA: CPT

## 2024-12-11 PROCEDURE — 85610 PROTHROMBIN TIME: CPT

## 2024-12-11 PROCEDURE — 93005 ELECTROCARDIOGRAM TRACING: CPT | Performed by: EMERGENCY MEDICINE

## 2024-12-11 PROCEDURE — 84145 PROCALCITONIN (PCT): CPT

## 2024-12-11 PROCEDURE — 87506 IADNA-DNA/RNA PROBE TQ 6-11: CPT

## 2024-12-11 PROCEDURE — 87086 URINE CULTURE/COLONY COUNT: CPT

## 2024-12-11 RX ORDER — VITAMIN B COMPLEX/FOLIC ACID 0.4 MG
1 TABLET ORAL DAILY
Status: DISCONTINUED | OUTPATIENT
Start: 2024-12-12 | End: 2024-12-23 | Stop reason: HOSPADM

## 2024-12-11 RX ORDER — THIAMINE HYDROCHLORIDE 100 MG/ML
100 INJECTION, SOLUTION INTRAMUSCULAR; INTRAVENOUS DAILY
Status: DISCONTINUED | OUTPATIENT
Start: 2024-12-11 | End: 2024-12-12

## 2024-12-11 RX ORDER — ACETAMINOPHEN 650 MG/1
650 SUPPOSITORY RECTAL EVERY 6 HOURS PRN
Status: DISCONTINUED | OUTPATIENT
Start: 2024-12-11 | End: 2024-12-23 | Stop reason: HOSPADM

## 2024-12-11 RX ORDER — SODIUM CHLORIDE 9 MG/ML
INJECTION, SOLUTION INTRAVENOUS PRN
Status: DISCONTINUED | OUTPATIENT
Start: 2024-12-11 | End: 2024-12-12 | Stop reason: SDUPTHER

## 2024-12-11 RX ORDER — CLOPIDOGREL BISULFATE 75 MG/1
75 TABLET ORAL DAILY
Status: DISCONTINUED | OUTPATIENT
Start: 2024-12-12 | End: 2024-12-12

## 2024-12-11 RX ORDER — ONDANSETRON 4 MG/1
4 TABLET, ORALLY DISINTEGRATING ORAL EVERY 8 HOURS PRN
Status: DISCONTINUED | OUTPATIENT
Start: 2024-12-11 | End: 2024-12-23 | Stop reason: HOSPADM

## 2024-12-11 RX ORDER — ONDANSETRON 2 MG/ML
4 INJECTION INTRAMUSCULAR; INTRAVENOUS EVERY 6 HOURS PRN
Status: DISCONTINUED | OUTPATIENT
Start: 2024-12-11 | End: 2024-12-23 | Stop reason: HOSPADM

## 2024-12-11 RX ORDER — SODIUM CHLORIDE 0.9 % (FLUSH) 0.9 %
5-40 SYRINGE (ML) INJECTION PRN
Status: DISCONTINUED | OUTPATIENT
Start: 2024-12-11 | End: 2024-12-14

## 2024-12-11 RX ORDER — SODIUM CHLORIDE 9 MG/ML
INJECTION, SOLUTION INTRAVENOUS CONTINUOUS
Status: DISCONTINUED | OUTPATIENT
Start: 2024-12-11 | End: 2024-12-11

## 2024-12-11 RX ORDER — ASPIRIN 81 MG/1
81 TABLET, CHEWABLE ORAL DAILY
Status: DISCONTINUED | OUTPATIENT
Start: 2024-12-12 | End: 2024-12-23 | Stop reason: HOSPADM

## 2024-12-11 RX ORDER — HEPARIN SODIUM 5000 [USP'U]/ML
5000 INJECTION, SOLUTION INTRAVENOUS; SUBCUTANEOUS EVERY 8 HOURS SCHEDULED
Status: DISCONTINUED | OUTPATIENT
Start: 2024-12-11 | End: 2024-12-12

## 2024-12-11 RX ORDER — 0.9 % SODIUM CHLORIDE 0.9 %
30 INTRAVENOUS SOLUTION INTRAVENOUS ONCE
Status: COMPLETED | OUTPATIENT
Start: 2024-12-11 | End: 2024-12-11

## 2024-12-11 RX ORDER — SODIUM CHLORIDE, SODIUM LACTATE, POTASSIUM CHLORIDE, CALCIUM CHLORIDE 600; 310; 30; 20 MG/100ML; MG/100ML; MG/100ML; MG/100ML
INJECTION, SOLUTION INTRAVENOUS CONTINUOUS
Status: DISCONTINUED | OUTPATIENT
Start: 2024-12-11 | End: 2024-12-13

## 2024-12-11 RX ORDER — CODEINE PHOSPHATE AND GUAIFENESIN 10; 100 MG/5ML; MG/5ML
5 SOLUTION ORAL EVERY 4 HOURS PRN
Status: DISCONTINUED | OUTPATIENT
Start: 2024-12-11 | End: 2024-12-23 | Stop reason: HOSPADM

## 2024-12-11 RX ORDER — SODIUM CHLORIDE 0.9 % (FLUSH) 0.9 %
5-40 SYRINGE (ML) INJECTION EVERY 12 HOURS SCHEDULED
Status: DISCONTINUED | OUTPATIENT
Start: 2024-12-11 | End: 2024-12-14

## 2024-12-11 RX ORDER — ACETAMINOPHEN 325 MG/1
650 TABLET ORAL EVERY 6 HOURS PRN
Status: DISCONTINUED | OUTPATIENT
Start: 2024-12-11 | End: 2024-12-16 | Stop reason: SDUPTHER

## 2024-12-11 RX ORDER — POLYETHYLENE GLYCOL 3350 17 G/17G
17 POWDER, FOR SOLUTION ORAL DAILY PRN
Status: DISCONTINUED | OUTPATIENT
Start: 2024-12-11 | End: 2024-12-23 | Stop reason: HOSPADM

## 2024-12-11 RX ADMIN — HEPARIN SODIUM 5000 UNITS: 5000 INJECTION INTRAVENOUS; SUBCUTANEOUS at 16:21

## 2024-12-11 RX ADMIN — SODIUM CHLORIDE, SODIUM LACTATE, POTASSIUM CHLORIDE, CALCIUM CHLORIDE: 600; 310; 30; 20 INJECTION, SOLUTION INTRAVENOUS at 22:29

## 2024-12-11 RX ADMIN — WATER 1000 MG: 1 INJECTION INTRAMUSCULAR; INTRAVENOUS; SUBCUTANEOUS at 12:33

## 2024-12-11 RX ADMIN — SODIUM CHLORIDE 1626 ML: 9 INJECTION, SOLUTION INTRAVENOUS at 12:31

## 2024-12-11 RX ADMIN — SODIUM CHLORIDE: 9 INJECTION, SOLUTION INTRAVENOUS at 13:33

## 2024-12-11 RX ADMIN — THIAMINE HYDROCHLORIDE 100 MG: 100 INJECTION, SOLUTION INTRAMUSCULAR; INTRAVENOUS at 16:23

## 2024-12-11 RX ADMIN — HEPARIN SODIUM 5000 UNITS: 5000 INJECTION INTRAVENOUS; SUBCUTANEOUS at 21:19

## 2024-12-11 RX ADMIN — SODIUM CHLORIDE, PRESERVATIVE FREE 10 ML: 5 INJECTION INTRAVENOUS at 21:19

## 2024-12-11 RX ADMIN — SODIUM CHLORIDE 125 MG: 9 INJECTION, SOLUTION INTRAVENOUS at 22:12

## 2024-12-11 RX ADMIN — SODIUM CHLORIDE, SODIUM LACTATE, POTASSIUM CHLORIDE, CALCIUM CHLORIDE: 600; 310; 30; 20 INJECTION, SOLUTION INTRAVENOUS at 14:42

## 2024-12-11 ASSESSMENT — PAIN DESCRIPTION - ORIENTATION: ORIENTATION: RIGHT;LEFT

## 2024-12-11 ASSESSMENT — PAIN DESCRIPTION - DESCRIPTORS: DESCRIPTORS: SHARP

## 2024-12-11 ASSESSMENT — PAIN DESCRIPTION - LOCATION: LOCATION: CHEST;ABDOMEN

## 2024-12-11 ASSESSMENT — PAIN SCALES - GENERAL
PAINLEVEL_OUTOF10: 0
PAINLEVEL_OUTOF10: 8
PAINLEVEL_OUTOF10: 0

## 2024-12-11 ASSESSMENT — PAIN - FUNCTIONAL ASSESSMENT: PAIN_FUNCTIONAL_ASSESSMENT: 0-10

## 2024-12-11 NOTE — ED NOTES
Lab called with critical results, Pt. Troponin 9,950 resulted from Janna Bocanegra in the lab. Provider made aware.

## 2024-12-11 NOTE — ED NOTES
Aseptic straight Cath performed. Pt. Able to tolerate. Urine obtained and sent to lab.    Repeat Troponin drawn and sent to lab.

## 2024-12-11 NOTE — H&P
History & Physical    Patient: Gio Khalil MRN: [id number]  CSN: [id number]    YOB: 1960  Age: 63 y.o.  Sex: male      DOA: 12/11/2024    Chief Complaint   Patient presents with    Altered Mental Status    Chest Pain    Abdominal Pain          HPI:     Gio Khalil is a 63 y.o. male with past medical hx of PAD, hepatitis C, etOH use disorder, dyslipidemia, aortic stenosis, past marijuana, tobacco use disorder who presented from home via EMS with altered mental status.  Per EMS report to ED nurse, pt's spo2 was 86% on RA and placed on o2 at 2lpm via NC, improved to 96%. Patient has been living with niece x 1 year.  Discussed with patient's niece over the phone, she reports that Family called EMS as patient was altered since Sunday. At baseline, patient is oriented x4. Sunday started being more lethargic.  Generally, patient comes out of his room for dinner and watches movies. Patient with quiet personality, talks more when he drinks etOH per caleb report. She stayed home Monday, he did not come out of his room all day except to use restroom.  Early evening, he came out his room briefly, did not eat dinner. Drank 1 glass of sweet tea with her encouragement. Tuesday, she went work. [place] scheduled for next Wednesday 12/18/2024, was scheduled for concern for internal bleeding as hb noted 7.0 in the outpatient setting.  Niece reports they saw GI doctor past [facility].      Niece reports that Tuesday, patient did not know his name or niece's name until a lot of prompting given. Could not answer month, season. Patient declined po intake of any kind, declined to go to hospital. She went to work today, when she came home she found loose stool all over his bed, the curtains, a trail to the bathroom and covering the bathroom. She did not see blood. She called 911.     Mino further relates that Two weeks ago, patient told her toilet was full of blood and stool, however she did not see it. Saw GI doc,

## 2024-12-11 NOTE — ED NOTES
TRANSFER - OUT REPORT:    Verbal report given to AMBER Capellan on Gio Khalil  being transferred to T SD for routine progression of patient care       Report consisted of patient's Situation, Background, Assessment and   Recommendations(SBAR).     Information from the following report(s) ED Encounter Summary, ED SBAR, Intake/Output, MAR, Recent Results, and Neuro Assessment was reviewed with the receiving nurse.    [place] Fall Assessment:    Presents to emergency department  because of falls (Syncope, seizure, or loss of consciousness): No  Age > 70: No  Altered Mental Status, Intoxication with alcohol or substance confusion (Disorientation, impaired judgment, poor safety awaremess, or inability to follow instructions): No  Impaired Mobility: Ambulates or transfers with assistive devices or assistance; Unable to ambulate or transer.: No  Nursing Judgement: No          Lines:   Peripheral IV 12/11/24 Proximal;Right;Anterior Forearm (Active)       Peripheral IV 12/11/24 Left Antecubital (Active)        Opportunity for questions and clarification was provided.      Patient transported with:  O2 @ 2lpm and Registered Nurse

## 2024-12-11 NOTE — ED PROVIDER NOTES
EMERGENCY DEPARTMENT HISTORY AND PHYSICAL EXAM    1:29 PM      Date: 12/11/2024  Patient Name: Gio Khalil    History of Presenting Illness     Chief Complaint   Patient presents with    Altered Mental Status    Chest Pain    Abdominal Pain       History From: Patient, EMS, and Caregiver    Gio Khalil is a 63 y.o. male   Patient is a 63-year-old male with a history of peripheral vascular disease, hepatitis C, alcohol use in the past, dyslipidemia, carotid stenosis, concern for stroke, anemia, anxiety, presents emergency department from home with increasing confusion for the last several days according to family and some concerns for bleeding as well as possible stroke.  There is no family at the bedside to provide further history.  Patient is confused and cannot provide much of a history.  Patient has history of smoking, alcohol use, marijuana use by the record.  Per EMS the patient was found to be hypoxemic requiring 4 L nasal cannula oxygen and is not on oxygen at home.    Patient's niece is at the bedside and is able provide extensive history.  The patient has been slowly declining since falling 1 year ago and has not been able to work.  Patient used to live with his mother and when she passed away patient's niece took over and started to help take care of him and they live together with her .  Patient has been having some fatigue and they found that he had anemia and was planning to have a colonoscopy but needed to be cleared prior to having the colonoscopy.  The patient had an echocardiogram last week and everything looked good but on Sunday family noticed there was a change.  He became confused, and did not want to get out of bed.  Typically he is depressed and does not want to do things however him not being able to get out of bed was was a change.  Over the last 24 hours he has not been able to recognize her and had been refusing to come the hospital.  The patient has been going to the bathroom

## 2024-12-11 NOTE — ED TRIAGE NOTES
Pt came via EMS stretcher from home c/o AMS. Per EMS, family called because pt has been altered since Sunday. Pt A&Ox4 at baseline but currently A&Ox1.    Per EMS, family informed them pt was seen by vascular specialist 2wks and was told that his Hgb was 7.0, no treatment done.    Pt reports chest pain and abdominal pain at this time.

## 2024-12-12 ENCOUNTER — APPOINTMENT (OUTPATIENT)
Facility: HOSPITAL | Age: 64
DRG: 469 | End: 2024-12-12
Payer: MEDICAID

## 2024-12-12 PROBLEM — Z71.89 GOALS OF CARE, COUNSELING/DISCUSSION: Status: ACTIVE | Noted: 2024-12-12

## 2024-12-12 PROBLEM — K92.2 LOWER GI BLEED: Status: ACTIVE | Noted: 2024-12-12

## 2024-12-12 PROBLEM — J10.1 INFLUENZA A (H1N1): Status: ACTIVE | Noted: 2024-12-12

## 2024-12-12 PROBLEM — G93.41 ACUTE METABOLIC ENCEPHALOPATHY: Status: ACTIVE | Noted: 2024-12-12

## 2024-12-12 PROBLEM — E86.0 DEHYDRATION: Status: ACTIVE | Noted: 2024-12-12

## 2024-12-12 PROBLEM — I35.0 AORTIC VALVE STENOSIS: Status: ACTIVE | Noted: 2024-12-12

## 2024-12-12 PROBLEM — I21.4 NSTEMI (NON-ST ELEVATED MYOCARDIAL INFARCTION) (HCC): Status: ACTIVE | Noted: 2024-12-12

## 2024-12-12 PROBLEM — I25.83 CORONARY ARTERY DISEASE DUE TO LIPID RICH PLAQUE: Status: ACTIVE | Noted: 2024-12-12

## 2024-12-12 PROBLEM — I50.32 CHRONIC HEART FAILURE WITH PRESERVED EJECTION FRACTION (HCC): Status: ACTIVE | Noted: 2024-12-12

## 2024-12-12 PROBLEM — Z51.5 ENCOUNTER FOR PALLIATIVE CARE: Status: ACTIVE | Noted: 2024-12-12

## 2024-12-12 PROBLEM — I25.10 CORONARY ARTERY DISEASE DUE TO LIPID RICH PLAQUE: Status: ACTIVE | Noted: 2024-12-12

## 2024-12-12 PROBLEM — F10.10 ETOH ABUSE: Status: ACTIVE | Noted: 2024-12-12

## 2024-12-12 PROBLEM — D64.9 ANEMIA: Status: ACTIVE | Noted: 2024-12-12

## 2024-12-12 LAB
ALBUMIN SERPL-MCNC: 2.9 G/DL (ref 3.4–5)
ALBUMIN/GLOB SERPL: 0.9 (ref 0.8–1.7)
ALP SERPL-CCNC: 39 U/L (ref 45–117)
ALT SERPL-CCNC: 41 U/L (ref 16–61)
AMPHET UR QL SCN: NEGATIVE
ANION GAP SERPL CALC-SCNC: 10 MMOL/L (ref 3–18)
AST SERPL-CCNC: 90 U/L (ref 10–38)
BACTERIA SPEC CULT: NORMAL
BARBITURATES UR QL SCN: NEGATIVE
BASOPHILS # BLD: 0 K/UL (ref 0–0.1)
BASOPHILS NFR BLD: 0 % (ref 0–2)
BENZODIAZ UR QL: NEGATIVE
BILIRUB DIRECT SERPL-MCNC: 0.1 MG/DL (ref 0–0.2)
BILIRUB SERPL-MCNC: 0.2 MG/DL (ref 0.2–1)
BUN SERPL-MCNC: 60 MG/DL (ref 7–18)
BUN/CREAT SERPL: 9 (ref 12–20)
C COLI+JEJUNI TUF STL QL NAA+PROBE: NEGATIVE
C DIFF GDH STL QL: NEGATIVE
C DIFF TOX A+B STL QL IA: NEGATIVE
C DIFF TOXIN INTERPRETATION: NORMAL
C3 SERPL-MCNC: 110 MG/DL (ref 82–167)
C4 SERPL-MCNC: 23 MG/DL (ref 12–38)
CALCIUM SERPL-MCNC: 7.6 MG/DL (ref 8.5–10.1)
CALCIUM SERPL-MCNC: 7.7 MG/DL (ref 8.5–10.1)
CANNABINOIDS UR QL SCN: NEGATIVE
CHLORIDE SERPL-SCNC: 110 MMOL/L (ref 100–111)
CO2 SERPL-SCNC: 19 MMOL/L (ref 21–32)
COCAINE UR QL SCN: NEGATIVE
CREAT SERPL-MCNC: 6.33 MG/DL (ref 0.6–1.3)
CREAT UR-MCNC: 181 MG/DL (ref 30–125)
CREAT UR-MCNC: 183 MG/DL (ref 30–125)
DIFFERENTIAL METHOD BLD: ABNORMAL
EC STX1+STX2 GENES STL QL NAA+PROBE: NEGATIVE
EOSINOPHIL # BLD: 0 K/UL (ref 0–0.4)
EOSINOPHIL NFR BLD: 0 % (ref 0–5)
ERYTHROCYTE [DISTWIDTH] IN BLOOD BY AUTOMATED COUNT: 13.8 % (ref 11.6–14.5)
ETEC ELTA+ESTB GENES STL QL NAA+PROBE: NEGATIVE
FOLATE SERPL-MCNC: 12.8 NG/ML (ref 3.1–17.5)
GBM IGG SER-ACNC: <0.2 UNITS (ref 0–0.9)
GLOBULIN SER CALC-MCNC: 3.2 G/DL (ref 2–4)
GLUCOSE SERPL-MCNC: 103 MG/DL (ref 74–99)
HBV SURFACE AG SER QL: 0.64 INDEX
HBV SURFACE AG SER QL: NEGATIVE
HCT VFR BLD AUTO: 19.2 % (ref 36–48)
HCT VFR BLD AUTO: 25.9 % (ref 36–48)
HGB BLD-MCNC: 6 G/DL (ref 13–16)
HGB BLD-MCNC: 8.4 G/DL (ref 13–16)
HISTORY CHECK: NORMAL
IMM GRANULOCYTES # BLD AUTO: 0 K/UL (ref 0–0.04)
IMM GRANULOCYTES NFR BLD AUTO: 0 % (ref 0–0.5)
LYMPHOCYTES # BLD: 1.4 K/UL (ref 0.9–3.6)
LYMPHOCYTES NFR BLD: 20 % (ref 21–52)
Lab: NORMAL
MAGNESIUM SERPL-MCNC: 2 MG/DL (ref 1.6–2.6)
MCH RBC QN AUTO: 28.4 PG (ref 24–34)
MCHC RBC AUTO-ENTMCNC: 31.3 G/DL (ref 31–37)
MCV RBC AUTO: 91 FL (ref 78–100)
METHADONE UR QL: NEGATIVE
MICROALBUMIN UR-MCNC: 88.5 MG/DL (ref 0–3)
MICROALBUMIN/CREAT UR-RTO: 489 MG/G (ref 0–30)
MONOCYTES # BLD: 0.7 K/UL (ref 0.05–1.2)
MONOCYTES NFR BLD: 11 % (ref 3–10)
NEUTS SEG # BLD: 4.8 K/UL (ref 1.8–8)
NEUTS SEG NFR BLD: 69 % (ref 40–73)
NRBC # BLD: 0 K/UL (ref 0–0.01)
NRBC BLD-RTO: 0 PER 100 WBC
OPIATES UR QL: NEGATIVE
P SHIGELLOIDES DNA STL QL NAA+PROBE: NEGATIVE
PCP UR QL: NEGATIVE
PHOSPHATE SERPL-MCNC: 6.4 MG/DL (ref 2.5–4.9)
PLATELET # BLD AUTO: 139 K/UL (ref 135–420)
PMV BLD AUTO: 12.2 FL (ref 9.2–11.8)
POTASSIUM SERPL-SCNC: 3.6 MMOL/L (ref 3.5–5.5)
PROT SERPL-MCNC: 6.1 G/DL (ref 6.4–8.2)
PROT UR-MCNC: 243 MG/DL
PROT/CREAT UR-RTO: 1.3
PTH-INTACT SERPL-MCNC: 180.5 PG/ML (ref 18.4–88)
RBC # BLD AUTO: 2.11 M/UL (ref 4.35–5.65)
SALMONELLA SP SPAO STL QL NAA+PROBE: NEGATIVE
SERVICE CMNT-IMP: NORMAL
SHIGELLA SP+EIEC IPAH STL QL NAA+PROBE: NEGATIVE
SODIUM SERPL-SCNC: 139 MMOL/L (ref 136–145)
T4 FREE SERPL-MCNC: 0.9 NG/DL (ref 0.7–1.5)
TROPONIN I SERPL HS-MCNC: 3913 NG/L (ref 0–78)
TSH SERPL DL<=0.05 MIU/L-ACNC: 1.12 UIU/ML (ref 0.36–3.74)
V CHOL+PARA+VUL DNA STL QL NAA+NON-PROBE: NEGATIVE
VIT B12 SERPL-MCNC: 519 PG/ML (ref 211–911)
WBC # BLD AUTO: 7 K/UL (ref 4.6–13.2)
Y ENTEROCOL DNA STL QL NAA+NON-PROBE: NEGATIVE

## 2024-12-12 PROCEDURE — 06HY33Z INSERTION OF INFUSION DEVICE INTO LOWER VEIN, PERCUTANEOUS APPROACH: ICD-10-PCS | Performed by: SURGERY

## 2024-12-12 PROCEDURE — 51798 US URINE CAPACITY MEASURE: CPT

## 2024-12-12 PROCEDURE — 90935 HEMODIALYSIS ONE EVALUATION: CPT

## 2024-12-12 PROCEDURE — 99223 1ST HOSP IP/OBS HIGH 75: CPT | Performed by: INTERNAL MEDICINE

## 2024-12-12 PROCEDURE — 36556 INSERT NON-TUNNEL CV CATH: CPT | Performed by: SURGERY

## 2024-12-12 PROCEDURE — 84439 ASSAY OF FREE THYROXINE: CPT

## 2024-12-12 PROCEDURE — 36430 TRANSFUSION BLD/BLD COMPNT: CPT

## 2024-12-12 PROCEDURE — 6370000000 HC RX 637 (ALT 250 FOR IP): Performed by: HOSPITALIST

## 2024-12-12 PROCEDURE — 6360000002 HC RX W HCPCS: Performed by: INTERNAL MEDICINE

## 2024-12-12 PROCEDURE — 99233 SBSQ HOSP IP/OBS HIGH 50: CPT | Performed by: INTERNAL MEDICINE

## 2024-12-12 PROCEDURE — 94761 N-INVAS EAR/PLS OXIMETRY MLT: CPT

## 2024-12-12 PROCEDURE — 6370000000 HC RX 637 (ALT 250 FOR IP): Performed by: INTERNAL MEDICINE

## 2024-12-12 PROCEDURE — 85014 HEMATOCRIT: CPT

## 2024-12-12 PROCEDURE — 84443 ASSAY THYROID STIM HORMONE: CPT

## 2024-12-12 PROCEDURE — 84100 ASSAY OF PHOSPHORUS: CPT

## 2024-12-12 PROCEDURE — 2580000003 HC RX 258: Performed by: HOSPITALIST

## 2024-12-12 PROCEDURE — 93005 ELECTROCARDIOGRAM TRACING: CPT | Performed by: INTERNAL MEDICINE

## 2024-12-12 PROCEDURE — 83735 ASSAY OF MAGNESIUM: CPT

## 2024-12-12 PROCEDURE — 2140000001 HC CVICU INTERMEDIATE R&B

## 2024-12-12 PROCEDURE — 86706 HEP B SURFACE ANTIBODY: CPT

## 2024-12-12 PROCEDURE — 6360000002 HC RX W HCPCS: Performed by: HOSPITALIST

## 2024-12-12 PROCEDURE — 76705 ECHO EXAM OF ABDOMEN: CPT

## 2024-12-12 PROCEDURE — 84484 ASSAY OF TROPONIN QUANT: CPT

## 2024-12-12 PROCEDURE — 85025 COMPLETE CBC W/AUTO DIFF WBC: CPT

## 2024-12-12 PROCEDURE — 87340 HEPATITIS B SURFACE AG IA: CPT

## 2024-12-12 PROCEDURE — 99223 1ST HOSP IP/OBS HIGH 75: CPT | Performed by: NURSE PRACTITIONER

## 2024-12-12 PROCEDURE — 82607 VITAMIN B-12: CPT

## 2024-12-12 PROCEDURE — 83970 ASSAY OF PARATHORMONE: CPT

## 2024-12-12 PROCEDURE — 82746 ASSAY OF FOLIC ACID SERUM: CPT

## 2024-12-12 PROCEDURE — 80048 BASIC METABOLIC PNL TOTAL CA: CPT

## 2024-12-12 PROCEDURE — P9016 RBC LEUKOCYTES REDUCED: HCPCS

## 2024-12-12 PROCEDURE — 85018 HEMOGLOBIN: CPT

## 2024-12-12 PROCEDURE — 5A1D70Z PERFORMANCE OF URINARY FILTRATION, INTERMITTENT, LESS THAN 6 HOURS PER DAY: ICD-10-PCS | Performed by: INTERNAL MEDICINE

## 2024-12-12 PROCEDURE — 99223 1ST HOSP IP/OBS HIGH 75: CPT | Performed by: SURGERY

## 2024-12-12 PROCEDURE — 2580000003 HC RX 258: Performed by: INTERNAL MEDICINE

## 2024-12-12 PROCEDURE — 6370000000 HC RX 637 (ALT 250 FOR IP): Performed by: FAMILY MEDICINE

## 2024-12-12 PROCEDURE — 36415 COLL VENOUS BLD VENIPUNCTURE: CPT

## 2024-12-12 PROCEDURE — 80076 HEPATIC FUNCTION PANEL: CPT

## 2024-12-12 RX ORDER — GAUZE BANDAGE 2" X 2"
100 BANDAGE TOPICAL DAILY
Status: DISCONTINUED | OUTPATIENT
Start: 2024-12-13 | End: 2024-12-23 | Stop reason: HOSPADM

## 2024-12-12 RX ORDER — SODIUM CHLORIDE 9 MG/ML
INJECTION, SOLUTION INTRAVENOUS PRN
Status: DISCONTINUED | OUTPATIENT
Start: 2024-12-12 | End: 2024-12-14

## 2024-12-12 RX ORDER — ATORVASTATIN CALCIUM 40 MG/1
40 TABLET, FILM COATED ORAL NIGHTLY
Status: DISCONTINUED | OUTPATIENT
Start: 2024-12-12 | End: 2024-12-23 | Stop reason: HOSPADM

## 2024-12-12 RX ORDER — SODIUM CHLORIDE 0.9 % (FLUSH) 0.9 %
5-40 SYRINGE (ML) INJECTION PRN
Status: DISCONTINUED | OUTPATIENT
Start: 2024-12-12 | End: 2024-12-14

## 2024-12-12 RX ORDER — SODIUM CHLORIDE 0.9 % (FLUSH) 0.9 %
5-40 SYRINGE (ML) INJECTION EVERY 12 HOURS SCHEDULED
Status: DISCONTINUED | OUTPATIENT
Start: 2024-12-12 | End: 2024-12-14

## 2024-12-12 RX ADMIN — SODIUM CHLORIDE, PRESERVATIVE FREE 10 ML: 5 INJECTION INTRAVENOUS at 20:38

## 2024-12-12 RX ADMIN — ATORVASTATIN CALCIUM 40 MG: 40 TABLET, FILM COATED ORAL at 20:39

## 2024-12-12 RX ADMIN — GUAIFENESIN AND CODEINE PHOSPHATE 5 ML: 100; 10 SOLUTION ORAL at 00:19

## 2024-12-12 RX ADMIN — HEPARIN SODIUM 5000 UNITS: 5000 INJECTION INTRAVENOUS; SUBCUTANEOUS at 05:43

## 2024-12-12 RX ADMIN — WATER 1000 MG: 1 INJECTION INTRAMUSCULAR; INTRAVENOUS; SUBCUTANEOUS at 13:51

## 2024-12-12 RX ADMIN — THIAMINE HYDROCHLORIDE 100 MG: 100 INJECTION, SOLUTION INTRAMUSCULAR; INTRAVENOUS at 08:51

## 2024-12-12 RX ADMIN — HEPARIN SODIUM 5000 UNITS: 5000 INJECTION INTRAVENOUS; SUBCUTANEOUS at 13:51

## 2024-12-12 RX ADMIN — HYDROMORPHONE HYDROCHLORIDE 0.5 MG: 1 INJECTION, SOLUTION INTRAMUSCULAR; INTRAVENOUS; SUBCUTANEOUS at 14:09

## 2024-12-12 RX ADMIN — SODIUM CHLORIDE, PRESERVATIVE FREE 10 ML: 5 INJECTION INTRAVENOUS at 08:51

## 2024-12-12 RX ADMIN — Medication 1 TABLET: at 08:51

## 2024-12-12 RX ADMIN — CLOPIDOGREL BISULFATE 75 MG: 75 TABLET ORAL at 08:51

## 2024-12-12 RX ADMIN — ASPIRIN 81 MG CHEWABLE TABLET 81 MG: 81 TABLET CHEWABLE at 08:51

## 2024-12-12 RX ADMIN — SODIUM CHLORIDE, SODIUM LACTATE, POTASSIUM CHLORIDE, CALCIUM CHLORIDE: 600; 310; 30; 20 INJECTION, SOLUTION INTRAVENOUS at 05:45

## 2024-12-12 ASSESSMENT — PAIN SCALES - GENERAL
PAINLEVEL_OUTOF10: 0
PAINLEVEL_OUTOF10: 10
PAINLEVEL_OUTOF10: 0
PAINLEVEL_OUTOF10: 10

## 2024-12-12 ASSESSMENT — PAIN DESCRIPTION - ORIENTATION: ORIENTATION: RIGHT

## 2024-12-12 ASSESSMENT — PAIN DESCRIPTION - DESCRIPTORS: DESCRIPTORS: SHARP

## 2024-12-12 ASSESSMENT — PAIN DESCRIPTION - LOCATION: LOCATION: ABDOMEN

## 2024-12-12 NOTE — H&P (VIEW-ONLY)
[facility]     History:   64 y/o M with known PAD admitted with progressive renal failure and anemia. He has severe aortic stenosis. I saw him in the office recently and we were planning for BLE angiography for claudication symptoms. In the interim, his overall health has declined and he was brought to the ER by his family. Nephrology is recommending acute dialysis and a temp HD catheter was requested. Most of the history was obtained from the pt's niece. He was fatigued during our conversation.         Past Medical History:   Diagnosis Date    CURTIS (acute kidney injury) ([facility]) 01/24/2024    Anxiety associated with depression 08/08/2023    Atherosclerosis 08/08/2023    Bilateral carotid artery stenosis 11/24/2023    Rt mod (50-69%); Lt mild (<50%)    Cataract 03/07/2022    Cataract extraction status of eye, unspecified laterality 03/07/2022    Chronic hepatitis C without hepatic coma ([facility]) 05/23/2022    Chronic pain in left foot 03/07/2022    Current smoker 03/07/2022    Daily consumption of alcohol 03/07/2022    Dyslipidemia 08/08/2023    Elevated LFTs 03/09/2022    Fracture of thoracic transverse process, closed, initial encounter ([facility]) 01/24/2024    Hepatitis C test positive 03/09/2022    Hyperkalemia 03/09/2022    Marijuana smoker 03/07/2022    Mental subnormality 03/07/2022    PAD (peripheral artery disease) ([facility])     Primary hypertension 03/07/2022    PVD (peripheral vascular disease) ([facility]) 07/22/2022     Past Surgical History:   Procedure Laterality Date    CATARACT REMOVAL Right     4 years ago    VASCULAR SURGERY Left 06/01/2023    Stents     No current facility-administered medications on file prior to encounter.     Current Outpatient Medications on File Prior to Encounter   Medication Sig Dispense Refill    diclofenac sodium (VOLTAREN) 1 % GEL Apply 4 g topically 4 times daily 480 g 2    ferrous sulfate dried (SLOW IRON) 160 (50 Fe) MG TBCR extended release tablet Take 1 tab

## 2024-12-12 NOTE — PROCEDURES
PROCEDURE NOTE  Date: 12/12/2024   Name: Gio Khaill  YOB: 1960    Vascular Surgery Procedure Note     Temporary Dialysis Catheter Placement     Indications: Need for emergent HD     Risks, benefits, and alternatives discussed with the patient  and his niece/POA including but not limited to bleeding, infection, pain, catheter malfunction, need for further procedures. We discussed the temporary nature of this catheter, and that if long-term HD were needed, a tunneled catheter would be required. She expressed understanding after the opportunity to ask questions and consented to the procedure for her uncle.      Procedure: The R groin was prepped and draped. 1% lidocaine was instilled subcutaneously in the groin. The common femoral artery was easily palpable. Under ultrasound guidance, the CFV was accessed with dark, venous blood return. The wire was passed without difficulty. The tract was dilated.  A 24cm Trialysis catheter was placed. All three ports flushed easily, with robust back flow. The catheter was secured in place with suture and dressed with a biopatch, tegaderm, and gauze covering the ports.      The patient tolerated the procedure well. I informed the nephrologist that the catheter was ready for HD use.     MD Ivan Barrera [facility]  Vascular Surgeon

## 2024-12-12 NOTE — CONSENT
Informed Consent for Blood Component Transfusion Note    I have discussed with the patient the rationale for blood component transfusion; its benefits in treating or preventing fatigue, organ damage, or death; and its risk which includes mild transfusion reactions, rare risk of blood borne infection, or more serious but rare reactions. I have discussed the alternatives to transfusion, including the risk and consequences of not receiving transfusion. The patient had an opportunity to ask questions and had agreed to proceed with transfusion of blood components.    Electronically signed by Tomas Alfaro MD on 12/12/24 at 6:39 AM EST

## 2024-12-12 NOTE — ACP (ADVANCE CARE PLANNING)
Advance Care Planning     Palliative Team Advance Care Planning (ACP) Conversation    Date of Conversation: 24    Individuals present for the conversation: Patient with decision making capacity, Brother Pankaj Khalil , and Ruth montanez     ACP documents on file prior to discussion:  -None    Previously completed document/s not on file: Patient / participant reports that there are no previously executed ACP documents.    Healthcare Decision Maker:    Primary Decision Maker: Tej Choudhury - Niece/Nephew - [phone]    Secondary Decision Maker: Ruth Choudhury - Niece/Nephew - [phone]     Conversation Summary:  Gio Khalil is a 63 y.o. male with past medical hx of PAD, hepatitis C, etOH use disorder, dyslipidemia, aortic stenosis, past marijuana, tobacco use disorder who presented from home via EMS with altered mental status. Palliative team was consulted for goals of care.   Palliative team met with patient at bedside. Also present was patient's brother, Pankaj Khalil. During our visit GI staff was present and provided update to patient and brother, all questions were answered. Patient was able to provide some information about his condition prior to his hospital admission. According to his brother, Pankaj patient has been declining starting 2 years ago. He was caring for Gio But after the fall from the roof, patient moved with his niece, Shaniqua Choudhury. Patient was requiring assist with meal prep, transport, organization of MD appointments. Mr Gio Khalil is not , has no children, parents are .  Team introduced the completion of an Advance Medical Directive. Patient named his niece Shaniqua Choudhury as priamry agent. He named his other niece Ruth Choudhury as surrogate. Copies scanned into EMR. Additional copies given to family. Team discussed he wishes at end of life (cardiac or respiratory arrest). He was not able to provide a clear answer \"I don't know\". Team encouraged

## 2024-12-12 NOTE — CARE COORDINATION
Chart reviewed and met with pt and niece at bedside, pt independent lives at home with niece and family, pt doesn't anticipate any needs at this time, family will provide transportation home.   12/12/24 1432   Service Assessment   Patient Orientation Alert and Oriented   Cognition Alert   History Provided By Patient   Primary Caregiver Self   Support Systems Family Members   PCP Verified by CM Yes   Last Visit to PCP Within last 3 months   Prior Functional Level Independent in ADLs/IADLs   Current Functional Level Independent in ADLs/IADLs   Can patient return to prior living arrangement Yes   Ability to make needs known: Good   Family able to assist with home care needs: Yes   Would you like for me to discuss the discharge plan with any other family members/significant others, and if so, who? Yes  (Shaniqua Choudhury)   Financial Resources Medicaid   Community Resources None   CM/SW Referral Other (see comment)  (new patient)   Social/Functional History   Lives With Family   Type of Home House   Home Layout One level   Home Access Stairs to enter with rails   Entrance Stairs - Number of Steps 6   Bathroom Shower/Tub Tub/Shower unit   Bathroom Toilet Standard   Bathroom Equipment None   Bathroom Accessibility Accessible   Home Equipment None   Receives Help From Family   Prior Level of Assist for ADLs Independent   Prior Level of Assist for Homemaking Independent   Ambulation Assistance Independent   Prior Level of Assist for Transfers Independent   Active  No   Patient's  Info Shaniqua Choudhury and family   Mode of Transportation Car   Occupation Unemployed   Discharge Planning   Type of Residence House   Living Arrangements Family Members   Current Services Prior To Admission None   Potential Assistance Needed Other (Comment)  (to be determined, none at this time)   DME Ordered? No   Potential Assistance Purchasing Medications No   Type of Home Care Services None   Patient expects to be discharged to:

## 2024-12-13 ENCOUNTER — APPOINTMENT (OUTPATIENT)
Facility: HOSPITAL | Age: 64
DRG: 469 | End: 2024-12-13
Payer: MEDICAID

## 2024-12-13 LAB
ABO + RH BLD: NORMAL
ALBUMIN SERPL-MCNC: 3.3 G/DL (ref 3.4–5)
ALBUMIN/GLOB SERPL: 0.9 (ref 0.8–1.7)
ALP SERPL-CCNC: 42 U/L (ref 45–117)
ALT SERPL-CCNC: 44 U/L (ref 16–61)
ANA TITR SER IF: NEGATIVE
ANION GAP SERPL CALC-SCNC: 9 MMOL/L (ref 3–18)
AST SERPL-CCNC: 88 U/L (ref 10–38)
BASOPHILS # BLD: 0 K/UL (ref 0–0.1)
BASOPHILS NFR BLD: 0 % (ref 0–2)
BILIRUB SERPL-MCNC: 0.4 MG/DL (ref 0.2–1)
BLD PROD TYP BPU: NORMAL
BLOOD BANK BLOOD PRODUCT EXPIRATION DATE: NORMAL
BLOOD BANK DISPENSE STATUS: NORMAL
BLOOD BANK ISBT PRODUCT BLOOD TYPE: 6200
BLOOD BANK PRODUCT CODE: NORMAL
BLOOD BANK UNIT TYPE AND RH: NORMAL
BLOOD GROUP ANTIBODIES SERPL: NORMAL
BPU ID: NORMAL
BUN SERPL-MCNC: 35 MG/DL (ref 7–18)
BUN/CREAT SERPL: 7 (ref 12–20)
C-ANCA TITR SER IF: NORMAL TITER
CALCIUM SERPL-MCNC: 8.9 MG/DL (ref 8.5–10.1)
CALLED TO: NORMAL
CHLORIDE SERPL-SCNC: 105 MMOL/L (ref 100–111)
CO2 SERPL-SCNC: 23 MMOL/L (ref 21–32)
CREAT SERPL-MCNC: 4.7 MG/DL (ref 0.6–1.3)
CROSSMATCH RESULT: NORMAL
DIFFERENTIAL METHOD BLD: ABNORMAL
EKG ATRIAL RATE: 64 BPM
EKG DIAGNOSIS: NORMAL
EKG P AXIS: 59 DEGREES
EKG P-R INTERVAL: 134 MS
EKG Q-T INTERVAL: 406 MS
EKG QRS DURATION: 74 MS
EKG QTC CALCULATION (BAZETT): 418 MS
EKG R AXIS: -34 DEGREES
EKG T AXIS: 42 DEGREES
EKG VENTRICULAR RATE: 64 BPM
EOSINOPHIL # BLD: 0.1 K/UL (ref 0–0.4)
EOSINOPHIL NFR BLD: 1 % (ref 0–5)
ERYTHROCYTE [DISTWIDTH] IN BLOOD BY AUTOMATED COUNT: 13.7 % (ref 11.6–14.5)
GLOBULIN SER CALC-MCNC: 3.5 G/DL (ref 2–4)
GLUCOSE SERPL-MCNC: 102 MG/DL (ref 74–99)
HBV SURFACE AB SER QL IA: NEGATIVE
HBV SURFACE AB SERPL IA-ACNC: <3.1 MIU/ML
HCT VFR BLD AUTO: 25.1 % (ref 36–48)
HEP BS AB COMMENT: ABNORMAL
HGB BLD-MCNC: 8 G/DL (ref 13–16)
IMM GRANULOCYTES # BLD AUTO: 0 K/UL (ref 0–0.04)
IMM GRANULOCYTES NFR BLD AUTO: 1 % (ref 0–0.5)
LABORATORY COMMENT REPORT: NORMAL
LYMPHOCYTES # BLD: 1.1 K/UL (ref 0.9–3.6)
LYMPHOCYTES NFR BLD: 16 % (ref 21–52)
MCH RBC QN AUTO: 28.8 PG (ref 24–34)
MCHC RBC AUTO-ENTMCNC: 31.9 G/DL (ref 31–37)
MCV RBC AUTO: 90.3 FL (ref 78–100)
MONOCYTES # BLD: 0.9 K/UL (ref 0.05–1.2)
MONOCYTES NFR BLD: 14 % (ref 3–10)
MYELOPEROXIDASE AB SER IA-ACNC: <0.2 UNITS (ref 0–0.9)
NEUTS SEG # BLD: 4.6 K/UL (ref 1.8–8)
NEUTS SEG NFR BLD: 69 % (ref 40–73)
NRBC # BLD: 0 K/UL (ref 0–0.01)
NRBC BLD-RTO: 0 PER 100 WBC
P-ANCA ATYPICAL TITR SER IF: NORMAL TITER
P-ANCA TITR SER IF: NORMAL TITER
PLATELET # BLD AUTO: 127 K/UL (ref 135–420)
PMV BLD AUTO: 12.7 FL (ref 9.2–11.8)
POTASSIUM SERPL-SCNC: 3.5 MMOL/L (ref 3.5–5.5)
PROT SERPL-MCNC: 6.8 G/DL (ref 6.4–8.2)
PROTEINASE3 AB SER IA-ACNC: <0.2 UNITS (ref 0–0.9)
RBC # BLD AUTO: 2.78 M/UL (ref 4.35–5.65)
SODIUM SERPL-SCNC: 137 MMOL/L (ref 136–145)
SPECIMEN EXP DATE BLD: NORMAL
UNIT DIVISION: 0
UNIT ISSUE DATE/TIME: NORMAL
WBC # BLD AUTO: 6.6 K/UL (ref 4.6–13.2)

## 2024-12-13 PROCEDURE — 71045 X-RAY EXAM CHEST 1 VIEW: CPT

## 2024-12-13 PROCEDURE — 6370000000 HC RX 637 (ALT 250 FOR IP): Performed by: INTERNAL MEDICINE

## 2024-12-13 PROCEDURE — 36415 COLL VENOUS BLD VENIPUNCTURE: CPT

## 2024-12-13 PROCEDURE — 2140000001 HC CVICU INTERMEDIATE R&B

## 2024-12-13 PROCEDURE — 85025 COMPLETE CBC W/AUTO DIFF WBC: CPT

## 2024-12-13 PROCEDURE — 11719 TRIM NAIL(S) ANY NUMBER: CPT

## 2024-12-13 PROCEDURE — 30233N1 TRANSFUSION OF NONAUTOLOGOUS RED BLOOD CELLS INTO PERIPHERAL VEIN, PERCUTANEOUS APPROACH: ICD-10-PCS | Performed by: STUDENT IN AN ORGANIZED HEALTH CARE EDUCATION/TRAINING PROGRAM

## 2024-12-13 PROCEDURE — 97530 THERAPEUTIC ACTIVITIES: CPT

## 2024-12-13 PROCEDURE — 93010 ELECTROCARDIOGRAM REPORT: CPT | Performed by: INTERNAL MEDICINE

## 2024-12-13 PROCEDURE — 2580000003 HC RX 258: Performed by: INTERNAL MEDICINE

## 2024-12-13 PROCEDURE — 11721 DEBRIDE NAIL 6 OR MORE: CPT

## 2024-12-13 PROCEDURE — 97166 OT EVAL MOD COMPLEX 45 MIN: CPT

## 2024-12-13 PROCEDURE — 5A09357 ASSISTANCE WITH RESPIRATORY VENTILATION, LESS THAN 24 CONSECUTIVE HOURS, CONTINUOUS POSITIVE AIRWAY PRESSURE: ICD-10-PCS | Performed by: STUDENT IN AN ORGANIZED HEALTH CARE EDUCATION/TRAINING PROGRAM

## 2024-12-13 PROCEDURE — 6370000000 HC RX 637 (ALT 250 FOR IP): Performed by: FAMILY MEDICINE

## 2024-12-13 PROCEDURE — 80053 COMPREHEN METABOLIC PANEL: CPT

## 2024-12-13 PROCEDURE — 94761 N-INVAS EAR/PLS OXIMETRY MLT: CPT

## 2024-12-13 PROCEDURE — 6360000002 HC RX W HCPCS: Performed by: INTERNAL MEDICINE

## 2024-12-13 PROCEDURE — 6370000000 HC RX 637 (ALT 250 FOR IP): Performed by: HOSPITALIST

## 2024-12-13 PROCEDURE — 97162 PT EVAL MOD COMPLEX 30 MIN: CPT

## 2024-12-13 PROCEDURE — 2700000000 HC OXYGEN THERAPY PER DAY

## 2024-12-13 PROCEDURE — 94640 AIRWAY INHALATION TREATMENT: CPT

## 2024-12-13 PROCEDURE — 2580000003 HC RX 258: Performed by: HOSPITALIST

## 2024-12-13 PROCEDURE — 99233 SBSQ HOSP IP/OBS HIGH 50: CPT | Performed by: INTERNAL MEDICINE

## 2024-12-13 PROCEDURE — 90935 HEMODIALYSIS ONE EVALUATION: CPT

## 2024-12-13 PROCEDURE — 6360000002 HC RX W HCPCS: Performed by: HOSPITALIST

## 2024-12-13 PROCEDURE — 94660 CPAP INITIATION&MGMT: CPT

## 2024-12-13 PROCEDURE — 99232 SBSQ HOSP IP/OBS MODERATE 35: CPT | Performed by: INTERNAL MEDICINE

## 2024-12-13 RX ORDER — METOPROLOL TARTRATE 25 MG/1
25 TABLET, FILM COATED ORAL 2 TIMES DAILY
Status: DISCONTINUED | OUTPATIENT
Start: 2024-12-13 | End: 2024-12-23 | Stop reason: HOSPADM

## 2024-12-13 RX ORDER — BUDESONIDE 1 MG/2ML
1 INHALANT ORAL 2 TIMES DAILY
Status: DISCONTINUED | OUTPATIENT
Start: 2024-12-13 | End: 2024-12-23 | Stop reason: HOSPADM

## 2024-12-13 RX ORDER — IPRATROPIUM BROMIDE AND ALBUTEROL SULFATE 2.5; .5 MG/3ML; MG/3ML
1 SOLUTION RESPIRATORY (INHALATION) EVERY 4 HOURS PRN
Status: DISCONTINUED | OUTPATIENT
Start: 2024-12-13 | End: 2024-12-23 | Stop reason: HOSPADM

## 2024-12-13 RX ORDER — LORAZEPAM 1 MG/1
1 TABLET ORAL ONCE
Status: COMPLETED | OUTPATIENT
Start: 2024-12-13 | End: 2024-12-13

## 2024-12-13 RX ORDER — ARFORMOTEROL TARTRATE 15 UG/2ML
15 SOLUTION RESPIRATORY (INHALATION)
Status: DISCONTINUED | OUTPATIENT
Start: 2024-12-13 | End: 2024-12-23 | Stop reason: HOSPADM

## 2024-12-13 RX ADMIN — IPRATROPIUM BROMIDE AND ALBUTEROL SULFATE 1 DOSE: .5; 3 SOLUTION RESPIRATORY (INHALATION) at 02:01

## 2024-12-13 RX ADMIN — Medication 1 TABLET: at 09:24

## 2024-12-13 RX ADMIN — WATER 1000 MG: 1 INJECTION INTRAMUSCULAR; INTRAVENOUS; SUBCUTANEOUS at 12:49

## 2024-12-13 RX ADMIN — LORAZEPAM 1 MG: 1 TABLET ORAL at 00:28

## 2024-12-13 RX ADMIN — THIAMINE HCL TAB 100 MG 100 MG: 100 TAB at 09:24

## 2024-12-13 RX ADMIN — ATORVASTATIN CALCIUM 40 MG: 40 TABLET, FILM COATED ORAL at 21:47

## 2024-12-13 RX ADMIN — SODIUM CHLORIDE, PRESERVATIVE FREE 10 ML: 5 INJECTION INTRAVENOUS at 09:27

## 2024-12-13 RX ADMIN — ASPIRIN 81 MG CHEWABLE TABLET 81 MG: 81 TABLET CHEWABLE at 09:24

## 2024-12-13 RX ADMIN — BUDESONIDE 1 MG: 1 SUSPENSION RESPIRATORY (INHALATION) at 19:31

## 2024-12-13 RX ADMIN — METOPROLOL TARTRATE 25 MG: 25 TABLET, FILM COATED ORAL at 21:47

## 2024-12-13 RX ADMIN — POTASSIUM BICARBONATE 50 MEQ: 978 TABLET, EFFERVESCENT ORAL at 09:24

## 2024-12-13 RX ADMIN — BUDESONIDE 1 MG: 1 SUSPENSION RESPIRATORY (INHALATION) at 12:18

## 2024-12-13 RX ADMIN — ARFORMOTEROL TARTRATE 15 MCG: 15 SOLUTION RESPIRATORY (INHALATION) at 12:18

## 2024-12-13 RX ADMIN — SODIUM CHLORIDE 125 MG: 9 INJECTION, SOLUTION INTRAVENOUS at 05:42

## 2024-12-13 RX ADMIN — SODIUM CHLORIDE: 9 INJECTION, SOLUTION INTRAVENOUS at 05:41

## 2024-12-13 RX ADMIN — SODIUM CHLORIDE, PRESERVATIVE FREE 10 ML: 5 INJECTION INTRAVENOUS at 21:47

## 2024-12-13 RX ADMIN — METOPROLOL TARTRATE 25 MG: 25 TABLET, FILM COATED ORAL at 09:24

## 2024-12-13 RX ADMIN — SODIUM CHLORIDE, PRESERVATIVE FREE 10 ML: 5 INJECTION INTRAVENOUS at 23:04

## 2024-12-13 RX ADMIN — ARFORMOTEROL TARTRATE 15 MCG: 15 SOLUTION RESPIRATORY (INHALATION) at 19:31

## 2024-12-13 ASSESSMENT — PAIN SCALES - GENERAL: PAINLEVEL_OUTOF10: 0

## 2024-12-13 NOTE — CONSULTS
Consult Note  Consult requested by: Gilson Farooq MD   Gio Khalil is a 63 y.o. male White (non-) who is being seen on consult for CURTIS  Chief Complaint   Patient presents with    Altered Mental Status    Chest Pain    Abdominal Pain         HPI: 61-year-old male with past medical history of peripheral vascular disease, hepatitis C, hypertension who presents send to the emergency department for confusion.  He was found by his family member in a very disheveled state and with feces around him.  He has not been eating or drinking anything since the past 3 days.  He states that he has been taking a lot of Motrin but family is not sure whether this is completely true.  He does have a history of back pain for which he takes gabapentin.  Continues to smoke a pack a day.  He drinks a few beers every night.  He looks cachectic and hypovolemic.  He is not a good historian.  He does complain of pain in the back.  When I ask him about abdominal pain he says yes to it but cannot explain or quantify.  Straight cath were performed in the ER and about 50 cc of urine came out.  Family says that he had a blood in the urine and also the feces.  Urinalysis reveals large blood but RBCs between 4 and 10.  Protein is around 300.  Bacteria is 1+ without much WBC.  Hemoglobin was noted to be low at 7.8 with platelet of 176.  He has a history of iron deficiency and follows up with hematology in the past.  In November he had a iron saturation of 8.  Ferritin was 20.  He is positive for influenza.  He is on his way for CAT scan of the abdomen.  He has severe aortic valve stenosis.  Also has moderate regurgitation.  Left atrium is severely dilated on last echocardiogram.  EF was around 55 to 60%.  He takes lisinopril 40 mg once a day for blood pressure along with amlodipine 5 mg.  Appears to be on aspirin and Plavix for his peripheral vascular disease.  Past Medical History:   Diagnosis Date    CURTIS (acute kidney injury) 
  [email or web address]  [phone]    GASTROENTEROLOGY CONSULT      Impression:   1. Altered mental status  - UDS neg 12/11  2. Anemia  - H/H 6.0/19.2 on admission  - ferritin 66, iron 22, iron sat 5% 12/11  - B12/folate nl 12/12  - pt and family reported multiple episodes of BRBPR with diarrhea for several weeks  - occult stool negative 12/11  3. Diarrhea  - Enteric panel DNA - pending  - C diff pending  4. Hx HCV   - completed treated with Epclusa in 8/2022 with SVR  - negative virus on last check 1/24/23  5. Hepatic fibrosis   - AST 90, ALT 41 - 12/11  - CT abd/pel wo cont 12/11 - New thickening right lower rectus muscle concerning for intramuscular hematoma, small fluid in colon suggest diarrheal illness, punctate focus air in bladder, colonic diverticulosis, severe atherosclerotic disease, liver unremarkable, normal bile ducts.   - F3 on Fibrosure 5/3/22 prior to HCV tx, Elastography 5/3/22 F0-F1 fibrosis  - followed at [facility] (Dr. Betancourt), last seen 10/2023  - MICHELLE negative 1/2023  - HBsAb/HBsAg/HBcAB tot all neg 1/2023  6. Aortic stenosis moderate to severe on echo 12/6/24  7. Chronic anticoagulation - Plavix  - Hx of PVD has had LLE SFA PTA with DCB and bilateral LAILA/EIA stenting for LLE resting ischemic pain and severe RLE claudication 7/22/22  - Hx CAD s/p stenting 2021  8. HTN  9. CURTIS  10. Alcohol use disorder      Plan:     1. Monitor H/H and transfuse as per protocol  2. Replace iron as indicated   3. Nuclear bleed scan or CTA abd/pelvis with mesenteric protocol for overt large volume GI bleeding  4. Medical management as per primary team      Chief Complaint: change in mental status      HPI:  Gio Khalil is a 63 y.o. male who I am being asked to see in consultation for an opinion regarding anemia. He was seen by Birdie Fox NP in [facility] office 12/2 accompanied by niece on 12/2/24 and reported intermittent hematochezia about three weeks prior, had constipation at that time. EVE 
[facility]     History:   62 y/o M with known PAD admitted with progressive renal failure and anemia. He has severe aortic stenosis. I saw him in the office recently and we were planning for BLE angiography for claudication symptoms. In the interim, his overall health has declined and he was brought to the ER by his family. Nephrology is recommending acute dialysis and a temp HD catheter was requested. Most of the history was obtained from the pt's niece. He was fatigued during our conversation.         Past Medical History:   Diagnosis Date    CURTIS (acute kidney injury) ([facility]) 01/24/2024    Anxiety associated with depression 08/08/2023    Atherosclerosis 08/08/2023    Bilateral carotid artery stenosis 11/24/2023    Rt mod (50-69%); Lt mild (<50%)    Cataract 03/07/2022    Cataract extraction status of eye, unspecified laterality 03/07/2022    Chronic hepatitis C without hepatic coma ([facility]) 05/23/2022    Chronic pain in left foot 03/07/2022    Current smoker 03/07/2022    Daily consumption of alcohol 03/07/2022    Dyslipidemia 08/08/2023    Elevated LFTs 03/09/2022    Fracture of thoracic transverse process, closed, initial encounter ([facility]) 01/24/2024    Hepatitis C test positive 03/09/2022    Hyperkalemia 03/09/2022    Marijuana smoker 03/07/2022    Mental subnormality 03/07/2022    PAD (peripheral artery disease) ([facility])     Primary hypertension 03/07/2022    PVD (peripheral vascular disease) ([facility]) 07/22/2022     Past Surgical History:   Procedure Laterality Date    CATARACT REMOVAL Right     4 years ago    VASCULAR SURGERY Left 06/01/2023    Stents     No current facility-administered medications on file prior to encounter.     Current Outpatient Medications on File Prior to Encounter   Medication Sig Dispense Refill    diclofenac sodium (VOLTAREN) 1 % GEL Apply 4 g topically 4 times daily 480 g 2    ferrous sulfate dried (SLOW IRON) 160 (50 Fe) MG TBCR extended release tablet Take 1 tab 
Palliative Medicine  Patient Name: Gio Khalil  YOB: 1960  MRN: [id number]  Age: 63 y.o.  Gender: male    Date of Initial Consult: 12/12/2024   Date of Service: 12/12/2024  Time: 1:35 PM  Provider: MAL Seay NP  Hospital Day: 2  Admit Date: 12/11/2024  Referring Provider: Dr Fenton        Reasons for Consultation:  Goals of Care    HISTORY OF PRESENT ILLNESS (HPI):   Gio Khalil is a 63 y.o. male with a past medical history of PAD, hepatitis C, ETOH use disorder, aortic stenosis, tobacco use disorder who was admitted on 12/11/2024 from home with a diagnosis of acute metabolic encephalopathy. He was brought to the ER by family for poor po. In the ER patient's oxygen sats on room air noted to be 86%. He was not cleared as an o/p for colonoscopy due to cardiac issues. He also has complaints of loose stools and rectal bleeding. Ct head - for acute issues. He was also noted to have a creat of 5.55. Palliative medicine is consulted for goals of care discussions.     12/12/2024 patient is alert and oriented x 3. Blood infusing. Denies shortness of breath but using some accessory muscles, and noted wheezing.           PALLIATIVE DIAGNOSES:    Encounter for palliative care /advanced care plan discussions  Goals of care   Acute encephalopathy  CURTIS  Severe aortic stenosis   Anemia  ETOH use disorder    ASSESSMENT AND PLAN:   Encounter for palliative care / advanced care plan discussions     December 12, 2024 Patient seen along with Ms Jaime RN. He is lying in bed alert and oriented x 3. He is able to participate in his medical discussion however at times his responses are slow. He lives with his niece Shaniqua. His brother was at bedside. We introduced our team and purpose of visit. Patient tells us he was a  and fell off in jan 2024. He has been declining in health since then. AMD there is no AMD on file. We introduced completion he was agreeable naming his niece Crystal as MPOA and 
Room 2307  Physician order for fingernails trimming/toenails debridement received from Dr. Trent.   Introduced myself & services.   Explained procedure: assessment, hygiene, education, & nail service and no questions asked.   Informed of possible complications: nip/cut skin, infection, & nail splits or crumbles, and no questions asked.   Pt & family member niece verbalized understanding of procedure and associated risks and agrees to have services provided at this time.   Minimal toenail debridement performed due to pt not tolerating having his feet touched or cleaned & was moving around, restless, agitated feet.   Foot & Nail Care Services    1. History:   Chief Complaint   Patient presents with    Altered Mental Status    Chest Pain    Abdominal Pain   ,   Active Ambulatory Problems     Diagnosis Date Noted    Marijuana smoker 03/07/2022    Hypertension 03/07/2022    PAD (peripheral artery disease) ([facility]) 07/19/2022    Smoker 03/07/2022    PVD (peripheral vascular disease) ([facility]) 07/22/2022    Elevated rheumatoid factor 02/07/2023    Atherosclerosis 08/08/2023    Alcohol abuse 08/08/2023    Anxiety associated with depression 08/08/2023    Hyperlipidemia 08/08/2023    Bilateral carotid artery stenosis 11/24/2023    Decreased GFR 05/06/2024    Lumbar radiculopathy 07/11/2024    Lumbar spondylosis 08/22/2024    Melena 11/07/2024    Environmental and seasonal allergies 11/07/2024    Memory problem 11/07/2024    Iron deficiency anemia 11/12/2024     Resolved Ambulatory Problems     Diagnosis Date Noted    Cataract extraction status of eye, unspecified laterality 03/07/2022    Hepatitis C test positive 03/09/2022    Elevated LFTs 03/09/2022    Cataract 03/07/2022    Mental subnormality 03/07/2022    Daily consumption of alcohol 03/07/2022    Hyperkalemia 03/09/2022    Chronic pain in left foot 03/07/2022    Chronic hepatitis C without hepatic coma (HCC) 05/23/2022    Bilateral carotid bruits 05/23/2022    Toe pain, right 
Room 2307: pt unavailable at this time.  Kaylan RAMANN, RN, CWOCN, CFCN  
CURTIS (acute kidney injury) ([facility]) 01/24/2024    Anxiety associated with depression 08/08/2023    Atherosclerosis 08/08/2023    Bilateral carotid artery stenosis 11/24/2023    Rt mod (50-69%); Lt mild (<50%)    Cataract 03/07/2022    Cataract extraction status of eye, unspecified laterality 03/07/2022    Chronic hepatitis C without hepatic coma ([facility]) 05/23/2022    Chronic pain in left foot 03/07/2022    Current smoker 03/07/2022    Daily consumption of alcohol 03/07/2022    Dyslipidemia 08/08/2023    Elevated LFTs 03/09/2022    Fracture of thoracic transverse process, closed, initial encounter ([facility]) 01/24/2024    Hepatitis C test positive 03/09/2022    Hyperkalemia 03/09/2022    Marijuana smoker 03/07/2022    Mental subnormality 03/07/2022    PAD (peripheral artery disease) ([facility])     Primary hypertension 03/07/2022    PVD (peripheral vascular disease) ([facility]) 07/22/2022         Social History:     Social History     Socioeconomic History    Marital status: Single   Tobacco Use    Smoking status: Every Day     Current packs/day: 1.00     Average packs/day: 1 pack/day for 42.9 years (42.9 ttl pk-yrs)     Types: Cigarettes     Start date: 1/1/1982    Smokeless tobacco: Never   Vaping Use    Vaping status: Never Used   Substance and Sexual Activity    Alcohol use: Yes     Alcohol/week: 14.0 standard drinks of alcohol    Drug use: Yes     Types: Marijuana (Weed)     Social Determinants of Health     Financial Resource Strain: Low Risk  (11/7/2024)    Overall Financial Resource Strain (CARDIA)     Difficulty of Paying Living Expenses: Not hard at all   Food Insecurity: No Food Insecurity (12/11/2024)    Hunger Vital Sign     Worried About Running Out of Food in the Last Year: Never true     Ran Out of Food in the Last Year: Never true   Transportation Needs: No Transportation Needs (12/11/2024)    PRAPARE - Transportation     Lack of Transportation (Medical): No     Lack of Transportation (Non-Medical): No   Housing Stability:

## 2024-12-13 NOTE — CARE COORDINATION
Sent message to Jabari to screen pt for placement.            Cristiana Vazquez, LAMINEN RN  Care Management

## 2024-12-14 LAB
ALBUMIN SERPL-MCNC: 3 G/DL (ref 3.4–5)
ALBUMIN/GLOB SERPL: 0.8 (ref 0.8–1.7)
ALP SERPL-CCNC: 40 U/L (ref 45–117)
ALT SERPL-CCNC: 70 U/L (ref 16–61)
ANION GAP SERPL CALC-SCNC: 10 MMOL/L (ref 3–18)
AST SERPL-CCNC: 126 U/L (ref 10–38)
BASOPHILS # BLD: 0 K/UL (ref 0–0.1)
BASOPHILS NFR BLD: 0 % (ref 0–2)
BILIRUB SERPL-MCNC: 0.5 MG/DL (ref 0.2–1)
BUN SERPL-MCNC: 34 MG/DL (ref 7–18)
BUN/CREAT SERPL: 8 (ref 12–20)
CALCIUM SERPL-MCNC: 8.6 MG/DL (ref 8.5–10.1)
CHLORIDE SERPL-SCNC: 106 MMOL/L (ref 100–111)
CO2 SERPL-SCNC: 23 MMOL/L (ref 21–32)
CREAT SERPL-MCNC: 4.51 MG/DL (ref 0.6–1.3)
DIFFERENTIAL METHOD BLD: ABNORMAL
EOSINOPHIL # BLD: 0.1 K/UL (ref 0–0.4)
EOSINOPHIL NFR BLD: 2 % (ref 0–5)
ERYTHROCYTE [DISTWIDTH] IN BLOOD BY AUTOMATED COUNT: 13.8 % (ref 11.6–14.5)
GLOBULIN SER CALC-MCNC: 3.6 G/DL (ref 2–4)
GLUCOSE SERPL-MCNC: 98 MG/DL (ref 74–99)
HCT VFR BLD AUTO: 23.2 % (ref 36–48)
HGB BLD-MCNC: 7.5 G/DL (ref 13–16)
IMM GRANULOCYTES # BLD AUTO: 0.1 K/UL (ref 0–0.04)
IMM GRANULOCYTES NFR BLD AUTO: 1 % (ref 0–0.5)
LYMPHOCYTES # BLD: 1.6 K/UL (ref 0.9–3.6)
LYMPHOCYTES NFR BLD: 29 % (ref 21–52)
MCH RBC QN AUTO: 29.6 PG (ref 24–34)
MCHC RBC AUTO-ENTMCNC: 32.3 G/DL (ref 31–37)
MCV RBC AUTO: 91.7 FL (ref 78–100)
MONOCYTES # BLD: 0.7 K/UL (ref 0.05–1.2)
MONOCYTES NFR BLD: 12 % (ref 3–10)
NEUTS SEG # BLD: 3 K/UL (ref 1.8–8)
NEUTS SEG NFR BLD: 56 % (ref 40–73)
NRBC # BLD: 0 K/UL (ref 0–0.01)
NRBC BLD-RTO: 0 PER 100 WBC
PLATELET # BLD AUTO: 89 K/UL (ref 135–420)
PLATELET COMMENT: ABNORMAL
PMV BLD AUTO: 12.5 FL (ref 9.2–11.8)
POTASSIUM SERPL-SCNC: 3.8 MMOL/L (ref 3.5–5.5)
PROT SERPL-MCNC: 6.6 G/DL (ref 6.4–8.2)
RBC # BLD AUTO: 2.53 M/UL (ref 4.35–5.65)
RBC MORPH BLD: ABNORMAL
SODIUM SERPL-SCNC: 139 MMOL/L (ref 136–145)
WBC # BLD AUTO: 5.5 K/UL (ref 4.6–13.2)

## 2024-12-14 PROCEDURE — 2580000003 HC RX 258: Performed by: HOSPITALIST

## 2024-12-14 PROCEDURE — 80053 COMPREHEN METABOLIC PANEL: CPT

## 2024-12-14 PROCEDURE — 85025 COMPLETE CBC W/AUTO DIFF WBC: CPT

## 2024-12-14 PROCEDURE — 2580000003 HC RX 258: Performed by: INTERNAL MEDICINE

## 2024-12-14 PROCEDURE — 51798 US URINE CAPACITY MEASURE: CPT

## 2024-12-14 PROCEDURE — 99232 SBSQ HOSP IP/OBS MODERATE 35: CPT | Performed by: INTERNAL MEDICINE

## 2024-12-14 PROCEDURE — 2700000000 HC OXYGEN THERAPY PER DAY

## 2024-12-14 PROCEDURE — 6360000002 HC RX W HCPCS: Performed by: HOSPITALIST

## 2024-12-14 PROCEDURE — 36415 COLL VENOUS BLD VENIPUNCTURE: CPT

## 2024-12-14 PROCEDURE — 6370000000 HC RX 637 (ALT 250 FOR IP): Performed by: INTERNAL MEDICINE

## 2024-12-14 PROCEDURE — 94761 N-INVAS EAR/PLS OXIMETRY MLT: CPT

## 2024-12-14 PROCEDURE — 2140000001 HC CVICU INTERMEDIATE R&B

## 2024-12-14 PROCEDURE — 6370000000 HC RX 637 (ALT 250 FOR IP): Performed by: HOSPITALIST

## 2024-12-14 PROCEDURE — 94640 AIRWAY INHALATION TREATMENT: CPT

## 2024-12-14 PROCEDURE — 99232 SBSQ HOSP IP/OBS MODERATE 35: CPT | Performed by: STUDENT IN AN ORGANIZED HEALTH CARE EDUCATION/TRAINING PROGRAM

## 2024-12-14 PROCEDURE — 94660 CPAP INITIATION&MGMT: CPT

## 2024-12-14 PROCEDURE — 6360000002 HC RX W HCPCS: Performed by: INTERNAL MEDICINE

## 2024-12-14 RX ORDER — SODIUM CHLORIDE 9 MG/ML
500 INJECTION, SOLUTION INTRAVENOUS ONCE
Status: COMPLETED | OUTPATIENT
Start: 2024-12-14 | End: 2024-12-14

## 2024-12-14 RX ADMIN — ARFORMOTEROL TARTRATE 15 MCG: 15 SOLUTION RESPIRATORY (INHALATION) at 08:10

## 2024-12-14 RX ADMIN — BUDESONIDE 1 MG: 1 SUSPENSION RESPIRATORY (INHALATION) at 08:11

## 2024-12-14 RX ADMIN — ARFORMOTEROL TARTRATE 15 MCG: 15 SOLUTION RESPIRATORY (INHALATION) at 20:44

## 2024-12-14 RX ADMIN — BUDESONIDE 1 MG: 1 SUSPENSION RESPIRATORY (INHALATION) at 20:44

## 2024-12-14 RX ADMIN — THIAMINE HCL TAB 100 MG 100 MG: 100 TAB at 08:14

## 2024-12-14 RX ADMIN — SODIUM CHLORIDE, PRESERVATIVE FREE 5 ML: 5 INJECTION INTRAVENOUS at 08:16

## 2024-12-14 RX ADMIN — Medication 1 TABLET: at 08:14

## 2024-12-14 RX ADMIN — WATER 1000 MG: 1 INJECTION INTRAMUSCULAR; INTRAVENOUS; SUBCUTANEOUS at 12:51

## 2024-12-14 RX ADMIN — ASPIRIN 81 MG CHEWABLE TABLET 81 MG: 81 TABLET CHEWABLE at 08:14

## 2024-12-14 RX ADMIN — SODIUM CHLORIDE 500 ML: 9 INJECTION, SOLUTION INTRAVENOUS at 12:48

## 2024-12-14 RX ADMIN — METOPROLOL TARTRATE 25 MG: 25 TABLET, FILM COATED ORAL at 08:14

## 2024-12-14 ASSESSMENT — PAIN SCALES - GENERAL: PAINLEVEL_OUTOF10: 0

## 2024-12-15 LAB
ALBUMIN SERPL-MCNC: 2.8 G/DL (ref 3.4–5)
ALBUMIN/GLOB SERPL: 0.8 (ref 0.8–1.7)
ALP SERPL-CCNC: 39 U/L (ref 45–117)
ALT SERPL-CCNC: 85 U/L (ref 16–61)
ANION GAP SERPL CALC-SCNC: 7 MMOL/L (ref 3–18)
AST SERPL-CCNC: 98 U/L (ref 10–38)
BASOPHILS # BLD: 0.1 K/UL (ref 0–0.1)
BASOPHILS NFR BLD: 1 % (ref 0–2)
BILIRUB SERPL-MCNC: 0.2 MG/DL (ref 0.2–1)
BUN SERPL-MCNC: 47 MG/DL (ref 7–18)
BUN/CREAT SERPL: 8 (ref 12–20)
CALCIUM SERPL-MCNC: 8.6 MG/DL (ref 8.5–10.1)
CHLORIDE SERPL-SCNC: 108 MMOL/L (ref 100–111)
CO2 SERPL-SCNC: 25 MMOL/L (ref 21–32)
CREAT SERPL-MCNC: 5.66 MG/DL (ref 0.6–1.3)
DIFFERENTIAL METHOD BLD: ABNORMAL
EOSINOPHIL # BLD: 0.1 K/UL (ref 0–0.4)
EOSINOPHIL NFR BLD: 1 % (ref 0–5)
ERYTHROCYTE [DISTWIDTH] IN BLOOD BY AUTOMATED COUNT: 13.9 % (ref 11.6–14.5)
GLOBULIN SER CALC-MCNC: 3.4 G/DL (ref 2–4)
GLUCOSE SERPL-MCNC: 123 MG/DL (ref 74–99)
HCT VFR BLD AUTO: 20.4 % (ref 36–48)
HGB BLD-MCNC: 6.6 G/DL (ref 13–16)
HISTORY CHECK: NORMAL
IMM GRANULOCYTES # BLD AUTO: 0 K/UL (ref 0–0.04)
IMM GRANULOCYTES NFR BLD AUTO: 0 % (ref 0–0.5)
LYMPHOCYTES # BLD: 0.4 K/UL (ref 0.9–3.6)
LYMPHOCYTES NFR BLD: 7 % (ref 21–52)
MAGNESIUM SERPL-MCNC: 1.9 MG/DL (ref 1.6–2.6)
MCH RBC QN AUTO: 29.2 PG (ref 24–34)
MCHC RBC AUTO-ENTMCNC: 32.4 G/DL (ref 31–37)
MCV RBC AUTO: 90.3 FL (ref 78–100)
MONOCYTES # BLD: 0.7 K/UL (ref 0.05–1.2)
MONOCYTES NFR BLD: 11 % (ref 3–10)
NEUTS SEG # BLD: 4.7 K/UL (ref 1.8–8)
NEUTS SEG NFR BLD: 80 % (ref 40–73)
NRBC # BLD: 0 K/UL (ref 0–0.01)
NRBC BLD-RTO: 0 PER 100 WBC
PLATELET # BLD AUTO: 103 K/UL (ref 135–420)
PLATELET COMMENT: ABNORMAL
PMV BLD AUTO: 12.4 FL (ref 9.2–11.8)
POTASSIUM SERPL-SCNC: 3.5 MMOL/L (ref 3.5–5.5)
PROT SERPL-MCNC: 6.2 G/DL (ref 6.4–8.2)
RBC # BLD AUTO: 2.26 M/UL (ref 4.35–5.65)
RBC MORPH BLD: ABNORMAL
RBC MORPH BLD: ABNORMAL
SODIUM SERPL-SCNC: 140 MMOL/L (ref 136–145)
WBC # BLD AUTO: 6 K/UL (ref 4.6–13.2)

## 2024-12-15 PROCEDURE — 6370000000 HC RX 637 (ALT 250 FOR IP): Performed by: STUDENT IN AN ORGANIZED HEALTH CARE EDUCATION/TRAINING PROGRAM

## 2024-12-15 PROCEDURE — 94761 N-INVAS EAR/PLS OXIMETRY MLT: CPT

## 2024-12-15 PROCEDURE — 6360000002 HC RX W HCPCS: Performed by: HOSPITALIST

## 2024-12-15 PROCEDURE — 83735 ASSAY OF MAGNESIUM: CPT

## 2024-12-15 PROCEDURE — 86900 BLOOD TYPING SEROLOGIC ABO: CPT

## 2024-12-15 PROCEDURE — 36430 TRANSFUSION BLD/BLD COMPNT: CPT

## 2024-12-15 PROCEDURE — P9016 RBC LEUKOCYTES REDUCED: HCPCS

## 2024-12-15 PROCEDURE — 86901 BLOOD TYPING SEROLOGIC RH(D): CPT

## 2024-12-15 PROCEDURE — 36415 COLL VENOUS BLD VENIPUNCTURE: CPT

## 2024-12-15 PROCEDURE — 94660 CPAP INITIATION&MGMT: CPT

## 2024-12-15 PROCEDURE — 99232 SBSQ HOSP IP/OBS MODERATE 35: CPT | Performed by: INTERNAL MEDICINE

## 2024-12-15 PROCEDURE — 6370000000 HC RX 637 (ALT 250 FOR IP): Performed by: HOSPITALIST

## 2024-12-15 PROCEDURE — 80053 COMPREHEN METABOLIC PANEL: CPT

## 2024-12-15 PROCEDURE — 6360000002 HC RX W HCPCS: Performed by: INTERNAL MEDICINE

## 2024-12-15 PROCEDURE — 2580000003 HC RX 258: Performed by: HOSPITALIST

## 2024-12-15 PROCEDURE — 85025 COMPLETE CBC W/AUTO DIFF WBC: CPT

## 2024-12-15 PROCEDURE — 6370000000 HC RX 637 (ALT 250 FOR IP): Performed by: INTERNAL MEDICINE

## 2024-12-15 PROCEDURE — 2140000001 HC CVICU INTERMEDIATE R&B

## 2024-12-15 PROCEDURE — 94640 AIRWAY INHALATION TREATMENT: CPT

## 2024-12-15 PROCEDURE — 99232 SBSQ HOSP IP/OBS MODERATE 35: CPT | Performed by: STUDENT IN AN ORGANIZED HEALTH CARE EDUCATION/TRAINING PROGRAM

## 2024-12-15 PROCEDURE — 86850 RBC ANTIBODY SCREEN: CPT

## 2024-12-15 PROCEDURE — 86923 COMPATIBILITY TEST ELECTRIC: CPT

## 2024-12-15 RX ORDER — FUROSEMIDE 10 MG/ML
80 INJECTION INTRAMUSCULAR; INTRAVENOUS SEE ADMIN INSTRUCTIONS
Status: COMPLETED | OUTPATIENT
Start: 2024-12-15 | End: 2024-12-16

## 2024-12-15 RX ORDER — ALPRAZOLAM 0.5 MG
0.25 TABLET ORAL NIGHTLY PRN
Status: DISCONTINUED | OUTPATIENT
Start: 2024-12-15 | End: 2024-12-23 | Stop reason: HOSPADM

## 2024-12-15 RX ORDER — SODIUM CHLORIDE 9 MG/ML
INJECTION, SOLUTION INTRAVENOUS PRN
Status: DISCONTINUED | OUTPATIENT
Start: 2024-12-15 | End: 2024-12-19

## 2024-12-15 RX ORDER — SODIUM CHLORIDE 9 MG/ML
INJECTION, SOLUTION INTRAVENOUS CONTINUOUS
Status: DISCONTINUED | OUTPATIENT
Start: 2024-12-15 | End: 2024-12-15

## 2024-12-15 RX ORDER — LACTULOSE 10 G/15ML
10 SOLUTION ORAL 3 TIMES DAILY PRN
Status: DISCONTINUED | OUTPATIENT
Start: 2024-12-15 | End: 2024-12-23 | Stop reason: HOSPADM

## 2024-12-15 RX ADMIN — ALPRAZOLAM 0.25 MG: 0.25 TABLET ORAL at 21:45

## 2024-12-15 RX ADMIN — Medication 1 TABLET: at 08:26

## 2024-12-15 RX ADMIN — WATER 1000 MG: 1 INJECTION INTRAMUSCULAR; INTRAVENOUS; SUBCUTANEOUS at 11:40

## 2024-12-15 RX ADMIN — ARFORMOTEROL TARTRATE 15 MCG: 15 SOLUTION RESPIRATORY (INHALATION) at 10:35

## 2024-12-15 RX ADMIN — ATORVASTATIN CALCIUM 40 MG: 40 TABLET, FILM COATED ORAL at 21:45

## 2024-12-15 RX ADMIN — BUDESONIDE 1 MG: 1 SUSPENSION RESPIRATORY (INHALATION) at 10:30

## 2024-12-15 RX ADMIN — LACTULOSE 10 G: 20 SOLUTION ORAL at 17:27

## 2024-12-15 RX ADMIN — ARFORMOTEROL TARTRATE 15 MCG: 15 SOLUTION RESPIRATORY (INHALATION) at 21:15

## 2024-12-15 RX ADMIN — ASPIRIN 81 MG CHEWABLE TABLET 81 MG: 81 TABLET CHEWABLE at 08:26

## 2024-12-15 RX ADMIN — POLYETHYLENE GLYCOL 3350 17 G: 17 POWDER, FOR SOLUTION ORAL at 05:53

## 2024-12-15 RX ADMIN — THIAMINE HCL TAB 100 MG 100 MG: 100 TAB at 08:26

## 2024-12-15 RX ADMIN — BUDESONIDE 1 MG: 1 SUSPENSION RESPIRATORY (INHALATION) at 21:15

## 2024-12-15 RX ADMIN — METOPROLOL TARTRATE 25 MG: 25 TABLET, FILM COATED ORAL at 21:45

## 2024-12-15 ASSESSMENT — PAIN SCALES - GENERAL
PAINLEVEL_OUTOF10: 0

## 2024-12-16 LAB
ABO + RH BLD: NORMAL
ALBUMIN SERPL-MCNC: 3.1 G/DL (ref 3.4–5)
ALBUMIN/GLOB SERPL: 0.8 (ref 0.8–1.7)
ALP SERPL-CCNC: 41 U/L (ref 45–117)
ALT SERPL-CCNC: 89 U/L (ref 16–61)
ANION GAP SERPL CALC-SCNC: 7 MMOL/L (ref 3–18)
AST SERPL-CCNC: 96 U/L (ref 10–38)
BASOPHILS # BLD: 0 K/UL (ref 0–0.1)
BASOPHILS NFR BLD: 0 % (ref 0–2)
BILIRUB SERPL-MCNC: 1.1 MG/DL (ref 0.2–1)
BLD PROD TYP BPU: NORMAL
BLOOD BANK BLOOD PRODUCT EXPIRATION DATE: NORMAL
BLOOD BANK DISPENSE STATUS: NORMAL
BLOOD BANK ISBT PRODUCT BLOOD TYPE: 6200
BLOOD BANK PRODUCT CODE: NORMAL
BLOOD BANK UNIT TYPE AND RH: NORMAL
BLOOD GROUP ANTIBODIES SERPL: NORMAL
BPU ID: NORMAL
BUN SERPL-MCNC: 49 MG/DL (ref 7–18)
BUN/CREAT SERPL: 9 (ref 12–20)
CALCIUM SERPL-MCNC: 8.8 MG/DL (ref 8.5–10.1)
CALLED TO: NORMAL
CHLORIDE SERPL-SCNC: 105 MMOL/L (ref 100–111)
CHOLEST SERPL-MCNC: 89 MG/DL
CO2 SERPL-SCNC: 26 MMOL/L (ref 21–32)
CREAT SERPL-MCNC: 5.4 MG/DL (ref 0.6–1.3)
CROSSMATCH RESULT: NORMAL
DIFFERENTIAL METHOD BLD: ABNORMAL
EOSINOPHIL # BLD: 0.2 K/UL (ref 0–0.4)
EOSINOPHIL NFR BLD: 2 % (ref 0–5)
ERYTHROCYTE [DISTWIDTH] IN BLOOD BY AUTOMATED COUNT: 13.6 % (ref 11.6–14.5)
EST. AVERAGE GLUCOSE BLD GHB EST-MCNC: 105 MG/DL
GLOBULIN SER CALC-MCNC: 3.8 G/DL (ref 2–4)
GLUCOSE SERPL-MCNC: 106 MG/DL (ref 74–99)
HBA1C MFR BLD: 5.3 % (ref 4.2–5.6)
HCT VFR BLD AUTO: 27.3 % (ref 36–48)
HDLC SERPL-MCNC: 38 MG/DL (ref 40–60)
HDLC SERPL: 2.3 (ref 0–5)
HGB BLD-MCNC: 8.9 G/DL (ref 13–16)
IMM GRANULOCYTES # BLD AUTO: 0 K/UL (ref 0–0.04)
IMM GRANULOCYTES NFR BLD AUTO: 0 % (ref 0–0.5)
LDLC SERPL CALC-MCNC: 35.8 MG/DL (ref 0–100)
LIPID PANEL: ABNORMAL
LYMPHOCYTES # BLD: 2 K/UL (ref 0.9–3.6)
LYMPHOCYTES NFR BLD: 24 % (ref 21–52)
MAGNESIUM SERPL-MCNC: 2.2 MG/DL (ref 1.6–2.6)
MCH RBC QN AUTO: 30 PG (ref 24–34)
MCHC RBC AUTO-ENTMCNC: 32.6 G/DL (ref 31–37)
MCV RBC AUTO: 91.9 FL (ref 78–100)
MONOCYTES # BLD: 0.6 K/UL (ref 0.05–1.2)
MONOCYTES NFR BLD: 7 % (ref 3–10)
NEUTS BAND NFR BLD MANUAL: 1 %
NEUTS SEG # BLD: 5.7 K/UL (ref 1.8–8)
NEUTS SEG NFR BLD: 66 % (ref 40–73)
NRBC # BLD: 0 K/UL (ref 0–0.01)
NRBC BLD-RTO: 0 PER 100 WBC
PLATELET # BLD AUTO: 98 K/UL (ref 135–420)
PLATELET COMMENT: ABNORMAL
PMV BLD AUTO: 13.4 FL (ref 9.2–11.8)
POTASSIUM SERPL-SCNC: 3.2 MMOL/L (ref 3.5–5.5)
PROT SERPL-MCNC: 6.9 G/DL (ref 6.4–8.2)
RBC # BLD AUTO: 2.97 M/UL (ref 4.35–5.65)
RBC MORPH BLD: ABNORMAL
SODIUM SERPL-SCNC: 138 MMOL/L (ref 136–145)
SPECIMEN EXP DATE BLD: NORMAL
TRIGL SERPL-MCNC: 76 MG/DL
UNIT DIVISION: 0
UNIT ISSUE DATE/TIME: NORMAL
VLDLC SERPL CALC-MCNC: 15.2 MG/DL
WBC # BLD AUTO: 8.5 K/UL (ref 4.6–13.2)

## 2024-12-16 PROCEDURE — 80061 LIPID PANEL: CPT

## 2024-12-16 PROCEDURE — 85025 COMPLETE CBC W/AUTO DIFF WBC: CPT

## 2024-12-16 PROCEDURE — 7100000010 HC PHASE II RECOVERY - FIRST 15 MIN: Performed by: INTERNAL MEDICINE

## 2024-12-16 PROCEDURE — 83036 HEMOGLOBIN GLYCOSYLATED A1C: CPT

## 2024-12-16 PROCEDURE — 36415 COLL VENOUS BLD VENIPUNCTURE: CPT

## 2024-12-16 PROCEDURE — C1894 INTRO/SHEATH, NON-LASER: HCPCS | Performed by: INTERNAL MEDICINE

## 2024-12-16 PROCEDURE — 99232 SBSQ HOSP IP/OBS MODERATE 35: CPT | Performed by: INTERNAL MEDICINE

## 2024-12-16 PROCEDURE — 76000 FLUOROSCOPY <1 HR PHYS/QHP: CPT | Performed by: INTERNAL MEDICINE

## 2024-12-16 PROCEDURE — 2140000001 HC CVICU INTERMEDIATE R&B

## 2024-12-16 PROCEDURE — 6370000000 HC RX 637 (ALT 250 FOR IP): Performed by: STUDENT IN AN ORGANIZED HEALTH CARE EDUCATION/TRAINING PROGRAM

## 2024-12-16 PROCEDURE — 6360000004 HC RX CONTRAST MEDICATION: Performed by: INTERNAL MEDICINE

## 2024-12-16 PROCEDURE — 2709999900 HC NON-CHARGEABLE SUPPLY: Performed by: INTERNAL MEDICINE

## 2024-12-16 PROCEDURE — 7100000011 HC PHASE II RECOVERY - ADDTL 15 MIN: Performed by: INTERNAL MEDICINE

## 2024-12-16 PROCEDURE — 6360000002 HC RX W HCPCS: Performed by: HOSPITALIST

## 2024-12-16 PROCEDURE — 6360000002 HC RX W HCPCS: Performed by: STUDENT IN AN ORGANIZED HEALTH CARE EDUCATION/TRAINING PROGRAM

## 2024-12-16 PROCEDURE — 6360000002 HC RX W HCPCS: Performed by: INTERNAL MEDICINE

## 2024-12-16 PROCEDURE — 2580000003 HC RX 258: Performed by: HOSPITALIST

## 2024-12-16 PROCEDURE — 4A023N7 MEASUREMENT OF CARDIAC SAMPLING AND PRESSURE, LEFT HEART, PERCUTANEOUS APPROACH: ICD-10-PCS | Performed by: INTERNAL MEDICINE

## 2024-12-16 PROCEDURE — 94640 AIRWAY INHALATION TREATMENT: CPT

## 2024-12-16 PROCEDURE — 94761 N-INVAS EAR/PLS OXIMETRY MLT: CPT

## 2024-12-16 PROCEDURE — 80053 COMPREHEN METABOLIC PANEL: CPT

## 2024-12-16 PROCEDURE — 2700000000 HC OXYGEN THERAPY PER DAY

## 2024-12-16 PROCEDURE — 2580000003 HC RX 258: Performed by: INTERNAL MEDICINE

## 2024-12-16 PROCEDURE — 97530 THERAPEUTIC ACTIVITIES: CPT

## 2024-12-16 PROCEDURE — 99232 SBSQ HOSP IP/OBS MODERATE 35: CPT | Performed by: STUDENT IN AN ORGANIZED HEALTH CARE EDUCATION/TRAINING PROGRAM

## 2024-12-16 PROCEDURE — 6370000000 HC RX 637 (ALT 250 FOR IP): Performed by: INTERNAL MEDICINE

## 2024-12-16 PROCEDURE — C1769 GUIDE WIRE: HCPCS | Performed by: INTERNAL MEDICINE

## 2024-12-16 PROCEDURE — 83735 ASSAY OF MAGNESIUM: CPT

## 2024-12-16 PROCEDURE — 93458 L HRT ARTERY/VENTRICLE ANGIO: CPT | Performed by: INTERNAL MEDICINE

## 2024-12-16 PROCEDURE — C1713 ANCHOR/SCREW BN/BN,TIS/BN: HCPCS | Performed by: INTERNAL MEDICINE

## 2024-12-16 PROCEDURE — 90935 HEMODIALYSIS ONE EVALUATION: CPT

## 2024-12-16 PROCEDURE — 6370000000 HC RX 637 (ALT 250 FOR IP): Performed by: HOSPITALIST

## 2024-12-16 PROCEDURE — B2111ZZ FLUOROSCOPY OF MULTIPLE CORONARY ARTERIES USING LOW OSMOLAR CONTRAST: ICD-10-PCS | Performed by: INTERNAL MEDICINE

## 2024-12-16 RX ORDER — SODIUM CHLORIDE 9 MG/ML
INJECTION, SOLUTION INTRAVENOUS CONTINUOUS
Status: DISCONTINUED | OUTPATIENT
Start: 2024-12-16 | End: 2024-12-16 | Stop reason: HOSPADM

## 2024-12-16 RX ORDER — SODIUM CHLORIDE 9 MG/ML
INJECTION, SOLUTION INTRAVENOUS PRN
Status: DISCONTINUED | OUTPATIENT
Start: 2024-12-16 | End: 2024-12-23 | Stop reason: HOSPADM

## 2024-12-16 RX ORDER — HEPARIN SODIUM 1000 [USP'U]/ML
INJECTION, SOLUTION INTRAVENOUS; SUBCUTANEOUS PRN
Status: DISCONTINUED | OUTPATIENT
Start: 2024-12-16 | End: 2024-12-16 | Stop reason: HOSPADM

## 2024-12-16 RX ORDER — SODIUM CHLORIDE 0.9 % (FLUSH) 0.9 %
5-40 SYRINGE (ML) INJECTION PRN
Status: DISCONTINUED | OUTPATIENT
Start: 2024-12-16 | End: 2024-12-23 | Stop reason: HOSPADM

## 2024-12-16 RX ORDER — ACETAMINOPHEN 325 MG/1
650 TABLET ORAL EVERY 4 HOURS PRN
Status: DISCONTINUED | OUTPATIENT
Start: 2024-12-16 | End: 2024-12-23 | Stop reason: HOSPADM

## 2024-12-16 RX ORDER — SODIUM CHLORIDE 0.9 % (FLUSH) 0.9 %
5-40 SYRINGE (ML) INJECTION EVERY 12 HOURS SCHEDULED
Status: DISCONTINUED | OUTPATIENT
Start: 2024-12-16 | End: 2024-12-23 | Stop reason: HOSPADM

## 2024-12-16 RX ORDER — IODIXANOL 320 MG/ML
INJECTION, SOLUTION INTRAVASCULAR PRN
Status: DISCONTINUED | OUTPATIENT
Start: 2024-12-16 | End: 2024-12-16 | Stop reason: HOSPADM

## 2024-12-16 RX ADMIN — ALPRAZOLAM 0.25 MG: 0.25 TABLET ORAL at 22:00

## 2024-12-16 RX ADMIN — ATORVASTATIN CALCIUM 40 MG: 40 TABLET, FILM COATED ORAL at 20:13

## 2024-12-16 RX ADMIN — WATER 1000 MG: 1 INJECTION INTRAMUSCULAR; INTRAVENOUS; SUBCUTANEOUS at 17:04

## 2024-12-16 RX ADMIN — ASPIRIN 81 MG CHEWABLE TABLET 81 MG: 81 TABLET CHEWABLE at 08:37

## 2024-12-16 RX ADMIN — Medication 1 TABLET: at 08:37

## 2024-12-16 RX ADMIN — SODIUM CHLORIDE, PRESERVATIVE FREE 10 ML: 5 INJECTION INTRAVENOUS at 20:14

## 2024-12-16 RX ADMIN — POTASSIUM BICARBONATE 20 MEQ: 782 TABLET, EFFERVESCENT ORAL at 04:17

## 2024-12-16 RX ADMIN — BUDESONIDE 1 MG: 1 SUSPENSION RESPIRATORY (INHALATION) at 20:18

## 2024-12-16 RX ADMIN — METOPROLOL TARTRATE 25 MG: 25 TABLET, FILM COATED ORAL at 20:13

## 2024-12-16 RX ADMIN — THIAMINE HCL TAB 100 MG 100 MG: 100 TAB at 08:37

## 2024-12-16 RX ADMIN — ARFORMOTEROL TARTRATE 15 MCG: 15 SOLUTION RESPIRATORY (INHALATION) at 20:18

## 2024-12-16 RX ADMIN — FUROSEMIDE 80 MG: 10 INJECTION, SOLUTION INTRAMUSCULAR; INTRAVENOUS at 00:50

## 2024-12-16 ASSESSMENT — PAIN SCALES - GENERAL
PAINLEVEL_OUTOF10: 0
PAINLEVEL_OUTOF10: 8
PAINLEVEL_OUTOF10: 0

## 2024-12-16 ASSESSMENT — PAIN DESCRIPTION - ORIENTATION: ORIENTATION: ANTERIOR

## 2024-12-16 ASSESSMENT — PAIN DESCRIPTION - LOCATION: LOCATION: CHEST

## 2024-12-16 ASSESSMENT — PAIN DESCRIPTION - FREQUENCY: FREQUENCY: CONTINUOUS

## 2024-12-16 ASSESSMENT — PAIN DESCRIPTION - ONSET: ONSET: ON-GOING

## 2024-12-16 ASSESSMENT — PAIN DESCRIPTION - DESCRIPTORS: DESCRIPTORS: DISCOMFORT;PRESSURE

## 2024-12-16 ASSESSMENT — PAIN DESCRIPTION - PAIN TYPE: TYPE: ACUTE PAIN

## 2024-12-16 NOTE — PRE SEDATION
Sedation Plan  ASA: class 3 - patient with severe systemic disease     Mallampati class: II - soft palate, uvula, fauces visible.    Sedation plan: local anesthesia and moderate (conscious sedation)    Risks, benefits, and alternatives discussed with patient and parent/guardian.  Use of blood products discussed with patient and parent/guardian who consented to blood products.       Immediate reassessment prior to sedation:  Patient's status reviewed and vital signs assessed; acceptable to perform procedure and proceed to administer sedation as planned.

## 2024-12-17 LAB
ALBUMIN SERPL-MCNC: 3.5 G/DL (ref 3.4–5)
ALBUMIN/GLOB SERPL: 0.8 (ref 0.8–1.7)
ALP SERPL-CCNC: 55 U/L (ref 45–117)
ALT SERPL-CCNC: 71 U/L (ref 16–61)
ANION GAP SERPL CALC-SCNC: 11 MMOL/L (ref 3–18)
AST SERPL-CCNC: 56 U/L (ref 10–38)
BACTERIA SPEC CULT: NORMAL
BACTERIA SPEC CULT: NORMAL
BASOPHILS # BLD: 0.1 K/UL (ref 0–0.1)
BASOPHILS NFR BLD: 1 % (ref 0–2)
BILIRUB SERPL-MCNC: 0.9 MG/DL (ref 0.2–1)
BUN SERPL-MCNC: 33 MG/DL (ref 7–18)
BUN/CREAT SERPL: 8 (ref 12–20)
CALCIUM SERPL-MCNC: 9.4 MG/DL (ref 8.5–10.1)
CHLORIDE SERPL-SCNC: 101 MMOL/L (ref 100–111)
CO2 SERPL-SCNC: 24 MMOL/L (ref 21–32)
CREAT SERPL-MCNC: 3.91 MG/DL (ref 0.6–1.3)
DIFFERENTIAL METHOD BLD: ABNORMAL
EOSINOPHIL # BLD: 0.3 K/UL (ref 0–0.4)
EOSINOPHIL NFR BLD: 3 % (ref 0–5)
ERYTHROCYTE [DISTWIDTH] IN BLOOD BY AUTOMATED COUNT: 14 % (ref 11.6–14.5)
GLOBULIN SER CALC-MCNC: 4.6 G/DL (ref 2–4)
GLUCOSE SERPL-MCNC: 116 MG/DL (ref 74–99)
HCT VFR BLD AUTO: 31.6 % (ref 36–48)
HGB BLD-MCNC: 10.6 G/DL (ref 13–16)
IMM GRANULOCYTES # BLD AUTO: 0 K/UL (ref 0–0.04)
IMM GRANULOCYTES NFR BLD AUTO: 0 % (ref 0–0.5)
LYMPHOCYTES # BLD: 2.6 K/UL (ref 0.9–3.6)
LYMPHOCYTES NFR BLD: 23 % (ref 21–52)
MAGNESIUM SERPL-MCNC: 1.9 MG/DL (ref 1.6–2.6)
MCH RBC QN AUTO: 29.9 PG (ref 24–34)
MCHC RBC AUTO-ENTMCNC: 33.5 G/DL (ref 31–37)
MCV RBC AUTO: 89 FL (ref 78–100)
MONOCYTES # BLD: 0.9 K/UL (ref 0.05–1.2)
MONOCYTES NFR BLD: 8 % (ref 3–10)
NEUTS SEG # BLD: 7.3 K/UL (ref 1.8–8)
NEUTS SEG NFR BLD: 65 % (ref 40–73)
NRBC # BLD: 0 K/UL (ref 0–0.01)
NRBC BLD-RTO: 0 PER 100 WBC
PLATELET # BLD AUTO: 161 K/UL (ref 135–420)
PLATELET COMMENT: ABNORMAL
PMV BLD AUTO: 12.5 FL (ref 9.2–11.8)
POTASSIUM SERPL-SCNC: 3.3 MMOL/L (ref 3.5–5.5)
PROT SERPL-MCNC: 8.1 G/DL (ref 6.4–8.2)
RBC # BLD AUTO: 3.55 M/UL (ref 4.35–5.65)
RBC MORPH BLD: ABNORMAL
RBC MORPH BLD: ABNORMAL
SERVICE CMNT-IMP: NORMAL
SERVICE CMNT-IMP: NORMAL
SODIUM SERPL-SCNC: 136 MMOL/L (ref 136–145)
WBC # BLD AUTO: 11.2 K/UL (ref 4.6–13.2)
WBC MORPH BLD: ABNORMAL

## 2024-12-17 PROCEDURE — 80053 COMPREHEN METABOLIC PANEL: CPT

## 2024-12-17 PROCEDURE — 6370000000 HC RX 637 (ALT 250 FOR IP): Performed by: STUDENT IN AN ORGANIZED HEALTH CARE EDUCATION/TRAINING PROGRAM

## 2024-12-17 PROCEDURE — 6360000002 HC RX W HCPCS: Performed by: HOSPITALIST

## 2024-12-17 PROCEDURE — 6370000000 HC RX 637 (ALT 250 FOR IP): Performed by: HOSPITALIST

## 2024-12-17 PROCEDURE — 99232 SBSQ HOSP IP/OBS MODERATE 35: CPT | Performed by: STUDENT IN AN ORGANIZED HEALTH CARE EDUCATION/TRAINING PROGRAM

## 2024-12-17 PROCEDURE — 6360000002 HC RX W HCPCS: Performed by: INTERNAL MEDICINE

## 2024-12-17 PROCEDURE — 83735 ASSAY OF MAGNESIUM: CPT

## 2024-12-17 PROCEDURE — 97530 THERAPEUTIC ACTIVITIES: CPT

## 2024-12-17 PROCEDURE — 94761 N-INVAS EAR/PLS OXIMETRY MLT: CPT

## 2024-12-17 PROCEDURE — 94640 AIRWAY INHALATION TREATMENT: CPT

## 2024-12-17 PROCEDURE — 2500000003 HC RX 250 WO HCPCS: Performed by: INTERNAL MEDICINE

## 2024-12-17 PROCEDURE — 6370000000 HC RX 637 (ALT 250 FOR IP): Performed by: INTERNAL MEDICINE

## 2024-12-17 PROCEDURE — 2580000003 HC RX 258: Performed by: INTERNAL MEDICINE

## 2024-12-17 PROCEDURE — 2580000003 HC RX 258: Performed by: HOSPITALIST

## 2024-12-17 PROCEDURE — 2140000001 HC CVICU INTERMEDIATE R&B

## 2024-12-17 PROCEDURE — 2700000000 HC OXYGEN THERAPY PER DAY

## 2024-12-17 PROCEDURE — 99232 SBSQ HOSP IP/OBS MODERATE 35: CPT | Performed by: INTERNAL MEDICINE

## 2024-12-17 PROCEDURE — 36415 COLL VENOUS BLD VENIPUNCTURE: CPT

## 2024-12-17 PROCEDURE — 85025 COMPLETE CBC W/AUTO DIFF WBC: CPT

## 2024-12-17 RX ORDER — DIPHENHYDRAMINE HYDROCHLORIDE 50 MG/ML
50 INJECTION INTRAMUSCULAR; INTRAVENOUS ONCE
Status: COMPLETED | OUTPATIENT
Start: 2024-12-17 | End: 2024-12-17

## 2024-12-17 RX ORDER — HALOPERIDOL 5 MG/ML
2 INJECTION INTRAMUSCULAR ONCE
Status: COMPLETED | OUTPATIENT
Start: 2024-12-17 | End: 2024-12-17

## 2024-12-17 RX ORDER — MECOBALAMIN 5000 MCG
5 TABLET,DISINTEGRATING ORAL NIGHTLY
Status: DISCONTINUED | OUTPATIENT
Start: 2024-12-17 | End: 2024-12-23 | Stop reason: HOSPADM

## 2024-12-17 RX ADMIN — BUDESONIDE 1 MG: 1 SUSPENSION RESPIRATORY (INHALATION) at 07:53

## 2024-12-17 RX ADMIN — METOPROLOL TARTRATE 25 MG: 25 TABLET, FILM COATED ORAL at 20:05

## 2024-12-17 RX ADMIN — SODIUM CHLORIDE, PRESERVATIVE FREE 10 ML: 5 INJECTION INTRAVENOUS at 20:05

## 2024-12-17 RX ADMIN — Medication 5 MG: at 22:41

## 2024-12-17 RX ADMIN — ATORVASTATIN CALCIUM 40 MG: 40 TABLET, FILM COATED ORAL at 20:05

## 2024-12-17 RX ADMIN — Medication 1 TABLET: at 08:18

## 2024-12-17 RX ADMIN — ARFORMOTEROL TARTRATE 15 MCG: 15 SOLUTION RESPIRATORY (INHALATION) at 07:53

## 2024-12-17 RX ADMIN — HALOPERIDOL LACTATE 2 MG: 5 INJECTION, SOLUTION INTRAMUSCULAR at 00:43

## 2024-12-17 RX ADMIN — ALPRAZOLAM 0.25 MG: 0.25 TABLET ORAL at 20:05

## 2024-12-17 RX ADMIN — SODIUM CHLORIDE, PRESERVATIVE FREE 10 ML: 5 INJECTION INTRAVENOUS at 08:19

## 2024-12-17 RX ADMIN — THIAMINE HCL TAB 100 MG 100 MG: 100 TAB at 08:18

## 2024-12-17 RX ADMIN — METOPROLOL TARTRATE 25 MG: 25 TABLET, FILM COATED ORAL at 08:18

## 2024-12-17 RX ADMIN — ASPIRIN 81 MG CHEWABLE TABLET 81 MG: 81 TABLET CHEWABLE at 08:18

## 2024-12-17 RX ADMIN — WATER 1000 MG: 1 INJECTION INTRAMUSCULAR; INTRAVENOUS; SUBCUTANEOUS at 13:09

## 2024-12-17 RX ADMIN — DIPHENHYDRAMINE HYDROCHLORIDE 50 MG: 50 INJECTION INTRAMUSCULAR; INTRAVENOUS at 01:58

## 2024-12-17 RX ADMIN — POTASSIUM BICARBONATE 20 MEQ: 782 TABLET, EFFERVESCENT ORAL at 08:18

## 2024-12-17 ASSESSMENT — PAIN SCALES - GENERAL
PAINLEVEL_OUTOF10: 0

## 2024-12-18 PROBLEM — Z99.2 ACUTE RENAL FAILURE ON DIALYSIS (HCC): Status: ACTIVE | Noted: 2024-12-11

## 2024-12-18 PROBLEM — R41.0 DELIRIUM: Status: ACTIVE | Noted: 2024-12-18

## 2024-12-18 LAB
ALBUMIN SERPL-MCNC: 3.5 G/DL (ref 3.4–5)
ALBUMIN/GLOB SERPL: 0.9 (ref 0.8–1.7)
ALP SERPL-CCNC: 47 U/L (ref 45–117)
ALT SERPL-CCNC: 53 U/L (ref 16–61)
ANION GAP SERPL CALC-SCNC: 9 MMOL/L (ref 3–18)
AST SERPL-CCNC: 41 U/L (ref 10–38)
BASOPHILS # BLD: 0.1 K/UL (ref 0–0.1)
BASOPHILS NFR BLD: 1 % (ref 0–2)
BILIRUB SERPL-MCNC: 0.8 MG/DL (ref 0.2–1)
BUN SERPL-MCNC: 49 MG/DL (ref 7–18)
BUN/CREAT SERPL: 12 (ref 12–20)
CALCIUM SERPL-MCNC: 9.3 MG/DL (ref 8.5–10.1)
CHLORIDE SERPL-SCNC: 103 MMOL/L (ref 100–111)
CO2 SERPL-SCNC: 26 MMOL/L (ref 21–32)
CREAT SERPL-MCNC: 4.11 MG/DL (ref 0.6–1.3)
DIFFERENTIAL METHOD BLD: ABNORMAL
EOSINOPHIL # BLD: 0.9 K/UL (ref 0–0.4)
EOSINOPHIL NFR BLD: 10 % (ref 0–5)
ERYTHROCYTE [DISTWIDTH] IN BLOOD BY AUTOMATED COUNT: 14.5 % (ref 11.6–14.5)
GLOBULIN SER CALC-MCNC: 4 G/DL (ref 2–4)
GLUCOSE SERPL-MCNC: 105 MG/DL (ref 74–99)
HCT VFR BLD AUTO: 30 % (ref 36–48)
HGB BLD-MCNC: 9.7 G/DL (ref 13–16)
IMM GRANULOCYTES # BLD AUTO: 0.1 K/UL (ref 0–0.04)
IMM GRANULOCYTES NFR BLD AUTO: 1 % (ref 0–0.5)
LYMPHOCYTES # BLD: 1.8 K/UL (ref 0.9–3.6)
LYMPHOCYTES NFR BLD: 20 % (ref 21–52)
MAGNESIUM SERPL-MCNC: 2 MG/DL (ref 1.6–2.6)
MCH RBC QN AUTO: 29.1 PG (ref 24–34)
MCHC RBC AUTO-ENTMCNC: 32.3 G/DL (ref 31–37)
MCV RBC AUTO: 90.1 FL (ref 78–100)
MONOCYTES # BLD: 1.4 K/UL (ref 0.05–1.2)
MONOCYTES NFR BLD: 15 % (ref 3–10)
NEUTS SEG # BLD: 4.9 K/UL (ref 1.8–8)
NEUTS SEG NFR BLD: 54 % (ref 40–73)
NRBC # BLD: 0 K/UL (ref 0–0.01)
NRBC BLD-RTO: 0 PER 100 WBC
PLATELET # BLD AUTO: 210 K/UL (ref 135–420)
PMV BLD AUTO: 12.3 FL (ref 9.2–11.8)
POTASSIUM SERPL-SCNC: 3.1 MMOL/L (ref 3.5–5.5)
PROT SERPL-MCNC: 7.5 G/DL (ref 6.4–8.2)
RBC # BLD AUTO: 3.33 M/UL (ref 4.35–5.65)
SODIUM SERPL-SCNC: 138 MMOL/L (ref 136–145)
WBC # BLD AUTO: 9 K/UL (ref 4.6–13.2)

## 2024-12-18 PROCEDURE — 76000 FLUOROSCOPY <1 HR PHYS/QHP: CPT | Performed by: SURGERY

## 2024-12-18 PROCEDURE — 06PYX3Z REMOVAL OF INFUSION DEVICE FROM LOWER VEIN, EXTERNAL APPROACH: ICD-10-PCS | Performed by: SURGERY

## 2024-12-18 PROCEDURE — 94761 N-INVAS EAR/PLS OXIMETRY MLT: CPT

## 2024-12-18 PROCEDURE — 99232 SBSQ HOSP IP/OBS MODERATE 35: CPT | Performed by: INTERNAL MEDICINE

## 2024-12-18 PROCEDURE — 87522 HEPATITIS C REVRS TRNSCRPJ: CPT

## 2024-12-18 PROCEDURE — 80053 COMPREHEN METABOLIC PANEL: CPT

## 2024-12-18 PROCEDURE — 6370000000 HC RX 637 (ALT 250 FOR IP)

## 2024-12-18 PROCEDURE — C1769 GUIDE WIRE: HCPCS | Performed by: SURGERY

## 2024-12-18 PROCEDURE — 83735 ASSAY OF MAGNESIUM: CPT

## 2024-12-18 PROCEDURE — 2500000003 HC RX 250 WO HCPCS: Performed by: INTERNAL MEDICINE

## 2024-12-18 PROCEDURE — 99232 SBSQ HOSP IP/OBS MODERATE 35: CPT | Performed by: STUDENT IN AN ORGANIZED HEALTH CARE EDUCATION/TRAINING PROGRAM

## 2024-12-18 PROCEDURE — 85025 COMPLETE CBC W/AUTO DIFF WBC: CPT

## 2024-12-18 PROCEDURE — 6370000000 HC RX 637 (ALT 250 FOR IP): Performed by: INTERNAL MEDICINE

## 2024-12-18 PROCEDURE — 7100000011 HC PHASE II RECOVERY - ADDTL 15 MIN: Performed by: SURGERY

## 2024-12-18 PROCEDURE — 7100000010 HC PHASE II RECOVERY - FIRST 15 MIN: Performed by: SURGERY

## 2024-12-18 PROCEDURE — 94640 AIRWAY INHALATION TREATMENT: CPT

## 2024-12-18 PROCEDURE — C1750 CATH, HEMODIALYSIS,LONG-TERM: HCPCS | Performed by: SURGERY

## 2024-12-18 PROCEDURE — 97116 GAIT TRAINING THERAPY: CPT

## 2024-12-18 PROCEDURE — 6370000000 HC RX 637 (ALT 250 FOR IP): Performed by: HOSPITALIST

## 2024-12-18 PROCEDURE — 36581 REPLACE TUNNELED CV CATH: CPT | Performed by: SURGERY

## 2024-12-18 PROCEDURE — 97530 THERAPEUTIC ACTIVITIES: CPT

## 2024-12-18 PROCEDURE — 2709999900 HC NON-CHARGEABLE SUPPLY: Performed by: SURGERY

## 2024-12-18 PROCEDURE — 99233 SBSQ HOSP IP/OBS HIGH 50: CPT

## 2024-12-18 PROCEDURE — 02HV33Z INSERTION OF INFUSION DEVICE INTO SUPERIOR VENA CAVA, PERCUTANEOUS APPROACH: ICD-10-PCS | Performed by: SURGERY

## 2024-12-18 PROCEDURE — C1894 INTRO/SHEATH, NON-LASER: HCPCS | Performed by: SURGERY

## 2024-12-18 PROCEDURE — 36415 COLL VENOUS BLD VENIPUNCTURE: CPT

## 2024-12-18 PROCEDURE — 6360000002 HC RX W HCPCS: Performed by: INTERNAL MEDICINE

## 2024-12-18 PROCEDURE — 76937 US GUIDE VASCULAR ACCESS: CPT | Performed by: SURGERY

## 2024-12-18 PROCEDURE — 6360000002 HC RX W HCPCS: Performed by: SURGERY

## 2024-12-18 PROCEDURE — 0JH63XZ INSERTION OF TUNNELED VASCULAR ACCESS DEVICE INTO CHEST SUBCUTANEOUS TISSUE AND FASCIA, PERCUTANEOUS APPROACH: ICD-10-PCS | Performed by: SURGERY

## 2024-12-18 PROCEDURE — 2140000001 HC CVICU INTERMEDIATE R&B

## 2024-12-18 RX ORDER — POTASSIUM CHLORIDE 1500 MG/1
40 TABLET, EXTENDED RELEASE ORAL PRN
Status: DISCONTINUED | OUTPATIENT
Start: 2024-12-18 | End: 2024-12-23 | Stop reason: HOSPADM

## 2024-12-18 RX ORDER — POTASSIUM CHLORIDE 7.45 MG/ML
10 INJECTION INTRAVENOUS PRN
Status: DISCONTINUED | OUTPATIENT
Start: 2024-12-18 | End: 2024-12-23 | Stop reason: HOSPADM

## 2024-12-18 RX ORDER — NICOTINE 21 MG/24HR
1 PATCH, TRANSDERMAL 24 HOURS TRANSDERMAL DAILY
Status: DISCONTINUED | OUTPATIENT
Start: 2024-12-18 | End: 2024-12-23 | Stop reason: HOSPADM

## 2024-12-18 RX ORDER — OLANZAPINE 2.5 MG/1
2.5 TABLET, FILM COATED ORAL NIGHTLY
Status: DISCONTINUED | OUTPATIENT
Start: 2024-12-18 | End: 2024-12-19

## 2024-12-18 RX ORDER — OLANZAPINE 2.5 MG/1
2.5 TABLET, FILM COATED ORAL EVERY 6 HOURS PRN
Status: DISCONTINUED | OUTPATIENT
Start: 2024-12-18 | End: 2024-12-19

## 2024-12-18 RX ORDER — CEFAZOLIN SODIUM 1 G/3ML
INJECTION, POWDER, FOR SOLUTION INTRAMUSCULAR; INTRAVENOUS PRN
Status: DISCONTINUED | OUTPATIENT
Start: 2024-12-18 | End: 2024-12-18 | Stop reason: HOSPADM

## 2024-12-18 RX ORDER — HEPARIN SODIUM 1000 [USP'U]/ML
INJECTION, SOLUTION INTRAVENOUS; SUBCUTANEOUS PRN
Status: DISCONTINUED | OUTPATIENT
Start: 2024-12-18 | End: 2024-12-18 | Stop reason: HOSPADM

## 2024-12-18 RX ORDER — POLYETHYLENE GLYCOL 3350 17 G
2 POWDER IN PACKET (EA) ORAL
Status: DISCONTINUED | OUTPATIENT
Start: 2024-12-18 | End: 2024-12-23 | Stop reason: HOSPADM

## 2024-12-18 RX ADMIN — SODIUM CHLORIDE, PRESERVATIVE FREE 10 ML: 5 INJECTION INTRAVENOUS at 08:37

## 2024-12-18 RX ADMIN — METOPROLOL TARTRATE 25 MG: 25 TABLET, FILM COATED ORAL at 08:36

## 2024-12-18 RX ADMIN — SODIUM CHLORIDE, PRESERVATIVE FREE 10 ML: 5 INJECTION INTRAVENOUS at 20:02

## 2024-12-18 RX ADMIN — ARFORMOTEROL TARTRATE 15 MCG: 15 SOLUTION RESPIRATORY (INHALATION) at 19:36

## 2024-12-18 RX ADMIN — ASPIRIN 81 MG CHEWABLE TABLET 81 MG: 81 TABLET CHEWABLE at 08:36

## 2024-12-18 RX ADMIN — THIAMINE HCL TAB 100 MG 100 MG: 100 TAB at 08:36

## 2024-12-18 RX ADMIN — Medication 1 TABLET: at 08:36

## 2024-12-18 RX ADMIN — ARFORMOTEROL TARTRATE 15 MCG: 15 SOLUTION RESPIRATORY (INHALATION) at 08:20

## 2024-12-18 RX ADMIN — BUDESONIDE 1 MG: 1 SUSPENSION RESPIRATORY (INHALATION) at 19:36

## 2024-12-18 RX ADMIN — OLANZAPINE 2.5 MG: 2.5 TABLET, FILM COATED ORAL at 20:00

## 2024-12-18 RX ADMIN — Medication 5 MG: at 20:00

## 2024-12-18 RX ADMIN — POTASSIUM BICARBONATE 40 MEQ: 782 TABLET, EFFERVESCENT ORAL at 06:38

## 2024-12-18 RX ADMIN — ATORVASTATIN CALCIUM 40 MG: 40 TABLET, FILM COATED ORAL at 20:00

## 2024-12-18 RX ADMIN — METOPROLOL TARTRATE 25 MG: 25 TABLET, FILM COATED ORAL at 20:00

## 2024-12-18 RX ADMIN — BUDESONIDE 1 MG: 1 SUSPENSION RESPIRATORY (INHALATION) at 08:20

## 2024-12-18 ASSESSMENT — PAIN SCALES - GENERAL
PAINLEVEL_OUTOF10: 0

## 2024-12-18 NOTE — OP NOTE
tunnel for the catheter and the exit site for the catheter were anesthetized with 1% lidocaine.  A skin incision was made at the exit site, which was about 2 fingerbreadths below the clavicle, slightly lateral to the midclavicular line.  The vein access site was also enlarged to accommodate the dilators and the peel-away sheath.  The 19 cm Palindrome catheter that was flushed and attached to the metallic tunneler was then tunneled subcutaneously between the exit site incision to the vein access site.  The vein access site was serially dilated with the dilators and finally the peel-away sheath was placed over the wire.  The dilator of the sheath and the wire were removed, and the catheter that was disconnected from the metallic tunneler was inserted through the sheath, as the sheath was gently peeled away.  The final position of the catheter was noted under fluoroscopy.  Its tip was at the junction of the SVC and the right atrium.  There were no kinks or twist in the course of the catheter.  There was good backflow through both ports of the catheter.  Both ports were flushed with heparinized saline and hep-locked with full-strength heparin.  The catheter was anchored to the skin at the exit site with 2-0 Prolene suture.  The vein access site was closed with 4-0 Monocryl subcuticular suture.  Dry sterile dressings were placed.  The right femoral vein temporary catheter was removed and manual pressure was applied till hemostasis was achieved    The patient tolerated the procedure well and was hemodynamically stable.  No immediate complications were noted.    The procedure and findings were discussed with his niece over the phone     Electronically signed by Shereen Barrett MD on 12/18/2024 at 1:10 PM

## 2024-12-18 NOTE — ACP (ADVANCE CARE PLANNING)
Advance Care Planning     Palliative Team Advance Care Planning (ACP)     Date : 12/18/24    Individuals present for the conversation: Brother Pankaj Khalil        ACP documents on file prior to discussion:  -None    Previously completed document/s not on file: Patient / participant reports that there are no previously executed ACP documents.    Healthcare Decision Maker:    Primary Decision Maker: Tej Choudhury - Niece/Nephew - [phone]    Secondary Decision Maker: Ruth Choudhury - Niece/Nephew - [phone]     Conversation Summary:  Gio Khalil is a 63 y.o. male with past medical hx of PAD, hepatitis C, etOH use disorder, dyslipidemia, aortic stenosis, past marijuana, tobacco use disorder who presented from home via EMS with altered mental status.   Palliative team made 2 attempts to see Mr. Khalil today. Early AM he was working with OT with a goal of meeting criteria for acute rehab.   Patient was then scheduled for TDC placement by vascular surgery.    Present at bedside was patient's brother, Pankaj Khalil who shared that he felt Mr. Khalil was improving . He deferred any care decisions to Shaniqua Choudhury Patient's named MPOA.     Resuscitation Status:   Code Status: Full Code     Documentation Completed:  -Power of  for Healthcare    Plan - Palliative team will follow for further conversations with patient and MPOA.     Sonja RAHMAN, RN  Palliative Medicine Inpatient RN  Palliative [facility] Line: [phone]    Sonja Sandoval, RN    UPDATE [id number] Call placed to daughter, Shaniqua Choudhury.  She would like to meet with the Palliative Team tomorrow. She added she would like the attending MD to be available for Medical update. She expressed concern about the undetermined source of patient's bleeding and dropping blood counts. Attending alerted to meeting time of 11:30 at patient's bedside.   Sonja Sandoval RN

## 2024-12-18 NOTE — PRE SEDATION
Sedation Plan  ASA: class 4 - patient with severe systemic disease that is a constant threat to life     Mallampati class: IV - only hard palate visible.    Sedation plan: level 2-1: moderate/analgesia (conscious sedation)    Risks, benefits, and alternatives discussed with healthcare power of .  Use of blood products discussed with healthcare power of  who consented to blood products. Blood Products Consent Comment: Telephone consent from his daughter Shaniqua Choudhury.       Immediate reassessment prior to sedation:  Patient's status reviewed and vital signs assessed; acceptable to perform procedure and proceed to administer sedation as planned.

## 2024-12-18 NOTE — H&P
Pt's clinicals sent in careport to home health agencies.        Cristiana Vazquez, LAMINEN RN  Care Management

## 2024-12-18 NOTE — CARE COORDINATION
Called pt's niece and POFLY Choudhury [phone] and discussed  discharge planning.  She is in agreement for pt to go to [facility] if accepted and authorized and if not, she stated pt can return home with home health.  She asked CM if pt will be hospice and CM replied that the doctors have to decide that.  She stated if hospice, she will like for pt to go to a facility.  Informed her CM will keep updating her..            Cristiana Vazquez, LAMINEN RN  Care Management

## 2024-12-18 NOTE — INTERVAL H&P NOTE
Update History & Physical    The patient's History and Physical of December 12, 2024 was reviewed with the patient and I examined the patient.     Impression:  Persistent acute kidney injury-requiring ongoing hemodialysis treatments    Plan:   Insertion of a tunneled hemodialysis catheter and removal of temporary hemodialysis catheter.  Informed consent was obtained from his daughter who is the power of  for healthcare decision making, over the phone    The risks, benefits, expected outcome, and alternative to the recommended procedure have been discussed with the patient's daughter.  She understands and wants to proceed with the procedure.     Electronically signed by Shereen Barrett MD on 12/18/2024 at 12:07 PM

## 2024-12-19 LAB
ALBUMIN SERPL ELPH-MCNC: 3.3 G/DL (ref 2.9–4.4)
ALBUMIN SERPL-MCNC: 3.4 G/DL (ref 3.4–5)
ALBUMIN/GLOB SERPL: 0.8 (ref 0.8–1.7)
ALBUMIN/GLOB SERPL: 1.1 (ref 0.7–1.7)
ALP SERPL-CCNC: 48 U/L (ref 45–117)
ALPHA1 GLOB SERPL ELPH-MCNC: 0.3 G/DL (ref 0–0.4)
ALPHA2 GLOB SERPL ELPH-MCNC: 1.1 G/DL (ref 0.4–1)
ALT SERPL-CCNC: 38 U/L (ref 16–61)
ANION GAP SERPL CALC-SCNC: 6 MMOL/L (ref 3–18)
AST SERPL-CCNC: 35 U/L (ref 10–38)
B-GLOBULIN SERPL ELPH-MCNC: 1 G/DL (ref 0.7–1.3)
BASOPHILS # BLD: 0.2 K/UL (ref 0–0.1)
BASOPHILS NFR BLD: 2 % (ref 0–2)
BILIRUB SERPL-MCNC: 0.8 MG/DL (ref 0.2–1)
BUN SERPL-MCNC: 54 MG/DL (ref 7–18)
BUN/CREAT SERPL: 15 (ref 12–20)
CALCIUM SERPL-MCNC: 9.4 MG/DL (ref 8.5–10.1)
CHLORIDE SERPL-SCNC: 105 MMOL/L (ref 100–111)
CO2 SERPL-SCNC: 28 MMOL/L (ref 21–32)
CREAT SERPL-MCNC: 3.68 MG/DL (ref 0.6–1.3)
CRYOGLOB SER QL 1D COLD INC: NORMAL
DIFFERENTIAL METHOD BLD: ABNORMAL
EOSINOPHIL # BLD: 0.5 K/UL (ref 0–0.4)
EOSINOPHIL NFR BLD: 5 % (ref 0–5)
ERYTHROCYTE [DISTWIDTH] IN BLOOD BY AUTOMATED COUNT: 14.7 % (ref 11.6–14.5)
GAMMA GLOB SERPL ELPH-MCNC: 0.8 G/DL (ref 0.4–1.8)
GLOBULIN SER CALC-MCNC: 4.1 G/DL (ref 2–4)
GLOBULIN SER-MCNC: 3.2 G/DL (ref 2.2–3.9)
GLUCOSE SERPL-MCNC: 110 MG/DL (ref 74–99)
HCT VFR BLD AUTO: 25.8 % (ref 36–48)
HGB BLD-MCNC: 8.3 G/DL (ref 13–16)
IGA SERPL-MCNC: 307 MG/DL (ref 61–437)
IGG SERPL-MCNC: 876 MG/DL (ref 603–1613)
IGM SERPL-MCNC: 53 MG/DL (ref 20–172)
IMM GRANULOCYTES # BLD AUTO: 0 K/UL (ref 0–0.04)
IMM GRANULOCYTES NFR BLD AUTO: 0 % (ref 0–0.5)
INTERPRETATION SERPL IEP-IMP: ABNORMAL
KAPPA LC FREE SER-MCNC: 109.6 MG/L (ref 3.3–19.4)
KAPPA LC FREE/LAMBDA FREE SER: 1.37 (ref 0.26–1.65)
LAMBDA LC FREE SERPL-MCNC: 80 MG/L (ref 5.7–26.3)
LYMPHOCYTES # BLD: 0.9 K/UL (ref 0.9–3.6)
LYMPHOCYTES NFR BLD: 10 % (ref 21–52)
M PROTEIN SERPL ELPH-MCNC: ABNORMAL G/DL
MAGNESIUM SERPL-MCNC: 1.9 MG/DL (ref 1.6–2.6)
MCH RBC QN AUTO: 29.4 PG (ref 24–34)
MCHC RBC AUTO-ENTMCNC: 32.2 G/DL (ref 31–37)
MCV RBC AUTO: 91.5 FL (ref 78–100)
MONOCYTES # BLD: 0.7 K/UL (ref 0.05–1.2)
MONOCYTES NFR BLD: 7 % (ref 3–10)
NEUTS SEG # BLD: 7 K/UL (ref 1.8–8)
NEUTS SEG NFR BLD: 76 % (ref 40–73)
NRBC # BLD: 0 K/UL (ref 0–0.01)
NRBC BLD-RTO: 0 PER 100 WBC
PLATELET # BLD AUTO: 209 K/UL (ref 135–420)
PLATELET COMMENT: ABNORMAL
PMV BLD AUTO: 11.9 FL (ref 9.2–11.8)
POTASSIUM SERPL-SCNC: 3.5 MMOL/L (ref 3.5–5.5)
PROT SERPL-MCNC: 6.5 G/DL (ref 6–8.5)
PROT SERPL-MCNC: 7.5 G/DL (ref 6.4–8.2)
RBC # BLD AUTO: 2.82 M/UL (ref 4.35–5.65)
RBC MORPH BLD: ABNORMAL
SODIUM SERPL-SCNC: 139 MMOL/L (ref 136–145)
WBC # BLD AUTO: 9.3 K/UL (ref 4.6–13.2)

## 2024-12-19 PROCEDURE — 6360000002 HC RX W HCPCS: Performed by: INTERNAL MEDICINE

## 2024-12-19 PROCEDURE — 83735 ASSAY OF MAGNESIUM: CPT

## 2024-12-19 PROCEDURE — 6370000000 HC RX 637 (ALT 250 FOR IP): Performed by: HOSPITALIST

## 2024-12-19 PROCEDURE — 2700000000 HC OXYGEN THERAPY PER DAY

## 2024-12-19 PROCEDURE — 6360000002 HC RX W HCPCS: Performed by: STUDENT IN AN ORGANIZED HEALTH CARE EDUCATION/TRAINING PROGRAM

## 2024-12-19 PROCEDURE — 99233 SBSQ HOSP IP/OBS HIGH 50: CPT

## 2024-12-19 PROCEDURE — 94640 AIRWAY INHALATION TREATMENT: CPT

## 2024-12-19 PROCEDURE — 6370000000 HC RX 637 (ALT 250 FOR IP): Performed by: FAMILY MEDICINE

## 2024-12-19 PROCEDURE — 80053 COMPREHEN METABOLIC PANEL: CPT

## 2024-12-19 PROCEDURE — 90935 HEMODIALYSIS ONE EVALUATION: CPT

## 2024-12-19 PROCEDURE — 6370000000 HC RX 637 (ALT 250 FOR IP)

## 2024-12-19 PROCEDURE — 99233 SBSQ HOSP IP/OBS HIGH 50: CPT | Performed by: STUDENT IN AN ORGANIZED HEALTH CARE EDUCATION/TRAINING PROGRAM

## 2024-12-19 PROCEDURE — 85025 COMPLETE CBC W/AUTO DIFF WBC: CPT

## 2024-12-19 PROCEDURE — 2500000003 HC RX 250 WO HCPCS: Performed by: INTERNAL MEDICINE

## 2024-12-19 PROCEDURE — 6370000000 HC RX 637 (ALT 250 FOR IP): Performed by: INTERNAL MEDICINE

## 2024-12-19 PROCEDURE — 1100000003 HC PRIVATE W/ TELEMETRY

## 2024-12-19 PROCEDURE — 6370000000 HC RX 637 (ALT 250 FOR IP): Performed by: STUDENT IN AN ORGANIZED HEALTH CARE EDUCATION/TRAINING PROGRAM

## 2024-12-19 PROCEDURE — 36415 COLL VENOUS BLD VENIPUNCTURE: CPT

## 2024-12-19 PROCEDURE — 6360000002 HC RX W HCPCS: Performed by: HOSPITALIST

## 2024-12-19 RX ORDER — ALBUMIN (HUMAN) 12.5 G/50ML
25 SOLUTION INTRAVENOUS AS NEEDED
Status: DISCONTINUED | OUTPATIENT
Start: 2024-12-19 | End: 2024-12-23 | Stop reason: HOSPADM

## 2024-12-19 RX ORDER — OLANZAPINE 2.5 MG/1
5 TABLET, FILM COATED ORAL EVERY 6 HOURS PRN
Status: DISCONTINUED | OUTPATIENT
Start: 2024-12-19 | End: 2024-12-20

## 2024-12-19 RX ORDER — OLANZAPINE 10 MG/1
10 TABLET ORAL NIGHTLY
Status: DISCONTINUED | OUTPATIENT
Start: 2024-12-19 | End: 2024-12-23 | Stop reason: HOSPADM

## 2024-12-19 RX ORDER — ALPRAZOLAM 0.25 MG/1
0.25 TABLET ORAL ONCE
Status: COMPLETED | OUTPATIENT
Start: 2024-12-19 | End: 2024-12-19

## 2024-12-19 RX ADMIN — Medication 1 TABLET: at 08:46

## 2024-12-19 RX ADMIN — ONDANSETRON 4 MG: 2 INJECTION, SOLUTION INTRAMUSCULAR; INTRAVENOUS at 11:07

## 2024-12-19 RX ADMIN — SODIUM CHLORIDE, PRESERVATIVE FREE 10 ML: 5 INJECTION INTRAVENOUS at 21:05

## 2024-12-19 RX ADMIN — THIAMINE HCL TAB 100 MG 100 MG: 100 TAB at 08:46

## 2024-12-19 RX ADMIN — ATORVASTATIN CALCIUM 40 MG: 40 TABLET, FILM COATED ORAL at 21:05

## 2024-12-19 RX ADMIN — OLANZAPINE 5 MG: 2.5 TABLET, FILM COATED ORAL at 13:05

## 2024-12-19 RX ADMIN — ARFORMOTEROL TARTRATE 15 MCG: 15 SOLUTION RESPIRATORY (INHALATION) at 21:18

## 2024-12-19 RX ADMIN — IRON SUCROSE 200 MG: 20 INJECTION, SOLUTION INTRAVENOUS at 17:24

## 2024-12-19 RX ADMIN — ASPIRIN 81 MG CHEWABLE TABLET 81 MG: 81 TABLET CHEWABLE at 08:46

## 2024-12-19 RX ADMIN — BUDESONIDE 1 MG: 1 SUSPENSION RESPIRATORY (INHALATION) at 21:18

## 2024-12-19 RX ADMIN — METOPROLOL TARTRATE 25 MG: 25 TABLET, FILM COATED ORAL at 21:05

## 2024-12-19 RX ADMIN — ALPRAZOLAM 0.25 MG: 0.25 TABLET ORAL at 14:44

## 2024-12-19 RX ADMIN — GUAIFENESIN AND CODEINE PHOSPHATE 5 ML: 100; 10 SOLUTION ORAL at 21:06

## 2024-12-19 RX ADMIN — OLANZAPINE 10 MG: 2.5 TABLET, FILM COATED ORAL at 21:05

## 2024-12-19 RX ADMIN — Medication 5 MG: at 21:05

## 2024-12-19 RX ADMIN — METOPROLOL TARTRATE 25 MG: 25 TABLET, FILM COATED ORAL at 12:20

## 2024-12-19 ASSESSMENT — PAIN SCALES - PAIN ASSESSMENT IN ADVANCED DEMENTIA (PAINAD)
BODYLANGUAGE: RELAXED
BODYLANGUAGE: RELAXED
TOTALSCORE: 0
CONSOLABILITY: NO NEED TO CONSOLE
TOTALSCORE: 0
FACIALEXPRESSION: SMILING OR INEXPRESSIVE
BODYLANGUAGE: RELAXED
BREATHING: NORMAL
TOTALSCORE: 0
FACIALEXPRESSION: SMILING OR INEXPRESSIVE
FACIALEXPRESSION: SMILING OR INEXPRESSIVE
CONSOLABILITY: NO NEED TO CONSOLE
CONSOLABILITY: NO NEED TO CONSOLE

## 2024-12-19 ASSESSMENT — PAIN SCALES - GENERAL
PAINLEVEL_OUTOF10: 0
PAINLEVEL_OUTOF10: 0

## 2024-12-19 NOTE — ACP (ADVANCE CARE PLANNING)
Advance Care Planning     Palliative Team Advance Care Planning (ACP) Conversation    Date of Conversation: 12/19/24    Individuals present for the conversation: Patient, Legal healthcare agent named below, and nieces's spouse.      ACP documents on file prior to discussion:  -Power of  for Healthcare    Previously completed document/s not on file: Patient / participant reports that there are no previously executed ACP documents.    Healthcare Decision Maker:    Primary Decision Maker: Tej Choudhury - Niece/Nephew - [phone]    Secondary Decision Maker: Ruth Choudhury - Niece/Nephew - [phone]     Conversation Summary:  Gio Khalil is a 63 y.o. male with past medical hx of PAD, hepatitis C, etOH use disorder, dyslipidemia, aortic stenosis, past marijuana, tobacco use disorder. Patient seen at bedside, very confused, taking off O2 with episodes of desating.     Palliative team met for family meeting.  Brief medical update was provided  but family is awaiting to speak with Attending about overall care and continued bleeding. Team provided compassionate support to family. Discussed goals of care and patient is not able to return to the home of his niece.  They were made aware that patient's current confused state may be his new normal.  Code status, CPT and Intubation were discussed. Ms Watts have spoken about this decision prior to the meeting and agree to DNR/DNI. DDNR form was signed. [facility] into the EMR.     Resuscitation Status:   Code Status: DNR/DNI     Documentation Completed:  -Portable DNR     Team spent 50 minutes with the patient and/or surrogate decision maker discussing the patient's wishes and goals.      Sonja Sandoval RN

## 2024-12-20 ENCOUNTER — APPOINTMENT (OUTPATIENT)
Facility: HOSPITAL | Age: 64
DRG: 469 | End: 2024-12-20
Payer: MEDICAID

## 2024-12-20 LAB
ALBUMIN SERPL-MCNC: 3.4 G/DL (ref 3.4–5)
ALBUMIN/GLOB SERPL: 0.8 (ref 0.8–1.7)
ALP SERPL-CCNC: 49 U/L (ref 45–117)
ALT SERPL-CCNC: 28 U/L (ref 16–61)
ANION GAP SERPL CALC-SCNC: 7 MMOL/L (ref 3–18)
ARTERIAL PATENCY WRIST A: POSITIVE
AST SERPL-CCNC: 29 U/L (ref 10–38)
BASE EXCESS BLD CALC-SCNC: 4.8 MMOL/L
BASOPHILS # BLD: 0 K/UL (ref 0–0.1)
BASOPHILS NFR BLD: 0 % (ref 0–2)
BDY SITE: ABNORMAL
BILIRUB SERPL-MCNC: 1 MG/DL (ref 0.2–1)
BUN SERPL-MCNC: 39 MG/DL (ref 7–18)
BUN/CREAT SERPL: 13 (ref 12–20)
CALCIUM SERPL-MCNC: 9.1 MG/DL (ref 8.5–10.1)
CHLORIDE SERPL-SCNC: 104 MMOL/L (ref 100–111)
CO2 SERPL-SCNC: 28 MMOL/L (ref 21–32)
CREAT SERPL-MCNC: 2.98 MG/DL (ref 0.6–1.3)
DIFFERENTIAL METHOD BLD: ABNORMAL
EOSINOPHIL # BLD: 0.5 K/UL (ref 0–0.4)
EOSINOPHIL NFR BLD: 4 % (ref 0–5)
ERYTHROCYTE [DISTWIDTH] IN BLOOD BY AUTOMATED COUNT: 15.2 % (ref 11.6–14.5)
GAS FLOW.O2 O2 DELIVERY SYS: ABNORMAL
GLOBULIN SER CALC-MCNC: 4.2 G/DL (ref 2–4)
GLUCOSE SERPL-MCNC: 116 MG/DL (ref 74–99)
HCO3 BLD-SCNC: 29.4 MMOL/L (ref 21–28)
HCT VFR BLD AUTO: 25.8 % (ref 36–48)
HCV GENTYP SERPL NAA+PROBE: NORMAL
HCV RNA SERPL NAA+PROBE-ACNC: NORMAL IU/ML
HCV RNA SERPL NAA+PROBE-LOG IU: NORMAL LOG10 IU/ML
HGB BLD-MCNC: 8.2 G/DL (ref 13–16)
IMM GRANULOCYTES # BLD AUTO: 0 K/UL (ref 0–0.04)
IMM GRANULOCYTES NFR BLD AUTO: 0 % (ref 0–0.5)
LABORATORY COMMENT REPORT: NORMAL
LYMPHOCYTES # BLD: 0.7 K/UL (ref 0.9–3.6)
LYMPHOCYTES NFR BLD: 5 % (ref 21–52)
MCH RBC QN AUTO: 29.7 PG (ref 24–34)
MCHC RBC AUTO-ENTMCNC: 31.8 G/DL (ref 31–37)
MCV RBC AUTO: 93.5 FL (ref 78–100)
MONOCYTES # BLD: 0.8 K/UL (ref 0.05–1.2)
MONOCYTES NFR BLD: 6 % (ref 3–10)
NEUTS SEG # BLD: 11.7 K/UL (ref 1.8–8)
NEUTS SEG NFR BLD: 85 % (ref 40–73)
NRBC # BLD: 0 K/UL (ref 0–0.01)
NRBC BLD-RTO: 0 PER 100 WBC
O2/TOTAL GAS SETTING VFR VENT: 40 %
PCO2 BLD: 42.6 MMHG (ref 35–48)
PH BLD: 7.45 (ref 7.35–7.45)
PLATELET # BLD AUTO: 225 K/UL (ref 135–420)
PLATELET COMMENT: ABNORMAL
PMV BLD AUTO: 11.3 FL (ref 9.2–11.8)
PO2 BLD: 56 MMHG (ref 83–108)
POTASSIUM SERPL-SCNC: 3.9 MMOL/L (ref 3.5–5.5)
PROT SERPL-MCNC: 7.6 G/DL (ref 6.4–8.2)
RBC # BLD AUTO: 2.76 M/UL (ref 4.35–5.65)
RBC MORPH BLD: ABNORMAL
RESPIRATORY RATE, POC: 22 (ref 5–40)
SAO2 % BLD: 89.7 % (ref 92–97)
SERVICE CMNT-IMP: ABNORMAL
SODIUM SERPL-SCNC: 139 MMOL/L (ref 136–145)
SPECIMEN TYPE: ABNORMAL
WBC # BLD AUTO: 13.7 K/UL (ref 4.6–13.2)

## 2024-12-20 PROCEDURE — 82803 BLOOD GASES ANY COMBINATION: CPT

## 2024-12-20 PROCEDURE — 6360000002 HC RX W HCPCS: Performed by: INTERNAL MEDICINE

## 2024-12-20 PROCEDURE — 5A0945A ASSISTANCE WITH RESPIRATORY VENTILATION, 24-96 CONSECUTIVE HOURS, HIGH NASAL FLOW/VELOCITY: ICD-10-PCS | Performed by: STUDENT IN AN ORGANIZED HEALTH CARE EDUCATION/TRAINING PROGRAM

## 2024-12-20 PROCEDURE — 6370000000 HC RX 637 (ALT 250 FOR IP)

## 2024-12-20 PROCEDURE — 71045 X-RAY EXAM CHEST 1 VIEW: CPT

## 2024-12-20 PROCEDURE — 2700000000 HC OXYGEN THERAPY PER DAY

## 2024-12-20 PROCEDURE — 1100000003 HC PRIVATE W/ TELEMETRY

## 2024-12-20 PROCEDURE — 36415 COLL VENOUS BLD VENIPUNCTURE: CPT

## 2024-12-20 PROCEDURE — 2500000003 HC RX 250 WO HCPCS: Performed by: INTERNAL MEDICINE

## 2024-12-20 PROCEDURE — 6370000000 HC RX 637 (ALT 250 FOR IP): Performed by: INTERNAL MEDICINE

## 2024-12-20 PROCEDURE — 85025 COMPLETE CBC W/AUTO DIFF WBC: CPT

## 2024-12-20 PROCEDURE — 6370000000 HC RX 637 (ALT 250 FOR IP): Performed by: HOSPITALIST

## 2024-12-20 PROCEDURE — 94640 AIRWAY INHALATION TREATMENT: CPT

## 2024-12-20 PROCEDURE — 36600 WITHDRAWAL OF ARTERIAL BLOOD: CPT

## 2024-12-20 PROCEDURE — 90935 HEMODIALYSIS ONE EVALUATION: CPT

## 2024-12-20 PROCEDURE — 99232 SBSQ HOSP IP/OBS MODERATE 35: CPT | Performed by: INTERNAL MEDICINE

## 2024-12-20 PROCEDURE — 6370000000 HC RX 637 (ALT 250 FOR IP): Performed by: FAMILY MEDICINE

## 2024-12-20 PROCEDURE — 99232 SBSQ HOSP IP/OBS MODERATE 35: CPT | Performed by: STUDENT IN AN ORGANIZED HEALTH CARE EDUCATION/TRAINING PROGRAM

## 2024-12-20 PROCEDURE — 80053 COMPREHEN METABOLIC PANEL: CPT

## 2024-12-20 RX ORDER — OLANZAPINE 2.5 MG/1
5 TABLET, FILM COATED ORAL EVERY 12 HOURS PRN
Status: DISCONTINUED | OUTPATIENT
Start: 2024-12-20 | End: 2024-12-23 | Stop reason: HOSPADM

## 2024-12-20 RX ADMIN — GUAIFENESIN AND CODEINE PHOSPHATE 5 ML: 100; 10 SOLUTION ORAL at 04:23

## 2024-12-20 RX ADMIN — ASPIRIN 81 MG CHEWABLE TABLET 81 MG: 81 TABLET CHEWABLE at 08:51

## 2024-12-20 RX ADMIN — ARFORMOTEROL TARTRATE 15 MCG: 15 SOLUTION RESPIRATORY (INHALATION) at 08:48

## 2024-12-20 RX ADMIN — SODIUM CHLORIDE, PRESERVATIVE FREE 10 ML: 5 INJECTION INTRAVENOUS at 11:28

## 2024-12-20 RX ADMIN — ATORVASTATIN CALCIUM 40 MG: 40 TABLET, FILM COATED ORAL at 22:37

## 2024-12-20 RX ADMIN — OLANZAPINE 10 MG: 2.5 TABLET, FILM COATED ORAL at 22:33

## 2024-12-20 RX ADMIN — EPOETIN ALFA-EPBX 3000 UNITS: 3000 INJECTION, SOLUTION INTRAVENOUS; SUBCUTANEOUS at 17:06

## 2024-12-20 RX ADMIN — Medication 5 MG: at 22:33

## 2024-12-20 RX ADMIN — BUDESONIDE 1 MG: 1 SUSPENSION RESPIRATORY (INHALATION) at 08:48

## 2024-12-20 RX ADMIN — METOPROLOL TARTRATE 25 MG: 25 TABLET, FILM COATED ORAL at 22:33

## 2024-12-20 RX ADMIN — Medication 1 TABLET: at 08:52

## 2024-12-20 RX ADMIN — OLANZAPINE 5 MG: 2.5 TABLET, FILM COATED ORAL at 08:51

## 2024-12-20 RX ADMIN — BUDESONIDE 1 MG: 1 SUSPENSION RESPIRATORY (INHALATION) at 20:51

## 2024-12-20 RX ADMIN — SODIUM CHLORIDE, PRESERVATIVE FREE 10 ML: 5 INJECTION INTRAVENOUS at 22:41

## 2024-12-20 RX ADMIN — THIAMINE HCL TAB 100 MG 100 MG: 100 TAB at 08:51

## 2024-12-20 RX ADMIN — ARFORMOTEROL TARTRATE 15 MCG: 15 SOLUTION RESPIRATORY (INHALATION) at 20:51

## 2024-12-20 RX ADMIN — IRON SUCROSE 100 MG: 20 INJECTION, SOLUTION INTRAVENOUS at 22:28

## 2024-12-20 RX ADMIN — SODIUM CHLORIDE, PRESERVATIVE FREE 10 ML: 5 INJECTION INTRAVENOUS at 08:54

## 2024-12-20 ASSESSMENT — PAIN SCALES - GENERAL
PAINLEVEL_OUTOF10: 0

## 2024-12-21 LAB
ALBUMIN SERPL-MCNC: 3.2 G/DL (ref 3.4–5)
ALBUMIN/GLOB SERPL: 0.7 (ref 0.8–1.7)
ALP SERPL-CCNC: 50 U/L (ref 45–117)
ALT SERPL-CCNC: 25 U/L (ref 16–61)
ANION GAP BLD CALC-SCNC: ABNORMAL MMOL/L (ref 10–20)
ANION GAP SERPL CALC-SCNC: 7 MMOL/L (ref 3–18)
ARTERIAL PATENCY WRIST A: ABNORMAL
AST SERPL-CCNC: 25 U/L (ref 10–38)
BASE EXCESS BLD CALC-SCNC: 3.7 MMOL/L
BASOPHILS # BLD: 0 K/UL (ref 0–0.1)
BASOPHILS NFR BLD: 0 % (ref 0–2)
BDY SITE: ABNORMAL
BILIRUB SERPL-MCNC: 0.8 MG/DL (ref 0.2–1)
BUN SERPL-MCNC: 40 MG/DL (ref 7–18)
BUN/CREAT SERPL: 15 (ref 12–20)
CA-I BLD-MCNC: 1.23 MMOL/L (ref 1.15–1.33)
CALCIUM SERPL-MCNC: 8.8 MG/DL (ref 8.5–10.1)
CHLORIDE BLD-SCNC: 98 MMOL/L (ref 98–107)
CHLORIDE SERPL-SCNC: 101 MMOL/L (ref 100–111)
CO2 BLD-SCNC: 28 MMOL/L (ref 22–29)
CO2 SERPL-SCNC: 27 MMOL/L (ref 21–32)
CREAT BLD-MCNC: 2.37 MG/DL (ref 0.6–1.3)
CREAT SERPL-MCNC: 2.7 MG/DL (ref 0.6–1.3)
DIFFERENTIAL METHOD BLD: ABNORMAL
EOSINOPHIL # BLD: 0.3 K/UL (ref 0–0.4)
EOSINOPHIL NFR BLD: 2 % (ref 0–5)
ERYTHROCYTE [DISTWIDTH] IN BLOOD BY AUTOMATED COUNT: 15.7 % (ref 11.6–14.5)
GAS FLOW.O2 O2 DELIVERY SYS: ABNORMAL
GLOBULIN SER CALC-MCNC: 4.5 G/DL (ref 2–4)
GLUCOSE BLD-MCNC: 125 MG/DL (ref 74–99)
GLUCOSE SERPL-MCNC: 102 MG/DL (ref 74–99)
HCO3 BLD-SCNC: 28.9 MMOL/L (ref 21–28)
HCT VFR BLD AUTO: 26.1 % (ref 36–48)
HGB BLD-MCNC: 8.3 G/DL (ref 13–16)
IMM GRANULOCYTES # BLD AUTO: 0 K/UL (ref 0–0.04)
IMM GRANULOCYTES NFR BLD AUTO: 0 % (ref 0–0.5)
LACTATE BLD-SCNC: 0.71 MMOL/L (ref 0.4–2)
LYMPHOCYTES # BLD: 2 K/UL (ref 0.9–3.6)
LYMPHOCYTES NFR BLD: 14 % (ref 21–52)
MAGNESIUM SERPL-MCNC: 1.9 MG/DL (ref 1.6–2.6)
MCH RBC QN AUTO: 30.3 PG (ref 24–34)
MCHC RBC AUTO-ENTMCNC: 31.8 G/DL (ref 31–37)
MCV RBC AUTO: 95.3 FL (ref 78–100)
MONOCYTES # BLD: 1.8 K/UL (ref 0.05–1.2)
MONOCYTES NFR BLD: 12 % (ref 3–10)
NEUTS SEG # BLD: 10.5 K/UL (ref 1.8–8)
NEUTS SEG NFR BLD: 72 % (ref 40–73)
NRBC # BLD: 0 K/UL (ref 0–0.01)
NRBC BLD-RTO: 0 PER 100 WBC
PCO2 BLD: 46.6 MMHG (ref 35–48)
PH BLD: 7.4 (ref 7.35–7.45)
PLATELET # BLD AUTO: 233 K/UL (ref 135–420)
PLATELET COMMENT: ABNORMAL
PMV BLD AUTO: 10.9 FL (ref 9.2–11.8)
PO2 BLD: 96 MMHG (ref 83–108)
POTASSIUM BLD-SCNC: 4.6 MMOL/L (ref 3.5–5.1)
POTASSIUM SERPL-SCNC: 3.4 MMOL/L (ref 3.5–5.5)
PROCALCITONIN SERPL-MCNC: 0.21 NG/ML
PROT SERPL-MCNC: 7.7 G/DL (ref 6.4–8.2)
RBC # BLD AUTO: 2.74 M/UL (ref 4.35–5.65)
RBC MORPH BLD: ABNORMAL
RESPIRATORY RATE, POC: 18 (ref 5–40)
SAO2 % BLD: 97 % (ref 94–98)
SERVICE CMNT-IMP: ABNORMAL
SODIUM BLD-SCNC: 138 MMOL/L (ref 136–145)
SODIUM SERPL-SCNC: 135 MMOL/L (ref 136–145)
SPECIMEN SITE: ABNORMAL
WBC # BLD AUTO: 14.6 K/UL (ref 4.6–13.2)

## 2024-12-21 PROCEDURE — 80053 COMPREHEN METABOLIC PANEL: CPT

## 2024-12-21 PROCEDURE — 83605 ASSAY OF LACTIC ACID: CPT

## 2024-12-21 PROCEDURE — 2580000003 HC RX 258: Performed by: INTERNAL MEDICINE

## 2024-12-21 PROCEDURE — 2500000003 HC RX 250 WO HCPCS: Performed by: INTERNAL MEDICINE

## 2024-12-21 PROCEDURE — 1100000003 HC PRIVATE W/ TELEMETRY

## 2024-12-21 PROCEDURE — 6370000000 HC RX 637 (ALT 250 FOR IP): Performed by: HOSPITALIST

## 2024-12-21 PROCEDURE — 83735 ASSAY OF MAGNESIUM: CPT

## 2024-12-21 PROCEDURE — 94660 CPAP INITIATION&MGMT: CPT

## 2024-12-21 PROCEDURE — 6360000002 HC RX W HCPCS: Performed by: INTERNAL MEDICINE

## 2024-12-21 PROCEDURE — 84132 ASSAY OF SERUM POTASSIUM: CPT

## 2024-12-21 PROCEDURE — 36600 WITHDRAWAL OF ARTERIAL BLOOD: CPT

## 2024-12-21 PROCEDURE — 94761 N-INVAS EAR/PLS OXIMETRY MLT: CPT

## 2024-12-21 PROCEDURE — 6370000000 HC RX 637 (ALT 250 FOR IP): Performed by: STUDENT IN AN ORGANIZED HEALTH CARE EDUCATION/TRAINING PROGRAM

## 2024-12-21 PROCEDURE — 2700000000 HC OXYGEN THERAPY PER DAY

## 2024-12-21 PROCEDURE — 84295 ASSAY OF SERUM SODIUM: CPT

## 2024-12-21 PROCEDURE — 85025 COMPLETE CBC W/AUTO DIFF WBC: CPT

## 2024-12-21 PROCEDURE — 82330 ASSAY OF CALCIUM: CPT

## 2024-12-21 PROCEDURE — 36415 COLL VENOUS BLD VENIPUNCTURE: CPT

## 2024-12-21 PROCEDURE — 6370000000 HC RX 637 (ALT 250 FOR IP)

## 2024-12-21 PROCEDURE — 99233 SBSQ HOSP IP/OBS HIGH 50: CPT | Performed by: STUDENT IN AN ORGANIZED HEALTH CARE EDUCATION/TRAINING PROGRAM

## 2024-12-21 PROCEDURE — 85014 HEMATOCRIT: CPT

## 2024-12-21 PROCEDURE — 6370000000 HC RX 637 (ALT 250 FOR IP): Performed by: INTERNAL MEDICINE

## 2024-12-21 PROCEDURE — 82947 ASSAY GLUCOSE BLOOD QUANT: CPT

## 2024-12-21 PROCEDURE — 82803 BLOOD GASES ANY COMBINATION: CPT

## 2024-12-21 PROCEDURE — 84145 PROCALCITONIN (PCT): CPT

## 2024-12-21 PROCEDURE — 94640 AIRWAY INHALATION TREATMENT: CPT

## 2024-12-21 RX ORDER — SODIUM CHLORIDE, SODIUM LACTATE, POTASSIUM CHLORIDE, CALCIUM CHLORIDE 600; 310; 30; 20 MG/100ML; MG/100ML; MG/100ML; MG/100ML
INJECTION, SOLUTION INTRAVENOUS CONTINUOUS
Status: DISCONTINUED | OUTPATIENT
Start: 2024-12-21 | End: 2024-12-22

## 2024-12-21 RX ADMIN — SODIUM CHLORIDE, SODIUM LACTATE, POTASSIUM CHLORIDE, AND CALCIUM CHLORIDE: 600; 310; 30; 20 INJECTION, SOLUTION INTRAVENOUS at 11:39

## 2024-12-21 RX ADMIN — POLYETHYLENE GLYCOL 3350 17 G: 17 POWDER, FOR SOLUTION ORAL at 08:29

## 2024-12-21 RX ADMIN — ASPIRIN 81 MG CHEWABLE TABLET 81 MG: 81 TABLET CHEWABLE at 08:31

## 2024-12-21 RX ADMIN — ALPRAZOLAM 0.25 MG: 0.25 TABLET ORAL at 03:53

## 2024-12-21 RX ADMIN — BUDESONIDE 1 MG: 1 SUSPENSION RESPIRATORY (INHALATION) at 21:43

## 2024-12-21 RX ADMIN — BUDESONIDE 1 MG: 1 SUSPENSION RESPIRATORY (INHALATION) at 09:21

## 2024-12-21 RX ADMIN — THIAMINE HCL TAB 100 MG 100 MG: 100 TAB at 08:31

## 2024-12-21 RX ADMIN — ARFORMOTEROL TARTRATE 15 MCG: 15 SOLUTION RESPIRATORY (INHALATION) at 09:20

## 2024-12-21 RX ADMIN — SODIUM CHLORIDE, PRESERVATIVE FREE 10 ML: 5 INJECTION INTRAVENOUS at 08:31

## 2024-12-21 RX ADMIN — Medication 1 TABLET: at 08:31

## 2024-12-21 RX ADMIN — POTASSIUM CHLORIDE 40 MEQ: 1500 TABLET, EXTENDED RELEASE ORAL at 08:29

## 2024-12-21 RX ADMIN — METOPROLOL TARTRATE 25 MG: 25 TABLET, FILM COATED ORAL at 08:31

## 2024-12-21 RX ADMIN — ARFORMOTEROL TARTRATE 15 MCG: 15 SOLUTION RESPIRATORY (INHALATION) at 21:42

## 2024-12-21 ASSESSMENT — PAIN SCALES - GENERAL
PAINLEVEL_OUTOF10: 0

## 2024-12-22 LAB
ALBUMIN SERPL-MCNC: 2.8 G/DL (ref 3.4–5)
ALBUMIN/GLOB SERPL: 0.7 (ref 0.8–1.7)
ALP SERPL-CCNC: 49 U/L (ref 45–117)
ALT SERPL-CCNC: 18 U/L (ref 16–61)
ANION GAP SERPL CALC-SCNC: 8 MMOL/L (ref 3–18)
AST SERPL-CCNC: 21 U/L (ref 10–38)
BASOPHILS # BLD: 0 K/UL (ref 0–0.1)
BASOPHILS NFR BLD: 0 % (ref 0–2)
BILIRUB SERPL-MCNC: 0.6 MG/DL (ref 0.2–1)
BUN SERPL-MCNC: 32 MG/DL (ref 7–18)
BUN/CREAT SERPL: 15 (ref 12–20)
CALCIUM SERPL-MCNC: 8.8 MG/DL (ref 8.5–10.1)
CHLORIDE SERPL-SCNC: 106 MMOL/L (ref 100–111)
CO2 SERPL-SCNC: 26 MMOL/L (ref 21–32)
CREAT SERPL-MCNC: 2.08 MG/DL (ref 0.6–1.3)
DIFFERENTIAL METHOD BLD: ABNORMAL
EOSINOPHIL # BLD: 0.8 K/UL (ref 0–0.4)
EOSINOPHIL NFR BLD: 6 % (ref 0–5)
ERYTHROCYTE [DISTWIDTH] IN BLOOD BY AUTOMATED COUNT: 16.1 % (ref 11.6–14.5)
GLOBULIN SER CALC-MCNC: 3.8 G/DL (ref 2–4)
GLUCOSE SERPL-MCNC: 88 MG/DL (ref 74–99)
HCT VFR BLD AUTO: 23.1 % (ref 36–48)
HGB BLD-MCNC: 7.2 G/DL (ref 13–16)
IMM GRANULOCYTES # BLD AUTO: 0.1 K/UL (ref 0–0.04)
IMM GRANULOCYTES NFR BLD AUTO: 1 % (ref 0–0.5)
LYMPHOCYTES # BLD: 2.3 K/UL (ref 0.9–3.6)
LYMPHOCYTES NFR BLD: 18 % (ref 21–52)
MCH RBC QN AUTO: 29.6 PG (ref 24–34)
MCHC RBC AUTO-ENTMCNC: 31.2 G/DL (ref 31–37)
MCV RBC AUTO: 95.1 FL (ref 78–100)
MONOCYTES # BLD: 1.7 K/UL (ref 0.05–1.2)
MONOCYTES NFR BLD: 13 % (ref 3–10)
NEUTS SEG # BLD: 8.1 K/UL (ref 1.8–8)
NEUTS SEG NFR BLD: 62 % (ref 40–73)
NRBC # BLD: 0 K/UL (ref 0–0.01)
NRBC BLD-RTO: 0 PER 100 WBC
PLATELET # BLD AUTO: 276 K/UL (ref 135–420)
PMV BLD AUTO: 11.4 FL (ref 9.2–11.8)
POTASSIUM SERPL-SCNC: 4 MMOL/L (ref 3.5–5.5)
PROT SERPL-MCNC: 6.6 G/DL (ref 6.4–8.2)
RBC # BLD AUTO: 2.43 M/UL (ref 4.35–5.65)
SODIUM SERPL-SCNC: 140 MMOL/L (ref 136–145)
WBC # BLD AUTO: 13.1 K/UL (ref 4.6–13.2)

## 2024-12-22 PROCEDURE — 2500000003 HC RX 250 WO HCPCS: Performed by: INTERNAL MEDICINE

## 2024-12-22 PROCEDURE — 36415 COLL VENOUS BLD VENIPUNCTURE: CPT

## 2024-12-22 PROCEDURE — 2580000003 HC RX 258: Performed by: INTERNAL MEDICINE

## 2024-12-22 PROCEDURE — 99232 SBSQ HOSP IP/OBS MODERATE 35: CPT | Performed by: STUDENT IN AN ORGANIZED HEALTH CARE EDUCATION/TRAINING PROGRAM

## 2024-12-22 PROCEDURE — 1100000003 HC PRIVATE W/ TELEMETRY

## 2024-12-22 PROCEDURE — 6360000002 HC RX W HCPCS: Performed by: INTERNAL MEDICINE

## 2024-12-22 PROCEDURE — 6370000000 HC RX 637 (ALT 250 FOR IP): Performed by: INTERNAL MEDICINE

## 2024-12-22 PROCEDURE — 80053 COMPREHEN METABOLIC PANEL: CPT

## 2024-12-22 PROCEDURE — 2700000000 HC OXYGEN THERAPY PER DAY

## 2024-12-22 PROCEDURE — 94761 N-INVAS EAR/PLS OXIMETRY MLT: CPT

## 2024-12-22 PROCEDURE — 85025 COMPLETE CBC W/AUTO DIFF WBC: CPT

## 2024-12-22 PROCEDURE — 94640 AIRWAY INHALATION TREATMENT: CPT

## 2024-12-22 PROCEDURE — 6370000000 HC RX 637 (ALT 250 FOR IP)

## 2024-12-22 PROCEDURE — 6370000000 HC RX 637 (ALT 250 FOR IP): Performed by: HOSPITALIST

## 2024-12-22 RX ADMIN — SODIUM CHLORIDE, SODIUM LACTATE, POTASSIUM CHLORIDE, AND CALCIUM CHLORIDE: 600; 310; 30; 20 INJECTION, SOLUTION INTRAVENOUS at 00:45

## 2024-12-22 RX ADMIN — Medication 5 MG: at 20:09

## 2024-12-22 RX ADMIN — ATORVASTATIN CALCIUM 40 MG: 40 TABLET, FILM COATED ORAL at 20:09

## 2024-12-22 RX ADMIN — BUDESONIDE 1 MG: 1 SUSPENSION RESPIRATORY (INHALATION) at 19:31

## 2024-12-22 RX ADMIN — SODIUM CHLORIDE, PRESERVATIVE FREE 10 ML: 5 INJECTION INTRAVENOUS at 09:16

## 2024-12-22 RX ADMIN — METOPROLOL TARTRATE 25 MG: 25 TABLET, FILM COATED ORAL at 09:15

## 2024-12-22 RX ADMIN — ARFORMOTEROL TARTRATE 15 MCG: 15 SOLUTION RESPIRATORY (INHALATION) at 08:41

## 2024-12-22 RX ADMIN — SODIUM CHLORIDE, PRESERVATIVE FREE 10 ML: 5 INJECTION INTRAVENOUS at 02:12

## 2024-12-22 RX ADMIN — THIAMINE HCL TAB 100 MG 100 MG: 100 TAB at 09:14

## 2024-12-22 RX ADMIN — METOPROLOL TARTRATE 25 MG: 25 TABLET, FILM COATED ORAL at 20:09

## 2024-12-22 RX ADMIN — BUDESONIDE 1 MG: 1 SUSPENSION RESPIRATORY (INHALATION) at 08:41

## 2024-12-22 RX ADMIN — IRON SUCROSE 200 MG: 20 INJECTION, SOLUTION INTRAVENOUS at 14:47

## 2024-12-22 RX ADMIN — ARFORMOTEROL TARTRATE 15 MCG: 15 SOLUTION RESPIRATORY (INHALATION) at 19:31

## 2024-12-22 RX ADMIN — ASPIRIN 81 MG CHEWABLE TABLET 81 MG: 81 TABLET CHEWABLE at 09:14

## 2024-12-22 RX ADMIN — Medication 1 TABLET: at 09:14

## 2024-12-22 RX ADMIN — OLANZAPINE 10 MG: 2.5 TABLET, FILM COATED ORAL at 20:09

## 2024-12-22 RX ADMIN — SODIUM CHLORIDE, PRESERVATIVE FREE 10 ML: 5 INJECTION INTRAVENOUS at 20:10

## 2024-12-22 ASSESSMENT — PAIN SCALES - GENERAL
PAINLEVEL_OUTOF10: 0

## 2024-12-23 VITALS
WEIGHT: 120.4 LBS | OXYGEN SATURATION: 97 % | TEMPERATURE: 98.7 F | HEART RATE: 63 BPM | DIASTOLIC BLOOD PRESSURE: 55 MMHG | HEIGHT: 66 IN | BODY MASS INDEX: 19.35 KG/M2 | RESPIRATION RATE: 19 BRPM | SYSTOLIC BLOOD PRESSURE: 135 MMHG

## 2024-12-23 LAB
ALBUMIN SERPL-MCNC: 2.7 G/DL (ref 3.4–5)
ALBUMIN/GLOB SERPL: 0.7 (ref 0.8–1.7)
ALP SERPL-CCNC: 50 U/L (ref 45–117)
ALT SERPL-CCNC: 22 U/L (ref 16–61)
ANION GAP SERPL CALC-SCNC: 6 MMOL/L (ref 3–18)
AST SERPL-CCNC: 28 U/L (ref 10–38)
BASOPHILS # BLD: 0 K/UL (ref 0–0.1)
BASOPHILS NFR BLD: 0 % (ref 0–2)
BILIRUB SERPL-MCNC: 0.6 MG/DL (ref 0.2–1)
BUN SERPL-MCNC: 22 MG/DL (ref 7–18)
BUN/CREAT SERPL: 13 (ref 12–20)
CALCIUM SERPL-MCNC: 8.5 MG/DL (ref 8.5–10.1)
CHLORIDE SERPL-SCNC: 105 MMOL/L (ref 100–111)
CO2 SERPL-SCNC: 28 MMOL/L (ref 21–32)
CREAT SERPL-MCNC: 1.63 MG/DL (ref 0.6–1.3)
DIFFERENTIAL METHOD BLD: ABNORMAL
EOSINOPHIL # BLD: 0.7 K/UL (ref 0–0.4)
EOSINOPHIL NFR BLD: 6 % (ref 0–5)
ERYTHROCYTE [DISTWIDTH] IN BLOOD BY AUTOMATED COUNT: 16 % (ref 11.6–14.5)
GLOBULIN SER CALC-MCNC: 3.7 G/DL (ref 2–4)
GLUCOSE SERPL-MCNC: 93 MG/DL (ref 74–99)
HCT VFR BLD AUTO: 23.3 % (ref 36–48)
HGB BLD-MCNC: 7.2 G/DL (ref 13–16)
IMM GRANULOCYTES # BLD AUTO: 0.1 K/UL (ref 0–0.04)
IMM GRANULOCYTES NFR BLD AUTO: 1 % (ref 0–0.5)
LYMPHOCYTES # BLD: 2.4 K/UL (ref 0.9–3.6)
LYMPHOCYTES NFR BLD: 21 % (ref 21–52)
MAGNESIUM SERPL-MCNC: 1.6 MG/DL (ref 1.6–2.6)
MCH RBC QN AUTO: 29.1 PG (ref 24–34)
MCHC RBC AUTO-ENTMCNC: 30.9 G/DL (ref 31–37)
MCV RBC AUTO: 94.3 FL (ref 78–100)
MONOCYTES # BLD: 1.5 K/UL (ref 0.05–1.2)
MONOCYTES NFR BLD: 13 % (ref 3–10)
NEUTS SEG # BLD: 7 K/UL (ref 1.8–8)
NEUTS SEG NFR BLD: 59 % (ref 40–73)
NRBC # BLD: 0 K/UL (ref 0–0.01)
NRBC BLD-RTO: 0 PER 100 WBC
PLATELET # BLD AUTO: 301 K/UL (ref 135–420)
PMV BLD AUTO: 11 FL (ref 9.2–11.8)
POTASSIUM SERPL-SCNC: 3.5 MMOL/L (ref 3.5–5.5)
PROT SERPL-MCNC: 6.4 G/DL (ref 6.4–8.2)
RBC # BLD AUTO: 2.47 M/UL (ref 4.35–5.65)
SODIUM SERPL-SCNC: 139 MMOL/L (ref 136–145)
WBC # BLD AUTO: 11.8 K/UL (ref 4.6–13.2)

## 2024-12-23 PROCEDURE — 2700000000 HC OXYGEN THERAPY PER DAY

## 2024-12-23 PROCEDURE — 94761 N-INVAS EAR/PLS OXIMETRY MLT: CPT

## 2024-12-23 PROCEDURE — 80053 COMPREHEN METABOLIC PANEL: CPT

## 2024-12-23 PROCEDURE — 6370000000 HC RX 637 (ALT 250 FOR IP): Performed by: INTERNAL MEDICINE

## 2024-12-23 PROCEDURE — 83735 ASSAY OF MAGNESIUM: CPT

## 2024-12-23 PROCEDURE — 97535 SELF CARE MNGMENT TRAINING: CPT

## 2024-12-23 PROCEDURE — 97168 OT RE-EVAL EST PLAN CARE: CPT

## 2024-12-23 PROCEDURE — 6360000002 HC RX W HCPCS: Performed by: INTERNAL MEDICINE

## 2024-12-23 PROCEDURE — 6370000000 HC RX 637 (ALT 250 FOR IP): Performed by: HOSPITALIST

## 2024-12-23 PROCEDURE — 97164 PT RE-EVAL EST PLAN CARE: CPT

## 2024-12-23 PROCEDURE — 94640 AIRWAY INHALATION TREATMENT: CPT

## 2024-12-23 PROCEDURE — 36415 COLL VENOUS BLD VENIPUNCTURE: CPT

## 2024-12-23 PROCEDURE — 6370000000 HC RX 637 (ALT 250 FOR IP)

## 2024-12-23 PROCEDURE — 2500000003 HC RX 250 WO HCPCS: Performed by: INTERNAL MEDICINE

## 2024-12-23 PROCEDURE — 99239 HOSP IP/OBS DSCHRG MGMT >30: CPT | Performed by: STUDENT IN AN ORGANIZED HEALTH CARE EDUCATION/TRAINING PROGRAM

## 2024-12-23 PROCEDURE — 85025 COMPLETE CBC W/AUTO DIFF WBC: CPT

## 2024-12-23 RX ORDER — NICOTINE 21 MG/24HR
1 PATCH, TRANSDERMAL 24 HOURS TRANSDERMAL DAILY
Qty: 30 PATCH | Refills: 3 | Status: SHIPPED | OUTPATIENT
Start: 2024-12-24

## 2024-12-23 RX ORDER — METOPROLOL TARTRATE 25 MG/1
25 TABLET, FILM COATED ORAL 2 TIMES DAILY
Qty: 60 TABLET | Refills: 3 | Status: SHIPPED | OUTPATIENT
Start: 2024-12-23

## 2024-12-23 RX ORDER — ATORVASTATIN CALCIUM 40 MG/1
40 TABLET, FILM COATED ORAL NIGHTLY
Qty: 30 TABLET | Refills: 3 | Status: SHIPPED | OUTPATIENT
Start: 2024-12-23

## 2024-12-23 RX ORDER — CHOLECALCIFEROL (VITAMIN D3) 10(400)/ML
DROPS ORAL
Qty: 30 TABLET | Refills: 0 | Status: SHIPPED | OUTPATIENT
Start: 2024-12-23

## 2024-12-23 RX ORDER — THIAMINE MONONITRATE (VIT B1) 100 MG
100 TABLET ORAL DAILY
Qty: 90 TABLET | Refills: 0 | Status: SHIPPED | OUTPATIENT
Start: 2024-12-24

## 2024-12-23 RX ADMIN — ASPIRIN 81 MG CHEWABLE TABLET 81 MG: 81 TABLET CHEWABLE at 10:44

## 2024-12-23 RX ADMIN — SODIUM CHLORIDE, PRESERVATIVE FREE 10 ML: 5 INJECTION INTRAVENOUS at 08:34

## 2024-12-23 RX ADMIN — BUDESONIDE 1 MG: 1 SUSPENSION RESPIRATORY (INHALATION) at 08:18

## 2024-12-23 RX ADMIN — ARFORMOTEROL TARTRATE 15 MCG: 15 SOLUTION RESPIRATORY (INHALATION) at 08:17

## 2024-12-23 RX ADMIN — METOPROLOL TARTRATE 25 MG: 25 TABLET, FILM COATED ORAL at 10:57

## 2024-12-23 RX ADMIN — THIAMINE HCL TAB 100 MG 100 MG: 100 TAB at 10:44

## 2024-12-23 RX ADMIN — Medication 1 TABLET: at 10:44

## 2024-12-23 ASSESSMENT — PAIN SCALES - GENERAL
PAINLEVEL_OUTOF10: 0

## 2024-12-23 NOTE — PROGRESS NOTES
RENAL DAILY PROGRESS NOTE      63-year-old male with past medical history of alcohol abuse disorder, valvular heart disease, admitted for altered mental status following for renal failure.  Subjective:       Complaint:   Overnight events noted  Agitated   Not able to finish HD yesterday because of chest pain, and short of breath  Sleeping comfortable this morning, sitter outside      IMPRESSION:   Acute kidney injury.  Appears to be related to hypovolemia in the setting of influenza infection.  However urine analysis does reveal  blood and protein.  As per him there is significant history of NSAID use.  Serology work up so far negative,   Access, temporary dialysis catheter  History of severe aortic stenosis with moderate aortic regurgitation and severe left atrial dilatation on previous echocardiogram  Nicotine dependence, peripheral vascular disease  Alcohol abuse  Chronic pain  Hx Hepatitis C   PLAN:   NO plan for HD today   Monitor renal function recovery     Discussed with primary team.       Current Facility-Administered Medications   Medication Dose Route Frequency    sodium chloride flush 0.9 % injection 5-40 mL  5-40 mL IntraVENous 2 times per day    sodium chloride flush 0.9 % injection 5-40 mL  5-40 mL IntraVENous PRN    0.9 % sodium chloride infusion   IntraVENous PRN    acetaminophen (TYLENOL) tablet 650 mg  650 mg Oral Q4H PRN    lactulose (CHRONULAC) 10 GM/15ML solution 10 g  10 g Oral TID PRN    0.9 % sodium chloride infusion   IntraVENous PRN    ALPRAZolam (XANAX) tablet 0.25 mg  0.25 mg Oral Nightly PRN    ipratropium 0.5 mg-albuterol 2.5 mg (DUONEB) nebulizer solution 1 Dose  1 Dose Inhalation Q4H PRN    metoprolol tartrate (LOPRESSOR) tablet 25 mg  25 mg Oral BID    budesonide (PULMICORT) nebulizer suspension 1 mg  1 mg Nebulization BID    arformoterol tartrate (BROVANA) nebulizer solution 15 mcg  15 mcg Nebulization BID RT    atorvastatin (LIPITOR) tablet 40 mg  40 mg Oral Nightly 
              RENAL DAILY PROGRESS NOTE      63-year-old male with past medical history of alcohol abuse disorder, valvular heart disease, admitted for altered mental status following for renal failure.  Subjective:       Complaint:   Overnight events noted  Awake, alert  Denies for any chest pain , short of breath   Documented urine output about 375cc      IMPRESSION:   Acute kidney injury.  Appears to be related to hypovolemia in the setting of influenza infection.  However urine analysis does reveal  blood and protein.  As per him there is significant history of NSAID use.  Serology work up so far negative,   Access, temporary dialysis catheter  History of severe aortic stenosis with moderate aortic regurgitation and severe left atrial dilatation on previous echocardiogram  Nicotine dependence, peripheral vascular disease  Alcohol abuse  Chronic pain  Hx Hepatitis C   PLAN:   He is clearance remained poor.  Plan to get TDC placed and continue dialysis as [facility] for next few weeks.  Monitor for renal function recovery.  NO plan for HD today.   I have modified vascular team for TDC placement.     Discussed with primary team.       Current Facility-Administered Medications   Medication Dose Route Frequency    potassium chloride (KLOR-CON M) extended release tablet 40 mEq  40 mEq Oral PRN    Or    potassium bicarb-citric acid (EFFER-K) effervescent tablet 40 mEq  40 mEq Oral PRN    Or    potassium chloride 10 mEq/100 mL IVPB (Peripheral Line)  10 mEq IntraVENous PRN    melatonin disintegrating tablet 5 mg  5 mg Oral Nightly    sodium chloride flush 0.9 % injection 5-40 mL  5-40 mL IntraVENous 2 times per day    sodium chloride flush 0.9 % injection 5-40 mL  5-40 mL IntraVENous PRN    0.9 % sodium chloride infusion   IntraVENous PRN    acetaminophen (TYLENOL) tablet 650 mg  650 mg Oral Q4H PRN    lactulose (CHRONULAC) 10 GM/15ML solution 10 g  10 g Oral TID PRN    0.9 % sodium chloride infusion   IntraVENous PRN    
              RENAL DAILY PROGRESS NOTE      63-year-old male with past medical history of alcohol abuse disorder, valvular heart disease, admitted for altered mental status following for renal failure.  Subjective:       Complaint:   Overnight events noted  Family at bedside  Easily get out of breath with minimal exertion  Documented urine output about 550cc    IMPRESSION:   Acute kidney injury.  Appears to be related to hypovolemia in the setting of influenza infection.  However urine analysis does reveal  blood and protein.  As per him there is significant history of NSAID use.  Serology work up so far negative,   Access, temporary dialysis catheter  History of severe aortic stenosis with moderate aortic regurgitation and severe left atrial dilatation on previous echocardiogram  Nicotine dependence, peripheral vascular disease  Alcohol abuse  Chronic pain  Hx Hepatitis C   PLAN:   He is renal recovery remained poor.  Will likely need ongoing dialysis support for now.  Plan for dialysis today about 2 hours,  cc to 1 L as tolerated.  With his presentation and so far available serology result low suspicion of GN, will hold biopsy for now.  Adjust medication per current renal function status.           Current Facility-Administered Medications   Medication Dose Route Frequency    ipratropium 0.5 mg-albuterol 2.5 mg (DUONEB) nebulizer solution 1 Dose  1 Dose Inhalation Q4H PRN    metoprolol tartrate (LOPRESSOR) tablet 25 mg  25 mg Oral BID    0.9 % sodium chloride infusion   IntraVENous PRN    atorvastatin (LIPITOR) tablet 40 mg  40 mg Oral Nightly    sodium chloride flush 0.9 % injection 5-40 mL  5-40 mL IntraVENous 2 times per day    sodium chloride flush 0.9 % injection 5-40 mL  5-40 mL IntraVENous PRN    0.9 % sodium chloride infusion   IntraVENous PRN    thiamine mononitrate tablet 100 mg  100 mg Oral Daily    aspirin chewable tablet 81 mg  81 mg Oral Daily    balance B-50 tablet 1 tablet  1 tablet Oral 
              RENAL DAILY PROGRESS NOTE      63-year-old male with past medical history of alcohol abuse disorder, valvular heart disease, admitted for altered mental status following for renal failure.  Subjective:       Complaint:   Overnight events noted  Looks comfortable   Received gentle iv hydration  , had episode of dyspnea last night   Documented urine output about 900cc      IMPRESSION:   Acute kidney injury.  Appears to be related to hypovolemia in the setting of influenza infection.  However urine analysis does reveal  blood and protein.  As per him there is significant history of NSAID use.  Serology work up so far negative,   Access, temporary dialysis catheter  History of severe aortic stenosis with moderate aortic regurgitation and severe left atrial dilatation on previous echocardiogram  Nicotine dependence, peripheral vascular disease  Alcohol abuse  Chronic pain  Hx Hepatitis C   PLAN:   NO plan for dialysis today   Schedule for cath tomorrow, will hold iv hydration given incident of dyspnea last night.            Current Facility-Administered Medications   Medication Dose Route Frequency    ipratropium 0.5 mg-albuterol 2.5 mg (DUONEB) nebulizer solution 1 Dose  1 Dose Inhalation Q4H PRN    metoprolol tartrate (LOPRESSOR) tablet 25 mg  25 mg Oral BID    budesonide (PULMICORT) nebulizer suspension 1 mg  1 mg Nebulization BID    arformoterol tartrate (BROVANA) nebulizer solution 15 mcg  15 mcg Nebulization BID RT    atorvastatin (LIPITOR) tablet 40 mg  40 mg Oral Nightly    thiamine mononitrate tablet 100 mg  100 mg Oral Daily    aspirin chewable tablet 81 mg  81 mg Oral Daily    balance B-50 tablet 1 tablet  1 tablet Oral Daily    diclofenac sodium (VOLTAREN) 1 % gel 4 g  4 g Topical 4x Daily PRN    ondansetron (ZOFRAN-ODT) disintegrating tablet 4 mg  4 mg Oral Q8H PRN    Or    ondansetron (ZOFRAN) injection 4 mg  4 mg IntraVENous Q6H PRN    polyethylene glycol (GLYCOLAX) packet 17 g  17 g Oral 
              RENAL DAILY PROGRESS NOTE      63-year-old male with past medical history of alcohol abuse disorder, valvular heart disease, admitted for altered mental status following for renal failure.  Subjective:       Complaint:   Overnight events noted  Now has TDC  Examined during dialysis, tolerating well   Blood pressure on higher side  Documented urine output about 330cc    IMPRESSION:   Acute kidney injury.  Appears to be related to hypovolemia in the setting of influenza infection.  However urine analysis does reveal  blood and protein.  As per him there is significant history of NSAID use.  Serology work up so far negative,   Access, temporary dialysis catheter  History of severe aortic stenosis with moderate aortic regurgitation and severe left atrial dilatation on previous echocardiogram  Nicotine dependence, peripheral vascular disease  Alcohol abuse  Chronic pain  Hx Hepatitis C   PLAN:   Plan to continue dialysis support for [facility], expecting renal function to recover.   Will arrange outpatient dialysis unit.   At 9:38 AM on 12/19/2024, I saw and examined patient during hemodialysis treatment. The patient was receiving hemodialysis for treatment of  renal failure. I have also reviewed vital signs, intake and output, lab results and recent events, and agreed with today's dialysis order.    Discussed with primary team.       Current Facility-Administered Medications   Medication Dose Route Frequency    potassium chloride (KLOR-CON M) extended release tablet 40 mEq  40 mEq Oral PRN    Or    potassium bicarb-citric acid (EFFER-K) effervescent tablet 40 mEq  40 mEq Oral PRN    Or    potassium chloride 10 mEq/100 mL IVPB (Peripheral Line)  10 mEq IntraVENous PRN    OLANZapine (ZYPREXA) tablet 2.5 mg  2.5 mg Oral Nightly    OLANZapine (ZYPREXA) tablet 2.5 mg  2.5 mg Oral Q6H PRN    nicotine (NICODERM CQ) 21 MG/24HR 1 patch  1 patch TransDERmal Daily    nicotine polacrilex (COMMIT) lozenge 2 mg  2 mg Oral Q2H 
              RENAL DAILY PROGRESS NOTE      63-year-old male with past medical history of alcohol abuse disorder, valvular heart disease, admitted for altered mental status following for renal failure.  Subjective:       Complaint:   Overnight events noted  Received blood transfusion yesterday   Received diuretics yesterday   Documented urine output about 1.8L    IMPRESSION:   Acute kidney injury.  Appears to be related to hypovolemia in the setting of influenza infection.  However urine analysis does reveal  blood and protein.  As per him there is significant history of NSAID use.  Serology work up so far negative,   Access, temporary dialysis catheter  History of severe aortic stenosis with moderate aortic regurgitation and severe left atrial dilatation on previous echocardiogram  Nicotine dependence, peripheral vascular disease  Alcohol abuse  Chronic pain  Hx Hepatitis C   PLAN:   His urine output seems picking up but clearance remain poor, will arrange HD today for clearance. If patient is going for cath will co ordinate time.   Okay with prn diuretics.   Dc diclofenac           Current Facility-Administered Medications   Medication Dose Route Frequency    lactulose (CHRONULAC) 10 GM/15ML solution 10 g  10 g Oral TID PRN    0.9 % sodium chloride infusion   IntraVENous PRN    ALPRAZolam (XANAX) tablet 0.25 mg  0.25 mg Oral Nightly PRN    ipratropium 0.5 mg-albuterol 2.5 mg (DUONEB) nebulizer solution 1 Dose  1 Dose Inhalation Q4H PRN    metoprolol tartrate (LOPRESSOR) tablet 25 mg  25 mg Oral BID    budesonide (PULMICORT) nebulizer suspension 1 mg  1 mg Nebulization BID    arformoterol tartrate (BROVANA) nebulizer solution 15 mcg  15 mcg Nebulization BID RT    atorvastatin (LIPITOR) tablet 40 mg  40 mg Oral Nightly    thiamine mononitrate tablet 100 mg  100 mg Oral Daily    aspirin chewable tablet 81 mg  81 mg Oral Daily    balance B-50 tablet 1 tablet  1 tablet Oral Daily    diclofenac sodium (VOLTAREN) 1 % 
              RENAL DAILY PROGRESS NOTE      63-year-old male with past medical history of alcohol abuse disorder, valvular heart disease, admitted for altered mental status following for renal failure.  Subjective:       Complaint:   Overnight events noted  Received dialysis yesterday , UF about 1L   Blood pressure on soft side  Serology report negative for any ANCA or lupus, positive for hep C      IMPRESSION:   Acute kidney injury.  Appears to be related to hypovolemia in the setting of influenza infection.  However urine analysis does reveal  blood and protein.  As per him there is significant history of NSAID use.  Serology work up so far negative,   Access, temporary dialysis catheter  History of severe aortic stenosis with moderate aortic regurgitation and severe left atrial dilatation on previous echocardiogram  Nicotine dependence, peripheral vascular disease  Alcohol abuse  Chronic pain  Hx Hepatitis C   PLAN:   NO plan for kidney biopsy for now.   His urine output remain low side, plan to continue ongoing dialysis support for now.   Case discussed with cardiology colleague, higher risk for CAD, plan for cardiac cath noted next week. Plan for staged procedure if able to do so, appreciate input from cardiology colleague.            Current Facility-Administered Medications   Medication Dose Route Frequency    ipratropium 0.5 mg-albuterol 2.5 mg (DUONEB) nebulizer solution 1 Dose  1 Dose Inhalation Q4H PRN    metoprolol tartrate (LOPRESSOR) tablet 25 mg  25 mg Oral BID    budesonide (PULMICORT) nebulizer suspension 1 mg  1 mg Nebulization BID    arformoterol tartrate (BROVANA) nebulizer solution 15 mcg  15 mcg Nebulization BID RT    0.9 % sodium chloride infusion   IntraVENous PRN    atorvastatin (LIPITOR) tablet 40 mg  40 mg Oral Nightly    sodium chloride flush 0.9 % injection 5-40 mL  5-40 mL IntraVENous 2 times per day    sodium chloride flush 0.9 % injection 5-40 mL  5-40 mL IntraVENous PRN    0.9 % 
      RENAL DAILY PROGRESS NOTE    Subjective:       Complaint:   Better  Awake and alert    IMPRESSION:   Acute kidney injury.  Appears to be related to hypovolemia in the setting of influenza infection.  However urine analysis does reveal  blood and protein.  As per him there is significant history of NSAID use.  Serology work up so far negative,   Access: tunneled dialysis catheter placed on 12/18/24  Hypoxia, atelectasis, multiple rib fx, suspected underlying copd etc.  NSTEMI with non obstructive CAD on Cardiac cath 12/16/24  History of severe aortic stenosis with moderate aortic regurgitation and severe left atrial dilatation on previous echocardiogram  Anemia related to blood loss plus kidney disease. Hematoma in right rectus muscle due to asa,plavix use?  Nicotine dependence, peripheral vascular disease  Alcohol abuse with encephalopathy-- delirium  Chronic pain  Hx Hepatitis C   PLAN:    Cr improved. Hold off on dialysis. UO picking up.His hypoxia is related to COPD, splinting etc.  Some bleeding on TDC site. Continue with dressing. Currently not bleeding. Spoke with vascular and no intervention needed.  Venofer,aleksandra for now           Current Facility-Administered Medications   Medication Dose Route Frequency    epoetin kiet-epbx (RETACRIT) injection 3,000 Units  3,000 Units SubCUTAneous Once per day on Monday Wednesday Friday    iron sucrose (VENOFER) injection 100 mg  100 mg IntraVENous Once per day on Monday Wednesday Friday    OLANZapine (ZYPREXA) tablet 5 mg  5 mg Oral Q12H PRN    Fixodent Complete CREA 1 Application  1 Application Does not apply Daily PRN    albumin human 25% IV solution 25 g  25 g IntraVENous PRN    OLANZapine (ZYPREXA) tablet 10 mg  10 mg Oral Nightly    potassium chloride (KLOR-CON M) extended release tablet 40 mEq  40 mEq Oral PRN    Or    potassium bicarb-citric acid (EFFER-K) effervescent tablet 40 mEq  40 mEq Oral PRN    Or    potassium chloride 10 mEq/100 mL IVPB (Peripheral 
      RENAL DAILY PROGRESS NOTE    Subjective:       Complaint:   Better  Awake and alert    IMPRESSION:   Acute kidney injury.  Appears to be related to hypovolemia in the setting of influenza infection.  However urine analysis does reveal  blood and protein.  As per him there is significant history of NSAID use.  Serology work up so far negative,   Access: tunneled dialysis catheter placed on 12/18/24  Hypoxia, atelectasis, multiple rib fx, suspected underlying copd etc.  NSTEMI with non obstructive CAD on Cardiac cath 12/16/24  History of severe aortic stenosis with moderate aortic regurgitation and severe left atrial dilatation on previous echocardiogram  Anemia related to blood loss plus kidney disease. Hematoma in right rectus muscle due to asa,plavix use?  Nicotine dependence, peripheral vascular disease  Alcohol abuse with encephalopathy-- delirium  Chronic pain  Hx Hepatitis C   PLAN:    Kidneys have now recovered. D/c TDC. No more dialysis  needed. OP follow up. Ok for discharge from my end. Will stop epogen,venofer after couple of doses.           Current Facility-Administered Medications   Medication Dose Route Frequency    iron sucrose (VENOFER) injection 200 mg  200 mg IntraVENous Q24H    epoetin kiet-epbx (RETACRIT) injection 3,000 Units  3,000 Units SubCUTAneous Once per day on Monday Wednesday Friday    OLANZapine (ZYPREXA) tablet 5 mg  5 mg Oral Q12H PRN    Fixodent Complete CREA 1 Application  1 Application Does not apply Daily PRN    albumin human 25% IV solution 25 g  25 g IntraVENous PRN    OLANZapine (ZYPREXA) tablet 10 mg  10 mg Oral Nightly    potassium chloride (KLOR-CON M) extended release tablet 40 mEq  40 mEq Oral PRN    Or    potassium bicarb-citric acid (EFFER-K) effervescent tablet 40 mEq  40 mEq Oral PRN    Or    potassium chloride 10 mEq/100 mL IVPB (Peripheral Line)  10 mEq IntraVENous PRN    nicotine (NICODERM CQ) 21 MG/24HR 1 patch  1 patch TransDERmal Daily    nicotine 
      RENAL DAILY PROGRESS NOTE    Subjective:       Complaint:   Better  Calmer currently  No pain complaints    IMPRESSION:   Acute kidney injury.  Appears to be related to hypovolemia in the setting of influenza infection.  However urine analysis does reveal  blood and protein.  As per him there is significant history of NSAID use.  Serology work up so far negative,   Access: tunneled dialysis catheter placed on 12/18/24  Hypoxia, atelectasis, multiple rib fx, suspected underlying copd etc.  NSTEMI with non obstructive CAD on Cardiac cath 12/16/24  History of severe aortic stenosis with moderate aortic regurgitation and severe left atrial dilatation on previous echocardiogram  Anemia related to blood loss plus kidney disease. Hematoma in right rectus muscle due to asa,plavix use?  Nicotine dependence, peripheral vascular disease  Alcohol abuse with encephalopathy-- delirium  Chronic pain  Hx Hepatitis C   PLAN:    Cr improved. Hold off on dialysis. Some UO noted. Gentle hydration for today. Watch for improvement in kidneys over weekend.             Current Facility-Administered Medications   Medication Dose Route Frequency    lactated ringers infusion   IntraVENous Continuous    epoetin kiet-epbx (RETACRIT) injection 3,000 Units  3,000 Units SubCUTAneous Once per day on Monday Wednesday Friday    iron sucrose (VENOFER) injection 100 mg  100 mg IntraVENous Once per day on Monday Wednesday Friday    OLANZapine (ZYPREXA) tablet 5 mg  5 mg Oral Q12H PRN    Fixodent Complete CREA 1 Application  1 Application Does not apply Daily PRN    albumin human 25% IV solution 25 g  25 g IntraVENous PRN    OLANZapine (ZYPREXA) tablet 10 mg  10 mg Oral Nightly    potassium chloride (KLOR-CON M) extended release tablet 40 mEq  40 mEq Oral PRN    Or    potassium bicarb-citric acid (EFFER-K) effervescent tablet 40 mEq  40 mEq Oral PRN    Or    potassium chloride 10 mEq/100 mL IVPB (Peripheral Line)  10 mEq IntraVENous PRN    nicotine 
    Gastroenterology follow up-Progress note    Impression:  1. Iron deficiency anemia, multifactorial - heme negative stool 12/11/2024   - iron sat 5%; iron 22  2. Anemia without overt GI bleeding requiring blood transfusion   - s/p 2u PRBCs 12/12/24 & 12/15/24 with appropriate response.   - Current H/H 10.6/31.6  3. NSTEMI type II - s/p left heart cath 12/16/24 without obstructive disease process  4. Acute metabolic encephalopathy   5. Moderate to severe aortic stenosis  6. Moderate aortic regurgitation  7. Acute kidney injury  8. Diarrhea - resolved; negative infectious etiologies on stool studies; likely related to excess laxatives takes at home  9. Influenza A (H1)  10. Positive Hep C antibody      Plan:  1. Continue supportive care as established. EGD and colonoscopy to be considered as outpatient.  2. CTA abd/pelvis with bleeding protocol if large volume GI bleeding  3. Replete iron stores, consult Hematology  4. Outpatient GI follow-up recommended.  5. Ongoing medical management per primary team.  6. Hep C virus RNA/genotype ordered. Full Hep C antibody work-up/treatment at outpatient visit.    Signed off but remaining available as needed.  Thank you for this consultation and the opportunity to participate in the care of this patient. Please do not hesitate to call with any questions or concerns, or should event occur that may necessitate additional GI evaluation.        Subjective:  no acute GI events/overt GI bleeding nor abnormal stools.     HPI 12/12/24  He was seen by Penelope Fox NP in [facility] office 12/2 accompanied by niece on 12/2/24 and reported intermittent hematochezia about three weeks prior, had constipation at that time. EVE noted since 11/2024 labs. Attempted to obtain clearance for ASAP colonoscopy scheduled 12/19- followed by Dr. Paez and was not cleared for colonoscopy at this time as he is very high risk due to his aortic stenosis after echo 12/6. Colonoscopy cancelled due to 
   12/14/24 2105   NIV Type   $NIV $Daily Charge   Ventilator ID [id number]   NIV Started/Stopped On   Equipment Type V60   Mode Bilevel   Mask Type Full face mask   Mask Size Medium   Bonnet size Medium   Assessment   Pulse 81   Respirations 28   SpO2 99 %   Level of Consciousness 0   Comfort Level Fair   Using Accessory Muscles Yes   Mask Compliance Fair   Skin Assessment Clean, dry, & intact   Skin Protection for O2 Device N/A   Breath Sounds   Respiratory Pattern Tachypneic   Breath Sounds Bilateral Fine crackles;Expiratory wheezing   Settings/Measurements   PIP Observed 14 cm H20   IPAP 14 cmH20   CPAP/EPAP 8 cmH2O   Vt (Measured) 540 mL   Rate Ordered 28   Insp Rise Time (%) 3 %   FiO2  40 %   I Time/ I Time % 1 s   Minute Volume (L/min) 15.8 Liters   Mask Leak (lpm) 40 lpm   Patient's Home Machine No   Alarm Settings   Alarms On Y   Low Pressure (cmH2O) 8 cmH2O   High Pressure (cmH2O) 40 cmH2O   Apnea (secs) 20 secs   RR Low (bpm) 8   RR High (bpm) 40 br/min       Found pt on RA with SpO2 87%. Gave pt schedulized nebulizers with O2 and SpO2 back up to 95%. Placed pt on 3L but started breathing in the high 20s low 30s. BS prior to nebs were fine crackles. Post nebulizers, crackles and expiratory wheezing noted.     Placed pt on BiPAP at this time. RR coming down to mid 20s. SpO2 100% on an FiO2 of 40%. Pt appears to be doing better respiratory wise but states that he feels anxious.     Family/friend at bedside with patient.   RN aware and waiting for response from MD.   
   12/20/24 2135   Oxygen Therapy/Pulse Ox   O2 Therapy Oxygen humidified   O2 Device Heated high flow cannula   O2 Flow Rate (L/min) 40 L/min   FiO2  60 %   Pulse 93   Respirations 20   SpO2 97 %     RT found pt on NRB on bubble humidifier at 6LPM. Per RN- she received this pt on NRB due to desaturation and pt is a mouth breather. RT switched pt to 6L nasal cannula and breathing given SPO2 94-95%. BS coarse crackles, tachypneic  RR 20+. ABG obtain. RT switched pt to HFNC post ABG and for increase of WOB. Pt stated he feels better and Spo2 97%.  RN at the bedside. MD notified.   
   HD Care plan  Time: 3 hrs  Dialysate:  3 K 2.5    Ca  [place]  Net UF:1.5L  Access: Aseptically care for R Chest CVC  Hemodynamic stability: Maintain BP WNL     Pre Dialysis:  Pt received from Unit Nurse Ugo De La Rosa , Pt on a bed,A+O X 4, No s/s of distress noted  on O 2 @ 5 L NC .   R Chest CVC  assessed no abnormalities noted, no active bleeding observed from the insertion site at this time, line patent with good flow.  CVC accessed  per protocol without any difficulty. Pt assessment witness by TRUPTI Suero RN     Intra Dialysis:  Tx initiated at 0918.    CVC flowing with ease.  For hemodynamic stability UF goal  set at 1500 ml as tolerated   Pt offered assistance with repositioning every 2 hours/prn    Vascular access visible  and line connections remained intact throughout entire duration of treatment.   Vital signs checked every 15 mins, WNL    MD @ bedside verbal to assess pt O2. Pt sat @ 71% on 5L, MD stated to end tx and send pt back to room due to anxiety and withdrawal. Pt treatment ended @0930     Post Dialysis:  Tx completed at 0930, total run time 12 minutes.  Tolerated well ,0 L removed.  De-accessed per protocol.    Dialysis catheter  locked accordingly with Heparin 1.6ml in arterial port, and 1.6ml in venous port ,catheter dressing clean, dry and intact.  Post Dialysis report given to unit  Nurse ARACELY De La Rosa.  
 conducted an initial consultation and Spiritual Assessment for Gio Khalil, who is a 63 y.o.,male. Patient’s Primary Language is: English.   According to the patient’s EMR Zoroastrian Affiliation is: Non-Holiness.     The reason the Patient came to the hospital is:   Patient Active Problem List    Diagnosis Date Noted    PVD (peripheral vascular disease) ([facility]) 07/22/2022    Acute renal failure (ARF) ([facility]) 12/11/2024    Iron deficiency anemia 11/12/2024    Melena 11/07/2024    Environmental and seasonal allergies 11/07/2024    Memory problem 11/07/2024    Lumbar spondylosis 08/22/2024    Lumbar radiculopathy 07/11/2024    Decreased GFR 05/06/2024    Bilateral carotid artery stenosis 11/24/2023    Atherosclerosis 08/08/2023    Alcohol abuse 08/08/2023    Anxiety associated with depression 08/08/2023    Dyslipidemia 08/08/2023    Elevated rheumatoid factor 02/07/2023    PAD (peripheral artery disease) ([facility]) 07/19/2022    Marijuana smoker 03/07/2022    Primary hypertension 03/07/2022    Smoker 03/07/2022        The  provided the following Interventions:  Initiated a relationship of care and support with patient who was not feeling in the best of spirits today.  Explored issues of grecia, belief, spirituality and Buddhist/ritual needs while hospitalized. María Elena not involved in a local Scientologist at this time.  Listened empathically. There is now an advance directive on file for this patient.  Offered prayer and assurance of continued prayers on patients behalf.       The following outcomes were achieved:  Patient shared limited information about both their medical narrative and spiritual journey/beliefs.  Patient processed feeling about current hospitalization.  Patient expressed gratitude for pastoral care visit.    Spiritual Health History and Assessment/Progress Note  [facility]    Initial Encounter, Crisis ([id number]), Follow up,  ,      Name: Gio Khalil MRN: [id number]    Age: 63 
0053-  Called to bedside by RN for pt with wheezing and increased WOB/oxygen requirement. Pt with crackles throughout both lungs. Tachypneic in high 20's. Pt is grunting and working to breathe. Bipap was placed on pt for fluid overload, but pt wouldn't keep on. Explained to pt the importance of wearing but pt still refusing. Will continue to monitor pt for decline in status.    0206- pt agreeable to wear BIPAP. Given duoneb. Pt still very anxious and trying to rip off mask.  
0606  per perfect serve Dr. Garcia notified of Pt. K of 3.1.    0615  MD ordered to order electrolytes protocol  
1 time order of 2 mg iv haldol  
12/12/24 2045  Troponin 3913, spoke with . No new orders received.     2150  Dialysis session complete.  Provided patient with food tray.     2234  Contacted hospitalist to inquire about duoneb as patient is very short of breath on 4L nasal cannula. Increased to 5L satting at 97% but evident wheezing, crackles, and difficulty breathing.     2325  Paged again.    2354  Spoke with Respiratory regarding wheezing and duoneb.    12/13/24  0015  Patient attempting to get out of the bed, very restless. Paged hospitalist to inquire about something for anxiety.  Respiratory therapist at bedside, reccomending Bipap as patient has crackles.    0025  Spoke with hospitalist. One time dose of po Ativan 1mg ordered. CXR and bipap ordered.    Educated both patient and family member at bedside purpose of BiPAP, risks of noncompliance, and breathing techniques.     0042  BiPAP attempted, patient took it off. Patient educated on rationale, still refusing.     0200  Patient agreeable to BiPAP. Respiratory therapist Pily at bedside placing mask on and giving treatment.    0300  Patient attempting to take mask off, redirected.    0456  Patient removed Bipap, refusing to have it placed back on. RN placed pt back on 4L nasal cannula. Sitter ordered for day shift for safety as patient is confused.  
12/16/24  2135  Patient bed alarm going off, patient up standing out of the bed and family member laying in the recliner. Re-oriented patient, provided urinal, and bed alarm re-initiated. Educated family member at bedside about calling for assistance.    2314  Patient pulled out IV. Contacted nursing supervisor Ugo for 1:1 sitter, there is no one to come sit. Avasure order initiated. Patient and patients brother at bedside educated on rationale for Avasure.     2342  Avasure placed in room. Spoke with avasure coordinator, 03 confirmed.    12/17/24  0008  Patient increasingly restless. Paged Hospitalist for orders.    0034  One time dose 2mg haldol IV ordered.    0200  Spoke with  again, patient consistently removing nasal cannula - pt then desat's and trying to get out of the bed.     One time dose 50mg IV Benadryl ordered.    0326  Pt attempting to get out of the bed, avasure alarming. Redirected pt back into bed, soft bilateral wrist restraints applied. Rationale explained to family at bedside.    0642  K w/ am labs 3.3, no prn replacement. Paged hospitalist.   
1623: Discharge instructions and medications reviewed with the patient and niece and appropriate educational materials and side effects teaching were provided. Removed PIV. Patient tolerated well. Allowed time for questions. All questions answered.     1700: Patient transported to [facility] entrance via wheelchair.   
1630: TRANSFER - IN REPORT:    Verbal report received from AMBER Brawsell on Gio Khalil  [facility]      Report consisted of patient's Situation, Background, Assessment and   Recommendations(SBAR).     Information from the following report(s) Nurse Handoff Report, Index, Adult Overview, Intake/Output, MAR, Cardiac Rhythm NSR-ST, Alarm Parameters, and Quality Measures was reviewed with the receiving nurse.    Opportunity for questions and clarification was provided.      Assessment completed upon patient's arrival to unit and care assumed.      1745: Pt on unit, transported with 15L NC and RT.   Tele notified, VSS, Continuous fluids infusing, pt alert and awake.    1930: Bedside and Verbal shift change report given to AMBER Park (oncoming nurse) by AMBER Patrick (offgoing nurse). Report included the following information Nurse Handoff Report, Index, Adult Overview, Surgery Report, Intake/Output, MAR, Recent Results, Med Rec Status, Cardiac Rhythm NSR-ST, and Alarm Parameters.    
1914 Bedside shift report given to Lena Shah RN from Marilia CLARK. Report including the following information SBAR, Kardex, recent results, MAR, intake/output and cardiac rhythm NSR.      2015 Shift assessment completed, medication list given by tarik updated in system, brother at bedside.    2119 Pt medicated per MAR.    2132 MD paged for order for cough medicine.    0015 Reassessment done, no pain reported. Pt medicated for cough and bladder scanned d/t last void being in ED. Bladder scan 157 ml, pt reports no discomfort.    0415 Reassessment done pt asleep in bed, no s/s of distress or pain.    0543 Pt medicated per MAR.    0605 MD paged d/t critical hemoglobin level 6.0    0630 MD at bedside to consent pt for blood transfusion, writer had pt sign paper consent and placed in chart.    0650 Blood bank called to notify writer blood is ready    0707 PRBC's started    0734 Bedside shift report given to Chrissy CLARK by Lena CLARK, all question and concerns answered.    
1930: Bedside and Verbal shift change report given to AMBER Park (oncoming nurse) by AMBER Patrick (offgoing nurse). Report included the following information Nurse Handoff Report, Index, Adult Overview, Surgery Report, Intake/Output, MAR, Recent Results, Med Rec Status, Cardiac Rhythm NSR-ST, and Alarm Parameters.      2000: Assessment completed, pt currently on BiPAP, family at bedside.    0130: BiPAP alarm going off, RN to pt room, Pt seen seen trying to take off his BiPAP. RN provided some education on the importance of having the mask on. Pt insisted on taking it off.    RN informed Respiratory therapist (RT)  0143: RT took mask off. Pt currently on high flow at 9L nasal cannula and tolerating well.  Pt asking for something to drink, RN explained current diet status of NPO and also pt just got off BiPAP.    0310: RN rounding, Pt's brother at bedside going off about pt being NPO saying it's inhumane for him to be NPO not even a simple drink.  RN educated family and pt for diet NPO.    AMBER Mcmillan helped and paged the attending MD,   Per MD orders \" Continuous fluids were stopped and pt was allowed to something to drink\".  
20 mEq potassium ordered  
2115:  Patient on 4L n/c but became increasingly SOB and anxious.  Crackles upon auscultation.  Switched to BIPAP and stopped maintenance fluid.     2230: Patient switched back to 4L n/c.  
4 Eyes Skin Assessment     NAME:  Gio Khaill  YOB: 1960  MEDICAL RECORD NUMBER:  [id number]    The patient is being assessed for  Shift Handoff    I agree that at least one RN has performed a thorough Head to Toe Skin Assessment on the patient. ALL assessment sites listed below have been assessed.      Areas assessed by both nurses:    Head, Face, Ears, Shoulders, Back, Chest, Arms, Elbows, Hands, Sacrum. Buttock, Coccyx, Ischium, and Legs. Feet and Heels        Does the Patient have a Wound? No noted wound(s)       Sergey Prevention initiated by RN: Yes  Wound Care Orders initiated by RN: No    Pressure Injury (Stage 3,4, Unstageable, DTI, NWPT, and Complex wounds) if present, place Wound referral order by RN under : No    New Ostomies, if present place, Ostomy referral order under : No     Nurse 1 eSignature: Electronically signed by Marilia Salamanca RN on 12/11/24 at 8:14 PM EST    **SHARE this note so that the co-signing nurse can place an eSignature**    Nurse 2 eSignature: Electronically signed by Lena Shah RN on 12/11/24 at 8:30 PM EST    
4 Eyes Skin Assessment     NAME:  Gio Khalil  YOB: 1960  MEDICAL RECORD NUMBER:  [id number]    The patient is being assessed for  Shift Handoff    I agree that at least one RN has performed a thorough Head to Toe Skin Assessment on the patient. ALL assessment sites listed below have been assessed.      Areas assessed by both nurses:    Head, Face, Ears, Shoulders, Back, Chest, Arms, Elbows, Hands, Sacrum. Buttock, Coccyx, Ischium, and Legs. Feet and Heels        Does the Patient have a Wound? Yes wound(s) were present on assessment. LDA wound assessment was Initiated and completed by RN       Sergey Prevention initiated by RN: Yes  Wound Care Orders initiated by RN: No    Pressure Injury (Stage 3,4, Unstageable, DTI, NWPT, and Complex wounds) if present, place Wound referral order by RN under : No    New Ostomies, if present place, Ostomy referral order under : No     Nurse 1 eSignature: Electronically signed by ALONZO PAINTING RN on 12/22/24 at 6:57 PM EST    **SHARE this note so that the co-signing nurse can place an eSignature**    Nurse 2 eSignature: {Esignature:[id number]}   
4 Eyes Skin Assessment     NAME:  Gio Khalil  YOB: 1960  MEDICAL RECORD NUMBER:  [id number]    The patient is being assessed for  Shift Handoff    I agree that at least one RN has performed a thorough Head to Toe Skin Assessment on the patient. ALL assessment sites listed below have been assessed.      Areas assessed by both nurses:    Head, Face, Ears, Shoulders, Back, Chest, Arms, Elbows, Hands, Sacrum. Buttock, Coccyx, Ischium, and Legs. Feet and Heels        Does the Patient have a Wound? No noted wound(s)       Sergey Prevention initiated by RN: No  Wound Care Orders initiated by RN: No    Pressure Injury (Stage 3,4, Unstageable, DTI, NWPT, and Complex wounds) if present, place Wound referral order by RN under : No    New Ostomies, if present place, Ostomy referral order under : No     Nurse 1 eSignature: Electronically signed by Geeta Anderson RN on 12/20/24 at 6:32 PM EST    **SHARE this note so that the co-signing nurse can place an eSignature**    Nurse 2 eSignature: {Esignature:[id number]}   
4 Eyes Skin Assessment     NAME:  Gio Khalil  YOB: 1960  MEDICAL RECORD NUMBER:  [id number]    The patient is being assessed for  Shift Handoff    I agree that at least one RN has performed a thorough Head to Toe Skin Assessment on the patient. ALL assessment sites listed below have been assessed.      Areas assessed by both nurses:    Head, Face, Ears, Shoulders, Back, Chest, Arms, Elbows, Hands, Sacrum. Buttock, Coccyx, Ischium, Legs. Feet and Heels, and Under Medical Devices         Does the Patient have a Wound? No noted wound(s)       Sergey Prevention initiated by RN: No  Wound Care Orders initiated by RN: No    Pressure Injury (Stage 3,4, Unstageable, DTI, NWPT, and Complex wounds) if present, place Wound referral order by RN under : No    New Ostomies, if present place, Ostomy referral order under : No     Nurse 1 eSignature: Electronically signed by Lena Shah RN on 12/12/24 at 6:01 AM EST    **SHARE this note so that the co-signing nurse can place an eSignature**    Nurse 2 eSignature: {Esignature:[id number]}    
4 Eyes Skin Assessment     NAME:  Gio Khalil  YOB: 1960  MEDICAL RECORD NUMBER:  [id number]    The patient is being assessed for  Shift Handoff    I agree that at least one RN has performed a thorough Head to Toe Skin Assessment on the patient. ALL assessment sites listed below have been assessed.      Areas assessed by both nurses:    Head, Face, Ears, Shoulders, Back, Chest, Arms, Elbows, Hands, Sacrum. Buttock, Coccyx, Ischium, and Legs. Feet and Heels        Does the Patient have a Wound? Yes wound(s) were present on assessment. LDA wound assessment was Initiated and completed by RN       Sergey Prevention initiated by RN: Yes  Wound Care Orders initiated by RN: No    Pressure Injury (Stage 3,4, Unstageable, DTI, NWPT, and Complex wounds) if present, place Wound referral order by RN under : No    New Ostomies, if present place, Ostomy referral order under : No     Nurse 1 eSignature: Electronically signed by Bre Dacosta RN on 12/19/24 at 5:42 PM EST    **SHARE this note so that the co-signing nurse can place an eSignature**    Nurse 2 eSignature: Electronically signed by Geeta Anderson RN on 12/20/24 at 4:10 PM EST   
ARU/IPR REFERRAL CONTACT NOTE  [facility]      Thank you for the opportunity to review this patient's case for admission to [facility].      Based on our pre-admission screening:                          [X] This patient does not meet criteria for admission to [facility] due to:    [X] Too functional, per documentation, patient does not require both Physical and Occupational Therapy Services at an acute rehabilitation level of intensity. PT is recommending home health therefore the patient does not meet ARU level of therapy density.         Again, Thank you for this referral. Should you have any questions please do not hesitate to call.     Sincerely,  Kathy Bocanegra RN    Clinical Liaison  [facility]  [phone]       
ARU/IPR REFERRAL CONTACT NOTE  [facility]      Thank you for the opportunity to review this patient's case for admission to [facility].    Referral received: 12/13/2024 time:2:05pm    Based on our pre-admission screening:                          [x ] Our Team/Medical Director is following this case.   Comments: Received referral from care manager. Will review with team.     Pt noted to be agitated requiring medication previous night. Will review.     Again, Thank you for this referral. Should you have any questions please do not hesitate to call.     Sincerely,  Karolina Adams    Clinical Liaison  [facility]  [phone]         
Attempted pt for OT tx X2. Pt unable to be seen 2/2 REBECCA for HD. Will continue to follow up as schedule allows. Thank you  VASYL Tolentino/ELSIE  
Attempted pt for OT tx. Pt unable to be seen 2/2 REBECCA for HD. Will continue to follow up as schedule allows. Thank you  VASYL Tolentino/ELSIE  
Bedside and Verbal shift change report given to ABMER Quintanilla (oncoming nurse) by AMBER Park (offgoing nurse). Report included the following information Nurse Handoff Report and Adult Overview.      Bedside and Verbal shift change report given to AMBER Park (oncoming nurse) by AMBER Quintanilla (offgoing nurse). Report included the following information Nurse Handoff Report and Adult Overview.    
Bedside and Verbal shift change report given to AMBER Fuentes (oncoming nurse) by AMBER Candelario (offgoing nurse). Report included the following information Nurse Handoff Report, Intake/Output, MAR, Recent Results, and Cardiac Rhythm NSR .     1054: Dr. Andrade requesting fluid due to pt poor PO intake. Hospitalist contacted, requested consulting nephrology. Orders placed 50mL/hr for 500mL total. Medicated per MAR.   
Bedside and Verbal shift change report given to Kevin Agudelo RN   (oncoming nurse) by Andree CLARK (offgoing nurse). Report included the following information Nurse Handoff Report, Index, MAR, Recent Results, and Cardiac Rhythm SB .     
Bedside and Verbal shift change report given to Kevin Agudelo RN   (oncoming nurse) by Shyann CLARK (offgoing nurse). Report included the following information Nurse Handoff Report, Index, MAR, Recent Results, and Cardiac Rhythm SR .     
Bedside shift change report given to AMBER Candelario (oncoming nurse) by AMBER James (offgoing nurse). Report included the following information Nurse Handoff Report, Recent Results, Cardiac Rhythm NSR, and Alarm Parameters.    
Bedside shift change report given to AMBER Candelario (oncoming nurse) by AMBER Zhang (offgoing nurse). Report included the following information Nurse Handoff Report, Recent Results, Cardiac Rhythm NSR/ tachy, and Alarm Parameters.      1930: received a call from blood bank, PRBC is ready for .  2015: Blood transfusion started after double checking with AMBER Lucas.  >observed for any blood transfusion reaction for 15 mins- none reported.  2345: blood transfusion completed, line flushed with saline.  > reassessment done, VS checked, no complains as of present.  0015: NPO instructed- for heart cath in AM.  Bedside shift change report given to AMBER Brown (oncoming nurse) by AMBER Candelario (offgoing nurse). Report included the following information Nurse Handoff Report, Recent Results, Cardiac Rhythm NSR/ malathi, and Alarm Parameters.        
Brief Update    Patient can be discharged once TDC is removed by vascular team.    Kamala Chakraborty, DO    
Cardiology Progress Note    Admit Date: 12/11/2024  Attending Cardiologist: Dr. Andrade    IMPRESSION  NSTEMI, I v II troponin greater than 10,000 on 12/11/2024 then greater than 9900 on 12/11/2024 in the context of subacute dyspnea in the last week 2D echo 12/6/2024 preserved ejection fraction 55 to 60%, EKG 12/11/2024 normal sinus rhythm rightward axis, septal infarct  Elevated proBNP, chest x-ray 12/11/2024 without acute process, 2D echo as above  Severe coronary artery disease, by CT abdomen pelvis without IV contrast on 12/11/2024  Thickening of the right lower rectus muscle concerning for intramuscular hematoma by CT abdomen pelvis 12/11/2024  Chronic heart failure preserved ejection fraction, mixed picture elevated proBNP but in the presence also renal failure without significant acute process on chest x-ray or edema on exam  Aortic valve moderate regurgitation by 2D echo 12/6/2024  Moderate to severe aortic stenosis by 2D echo 12/6/2024  Peripheral vascular disease  Bilateral carotid artery stenosis  Hyperlipidemia  Hypertension - Normotensive  Tobacco abuse     PLAN  Continue with aspirin 81 mg p.o. daily, holding heparin GTT due to anemia, hold Plavix at this point with anemia requiring transfusion and also with presence of severe coronary artery disease in anticipation of possible need for bypass surgery, also with needed kidney biopsy per nephrology.    Cannot rule out ACS at this point with multiple risk factors including tobacco abuse, peripheral vascular disease known valvular disease, also now with severe coronary artery disease found on CT abdomen pelvis as incidental finding and subacute dyspnea which may be anginal equivalent in the last week with severely elevated troponin.  Consideration for left heart catheterization possible PCI or surgery but would recommend optimization of renal function due to high risk for dialysis.  Patient is being transfused as needed with a GI evaluation planned.  
Cardiology Progress Note    Admit Date: 12/11/2024  Attending Cardiologist: Dr. Andrade    IMPRESSION  NSTEMI, I v II troponin greater than 10,000 on 12/11/2024 then greater than 9900 on 12/11/2024 in the context of subacute dyspnea in the last week 2D echo 12/6/2024 preserved ejection fraction 55 to 60%, EKG 12/11/2024 normal sinus rhythm rightward axis, septal infarct  Elevated proBNP, chest x-ray 12/11/2024 without acute process, 2D echo as above  Severe coronary artery disease, by CT abdomen pelvis without IV contrast on 12/11/2024  Thickening of the right lower rectus muscle concerning for intramuscular hematoma by CT abdomen pelvis 12/11/2024  Chronic heart failure preserved ejection fraction, mixed picture elevated proBNP but in the presence also renal failure without significant acute process on chest x-ray or edema on exam  Aortic valve moderate regurgitation by 2D echo 12/6/2024  Moderate to severe aortic stenosis by 2D echo 12/6/2024  Peripheral vascular disease  Bilateral carotid artery stenosis  Hyperlipidemia  Hypertension - Normotensive  Tobacco abuse     PLAN  Continue with aspirin 81 mg p.o. daily, holding heparin GTT due to anemia, hold Plavix at this point with anemia requiring transfusion and also with presence of severe coronary artery disease in anticipation of possible need for bypass surgery, also with needed kidney biopsy per nephrology.    Cannot rule out ACS at this point with multiple risk factors including tobacco abuse, peripheral vascular disease known valvular disease, also now with severe coronary artery disease found on CT abdomen pelvis as incidental finding and subacute dyspnea which may be anginal equivalent in the last week with severely elevated troponin.  Consideration for left heart catheterization possible PCI or surgery but would recommend optimization of renal function due to high risk for dialysis.  Patient is being transfused as needed with a GI evaluation planned.  
Cardiology Progress Note    Admit Date: 12/11/2024  Attending Cardiologist: Dr. Andrade    IMPRESSION  NSTEMI, I v II troponin greater than 10,000 on 12/11/2024 then greater than 9900, greater than 3000  in the context of subacute dyspnea in the last week 2D echo 12/6/2024 preserved ejection fraction 55 to 60%, EKG 12/11/2024 normal sinus rhythm rightward axis, septal infarct  Elevated proBNP, chest x-ray 12/11/2024 without acute process, 2D echo as above  Severe coronary artery disease, by CT abdomen pelvis without IV contrast on 12/11/2024  Thickening of the right lower rectus muscle concerning for intramuscular hematoma by CT abdomen pelvis 12/11/2024  Chronic heart failure preserved ejection fraction, mixed picture elevated proBNP but in the presence also renal failure without significant acute process on chest x-ray or edema on exam  Aortic valve moderate regurgitation by 2D echo 12/6/2024  Moderate to severe aortic stenosis by 2D echo 12/6/2024  Peripheral vascular disease  Bilateral carotid artery stenosis  Hyperlipidemia  Hypertension - Normotensive  Tobacco abuse     PLAN  Continue with aspirin 81 mg p.o. daily, holding heparin GTT due to anemia, hold Plavix at this point with anemia requiring transfusion and also with presence of severe coronary artery disease in anticipation of possible need for bypass surgery or if need for kidney biopsy.    Cannot rule out ACS at this point with multiple risk factors including tobacco abuse, peripheral vascular disease known valvular disease, also now with severe coronary artery disease found on CT abdomen pelvis as incidental finding and subacute dyspnea which may be anginal equivalent in the last week with severely elevated troponin.  Consideration for left heart catheterization possible PCI or surgery but would recommend optimization of renal function due to high risk for dialysis.  Patient is being transfused as needed with a GI evaluation planned and peripheral 
Cardiology Progress Note    Admit Date: 12/11/2024  Attending Cardiologist: Dr. Jesus Owen     Assessment:     -Acute Metabolic encephalopathy   -CURTIS, pending TDC placement   -Anemia, s/p multiple transfusions   -NSTEMI type 2   -Non obstructive CAD including LM disease 30-40%,  s/p LHC 12/16/24 dLM 30-40%, pLAD to mLAD 40-50%, mRCA 10-30%   pLCFX 30%   -Mod-Severe AS  -Echo 12/2024 LVEF 55%   -HTN  -HLD   -Hx CVA   -Polysubstance abuse, alcohol and tobacco     Primary Cardiologist, Dr. Paez    Plan:       I saw, evaluated, interviewed and examined the patient personally.  Patient denies any specific.  No chest pain   Hemodynamically stable  Getting TDC for dialysis management  Continue aspirin, statin, beta-blocker  Continue supportive care. Will be available peripherally.     Jesus Owen MD       Plans for TDC placement today, volume mgmt per nephrology   Continue ASA, statin, BB for CAD   Volume mgmt per nephrology   Recommend maintaining K at 4.0 and Mg at 2.0.     Subjective:     No new complaints.     Objective:      Patient Vitals for the past 8 hrs:   Temp Pulse Resp BP SpO2   12/18/24 1100 98 °F (36.7 °C) 82 28 -- --   12/18/24 0800 97.8 °F (36.6 °C) 85 20 127/62 94 %   12/18/24 0700 97.5 °F (36.4 °C) 91 23 (!) 149/66 93 %   12/18/24 0600 -- 90 29 (!) 125/46 (!) 89 %   12/18/24 0500 -- (!) 107 29 (!) 164/62 (!) 85 %   12/18/24 0400 98 °F (36.7 °C) 86 24 (!) 150/91 99 %         Patient Vitals for the past 96 hrs:   Weight   12/16/24 1633 60 kg (132 lb 4.4 oz)   12/16/24 1336 61.4 kg (135 lb 5.8 oz)   12/16/24 0453 54.1 kg (119 lb 4.8 oz)   12/15/24 0000 53.5 kg (118 lb)       TELE: normal sinus rhythm               Current Facility-Administered Medications   Medication Dose Route Frequency    potassium chloride (KLOR-CON M) extended release tablet 40 mEq  40 mEq Oral PRN    Or    potassium bicarb-citric acid (EFFER-K) effervescent tablet 40 mEq  40 mEq Oral PRN    Or    potassium chloride 10 mEq/100 
Cardiology Progress Note    Admit Date: 12/11/2024  Attending Cardiologist: Dr. Jesus Owen     Assessment:     -Acute Metabolic encephalopathy   -CURTIS, pending TDC placement   -Anemia, s/p multiple transfusions   -NSTEMI type 2   -Non obstructive CAD including LM disease 30-40%,  s/p LHC 12/16/24 dLM 30-40%, pLAD to mLAD 40-50%, mRCA 10-30%   pLCFX 30%   -Mod-Severe AS  -Echo 12/2024 LVEF 55%   -HTN  -HLD   -Hx CVA   -Polysubstance abuse, alcohol and tobacco     Primary Cardiologist, Dr. Paez    Plan:       Remained lethargic and not communicating well> Encephelopathic  Getting HD  Hemodynamically stable  Continue aspirin, statin, beta-blocker    Evaluation and management of moderate AS as outpatient.     Continue supportive care. Will be available peripherally.    Call with questions.     Jesus Owen MD       Subjective:     No new complaints. Resting comfortabley.   Lethargic,no distress.     Objective:      Patient Vitals for the past 8 hrs:   Temp Pulse Resp BP SpO2   12/20/24 1104 98.4 °F (36.9 °C) 83 18 111/73 97 %   12/20/24 1014 -- -- -- -- 99 %   12/20/24 0945 -- -- -- -- 92 %   12/20/24 0942 -- -- -- -- 100 %   12/20/24 0941 -- -- -- -- (!) 72 %   12/20/24 0937 -- 98 18 (!) 152/67 (!) 71 %   12/20/24 0930 -- (!) 116 18 (!) 181/66 (!) 71 %   12/20/24 0918 -- 84 18 117/69 --   12/20/24 0911 97.9 °F (36.6 °C) 96 18 (!) 133/94 --   12/20/24 0800 -- 94 -- -- --   12/20/24 0712 98.8 °F (37.1 °C) 85 18 132/69 95 %   12/20/24 0706 -- 78 -- -- --   12/20/24 0408 99.3 °F (37.4 °C) 83 18 139/64 --         Patient Vitals for the past 96 hrs:   Weight   12/20/24 0911 58.9 kg (129 lb 13.6 oz)   12/16/24 1633 60 kg (132 lb 4.4 oz)   12/16/24 1336 61.4 kg (135 lb 5.8 oz)       TELE: normal sinus rhythm               Current Facility-Administered Medications   Medication Dose Route Frequency    epoetin kiet-epbx (RETACRIT) injection 3,000 Units  3,000 Units SubCUTAneous Once per day on Monday Wednesday Friday    iron 
Cath holding summary:    1100: Patient via transport in bed from [facility] without difficulty, placed on monitor SR. A&O x2, no c/o pain. NPO since midnight, ID and allergies verified. H&P reviewed, med rec completed. Groin prep completed, consent ready for signature.    1124: Verbal report given to Peggy Khalil being transferred to Cath Lab for ordered procedure. Report consisted of patient's Situation, Background, Assessment and Recommendations (SBAR). Information from the following report(s) Nurse Handoff Report, Intake/Output, MAR, Recent Results, Med Rec Status, Cardiac Rhythm SR, Pre Procedure Checklist, and Procedure Verification was reviewed with the receiving nurse. Opportunity for questions and clarification was provided.    1215: Verbal report received from Peggy keturah Khalil being received from Cath Lab for routine post-op. Report consisted of patient's Situation, Background, Assessment and Recommendations (SBAR). Information from the following report(s) Nurse Handoff Report, Surgery Report, Intake/Output, MAR, Recent Results, Med Rec Status, Cardiac Rhythm Sinus Anjum, and Event Log was reviewed with the receiving nurse. Pt A&O x2, no c/o pain. Opportunity for questions and clarification provided.  Procedure: Cardiac Cath  Intervention: No  Site: Right, Arm    1310: R band removed from right wrist per protocol. No bleeding or hematoma noted, site covered with hemostatic dressing and tegaderm. Patient denies pain, pulse palpable and brisk cap refill distal to site.  Cath site instructions and post care including s/s of bleeding and methods of bleeding prevention reviewed with patient who verbalized understanding.    1319: Verbal report given to AMBER Brown on Gio Khalil  being transferred to [facility] for routine progression of patient care. Report consisted of patient's Situation, Background, Assessment and Recommendations (SBAR). Information from the following report(s) Nurse Handoff Report, 
Comprehensive Nutrition Assessment    Type and Reason for Visit:  Reassess    Nutrition Recommendations/Plan:   Continue current diet as tolerated.  Continue oral supplements: Ensure Plus High Protein (each provides 350 kcal, 20g protein) TID  Daily wts.  Continue to monitor tolerance of PO, compliance of oral supplements, weight, labs, and plan of care during admission.     Malnutrition Assessment:  Malnutrition Status:  Mild malnutrition (vs moderate/NFPE needed) (12/13/24 1053)   Context:  Acute Illness     Findings of the 6 clinical characteristics of malnutrition:  Energy Intake:  50% or less of estimated energy requirements for 5 or more days  Weight Loss:  Mild weight loss (-4% x1 month)     Body Fat Loss:  No body fat loss     Muscle Mass Loss:  Mild muscle mass loss Temples (temporalis), Clavicles (pectoralis & deltoids)  Fluid Accumulation:  No fluid accumulation     Strength:  Not Performed    Nutrition History and Allergies:      Past Medical History:   Diagnosis Date    CURTIS (acute kidney injury) (Formerly [facility]) 01/24/2024    Anxiety associated with depression 08/08/2023    Atherosclerosis 08/08/2023    Bilateral carotid artery stenosis 11/24/2023    Rt mod (50-69%); Lt mild (<50%)    Cataract 03/07/2022    Cataract extraction status of eye, unspecified laterality 03/07/2022    Chronic hepatitis C without hepatic coma (Formerly [facility]) 05/23/2022    Chronic pain in left foot 03/07/2022    Current smoker 03/07/2022    Daily consumption of alcohol 03/07/2022    Dyslipidemia 08/08/2023    Elevated LFTs 03/09/2022    Fracture of thoracic transverse process, closed, initial encounter ([facility]) 01/24/2024    Hepatitis C test positive 03/09/2022    Hyperkalemia 03/09/2022    Marijuana smoker 03/07/2022    Mental subnormality 03/07/2022    PAD (peripheral artery disease) (Formerly [facility])     Primary hypertension 03/07/2022    PVD (peripheral vascular disease) (Formerly [facility]) 07/22/2022     PTA: Pt resting; brother (Pankaj) and niece (Crystal) present. Per 
Discharge order noted.   
HD Care plan  Time: 2 hrs  Dialysate: 3K+  2.5Ca++  Bath  Net UF: 1L  Access: Aseptic care for Right femoral CVC  Hemodynamic stability: Maintain BP WNL     Pre Dialysis:  Patient received from ARACELY Natarajan RN . Patient arrived on a bed, A+O x 2, on 5L O2 via NC, no s/s of acute distress noted. Right femoral CVC assessed, no abnormalities noted. CVC accessed per protocol without any difficulty, line patent with good flow.     Intra Dialysis:  Time out / safety process performed per policy. Tx initiated at 1545.    CVC flowing with ease. For hemodynamic stability UF goal set at 1000 ml as tolerated.  Pt offered assistance with repositioning every 2 hours/prn    Vascular access visible and line connections remained intact throughout entire duration of treatment.   Vital signs checked every 15 mins.     Post Dialysis:  Tx completed at 1745,   Tolerated well, 1 L  removed. De-accessed per protocol.    Dialysis catheter locked accordingly with Heparin 1.3 ml in arterial port, and 1.3 ml in venous port. Catheter dressing clean, dry and intact.  Post Dialysis report given to ARACELY Natarajan RN  
HD Care plan  Time: 3 hrs  Dialysate: 2K+  2.5Ca++  Bath  Net UF: 1.5-2L  Access: Aseptic care for Right TDC  Hemodynamic stability: Maintain BP WNL     Pre Dialysis:  Patient received from ARACELY Mays RN . Patient arrived on a bed, A+O x 2, on 5L O2 via NC, no s/s of acute distress noted. Right TDC assessed, no abnormalities noted. TDC accessed per protocol without any difficulty, line patent with good flow.     Intra Dialysis:  Time out / safety process performed per policy. Tx initiated at 0910.    TDC flowing with ease. For hemodynamic stability UF goal set at 1500 ml as tolerated.  1100- BP 86/58, UF paused, patient c/o of nausea, PRN zofran administered  1108- Patient stating he wants to end tx,  Dr. Owen notified, tx ended 1110. Patient stated the zofran helped some with his nausea.   Pt offered assistance with repositioning every 2 hours/prn    Vascular access visible and line connections remained intact throughout entire duration of treatment.   Vital signs checked every 15 mins.     Post Dialysis:  Tx ended at 1110,   Tolerated fair, 750ml  removed. De-accessed per protocol.    Dialysis catheter locked accordingly with Heparin 1.6 ml in arterial port, and 1.6 ml in venous port. Catheter dressing clean, dry and intact.  Post Dialysis report given to AMBER Marin  
Hgb-6.0. Admin 1u PRBC. At 1034am- PRBC completed.  1218pm Dr. Dias is at the bedside putting a  Right Femoral Dialysis Catheter.   1225pm Post procedure-pt is stable.  1413pm Pt is c/o abdomen pain lower right. Admin 0.5 mg of Dilaudid IVP stat as per MD. Stat abdominal U/S stat.  
Ivan Patrick [facility] Hospitalist Group  Progress Note    Patient: Gio Khalil Age: 64 y.o. : 1960 MR#: [id number] SSN: [id number]  Date/Time: 2024    Subjective:   Subjective   Seen in room, with poor oxygenation.  No complaints via the patient, same amount of seeming encephalopathy and sightseeing prior.    Assessment/Plan:   Acute metabolic encephalopathy versus Warnicke encephalopathy versus Korsakoff  Moderate to severe aortic stenosis  NSTEMI type II  Moderate aortic regurgitation  LGIB  Influenza A  Iron deficiency anemia  Intramuscular hematoma right lower rectus muscle  Hyperlipidemia  PAD  History CVA  Alcohol use disorder  Tobacco use disorder    Plan  Clean chest x-ray, no obvious signs of pneumonia fluid overload etc.  No pneumothorax or pleural effusion to explain hypoxia.  Could consider possible splinting and subsequent atelectasis secondary to having prior rib fractures along with encephalopathy and hospital stay.  Regardless patient placed on salter high flow nasal cannula without significant improvement in oxygenation so will upgrade to stepdown and started on BiPAP for the time being.  Patient is awake and alert enough to be able to remove mask if needed.    It appears that patient's hemoglobin has overall stabilized, no obvious evidence of bleeding currently.  Given such, patient will likely need outpatient evaluation of lower GI bleed however does not appear to need 1 inpatient at this time.    Continuing to prepare for outpatient hemodialysis, HD chair    Still elevated white blood cell count, could be unrelated however will order procalcitonin.  Should this be elevated will start empiric antibiotics and do further search for any possible infective sources.      Disposition: Expected location: Skilled nursing facility     Expected discharge date: 2024      Case discussed with:  [x]Patient  []Family  [x]Nursing  [x]Case Management  DVT Prophylaxis:  
Ivan Patrick [facility] Hospitalist Group  Progress Note    Patient: Gio Khalil Age: 64 y.o. : 1960 MR#: [id number] SSN: [id number]  Date/Time: 2024    Subjective:   Subjective   Seen in room with niece and brother at bedside.  Mentation doing well.  Niece reports some bleeding from TDC overnight.     Assessment/Plan:   Acute metabolic encephalopathy versus Warnicke encephalopathy versus Korsakoff  Moderate to severe aortic stenosis  NSTEMI type II  Moderate aortic regurgitation  LGIB  Influenza A  Iron deficiency anemia  Intramuscular hematoma right lower rectus muscle  Hyperlipidemia  PAD  History CVA  Alcohol use disorder  Tobacco use disorder    Plan  Patient on NC. Satting well.     It appears that patient's hemoglobin has overall stabilized, no obvious evidence of bleeding currently.     Continuing to prepare for outpatient hemodialysis, HD chair    Disposition: Expected location: Home with .     Expected discharge date:  vs .       Case discussed with:  [x]Patient  []Family  [x]Nursing  [x]Case Management  DVT Prophylaxis:  []Lovenox  []Hep SQ  [x]SCDs  []Coumadin   []On Heparin gtt   [] DOAC    Objective:   Objective:  General Appearance:  Ill-appearing, comfortable and in no acute distress.    Vital signs: (most recent): Blood pressure (!) 122/50, pulse 66, temperature 97.4 °F (36.3 °C), temperature source Oral, resp. rate 19, height 1.676 m (5' 5.98\"), weight 58.9 kg (129 lb 13.6 oz), SpO2 99%.  No fever.    HEENT: Normal HEENT exam.    Lungs:  Normal effort and normal respiratory rate.  Breath sounds clear to auscultation.  There are rales.    Heart: Normal rate.  Regular rhythm.  S1 normal and S2 normal.  No murmur, gallop or friction rub.   Chest: Symmetric chest wall expansion.   Abdomen: Abdomen is soft and non-distended.  Bowel sounds are normal.   There is no abdominal tenderness.     Extremities: Normal range of motion.    Pulses: Distal pulses are intact. 
Ivan Patrick [facility] Hospitalist Group  Progress Note    Patient: Gio Khalil Age: 63 y.o. : 1960 MR#: [id number] SSN: [id number]  Date/Time: 2024    Subjective:   Subjective   No acute events overnight, no new concerns or complaints, vitals stable.  Seen prior to cath today      Assessment/Plan:   Acute metabolic encephalopathy  Moderate to severe aortic stenosis  NSTEMI type I versus type II  Moderate aortic regurgitation  LGIB  Iron deficiency anemia  Intramuscular hematoma right lower rectus muscle  Hyperlipidemia  PAD  History CVA  Alcohol use disorder  Tobacco use disorder    Plan  Patient with dialysis per nephrology.  Cardiology also on board.  C today to evaluate for any CAD  Good response to transfusion yesterday  Dialysis per nephrology likely later today after cath      Disposition: Expected location: Skilled nursing facility     Expected discharge date: 2024      Case discussed with:  [x]Patient  []Family  [x]Nursing  [x]Case Management  DVT Prophylaxis:  []Lovenox  []Hep SQ  [x]SCDs  []Coumadin   []On Heparin gtt   [] DOAC    Objective:   Objective:  General Appearance:  Ill-appearing, comfortable and in no acute distress.    Vital signs: (most recent): Blood pressure (!) 140/65, pulse 77, temperature 97.6 °F (36.4 °C), temperature source Oral, resp. rate 20, height 1.676 m (5' 5.98\"), weight 60 kg (132 lb 4.4 oz), SpO2 97%.  No fever.    HEENT: Normal HEENT exam.    Lungs:  Normal effort and normal respiratory rate.  Breath sounds clear to auscultation.  There are rales.    Heart: Normal rate.  Regular rhythm.  S1 normal and S2 normal.  No murmur, gallop or friction rub.   Chest: Symmetric chest wall expansion.   Abdomen: Abdomen is soft and non-distended.  Bowel sounds are normal.   There is no abdominal tenderness.     Extremities: Normal range of motion.    Pulses: Distal pulses are intact.    Neurological: Patient is alert.    Pupils:  Pupils are equal, 
Ivan Patrick [facility] Hospitalist Group  Progress Note    Patient: Gio Khalil Age: 63 y.o. : 1960 MR#: [id number] SSN: [id number]  Date/Time: 2024    Subjective:   Subjective   Again waxing waning mentation, most likely hospital delirium versus other delirium states possibly secondary to dialysis  dysequilibrium.  Seems more likely hospital delirium however.      Assessment/Plan:   Acute metabolic encephalopathy  Moderate to severe aortic stenosis  NSTEMI type I versus type II  Moderate aortic regurgitation  LGIB  Iron deficiency anemia  Intramuscular hematoma right lower rectus muscle  Hyperlipidemia  PAD  History CVA  Alcohol use disorder  Tobacco use disorder    Plan  Seems overall improved from cardiology standpoint however given kidney function seems to not be fully recovering, will likely need extended outpatient dialysis.  TDC to be placed today by vascular, nephrology care ongoing.    Disposition: Expected location: Skilled nursing facility     Expected discharge date: 2024      Case discussed with:  [x]Patient  []Family  [x]Nursing  [x]Case Management  DVT Prophylaxis:  []Lovenox  []Hep SQ  [x]SCDs  []Coumadin   []On Heparin gtt   [] DOAC    Objective:   Objective:  General Appearance:  Ill-appearing, comfortable and in no acute distress.    Vital signs: (most recent): Blood pressure (!) 140/76, pulse 86, temperature 98 °F (36.7 °C), temperature source Oral, resp. rate 27, height 1.676 m (5' 5.98\"), weight 60 kg (132 lb 4.4 oz), SpO2 94%.  No fever.    HEENT: Normal HEENT exam.    Lungs:  Normal effort and normal respiratory rate.  Breath sounds clear to auscultation.  There are rales.    Heart: Normal rate.  Regular rhythm.  S1 normal and S2 normal.  No murmur, gallop or friction rub.   Chest: Symmetric chest wall expansion.   Abdomen: Abdomen is soft and non-distended.  Bowel sounds are normal.   There is no abdominal tenderness.     Extremities: Normal range of 
Ivan Patrick [facility] Hospitalist Group  Progress Note    Patient: Gio Khalil Age: 63 y.o. : 1960 MR#: [id number] SSN: [id number]  Date/Time: 2024     Subjective:   Patient doing well.  Had some confusion and agitation overnight.  Removed CPAP overnight.  Dialysis planned for today which should help with some volume overload.    Disposition: Home health versus SNF  Discharge 2024    Review of systems  General: No fevers or chills.  Cardiovascular: No chest pain or pressure. No palpitations.   Pulmonary: No shortness of breath, cough or wheeze.   Gastrointestinal: No abdominal pain, nausea, vomiting or diarrhea.   Genitourinary: No urinary frequency, urgency, hesitancy or dysuria.   Musculoskeletal: No joint or muscle pain, no back pain, no recent trauma.    Neurologic: No headache, numbness, tingling or weakness.   Assessment/Plan:   Acute metabolic encephalopathy  Moderate to severe aortic stenosis  NSTEMI type I versus type II  Moderate aortic regurgitation  Acute renal failure  LGIB  Elevated AST  Iron deficiency anemia  Questionable intramuscular hematoma in right lower rectus muscle on CT  Diarrhea  PAD status post bilateral common stent and external iliac artery stent placement  Hyperlipidemia  History of CVA  Alcohol use disorder  Tobacco use disorder     Admitted to CVT stepdown  Cardiac monitoring  Suspect acute metabolic encephalopathy related to uremia and acute renal failure  Thyroid function testing normal, urine drug screen negative and ammonia within normal limits  Recent echocardiogram from 2024 noted with moderate to severe aortic stenosis and moderate regurgitation.  Cardiology consulted  Noted troponin of roughly 10K which is now downtrending  Continue dual antiplatelet therapy despite GI bleeding, will transfuse if needed  No heparin GTT at this time given acute anemia and GI bleeding  Would hold off on renal biopsy at this time unless testing for 
Ivan Patrick [facility] Hospitalist Group  Progress Note    Patient: Gio Khalil Age: 63 y.o. : 1960 MR#: [id number] SSN: [id number]  Date/Time: 12/15/2024    Subjective:   Subjective   No acute events overnight, no new concerns or complaints, vitals stable.  Patient does have bruising on ipsilateral side of the TDC site, along inner thigh penis and scrotum      Assessment/Plan:   Acute metabolic encephalopathy  Moderate to severe aortic stenosis  NSTEMI type I versus type II  Moderate aortic regurgitation  LGIB  Iron deficiency anemia  Intramuscular hematoma right lower rectus muscle  Hyperlipidemia  PAD  History CVA  Alcohol use disorder  Tobacco use disorder    Plan  Patient with dialysis per nephrology.  Cardiology also on board.  Plan for left heart cath Monday tentatively.  Downtrending hemoglobin, ordered 1 unit of blood along with coadministration of Lasix given increased dyspnea with fluids yesterday night.  Will defer to cardiology if they wish to proceed with heart cath or if any further intervention is needed prior.      Disposition: Expected location: Skilled nursing facility     Expected discharge date: 2024      Case discussed with:  [x]Patient  []Family  [x]Nursing  [x]Case Management  DVT Prophylaxis:  []Lovenox  []Hep SQ  [x]SCDs  []Coumadin   []On Heparin gtt   [] DOAC    Objective:   Objective:  General Appearance:  Ill-appearing, comfortable and in no acute distress.    Vital signs: (most recent): Blood pressure (!) 144/53, pulse 59, temperature 97.2 °F (36.2 °C), temperature source Temporal, resp. rate 17, height 1.676 m (5' 5.98\"), weight 53.5 kg (118 lb), SpO2 100%.  No fever.    HEENT: Normal HEENT exam.    Lungs:  Normal effort and normal respiratory rate.  Breath sounds clear to auscultation.  There are rales.    Heart: Normal rate.  Regular rhythm.  S1 normal and S2 normal.  No murmur, gallop or friction rub.   Chest: Symmetric chest wall expansion. 
Ivan Patrick [facility] Hospitalist Group  Progress Note    Patient: Gio Khalil Age: 63 y.o. : 1960 MR#: [id number] SSN: [id number]  Date/Time: 2024     Subjective:   Patient doing well today.  Received a temporary dialysis catheter and is scheduled to receive 1 round of dialysis.  Did have 1 episode of bright red blood in his stool this morning.  Not continually bleeding at this time.  No chest pain, nausea or vomiting reported    Disposition Home health versus SNF  Discharge 2024    Review of systems  General: No fevers or chills.  Cardiovascular: No chest pain or pressure. No palpitations.   Pulmonary: No shortness of breath, cough or wheeze.   Gastrointestinal: No abdominal pain, nausea, vomiting or diarrhea.   Genitourinary: No urinary frequency, urgency, hesitancy or dysuria.   Musculoskeletal: No joint or muscle pain, no back pain, no recent trauma.    Neurologic: No headache, numbness, tingling or weakness.   Assessment/Plan:   Acute metabolic encephalopathy  Moderate to severe aortic stenosis  NSTEMI type I versus type II  Moderate aortic regurgitation  Acute renal failure  LGIB  Elevated AST  Iron deficiency anemia  Questionable intramuscular hematoma in right lower rectus muscle on CT  Diarrhea  PAD status post bilateral common stent and external iliac artery stent placement  Hyperlipidemia  History of CVA  Alcohol use disorder  Tobacco use disorder    Admitted to CVT stepdown  Cardiac monitoring  Suspect acute metabolic encephalopathy related to uremia and acute renal failure  Thyroid function testing normal, urine drug screen negative and ammonia within normal limits  Recent echocardiogram from 2024 noted with moderate to severe aortic stenosis and moderate regurgitation.  Cardiology consulted  Noted troponin of roughly 10K which is now downtrending  Continue dual antiplatelet therapy despite GI bleeding, will transfuse if needed  No heparin GTT at this time 
[facility] Hospitalist Group  Progress Note    Patient: Gio Khalil Age: 63 y.o. : 1960 MR#: [id number] SSN: [id number]  Date/Time: 2024    Subjective:   Subjective   Somewhat agitated/confused last night.  Seems most likely to be secondary to hospital delirium.  Seen sleeping today.      Assessment/Plan:   Acute metabolic encephalopathy  Moderate to severe aortic stenosis  NSTEMI type I versus type II  Moderate aortic regurgitation  LGIB  Iron deficiency anemia  Intramuscular hematoma right lower rectus muscle  Hyperlipidemia  PAD  History CVA  Alcohol use disorder  Tobacco use disorder    Plan  Patient with dialysis per nephrology.  Cardiology also on board.  Patient likely to need dialysis at least in the foreseeable future, we will need to begin arranging this.      Disposition: Expected location: Skilled nursing facility     Expected discharge date: 2024      Case discussed with:  [x]Patient  []Family  [x]Nursing  [x]Case Management  DVT Prophylaxis:  []Lovenox  []Hep SQ  [x]SCDs  []Coumadin   []On Heparin gtt   [] DOAC    Objective:   Objective:  General Appearance:  Ill-appearing, comfortable and in no acute distress.    Vital signs: (most recent): Blood pressure (!) 163/60, pulse 73, temperature 98.1 °F (36.7 °C), temperature source Temporal, resp. rate 20, height 1.676 m (5' 5.98\"), weight 60 kg (132 lb 4.4 oz), SpO2 98%.  No fever.    HEENT: Normal HEENT exam.    Lungs:  Normal effort and normal respiratory rate.  Breath sounds clear to auscultation.  There are rales.    Heart: Normal rate.  Regular rhythm.  S1 normal and S2 normal.  No murmur, gallop or friction rub.   Chest: Symmetric chest wall expansion.   Abdomen: Abdomen is soft and non-distended.  Bowel sounds are normal.   There is no abdominal tenderness.     Extremities: Normal range of motion.    Pulses: Distal pulses are intact.    Neurological: Patient is alert.    Pupils:  Pupils are equal, 
[facility] Hospitalist Group  Progress Note    Patient: Gio Khalil Age: 64 y.o. : 1960 MR#: [id number] SSN: [id number]  Date/Time: 2024    Subjective:   Subjective   Worsening confusion, oxygenation noted during dialysis.    Assessment/Plan:   Acute metabolic encephalopathy versus Warnicke encephalopathy versus Korsakoff  Moderate to severe aortic stenosis  NSTEMI type II  Moderate aortic regurgitation  LGIB  Influenza A  Iron deficiency anemia  Intramuscular hematoma right lower rectus muscle  Hyperlipidemia  PAD  History CVA  Alcohol use disorder  Tobacco use disorder    Plan  Dialysis per nephrology.  Patient will require outpatient dialysis, nephrology working on this.  Dialysis canceled today secondary to worsening oxygenation due to patient pulling at nasal cannula.  GI suggests outpatient endoscopy for GI bleed workup  Hemoglobin has remained stable yesterday to today however has been an increase in white blood cell count 13.7.  Will order chest x-ray given hypoxia at the same time.      Disposition: Expected location: Skilled nursing facility     Expected discharge date: 2024      Case discussed with:  [x]Patient  []Family  [x]Nursing  [x]Case Management  DVT Prophylaxis:  []Lovenox  []Hep SQ  [x]SCDs  []Coumadin   []On Heparin gtt   [] DOAC    Objective:   Objective:  General Appearance:  Ill-appearing, comfortable and in no acute distress.    Vital signs: (most recent): Blood pressure 111/73, pulse 83, temperature 98.4 °F (36.9 °C), temperature source Axillary, resp. rate 18, height 1.676 m (5' 5.98\"), weight 58.9 kg (129 lb 13.6 oz), SpO2 97%.  No fever.    HEENT: Normal HEENT exam.    Lungs:  Normal effort and normal respiratory rate.  Breath sounds clear to auscultation.  There are rales.    Heart: Normal rate.  Regular rhythm.  S1 normal and S2 normal.  No murmur, gallop or friction rub.   Chest: Symmetric chest wall expansion.   Abdomen: Abdomen is 
[facility] Hospitalist Group  Progress Note    Patient: Gio Khalil Age: 63 y.o. : 1960 MR#: [id number] SSN: [id number]  Date/Time: 2024    Subjective:   Subjective   No acute events overnight, no new concerns or complaints, vitals stable.  Patient with ongoing confusion      Assessment/Plan:   Acute metabolic encephalopathy  Moderate to severe aortic stenosis  NSTEMI type I versus type II  Moderate aortic regurgitation  LGIB  Iron deficiency anemia  Intramuscular hematoma right lower rectus muscle  Hyperlipidemia  PAD  History CVA  Alcohol use disorder  Tobacco use disorder    Plan  Patient with dialysis per nephrology.  Cardiology also on board.  Plan for left heart cath Monday tentatively.  No current plans for kidney biopsy at this time      Disposition: Expected location: Skilled nursing facility     Expected discharge date: 2024      Case discussed with:  [x]Patient  []Family  [x]Nursing  [x]Case Management  DVT Prophylaxis:  [x]Lovenox  []Hep SQ  [x]SCDs  []Coumadin   []On Heparin gtt   [] DOAC    Objective:   Objective:  General Appearance:  Ill-appearing, comfortable and in no acute distress.    Vital signs: (most recent): Blood pressure (!) 153/63, pulse 81, temperature 97.7 °F (36.5 °C), temperature source Oral, resp. rate 17, height 1.676 m (5' 5.98\"), weight 54.2 kg (119 lb 8 oz), SpO2 97%.  No fever.    HEENT: Normal HEENT exam.    Lungs:  Normal effort and normal respiratory rate.  Breath sounds clear to auscultation.  There are rales.    Heart: Normal rate.  Regular rhythm.  S1 normal and S2 normal.  No murmur, gallop or friction rub.   Chest: Symmetric chest wall expansion.   Abdomen: Abdomen is soft and non-distended.  Bowel sounds are normal.   There is no abdominal tenderness.     Extremities: Normal range of motion.    Pulses: Distal pulses are intact.    Neurological: Patient is alert.    Pupils:  Pupils are equal, round, and reactive to light.  
O2 delivery    Patient received on [place] a 8 lpm / 100% SpO2 . To 4 lpm SPO2 remain 100%.    Plan : Determine continued need of NIV and HFNC today     Patient admit on 12/11/2024    PO2 concerns    - On 12 /21     - po2 96 on NIV ( poor patient tolerance    - spo2 93% on 6lpm      - On 12/20     -  Spo2 94-95% on 6lpm to HFNC per RT    Patient currently doing well on 4 lpm spo2 100%     Plan continue to observe on current O2 of 4 lpm       1515 Check back with patient concern O2 delivery / SPO2 and need for NIV and HFNC     - SPO2 remains 100% on 4 lpm   - patient stated up to bathroom and back without SOB    - O2 to 3 lpm at this time      Plan D/C HFNC and NIV at this time     Lungs clear per x-ray      1525 SPO2 96% at rest off O2 >? 5 minutes . Do to hypoxia concern placed on 3 lpm N/c      
OT order received and chart reviewed. 0750, Pt not seen for skilled OT as pt currently has low HGB of 6.0 g/dL. Spoke with RN who requests hold at this time. 1008, Unable to see pt for skilled OT evaluation as pt receiving blood at this time. Will f/u when pt is medically appropriate to be seen and as patient schedule allows.          Thank you for this referral.  Melinda Sylvester MS, OTR/L    
Palliative Medicine  Patient Name: Gio Khalil  YOB: 1960  MRN: [id number]  Age: 63 y.o.  Gender: male    Date of Initial Consult: 12/12/2024   Date of Service: 12/18/2024  Time: 4:49 PM  Provider: MAL Howard CNP  Hospital Day: 8  Admit Date: 12/11/2024  Referring Provider: Balta Fenton DO     Reasons for Consultation:  Goals of Care, Overwhelming Symptoms    HISTORY OF PRESENT ILLNESS (HPI):   Gio Khalil is a 63 y.o. male with a past medical history of severe aortic stenosis, CHFpEF, PAD, LGIB, HLD, hx CVA, hx HCV, AUD, and tobacco use disorder (43 pack-years) who was admitted on 12/11/2024 from home with hypovolemic CURTIS in the setting of influenza. He is now in acute renal failure with new dialysis requirement, and at least one session has been stopped early for chest pain and SOB. He has a right lower rectus hematoma. Source of GI bleeding cannot be determined by colonoscopy due to cardiac risks of procedural sedation. He has a new oxygen requirement and has also developed hospital delirium, which causes him to occasionally refuse BiPAP at night.    Psychosocial: Per chart review patient is single and lists his nieces, Shaniqua and Ruth, as primary and secondary mPOAs, respectively. His brother Pankaj is also involved in his care and often at bedside.    Functional: Patient's level of functioning is much lower than his baseline. Discharge planning includes discussion of rehab at [facility] vs home with home health vs hospice at facility. Discussions ongoing.    PALLIATIVE DIAGNOSES:    Encounter for palliative care  Goals of care   Aortic stenosis, severe  Acute renal failure on dialysis  LGIB  Delirium    ASSESSMENT AND PLAN:     Today made two attempts to assess patient. First time, he was working with Occupational Therapy. Second time, he was off unit for exchange of dialysis catheter. Will attempt assessment again tomorrow.  Performed chart review and conferred with attending 
Patient is not ready for night time BiPAP use. Patient does not want to go on the V60. Will plave on V60 if needed. Discussed with RN. Patient is AxO x2.    
Patient is not ready for night time BiPAP use. Patient unable to wear V60.  Patient will not keep the mask on and does not understand what the Bipap is. Will change to order to prn.   
Patient pulled his PIV, this nurse inserted another PIV on pt's Left upper arm and wrap it with an ace band.   
Patient without obstructive disease present, type II NSTEMI in retrospect.      
Patient's renal function improving.  No need for further hemodialysis treatments.    Requested by hospitalist (who discussed with nephrology) to have tunneled hemodialysis catheter removed   The right IJ tunneled hemodialysis catheter was removed at the bedside and manual pressure was applied till hemostasis achieved.  Dry sterile dressing was placed.    The patient tolerated the procedure well.  No immediate complications were noted.  
Physical Therapy  Attempt x 2 to see pt for PT treatment pt is off floor for dialysis with both attempts,  will continue to follow and see pt as appropriate.      Destiney Warren, PT, DPT    
Physical Therapy  PT order received and chart reviewed.  Attempt x 2 to see pt for PT evaluation, on first attempt pt was receiving blood transfusion and on second attempt asked to hold by RN due to continued low hemoglobin.  Will continue to follow and see as appropriate.  Thank you for this referral.      Destiney Warren, PT, DPT     
Physical Therapy  Pt not seen for skilled PT due to:    Actively leaving floor with transport for planned procedure.     PM attempt: Remains off unit with cardiology. Unavailable for PT session.     Will f/u later as schedule allows. Thank you.  WILLIAM HernándezT       
Physical Therapy  Pt not seen for skilled PT due to:    []  RN, MD/DO, NP/PA Hold   []  Refusal  []  Dialysis   []  Medically inappropriate  [x]  Off Unit  Other:     Will f/u later as schedule allows. Thank you.  WILLIAM HernándezT       
Pt not wanting to wear BiPAP for bedtime use. Family is also at bedside. Pt appears to be in no respiratory distress at this time and vitals are stable. Family member says pt appears to be very comfortable now that he has had some dialysis during the day and that he will be okay to remain on nasal cannula for now.     BiPAP at bedside, on standby. Patient and family aware to call with any need. RT will check back later.    0130: Pt resting comfortably. BiPAP on stand by.   
Pulse oximetry assessment   96% at rest on room air (if 88% or less, skip next steps)  88% at rest after ambulating on room air      
Received from lab  awake and oriented to himself. No complaints. Good respiratory effort. Right groin dressing intact, no bleeding or swelling. Right subclavian dialysis catheter with dressing intact, no bleeding or swelling.  
Received pre HD report from  NEERU Agudelo RN.      Pt in bed from cath holding post temporary CVC placement.   A+O x4, on O2 via NC @ 2L, no s/s of distress noted.      Accessed right Femoral CVC w/o complications.  Tx initiated at 0944.      CVC flowing with ease.  For hemodynamic stability UF goal 2500 ml.  Offered assistance with repositioning every 2 hours.  Vascular access visible at all times during treatment, line connections intact at all times.      @1615, pt c/o SOB O2 increased to 5L via NC, Nephrologist contacted and updated that pt now c/o SOB & chest pain that feels \"heavy\".  @1618 patient treatment ended 48 minutes early.    Tx completed at 1618, tolerated well 929 mL removed.  De-accessed per protocol.    Heparin indwell 1.3ml in arterial, and 1.3ml in venous catheter.      Patient returned to unit. Unit nurse NEERU Agudelo RN given report.  
Received pre HD report from AMBER Lowe.      Pt in bed, A+O x4, no s/s of distress noted on O2 via nasal cannula.      Accessed right femoral TDC w/o complications. No blood return from arterial lumen. Lines reversed.  Tx initiated at 1830.      CVC flowing with ease.  For hemodynamic stability UF goal 500 ml.  Offered assistance with repositioning every 2 hours.  Vascular access visible at all times during treatment, line connections intact at all times.      Tx completed at 2130, tolerated well, net UF 0ml removed.  De-accessed per protocol.    Heparin indwell 1.3ml in arterial lumen and 1.3ml in venous lumen.      Post HD report given to AMBER Boothe.  
Started Bilevel therapy IPAP 12, EPAP 7 FiO2 50%. Plan is to keep on the mask as tolerated for the night.      Latest Reference Range & Units 12/21/24 17:39   POC pH 7.35 - 7.45   7.40   POC pCO2 35.0 - 48.0 MMHG 46.6   POC PO2 83 - 108 MMHG 96   POC HCO3 21 - 28 MMOL/L 28.9 (H)   POC O2 SAT 94 - 98 % 97   POC TCO2 22 - 29 MMOL/L 28   (H): Data is abnormally high      Will continue to monitor Per RT assessments.   
TRANSFER - OUT REPORT:    Verbal report given to AMBER Johnson on Gio Khalil  being transferred to 2307 for routine progression of patient care       Report consisted of patient's Situation, Background, Assessment and   Recommendations(SBAR).     Information from the following report(s) Nurse Handoff Report was reviewed with the receiving nurse.           Lines:   Peripheral IV 12/18/24 Left Antecubital (Active)   Site Assessment Clean, dry & intact 12/18/24 1150   Line Status Blood return noted;Flushed 12/18/24 1150   Line Care Connections checked and tightened 12/18/24 1150   Phlebitis Assessment No symptoms 12/18/24 1150   Infiltration Assessment 0 12/18/24 1150   Dressing Status Clean, dry & intact 12/18/24 1150   Dressing Type Transparent 12/18/24 1150        Opportunity for questions and clarification was provided.      Patient transported with:  Tech: Transporter.       
TRANSFER - OUT REPORT:    Verbal report given to receiving nurse, Bre on Gio Khalil  being transferred to [address] [place] [address], room 217, for routine progression of patient care       Report consisted of patient's Situation, Background, Assessment and   Recommendations(SBAR).     Information from the following report(s) Nurse Handoff Report, ED Encounter Summary, Adult Overview, Intake/Output, MAR, Recent Results, Cardiac Rhythm NSR/Sinus Tachycardia, and Neuro Assessment was reviewed with the receiving nurse.     Lines:   Peripheral IV 12/18/24 Left Cephalic (Active)   Site Assessment Clean, dry & intact 12/19/24 1200   Line Status Flushed;Capped 12/19/24 1200   Line Care Connections checked and tightened;Ports disinfected 12/19/24 1200   Phlebitis Assessment No symptoms 12/19/24 1200   Infiltration Assessment 0 12/19/24 1200   Alcohol Cap Used Yes 12/19/24 1200   Dressing Status Clean, dry & intact 12/19/24 1200   Dressing Type Transparent 12/19/24 1200       Hemodialysis Central Access - Temporary Right Subclavian (Active)   Continued need for line? Yes 12/19/24 1200   Site Assessment Bleeding 12/19/24 1200   CVC Lumen Status Other (Comment) 12/19/24 0100   Blue Lumen Status Alcohol cap present 12/19/24 1200   Red Lumen Status Alcohol cap present 12/19/24 1200   Line Care Connections checked and tightened 12/19/24 1200   Dressing Type Sterile dressing, transparent;Antimicrobial;Bacteriocidal 12/19/24 1120   Date of Last Dressing Change 12/19/24 12/19/24 1200   Dressing Status Dry;Intact;Other (Comment) 12/19/24 1200   Dressing Intervention New;Dressing changed 12/19/24 1120        Opportunity for questions and clarification was provided.           
Titrate to 6L NC.   12/21/24 0935   Oxygen Therapy/Pulse Ox   O2 Therapy Oxygen   O2 Device Nasal cannula   O2 Flow Rate (L/min) 6 L/min   Pulse 72   Respirations 17   SpO2 93 %       
Was called by Dr. Romero -nephrology, to evaluate the right internal jugular vein tunneled hemodialysis catheter site, for bleeding.  The patient apparently had bleeding from the catheter exit site last night.  There is no active bleeding from the catheter site at the current time.  The surgical site dressing is still intact, with no evidence of soakage.    The patient is hemodynamically stable, with mild decrease in hemoglobin.    As the patient does not have any active bleeding-will monitor clinically, at the current time.    Discussed with the patient, his brother and niece at the bedside-will reassess  if he develops bleeding again, for need for additional suture at the catheter exit site  
Wife is concerned about bruised right side penis and scrotum  Wife and patient educated on cause; patient with right fem hemodialysis cath which caused bruising  Nothing to do at this time, but we will continue to monitor for increased bruising and swelling  Dr. Valderrama is aware  No new orders recieved  
  Cardiovascular: heart normal, normal rate and regular rhythm   Pulmonary/Chest Wall: breath sounds normal and effort normal   Abdominal: appearance normal, bowel sounds normal and soft, non-acute, RLQ tenderness     Patient Active Problem List   Diagnosis    Marijuana smoker    Hypertension    PAD (peripheral artery disease) ([facility])    Smoker    PVD (peripheral vascular disease) ([facility])    Elevated rheumatoid factor    Atherosclerosis    Alcohol abuse    Anxiety associated with depression    Hyperlipidemia    Bilateral carotid artery stenosis    Decreased GFR    Lumbar radiculopathy    Lumbar spondylosis    Melena    Environmental and seasonal allergies    Memory problem    Iron deficiency anemia    Acute renal failure (ARF) ([facility])    Encounter for palliative care    Goals of care, counseling/discussion    Acute metabolic encephalopathy    Aortic valve stenosis    Anemia    ETOH abuse    Influenza A (H1N1)    Dehydration    NSTEMI (non-ST elevated myocardial infarction) ([facility])    Lower GI bleed    Chronic heart failure with preserved ejection fraction ([facility])    Coronary artery disease due to lipid rich plaque         /64   Pulse 60   Temp 98.4 °F (36.9 °C) (Oral)   Resp 27   Ht 1.676 m (5' 6\")   Wt 54.2 kg (119 lb 8 oz)   SpO2 98%   BMI 19.29 kg/m²         Intake/Output Summary (Last 24 hours) at 12/13/2024 0946  Last data filed at 12/13/2024 0927  Gross per 24 hour   Intake 1405 ml   Output 1050 ml   Net 355 ml       CBC w/Diff    Lab Results   Component Value Date/Time    WBC 6.6 12/13/2024 04:40 AM    RBC 2.78 (L) 12/13/2024 04:40 AM    HGB 8.0 (L) 12/13/2024 04:40 AM    HCT 25.1 (L) 12/13/2024 04:40 AM    MCV 90.3 12/13/2024 04:40 AM    MCH 28.8 12/13/2024 04:40 AM    MCHC 31.9 12/13/2024 04:40 AM    RDW 13.7 12/13/2024 04:40 AM     (L) 12/13/2024 04:40 AM    MPV 12.7 (H) 12/13/2024 04:40 AM    No results found for: \"MONO\", \"BANDS\", \"BASOS\", \"METAS\", \"PRO\"   Basic Metabolic Profile   Recent Labs 
  Monitor blood pressure, adjust antihypertensive medications as necessary.  Statin if can tolerate prior to discharge.  Tentative plan for [facility] possible PCI today.  The benefits and risks of cardiac catheterization have been discussed in detail with the patient or healthcare power of . Patient understands  risk of potential cath complications including but not limited to bleeding, infection, dialysis or renal function decline, difficulty healing the arteriotomy access site which may require surgical repair, potential thromboembolic complications which could result in stroke, myocardial infarction, vascular injury, loss of limb or organ function and/or death and potential allergic reaction to contrast dye or other medication used during the procedure. Patient is also aware of the therapeutic implications for medical management vs coronary artery bypass surgery vs percutaneous coronary intervention in treatment of coronary artery disease. The additional risks for percutaneous coronary artery intervention include the need for emergent bypass surgery  and for restenosis for plain balloon angioplasty, for bare metal stent, and for drug eluting stent implants. The need for mandatory uninterrupted dual antiplatelet therapy with lifelong Aspirin combined with Plavix or similar for up to 12 months following drug eluting stents, and minimum 1 month following bare metal stents to prevent stent thrombosis which is the equivalent of acute heart attack has been reviewed in detail.  Patient or healthcare power of  verbalized understanding and all questions were answered.     Thank you for this consultation and for allowing us to participate in this patient's care.     Primary cardiologist Dr. Paez    Subjective:     Denies chest pains, denies shortness of breath, denies abdominal pains    Objective:      Patient Vitals for the past 8 hrs:   Temp Pulse Resp BP SpO2   12/16/24 0826 -- 50 -- -- --   12/16/24 0800 
fever.    HEENT: Normal HEENT exam.    Lungs:  Normal effort and normal respiratory rate.  Breath sounds clear to auscultation.  There are rales.    Heart: Normal rate.  Regular rhythm.  S1 normal and S2 normal.  No murmur, gallop or friction rub.   Chest: Symmetric chest wall expansion.   Abdomen: Abdomen is soft and non-distended.  Bowel sounds are normal.   There is no abdominal tenderness.     Extremities: Normal range of motion.    Pulses: Distal pulses are intact.    Neurological: Patient is alert.    Pupils:  Pupils are equal, round, and reactive to light.    Skin:  Warm and dry.         Labs:    Recent Results (from the past 24 hour(s))   CBC with Auto Differential    Collection Time: 12/19/24  4:09 AM   Result Value Ref Range    WBC 9.3 4.6 - 13.2 K/uL    RBC 2.82 (L) 4.35 - 5.65 M/uL    Hemoglobin 8.3 (L) 13.0 - 16.0 g/dL    Hematocrit 25.8 (L) 36.0 - 48.0 %    MCV 91.5 78.0 - 100.0 FL    MCH 29.4 24.0 - 34.0 PG    MCHC 32.2 31.0 - 37.0 g/dL    RDW 14.7 (H) 11.6 - 14.5 %    Platelets 209 135 - 420 K/uL    MPV 11.9 (H) 9.2 - 11.8 FL    Nucleated RBCs 0.0 0  WBC    nRBC 0.00 0.00 - 0.01 K/uL    Neutrophils % 76 (H) 40 - 73 %    Lymphocytes % 10 (L) 21 - 52 %    Monocytes % 7 3 - 10 %    Eosinophils % 5 0 - 5 %    Basophils % 2 0 - 2 %    Immature Granulocytes % 0 0.0 - 0.5 %    Neutrophils Absolute 7.0 1.8 - 8.0 K/UL    Lymphocytes Absolute 0.9 0.9 - 3.6 K/UL    Monocytes Absolute 0.7 0.05 - 1.2 K/UL    Eosinophils Absolute 0.5 (H) 0.0 - 0.4 K/UL    Basophils Absolute 0.2 (H) 0.0 - 0.1 K/UL    Immature Granulocytes Absolute 0.0 0.00 - 0.04 K/UL    Differential Type MANUAL      Platelet Comment ADEQUATE PLATELETS      RBC Comment NORMOCYTIC, NORMOCHROMIC     Comprehensive Metabolic Panel w/ Reflex to MG    Collection Time: 12/19/24  4:09 AM   Result Value Ref Range    Sodium 139 136 - 145 mmol/L    Potassium 3.5 3.5 - 5.5 mmol/L    Chloride 105 100 - 111 mmol/L    CO2 28 21 - 32 mmol/L    Anion Gap 6 
27.3* 31.6*   * 98* 161      Cardiac Enzymes @[id number](CPK:*,CK:*,CPKMB:*,CKRMB:*,CKMMB:*,CKMB:*,RCK3:*,CKMBT:*,CKMBPC:*,CKSMB:*,CKNDX:*,CKND1:*,SONJA:*,TROQR:*,TROPT:*,TROIQ:*,TROIP:*,GABE:*,TROPIT:*,TROPT:*,TRPOIT:*,ITNL:*,TNIPOC:*,BNP:*,BNPP:*,PBNP:*)@   Coagulation No results for input(s): \"INR\", \"APTT\" in the last 72 hours.    Invalid input(s): \"PTP\"    Lipid Panel Lab Results   Component Value Date/Time    CHOL 89 12/16/2024 12:57 AM    HDL 38 12/16/2024 12:57 AM    LDL 35.8 12/16/2024 12:57 AM    LDL 53 11/06/2023 12:00 AM    VLDL 15.2 12/16/2024 12:57 AM    VLDL 11 03/07/2022 12:00 AM      BNP No results found for: \"BNP\", \"BNPPOC\"   Liver Enzymes No results for input(s): \"TP\" in the last 72 hours.    Invalid input(s): \"ALB\", \"TBIL\", \"AP\", \"SGOT\", \"GPT\", \"DBIL\"   Thyroid Studies Lab Results   Component Value Date/Time    TSH 1.12 12/12/2024 03:49 AM          Signed By: MARSHALL SOSA MD     December 17, 2024      
83 18 139/64 --   12/20/24 0103 98.2 °F (36.8 °C) 84 18 (!) 135/55 96 %   12/19/24 1917 97.3 °F (36.3 °C) 74 18 (!) 135/90 100 %   12/19/24 1557 98.6 °F (37 °C) 70 18 (!) 141/64 100 %   12/19/24 1220 -- (!) 109 -- (!) 147/74 --   12/19/24 1200 97.7 °F (36.5 °C) (!) 104 25 (!) 147/74 93 %   12/19/24 1137 97.7 °F (36.5 °C) -- -- -- --   12/19/24 1120 97.8 °F (36.6 °C) (!) 103 22 (!) 140/81 --   12/19/24 1110 -- 100 -- 115/63 --   12/19/24 1100 -- (!) 110 -- (!) 86/58 --   12/19/24 1045 -- (!) 107 -- 101/65 --   12/19/24 1030 -- (!) 103 -- 107/63 --   12/19/24 1015 -- (!) 103 -- 135/76 --   12/19/24 1000 -- (!) 102 -- 126/71 --   12/19/24 0945 -- (!) 105 -- 127/68 --        Weight change:      12/18 1901 - 12/20 0700  In: 1220 [P.O.:720]  Out: 1580 [Urine:330]    Intake/Output Summary (Last 24 hours) at 12/20/2024 0936  Last data filed at 12/19/2024 1354  Gross per 24 hour   Intake 740 ml   Output 1250 ml   Net -510 ml     Physical Exam:   General:anxious  HEENT sclera anicteric, supple neck, no thyromegaly  CVS: S1S2 heard,  no rub  RS: + air entry b/l,   Abd: Soft  Neuro: mild confusion  Extrm:  no cyanosis, clubbing   Skin: no visible  Rash  Musculoskeletal: No gross joints or bone deformities         Data Review:     LABS:   Hematology:   Recent Labs     12/18/24  0406 12/19/24  0409 12/20/24  0430   WBC 9.0 9.3 13.7*   HGB 9.7* 8.3* 8.2*   HCT 30.0* 25.8* 25.8*     Chemistry:   Recent Labs     12/18/24  0406 12/19/24  0409 12/20/24  0430   BUN 49* 54* 39*   K 3.1* 3.5 3.9    139 139    105 104   CO2 26 28 28            Procedures/imaging: see electronic medical records for all procedures, Xrays and details which were not copied into this note but were reviewed prior to creation of Plan          Assessment & Plan:       Seen and examined  See above      ALLI JACK MD  12/20/2024  9:36 AM              
notes/results and researches anything she does not understand. They tell our team that they have thought about whether the patient would benefit from CPR and/or intubation, and they have agreed that he would not. Today we completed a Durable Do Not Resuscitate form, and inpatient code status order was changed to DNR.     They verbalize understanding that a DNR order does not preclude patient from receiving other treatments, interventions, and diagnostics. They would like to have him re-evaluated for endoscopy in hopes of finding the source of bleeding. Depending on the benefits and burdens of intervention, they are interested in potential treatments to stop or reverse the cause of bleeding. It is very important to Crystal that she be included in decision-making and be updated about the care plan as it evolves.    Please refer to Palliative Medicine ACP notes for further details.    PALLIATIVE ASSESSMENT:      Palliative Performance Scale (PPS):  PPS: 40    Modified ESAS:  Modified-[place] Symptom Assessment Scale (ESAS)  Tiredness Score: 5  Drowsiness Score: Not drowsy  Pain Score: 5  Nausea Score: Not nauseated  Appetite Score: 5  Dyspnea Score: 5  Other Problem Score: 6 (delirium)    Clinical Pain Assessment (nonverbal scale for severity on nonverbal patients):   Clinical Pain Assessment  Severity: 5  Location: unable to describe  Character: unable to describe  Duration: unable to describe  Factors: unable to describe  Frequency: unable to describe       RDOS:  RDOS  Heart rate per minute:   Respiratory Rate per Minute: 19 to 30  Restlessness:non-purposeful: Frequent movements  Paradoxical breathing pattern:abdomen moves in on inspiration: None  Accessory muscle use: rise in clavicle during inspiration: Pronounced rise  Grunting at end-expiration: guttural sound: None  Nasal flaring: involuntary movement of nares: Present  Look of fear: None  Total : 8    PHYSICAL ASSESSMENT:   General: [] Oriented x3  [] 
  HGB 7.8* 6.0*   HCT 24.6* 19.2*     Chemistry:   Recent Labs     12/11/24  1200 12/12/24  0349   BUN 56* 60*   K 3.5 3.6   * 139    110   CO2 20* 19*   PHOS  --  6.4*            Procedures/imaging: see electronic medical records for all procedures, Xrays and details which were not copied into this note but were reviewed prior to creation of Plan          Assessment & Plan:       See above      ALLI JACK MD  12/12/2024  10:58 AM       
Nutrition Supplement     Ariadna Nixno MS, RDN, LDN  Contact: [phone]    Available via [employer]  
        Radiology    XR Results (most recent):   Cardiac procedure  Coronary Artery Disease  dLM 30-40% stenosis  pLAD to mLAD 40-50% stenosis  mRCA 10-30% stenosis  pLCFX 30% stenosis  LVEDP per report  RCA Dominant System    Plan: Recommend aspirin 81mg po daily, statin as can tolerate, ACC   provisions diet and exercise               SARATH Watts    [facility]  Phone: [phone]  [facility] Cell: [phone] (Mon-Fri [id number])

## 2024-12-23 NOTE — PLAN OF CARE
Problem: Discharge Planning  Goal: Discharge to home or other facility with appropriate resources  Description: Pt to go home at discharge   Pt to have PT/OT to determine disposition of pt at discharge    Outcome: Adequate for Discharge     Problem: Pain  Goal: Verbalizes/displays adequate comfort level or baseline comfort level  Description: Pt to be pain free or at pain goal using appropriate pain scales.  Pt to have prn pain medication as needed  Outcome: Adequate for Discharge     Problem: Skin/Tissue Integrity  Goal: Absence of new skin breakdown  Description: 1.  Monitor for areas of redness and/or skin breakdown  2.  Assess vascular access sites hourly  3.  Every 4-6 hours minimum:  Change oxygen saturation probe site  4.  Every 4-6 hours:  If on nasal continuous positive airway pressure, respiratory therapy assess nares and determine need for appliance change or resting period.  Outcome: Adequate for Discharge     Problem: Safety - Adult  Goal: Free from fall injury  Description: Pt to don non skid socks, fall armband and fall sign to ensure safety.  Pt to have assistance when moving from one position to another.  Outcome: Adequate for Discharge     Problem: ABCDS Injury Assessment  Goal: Absence of physical injury  Description: Pt to use call bell for assistance when needed  Pt to have alarm set on bed or chair at all times.   Outcome: Adequate for Discharge     Problem: Metabolic/Fluid and Electrolytes - Adult  Goal: Hemodynamic stability and optimal renal function maintained  Description: Pt to have scheduled labs drawn to ensure stability of hemoglobin level and renal function level    Outcome: Adequate for Discharge     Problem: Confusion  Goal: Confusion, delirium, dementia, or psychosis is improved or at baseline  Description: INTERVENTIONS:  1. Assess for possible contributors to thought disturbance, including medications, impaired vision or hearing, underlying metabolic abnormalities, 
  Problem: Discharge Planning  Goal: Discharge to home or other facility with appropriate resources  Description: Pt to go home at discharge   Pt to have PT/OT to determine disposition of pt at discharge    12/20/2024 0230 by Areli Moore RN  Outcome: Progressing  12/19/2024 1646 by Bre Garrido RN  Outcome: Progressing     Problem: Pain  Goal: Verbalizes/displays adequate comfort level or baseline comfort level  Description: Pt to be pain free or at pain goal using appropriate pain scales.  Pt to have prn pain medication as needed  12/20/2024 0230 by Areli Moore RN  Outcome: Progressing  12/19/2024 1646 by Bre Garrido RN  Outcome: Progressing  12/19/2024 1534 by Marilia Mays RN  Outcome: Progressing  Flowsheets (Taken 12/19/2024 0800)  Verbalizes/displays adequate comfort level or baseline comfort level:   Encourage patient to monitor pain and request assistance   Assess pain using appropriate pain scale     Problem: Skin/Tissue Integrity  Goal: Absence of new skin breakdown  Description: 1.  Monitor for areas of redness and/or skin breakdown  2.  Assess vascular access sites hourly  3.  Every 4-6 hours minimum:  Change oxygen saturation probe site  4.  Every 4-6 hours:  If on nasal continuous positive airway pressure, respiratory therapy assess nares and determine need for appliance change or resting period.  12/20/2024 0230 by Areli Moore RN  Outcome: Progressing  12/19/2024 1646 by Bre Garrido RN  Outcome: Progressing  12/19/2024 1534 by Marilia Mays RN  Outcome: Progressing  Note: -Encourage repositioning approximately every 2-3 hours.     Problem: Safety - Adult  Goal: Free from fall injury  Description: Pt to don non skid socks, fall armband and fall sign to ensure safety.  Pt to have assistance when moving from one position to another.  12/20/2024 0230 by Areli Moore RN  Outcome: Progressing  12/19/2024 1646 by Bre Garrido, 
  Problem: Discharge Planning  Goal: Discharge to home or other facility with appropriate resources  Description: Pt to go home at discharge   Pt to have PT/OT to determine disposition of pt at discharge    12/20/2024 1615 by Geeta Anderson RN  Outcome: Progressing  12/20/2024 0230 by Areli Moore RN  Outcome: Progressing     Problem: Pain  Goal: Verbalizes/displays adequate comfort level or baseline comfort level  Description: Pt to be pain free or at pain goal using appropriate pain scales.  Pt to have prn pain medication as needed  12/20/2024 1615 by Geeta Anderson RN  Outcome: Progressing  12/20/2024 0230 by Areli Moore RN  Outcome: Progressing     Problem: Skin/Tissue Integrity  Goal: Absence of new skin breakdown  Description: 1.  Monitor for areas of redness and/or skin breakdown  2.  Assess vascular access sites hourly  3.  Every 4-6 hours minimum:  Change oxygen saturation probe site  4.  Every 4-6 hours:  If on nasal continuous positive airway pressure, respiratory therapy assess nares and determine need for appliance change or resting period.  12/20/2024 1615 by Geeta Anderson RN  Outcome: Progressing  12/20/2024 0230 by Areli Moore RN  Outcome: Progressing     Problem: Safety - Adult  Goal: Free from fall injury  Description: Pt to don non skid socks, fall armband and fall sign to ensure safety.  Pt to have assistance when moving from one position to another.  12/20/2024 1615 by Geeta Anderson RN  Outcome: Progressing  12/20/2024 0230 by Areli Moore RN  Outcome: Progressing     Problem: ABCDS Injury Assessment  Goal: Absence of physical injury  Description: Pt to use call bell for assistance when needed  Pt to have alarm set on bed or chair at all times.   12/20/2024 1615 by Geeta Anderson RN  Outcome: Progressing  12/20/2024 0230 by Areli Moore RN  Outcome: Progressing     Problem: Metabolic/Fluid and Electrolytes - Adult  Goal: Hemodynamic 
  Problem: Discharge Planning  Goal: Discharge to home or other facility with appropriate resources  Description: Pt to go home at discharge   Pt to have PT/OT to determine disposition of pt at discharge    12/21/2024 0214 by Kathleen Kearns LPN  Outcome: Progressing  12/20/2024 1615 by Geeta Anderson RN  Outcome: Progressing     Problem: Pain  Goal: Verbalizes/displays adequate comfort level or baseline comfort level  Description: Pt to be pain free or at pain goal using appropriate pain scales.  Pt to have prn pain medication as needed  12/21/2024 0214 by Kathleen Kearns LPN  Outcome: Progressing  12/20/2024 1615 by Geeta Anderson RN  Outcome: Progressing     Problem: Skin/Tissue Integrity  Goal: Absence of new skin breakdown  Description: 1.  Monitor for areas of redness and/or skin breakdown  2.  Assess vascular access sites hourly  3.  Every 4-6 hours minimum:  Change oxygen saturation probe site  4.  Every 4-6 hours:  If on nasal continuous positive airway pressure, respiratory therapy assess nares and determine need for appliance change or resting period.  12/21/2024 0214 by Kathleen Kearns LPN  Outcome: Progressing  12/20/2024 1615 by Geeta Anderson RN  Outcome: Progressing     Problem: Safety - Adult  Goal: Free from fall injury  Description: Pt to don non skid socks, fall armband and fall sign to ensure safety.  Pt to have assistance when moving from one position to another.  12/21/2024 0214 by Kathleen Kearns LPN  Outcome: Progressing  12/20/2024 1615 by Geeta Anderson RN  Outcome: Progressing     Problem: ABCDS Injury Assessment  Goal: Absence of physical injury  Description: Pt to use call bell for assistance when needed  Pt to have alarm set on bed or chair at all times.   12/21/2024 0214 by Kathleen Kearns LPN  Outcome: Progressing  12/20/2024 1615 by Geeta Anderson RN  Outcome: Progressing     Problem: Metabolic/Fluid and Electrolytes - Adult  Goal: 
  Problem: Discharge Planning  Goal: Discharge to home or other facility with appropriate resources  Description: Pt to go home at discharge   Pt to have PT/OT to determine disposition of pt at discharge    12/22/2024 1126 by Socorro Kramer RN  Outcome: Progressing  Note: Educating patient and family on diseaase processes and prevention methods      Problem: Pain  Goal: Verbalizes/displays adequate comfort level or baseline comfort level  Description: Pt to be pain free or at pain goal using appropriate pain scales.  Pt to have prn pain medication as needed  12/22/2024 1126 by Socorro Kramer, RN  Outcome: Progressing  Flowsheets (Taken 12/22/2024 0136 by Xavier Chowdary, RN)  Verbalizes/displays adequate comfort level or baseline comfort level:   Assess pain using appropriate pain scale   Encourage patient to monitor pain and request assistance   Implement non-pharmacological measures as appropriate and evaluate response  Note: Assessing pain using 0-10 pain scale and making comfortable as needed      Problem: Skin/Tissue Integrity  Goal: Absence of new skin breakdown  Description: 1.  Monitor for areas of redness and/or skin breakdown  2.  Assess vascular access sites hourly  3.  Every 4-6 hours minimum:  Change oxygen saturation probe site  4.  Every 4-6 hours:  If on nasal continuous positive airway pressure, respiratory therapy assess nares and determine need for appliance change or resting period.  12/22/2024 1126 by Socorro Kramer RN  Outcome: Progressing  Note: Performed dual skin check within offcoming RN and assessing during q2 rounds      Problem: Safety - Adult  Goal: Free from fall injury  Description: Pt to don non skid socks, fall armband and fall sign to ensure safety.  Pt to have assistance when moving from one position to another.  12/22/2024 1126 by Socorro Kramer, RN  Outcome: Progressing  Note: Patients bed at lowest position and bed alarm on to prevent injuries      Problem: ABCDS Injury 
  Problem: Discharge Planning  Goal: Discharge to home or other facility with appropriate resources  Description: Pt to go home at discharge   Pt to have PT/OT to determine disposition of pt at discharge    Outcome: Progressing     Problem: Pain  Goal: Verbalizes/displays adequate comfort level or baseline comfort level  Description: Pt to be pain free or at pain goal using appropriate pain scales.  Pt to have prn pain medication as needed  12/19/2024 1646 by Bre Garrido RN  Outcome: Progressing  12/19/2024 1534 by Marilia Mays RN  Outcome: Progressing  Flowsheets (Taken 12/19/2024 0800)  Verbalizes/displays adequate comfort level or baseline comfort level:   Encourage patient to monitor pain and request assistance   Assess pain using appropriate pain scale     Problem: Skin/Tissue Integrity  Goal: Absence of new skin breakdown  Description: 1.  Monitor for areas of redness and/or skin breakdown  2.  Assess vascular access sites hourly  3.  Every 4-6 hours minimum:  Change oxygen saturation probe site  4.  Every 4-6 hours:  If on nasal continuous positive airway pressure, respiratory therapy assess nares and determine need for appliance change or resting period.  12/19/2024 1646 by Bre Garrido RN  Outcome: Progressing  12/19/2024 1534 by Marilia Mays RN  Outcome: Progressing  Note: -Encourage repositioning approximately every 2-3 hours.     Problem: Safety - Adult  Goal: Free from fall injury  Description: Pt to don non skid socks, fall armband and fall sign to ensure safety.  Pt to have assistance when moving from one position to another.  12/19/2024 1646 by Bre Garrido RN  Outcome: Progressing  12/19/2024 1534 by Marilia Mays RN  Outcome: Progressing  Flowsheets (Taken 12/19/2024 0800)  Free From Fall Injury: Instruct family/caregiver on patient safety  Note: -Call bell/bedside table within arms reach.  -Bed alarm on.  -Safety sitter at bedside.  -Gripper socks 
  Problem: Discharge Planning  Goal: Discharge to home or other facility with appropriate resources  Description: Pt to go home at discharge   Pt to have PT/OT to determine disposition of pt at discharge    Outcome: Progressing     Problem: Pain  Goal: Verbalizes/displays adequate comfort level or baseline comfort level  Description: Pt to be pain free or at pain goal using appropriate pain scales.  Pt to have prn pain medication as needed  Outcome: Progressing     Problem: Skin/Tissue Integrity  Goal: Absence of new skin breakdown  Description: 1.  Monitor for areas of redness and/or skin breakdown  2.  Assess vascular access sites hourly  3.  Every 4-6 hours minimum:  Change oxygen saturation probe site  4.  Every 4-6 hours:  If on nasal continuous positive airway pressure, respiratory therapy assess nares and determine need for appliance change or resting period.  Outcome: Progressing     Problem: Safety - Adult  Goal: Free from fall injury  Description: Pt to don non skid socks, fall armband and fall sign to ensure safety.  Pt to have assistance when moving from one position to another.  Outcome: Progressing     Problem: ABCDS Injury Assessment  Goal: Absence of physical injury  Description: Pt to use call bell for assistance when needed  Pt to have alarm set on bed or chair at all times.   Outcome: Progressing     Problem: Metabolic/Fluid and Electrolytes - Adult  Goal: Hemodynamic stability and optimal renal function maintained  Description: Pt to have scheduled labs drawn to ensure stability of hemoglobin level and renal function level    Outcome: Progressing     Problem: Confusion  Goal: Confusion, delirium, dementia, or psychosis is improved or at baseline  Description: INTERVENTIONS:  1. Assess for possible contributors to thought disturbance, including medications, impaired vision or hearing, underlying metabolic abnormalities, dehydration, psychiatric diagnoses, and notify attending [facility]  2. [place] 
  Problem: Discharge Planning  Goal: Discharge to home or other facility with appropriate resources  Description: Pt to go home at discharge   Pt to have PT/OT to determine disposition of pt at discharge    Outcome: Progressing  Flowsheets (Taken 12/13/2024 2150)  Discharge to home or other facility with appropriate resources:   Identify barriers to discharge with patient and caregiver   Identify discharge learning needs (meds, wound care, etc)   Refer to discharge planning if patient needs post-hospital services based on physician order or complex needs related to functional status, cognitive ability or social support system     Problem: Pain  Goal: Verbalizes/displays adequate comfort level or baseline comfort level  Description: Pt to be pain free or at pain goal using appropriate pain scales.  Pt to have prn pain medication as needed  Outcome: Progressing     Problem: Skin/Tissue Integrity  Goal: Absence of new skin breakdown  Description: 1.  Monitor for areas of redness and/or skin breakdown  2.  Assess vascular access sites hourly  3.  Every 4-6 hours minimum:  Change oxygen saturation probe site  4.  Every 4-6 hours:  If on nasal continuous positive airway pressure, respiratory therapy assess nares and determine need for appliance change or resting period.  Outcome: Progressing     Problem: Safety - Adult  Goal: Free from fall injury  Description: Pt to don non skid socks, fall armband and fall sign to ensure safety.  Pt to have assistance when moving from one position to another.  Outcome: Progressing     Problem: ABCDS Injury Assessment  Goal: Absence of physical injury  Description: Pt to use call bell for assistance when needed  Pt to have alarm set on bed or chair at all times.   Outcome: Progressing     Problem: Metabolic/Fluid and Electrolytes - Adult  Goal: Hemodynamic stability and optimal renal function maintained  Description: Pt to have scheduled labs drawn to ensure stability of hemoglobin level 
  Problem: Discharge Planning  Goal: Discharge to home or other facility with appropriate resources  Description: Pt to go home at discharge   Pt to have PT/OT to determine disposition of pt at discharge    Outcome: Progressing  Flowsheets (Taken 12/15/2024 2000)  Discharge to home or other facility with appropriate resources:   Identify barriers to discharge with patient and caregiver   Arrange for needed discharge resources and transportation as appropriate   Identify discharge learning needs (meds, wound care, etc)   Refer to discharge planning if patient needs post-hospital services based on physician order or complex needs related to functional status, cognitive ability or social support system     Problem: Pain  Goal: Verbalizes/displays adequate comfort level or baseline comfort level  Description: Pt to be pain free or at pain goal using appropriate pain scales.  Pt to have prn pain medication as needed  Outcome: Progressing     Problem: Skin/Tissue Integrity  Goal: Absence of new skin breakdown  Description: 1.  Monitor for areas of redness and/or skin breakdown  2.  Assess vascular access sites hourly  3.  Every 4-6 hours minimum:  Change oxygen saturation probe site  4.  Every 4-6 hours:  If on nasal continuous positive airway pressure, respiratory therapy assess nares and determine need for appliance change or resting period.  Outcome: Progressing     Problem: ABCDS Injury Assessment  Goal: Absence of physical injury  Description: Pt to use call bell for assistance when needed  Pt to have alarm set on bed or chair at all times.   Outcome: Progressing     Problem: Metabolic/Fluid and Electrolytes - Adult  Goal: Hemodynamic stability and optimal renal function maintained  Description: Pt to have scheduled labs drawn to ensure stability of hemoglobin level and renal function level    Outcome: Progressing  Flowsheets (Taken 12/15/2024 2000)  Hemodynamic stability and optimal renal function maintained:   
  Problem: Metabolic/Fluid and Electrolytes - Adult  Goal: Hemodynamic stability and optimal renal function maintained  Description: Pt to have scheduled labs drawn to ensure stability of hemoglobin level and renal function level    Flowsheets (Taken 12/12/2024 1948)  Hemodynamic stability and optimal renal function maintained:   Monitor labs and assess for signs and symptoms of volume excess or deficit   Monitor intake, output and patient weight   Monitor response to interventions for patient's volume status, including labs, urine output, blood pressure (other measures as available)  Note: Admin 1 u PRBC r/t low Hgb-6.0. Post HH 8.4/25.9.      
  Problem: Neurosensory - Adult  Goal: Achieves stable or improved neurological status  Outcome: Progressing  Flowsheets (Taken 12/13/2024 1153)  Achieves stable or improved neurological status: Assess for and report changes in neurological status     Problem: Respiratory - Adult  Goal: Achieves optimal ventilation and oxygenation  Outcome: Progressing  Flowsheets (Taken 12/13/2024 1153)  Achieves optimal ventilation and oxygenation:   Assess for changes in respiratory status   Assess for changes in mentation and behavior     Problem: Cardiovascular - Adult  Goal: Maintains optimal cardiac output and hemodynamic stability  Outcome: Progressing  Flowsheets (Taken 12/13/2024 1153)  Maintains optimal cardiac output and hemodynamic stability:   Assess for signs of decreased cardiac output   Monitor blood pressure and heart rate     Problem: Skin/Tissue Integrity - Adult  Goal: Skin integrity remains intact  Outcome: Progressing     Problem: Genitourinary - Adult  Goal: Absence of urinary retention  Outcome: Progressing  Flowsheets (Taken 12/13/2024 1153)  Absence of urinary retention: Assess patient’s ability to void and empty bladder     Problem: Musculoskeletal - Adult  Goal: Return mobility to safest level of function  Outcome: Progressing  Flowsheets (Taken 12/13/2024 1153)  Return Mobility to Safest Level of Function: Assist with transfers and ambulation using safe patient handling equipment as needed     Problem: Infection - Adult  Goal: Absence of infection during hospitalization  Outcome: Progressing  Flowsheets (Taken 12/13/2024 1153)  Absence of infection during hospitalization: Assess and monitor for signs and symptoms of infection     Problem: Confusion  Goal: Confusion, delirium, dementia, or psychosis is improved or at baseline  Description: INTERVENTIONS:  1. Assess for possible contributors to thought disturbance, including medications, impaired vision or hearing, underlying metabolic abnormalities, 
  Problem: Occupational Therapy - Adult  Goal: By Discharge: Performs self-care activities at highest level of function for planned discharge setting.  See evaluation for individualized goals.  Description: Occupational Therapy Goals:  Initiated 12/13/2024 to be met within 7-10 days.    1.  Patient will perform bed mobility for ADLs with supervision/set-up.   2.  Patient will perform upper body dressing with supervision/set-up.  3.  Patient will perform lower body dressing with supervision/set-up.  4.  Patient will perform toilet transfers with minimal assistance/contact guard assist.  5.  Patient will perform all aspects of toileting with minimal assistance/contact guard assist.  6.  Patient will participate in upper extremity therapeutic exercise/activities with supervision/set-up for 8 minutes to improve endurance and UB strength needed for ADLs    7.  Patient will follow 100% of commands during functional activities with verbal cues.    PLOF: Pt lives with niece, usually independent with functional mobility and ADLs.  Outcome: Progressing  OCCUPATIONAL THERAPY EVALUATION    Patient: Gio Khalil (63 y.o. male)  Date: 12/13/2024  Primary Diagnosis: Dehydration [E86.0]  NSTEMI (non-ST elevated myocardial infarction) ([facility]) [I21.4]  Acute renal failure (ARF) ([facility]) [N17.9]  Influenza A (H1N1) [J10.1]  Acute renal failure, unspecified acute renal failure type ([facility]) [N17.9]  Anemia, unspecified type [D64.9]  Acute metabolic encephalopathy [G93.41]       Precautions: Isolation, Fall Risk    ASSESSMENT :    Pt cleared to participate in OT by RN. Pt is on 4L O2 with sats 92-94% at rest, decrease to 88% with all functional mobility. Pt sat EOB for ~ 3 min visibly SOB. Educated on PLB techniques, however, pt demos decreased commands following affecting his comprehension of education. Required Min A for basic grooming ADLs for sequencing, Max A for LB ADLs. Pt declined to std and self-initiated return to supine. Positioned 
  Problem: Pain  Goal: Verbalizes/displays adequate comfort level or baseline comfort level  Description: Pt to be pain free or at pain goal using appropriate pain scales.  Pt to have prn pain medication as needed  12/15/2024 0833 by Leatha Mai RN  Outcome: Progressing  Flowsheets (Taken 12/15/2024 0833)  Verbalizes/displays adequate comfort level or baseline comfort level:   Encourage patient to monitor pain and request assistance   Assess pain using appropriate pain scale  Note: No current pain  Will assess pain levels with vital signs and medicate as ordered  1  Problem: Skin/Tissue Integrity  Goal: Absence of new skin breakdown  Description: 1.  Monitor for areas of redness and/or skin breakdown  2.  Assess vascular access sites hourly  3.  Every 4-6 hours minimum:  Change oxygen saturation probe site  4.  Every 4-6 hours:  If on nasal continuous positive airway pressure, respiratory therapy assess nares and determine need for appliance change or resting period.  12/15/2024 0833 by Leatha Mai RN  Outcome: Progressing  Note: Skin intact   Blanchable heels and sacrum  Mepilex in place   Patient encouraged to turn q2 hrs  Problem: Respiratory - Adult  Goal: Achieves optimal ventilation and oxygenation  12/15/2024 0833 by Leatha Mai RN  Outcome: Progressing  Flowsheets (Taken 12/15/2024 0833)  Achieves optimal ventilation and oxygenation:   Assess for changes in respiratory status   Assess for changes in mentation and behavior   Position to facilitate oxygenation and minimize respiratory effort  Note: Patient dyspnea exacerbated by anxiety  Patient encouraged to deep breath  Will monitor respiratory status  Oxygen saturation parameter to maintain about 90%  Bipap at bedside if needed  
  Problem: Pain  Goal: Verbalizes/displays adequate comfort level or baseline comfort level  Description: Pt to be pain free or at pain goal using appropriate pain scales.  Pt to have prn pain medication as needed  12/17/2024 2219 by Lauro Schulz RN  Outcome: Progressing  Flowsheets (Taken 12/17/2024 2219)  Verbalizes/displays adequate comfort level or baseline comfort level:   Encourage patient to monitor pain and request assistance   Assess pain using appropriate pain scale   Administer analgesics based on type and severity of pain and evaluate response  Note: Monitor Pt pain and discomfort.Pain meds given prn per MAR.  12/17/2024 1319 by Kevin Agudelo RN  Outcome: Progressing  Flowsheets (Taken 12/17/2024 1319)  Verbalizes/displays adequate comfort level or baseline comfort level:   Encourage patient to monitor pain and request assistance   Assess pain using appropriate pain scale   Administer analgesics based on type and severity of pain and evaluate response     Problem: Safety - Adult  Goal: Free from fall injury  Description: Pt to don non skid socks, fall armband and fall sign to ensure safety.  Pt to have assistance when moving from one position to another.  12/17/2024 2219 by Lauro Schulz, RN  Outcome: Progressing  Flowsheets (Taken 12/17/2024 2219)  Free From Fall Injury:   Instruct family/caregiver on patient safety   Based on caregiver fall risk screen, instruct family/caregiver to ask for assistance with transferring infant if caregiver noted to have fall risk factors  Note: Assess for redness and skin breakdown.  Pt. Skin is intact.     Problem: Skin/Tissue Integrity - Adult  Goal: Skin integrity remains intact  12/17/2024 1319 by Kevin Agudelo, RN  Outcome: Progressing  Flowsheets (Taken 12/17/2024 1319)  Skin Integrity Remains Intact:   Monitor for areas of redness and/or skin breakdown   Assess vascular access sites hourly     Problem: Genitourinary - Adult  Goal: Absence of 
  Problem: Pain  Goal: Verbalizes/displays adequate comfort level or baseline comfort level  Description: Pt to be pain free or at pain goal using appropriate pain scales.  Pt to have prn pain medication as needed  12/18/2024 0854 by Leesa Epstein RN  Outcome: Progressing  Flowsheets (Taken 12/18/2024 0854)  Verbalizes/displays adequate comfort level or baseline comfort level:   Encourage patient to monitor pain and request assistance   Assess pain using appropriate pain scale   Administer analgesics based on type and severity of pain and evaluate response   Implement non-pharmacological measures as appropriate and evaluate response   Consider cultural and social influences on pain and pain management   Notify Licensed Independent Practitioner if interventions unsuccessful or patient reports new pain  12/17/2024 2219 by Lauro Schulz RN  Outcome: Progressing  Flowsheets (Taken 12/17/2024 2219)  Verbalizes/displays adequate comfort level or baseline comfort level:   Encourage patient to monitor pain and request assistance   Assess pain using appropriate pain scale   Administer analgesics based on type and severity of pain and evaluate response  Note: Monitor Pt pain and discomfort.Pain meds given prn per MAR.     Problem: Skin/Tissue Integrity  Goal: Absence of new skin breakdown  Description: 1.  Monitor for areas of redness and/or skin breakdown  2.  Assess vascular access sites hourly  3.  Every 4-6 hours minimum:  Change oxygen saturation probe site  4.  Every 4-6 hours:  If on nasal continuous positive airway pressure, respiratory therapy assess nares and determine need for appliance change or resting period.  12/17/2024 2219 by Lauro Schulz RN  Outcome: Progressing     Problem: Safety - Adult  Goal: Free from fall injury  Description: Pt to don non skid socks, fall armband and fall sign to ensure safety.  Pt to have assistance when moving from one position to another.  12/18/2024 0854 by Lucian 
  Problem: Pain  Goal: Verbalizes/displays adequate comfort level or baseline comfort level  Description: Pt to be pain free or at pain goal using appropriate pain scales.  Pt to have prn pain medication as needed  12/22/2024 0223 by Xavier Chowdary RN  Outcome: Progressing  12/22/2024 0136 by Xavier Chowdary RN  Outcome: Progressing  Flowsheets (Taken 12/22/2024 0136)  Verbalizes/displays adequate comfort level or baseline comfort level:   Assess pain using appropriate pain scale   Encourage patient to monitor pain and request assistance   Implement non-pharmacological measures as appropriate and evaluate response  Note: Assess patient's pain and administer medication as ordered.     Problem: Cardiovascular - Adult  Goal: Maintains optimal cardiac output and hemodynamic stability  12/22/2024 0223 by Xavier Chowdary RN  Outcome: Progressing  12/22/2024 0136 by Xavier Chowdary RN  Outcome: Progressing     Problem: Cardiovascular - Adult  Goal: Maintains optimal cardiac output and hemodynamic stability  12/22/2024 0223 by Xavier Chowdary RN  Outcome: Progressing  12/22/2024 0136 by Xavier Chowdary RN  Outcome: Progressing     
  Problem: Pain  Goal: Verbalizes/displays adequate comfort level or baseline comfort level  Description: Pt to be pain free or at pain goal using appropriate pain scales.  Pt to have prn pain medication as needed  Outcome: Progressing  Flowsheets  Taken 12/15/2024 0539  Verbalizes/displays adequate comfort level or baseline comfort level: Assess pain using appropriate pain scale  Taken 12/15/2024 0336  Verbalizes/displays adequate comfort level or baseline comfort level:   Encourage patient to monitor pain and request assistance   Assess pain using appropriate pain scale  Taken 12/15/2024 0000  Verbalizes/displays adequate comfort level or baseline comfort level: Encourage patient to monitor pain and request assistance  Taken 12/14/2024 2000  Verbalizes/displays adequate comfort level or baseline comfort level:   Encourage patient to monitor pain and request assistance   Assess pain using appropriate pain scale  Note: Patient will use a numerical pain scale      Problem: Skin/Tissue Integrity  Goal: Absence of new skin breakdown  Description: 1.  Monitor for areas of redness and/or skin breakdown  2.  Assess vascular access sites hourly  3.  Every 4-6 hours minimum:  Change oxygen saturation probe site  4.  Every 4-6 hours:  If on nasal continuous positive airway pressure, respiratory therapy assess nares and determine need for appliance change or resting period.  Outcome: Progressing  Note: Patient will turn self every two hours     Problem: Safety - Adult  Goal: Free from fall injury  Description: Pt to don non skid socks, fall armband and fall sign to ensure safety.  Pt to have assistance when moving from one position to another.  Outcome: Progressing  Flowsheets (Taken 12/15/2024 0539)  Free From Fall Injury: Instruct family/caregiver on patient safety  Note: Instruct patient to use call bell     Problem: Respiratory - Adult  Goal: Achieves optimal ventilation and oxygenation  Outcome: 
  Problem: Pain  Goal: Verbalizes/displays adequate comfort level or baseline comfort level  Description: Pt to be pain free or at pain goal using appropriate pain scales.  Pt to have prn pain medication as needed  Outcome: Progressing  Flowsheets (Taken 12/18/2024 0854 by Leesa Epstein, RN)  Verbalizes/displays adequate comfort level or baseline comfort level:   Encourage patient to monitor pain and request assistance   Assess pain using appropriate pain scale   Administer analgesics based on type and severity of pain and evaluate response   Implement non-pharmacological measures as appropriate and evaluate response   Consider cultural and social influences on pain and pain management   Notify Licensed Independent Practitioner if interventions unsuccessful or patient reports new pain     Problem: Skin/Tissue Integrity  Goal: Absence of new skin breakdown  Description: 1.  Monitor for areas of redness and/or skin breakdown  2.  Assess vascular access sites hourly  3.  Every 4-6 hours minimum:  Change oxygen saturation probe site  4.  Every 4-6 hours:  If on nasal continuous positive airway pressure, respiratory therapy assess nares and determine need for appliance change or resting period.  Outcome: Progressing  Note: Patient encouraged to turn to off load pressure on his bottom     Problem: Safety - Adult  Goal: Free from fall injury  Description: Pt to don non skid socks, fall armband and fall sign to ensure safety.  Pt to have assistance when moving from one position to another.  Outcome: Progressing  Flowsheets (Taken 12/18/2024 0854 by Leesa Epstein, RN)  Free From Fall Injury:   Instruct family/caregiver on patient safety   Based on caregiver fall risk screen, instruct family/caregiver to ask for assistance with transferring infant if caregiver noted to have fall risk factors  Note:  sitter at bedside, safety education offered, call bell within reach     Problem: Nutrition Deficit:  Goal: 
  Problem: Physical Therapy - Adult  Goal: By Discharge: Performs mobility at highest level of function for planned discharge setting.  See evaluation for individualized goals.  Description: Physical Therapy Goals:  Initiated 12/13/2024 to be met within 7-10 days.    1.  Patient will move from supine to sit and sit to supine , scoot up and down, and roll side to side in bed with supervision/set-up in order to safely get into/out of bed.  2.  Patient will transfer from bed to chair and chair to bed with supervision/set-up using RW in order to safely partake in OOB mobility.   3.  Patient will perform sit to stand with supervision/set-up in order to safely partake in OOB mobility.  4.  Patient will ambulate with supervision/set-up for 50 feet using RW in order to safely navigate household distances.   5.  Patient will ascend/descend 3 stairs without handrail(s) with contact guard assistance in order to safely enter/exit home environment.    PLOF: lives with niece in 1 level house with 3 FARRAH (no rail), independent at baseline without AD      12/13/2024 1317 by Rose Marie Stewart, PT  Outcome: Progressing     PHYSICAL THERAPY EVALUATION    Patient: Gio Khalil (63 y.o. male)  Date: 12/13/2024  Primary Diagnosis: Dehydration [E86.0]  NSTEMI (non-ST elevated myocardial infarction) (HCC) [I21.4]  Acute renal failure (ARF) (HCC) [N17.9]  Influenza A (H1N1) [J10.1]  Acute renal failure, unspecified acute renal failure type (HCC) [N17.9]  Anemia, unspecified type [D64.9]  Acute metabolic encephalopathy [G93.41]       Precautions: Isolation, Fall Risk,  ,  ,  ,  ,  ,  ,      ASSESSMENT :  Patient seen for PT evaluation and treatment. Received supine; agreeable, A&O x1. Alert and able to follow commands throughout session. Presenting with deficits as listed below, impacting patient's safety and independence mobilizing. Most notably impacted by impaired cognition and reduced activity tolerance. Presenting on 4.5L supplemental O2. 
  Problem: Physical Therapy - Adult  Goal: By Discharge: Performs mobility at highest level of function for planned discharge setting.  See evaluation for individualized goals.  Description: Physical Therapy Goals:  Initiated 12/13/2024 to be met within 7-10 days.    1.  Patient will move from supine to sit and sit to supine , scoot up and down, and roll side to side in bed with supervision/set-up in order to safely get into/out of bed.  2.  Patient will transfer from bed to chair and chair to bed with supervision/set-up using RW in order to safely partake in OOB mobility.   3.  Patient will perform sit to stand with supervision/set-up in order to safely partake in OOB mobility.  4.  Patient will ambulate with supervision/set-up for 50 feet using RW in order to safely navigate household distances.   5.  Patient will ascend/descend 3 stairs without handrail(s) with contact guard assistance in order to safely enter/exit home environment.    PLOF: lives with niece in 1 level house with 3 FARRAH (no rail), independent at baseline without AD      Outcome: Progressing     PHYSICAL THERAPY TREATMENT    Patient: Gio Khalil (63 y.o. male)  Date: 12/17/2024  Diagnosis: Dehydration [E86.0]  NSTEMI (non-ST elevated myocardial infarction) (HCC) [I21.4]  Acute renal failure (ARF) (HCC) [N17.9]  Influenza A (H1N1) [J10.1]  Acute renal failure, unspecified acute renal failure type (HCC) [N17.9]  Anemia, unspecified type [D64.9]  Acute metabolic encephalopathy [G93.41] Acute renal failure (ARF) (HCC)  Procedure(s) (LRB):  Left heart cath / coronary angiography (N/A) 1 Day Post-Op  Precautions: Isolation, Fall Risk, Seizure, Aspiration Risk (droplet),  ,  ,  ,  ,  ,  ,      ASSESSMENT:  Patient seen for PT follow up session. Received supine, agreeable, A&O x2, on 6L supplemental O2. SpO2 >92% throughout session. Continues to present with impaired cognition as well as reduced activity tolerance. Despite this, able to complete bed 
  Problem: Physical Therapy - Adult  Goal: By Discharge: Performs mobility at highest level of function for planned discharge setting.  See evaluation for individualized goals.  Description: Physical Therapy Goals:  Initiated 12/13/2024 to be met within 7-10 days.    1.  Patient will move from supine to sit and sit to supine , scoot up and down, and roll side to side in bed with supervision/set-up in order to safely get into/out of bed.  2.  Patient will transfer from bed to chair and chair to bed with supervision/set-up using RW in order to safely partake in OOB mobility.   3.  Patient will perform sit to stand with supervision/set-up in order to safely partake in OOB mobility.  4.  Patient will ambulate with supervision/set-up for 50 feet using RW in order to safely navigate household distances.   5.  Patient will ascend/descend 3 stairs without handrail(s) with contact guard assistance in order to safely enter/exit home environment.    PLOF: lives with niece in 1 level house with 3 FARRAH (no rail), independent at baseline without AD      Outcome: Progressing     PHYSICAL THERAPY TREATMENT    Patient: Gio Khalil (63 y.o. male)  Date: 12/18/2024  Diagnosis: Dehydration [E86.0]  NSTEMI (non-ST elevated myocardial infarction) (HCC) [I21.4]  Acute renal failure (ARF) (HCC) [N17.9]  Influenza A (H1N1) [J10.1]  Acute renal failure, unspecified acute renal failure type (HCC) [N17.9]  Anemia, unspecified type [D64.9]  Acute metabolic encephalopathy [G93.41] Acute renal failure (ARF) (HCC)  Procedure(s) (LRB):  Left heart cath / coronary angiography (N/A) 2 Days Post-Op  Precautions: Isolation, Fall Risk, Seizure, Aspiration Risk (droplet),  ,  ,  ,  ,  ,  ,      ASSESSMENT:  Patient seen for PT follow up session with focus on gait training. Received supine; agreeable, A&O x2. Demonstrates improved performance with functional tasks this date. Able to complete bed mobility with SBA, repeat transfers with CGA and 
  Problem: Skin/Tissue Integrity  Goal: Absence of new skin breakdown  Description: 1.  Monitor for areas of redness and/or skin breakdown  2.  Assess vascular access sites hourly  3.  Every 4-6 hours minimum:  Change oxygen saturation probe site  4.  Every 4-6 hours:  If on nasal continuous positive airway pressure, respiratory therapy assess nares and determine need for appliance change or resting period.  12/19/2024 1534 by Marilia Mays RN  Outcome: Progressing  Note: -Encourage repositioning approximately every 2-3 hours.     Problem: Safety - Adult  Goal: Free from fall injury  Description: Pt to don non skid socks, fall armband and fall sign to ensure safety.  Pt to have assistance when moving from one position to another.  12/19/2024 1534 by Marilia Mays RN  Outcome: Progressing  Flowsheets (Taken 12/19/2024 0800)  Free From Fall Injury: Instruct family/caregiver on patient safety  Note: -Call bell/bedside table within arms reach.  -Bed alarm on.  -Safety sitter at bedside.  -Gripper socks on.  -Door open.     Problem: Pain  Goal: Verbalizes/displays adequate comfort level or baseline comfort level  Description: Pt to be pain free or at pain goal using appropriate pain scales.  Pt to have prn pain medication as needed  12/19/2024 1534 by Marilia Mays RN  Outcome: Progressing  Flowsheets (Taken 12/19/2024 0800)  Verbalizes/displays adequate comfort level or baseline comfort level:   Encourage patient to monitor pain and request assistance   Assess pain using appropriate pain scale     
INTERVENTION:  HEMODYNAMIC STABILIZATION  MAINTAIN BP WNL WHILE ON HD.     INTERVENTION:  FLUID MANAGEMENT  WILL ATTEMPT 1000 ML TOTAL FLUID REMOVAL AS TOLERATED.     INTERVENTION:  METABOLIC/ELECTROLYTE MANAGEMENT  3.0 POTASSIUM 2.5 CALCIUM DIALYSATE USED WITH HD TODAY.     INTERVENTION:  HEMODIALYSIS ACCESS SITE MANAGEMENT  RIGHT SIDE FEMORAL CVC ACCESSED USING ASEPTIC TECHNIQUE.     GOAL:  SIGNS AND SYMPTOMS OF LISTED POTENTIAL PROBLEMS WILL BE ABSENT OR MANAGEABLE.     OUTCOME:  PROGRESSING.     HD PLANNED FOR 2 HOURS TODAY.      Problem: Chronic Conditions and Co-morbidities  Goal: Patient's chronic conditions and co-morbidity symptoms are monitored and maintained or improved  12/13/2024 1553 by Diana Anaya RN  Outcome: Progressing  
INTERVENTION:  HEMODYNAMIC STABILIZATION  MAINTAIN BP WNL WHILE ON HD.     INTERVENTION:  FLUID MANAGEMENT  WILL ATTEMPT [id number] ML TOTAL FLUID REMOVAL AS TOLERATED.     INTERVENTION:  METABOLIC/ELECTROLYTE MANAGEMENT  2.0 POTASSIUM 2.5 CALCIUM DIALYSATE USED WITH HD TODAY.     INTERVENTION:  HEMODIALYSIS ACCESS SITE MANAGEMENT  RIGHT TDC ACCESSED USING ASEPTIC TECHNIQUE.     GOAL:  SIGNS AND SYMPTOMS OF LISTED POTENTIAL PROBLEMS WILL BE ABSENT OR MANAGEABLE.     OUTCOME:  PROGRESSING.     HD PLANNED FOR 3 HOURS TODAY.      Problem: Chronic Conditions and Co-morbidities  Goal: Patient's chronic conditions and co-morbidity symptoms are monitored and maintained or improved  Outcome: Progressing     
INTERVENTION:  HEMODYNAMIC STABILIZATION  MAINTAIN BP WNL WHILE ON HD.    INTERVENTION:  FLUID MANAGEMENT  WILL ATTEMPT 2500 ML TOTAL FLUID REMOVAL AS TOLERATED.    INTERVENTION:  METABOLIC/ELECTROLYTE MANAGEMENT  3.0 POTASSIUM 2.5 CALCIUM DIALYSATE USED WITH HD TODAY.    INTERVENTION:  HEMODIALYSIS ACCESS SITE MANAGEMENT  RIGHT FEMORAL CVC ACCESSED USING ASEPTIC TECHNIQUE.    GOAL:  SIGNS AND SYMPTOMS OF LISTED POTENTIAL PROBLEMS WILL BE ABSENT OR MANAGEABLE.    OUTCOME:  PROGRESSING.    HD PLANNED FOR 3 HOURS TODAY.  
Problem: Chronic Conditions and Co-morbidities  Goal: Patient's chronic conditions and co-morbidity symptoms are monitored and maintained or improved  Outcome: Progressing     INTERVENTION:  HEMODYNAMIC STABILIZATION  MAINTAIN BP WNL WHILE ON HD.    INTERVENTION:  FLUID MANAGEMENT  WILL ATTEMPT 500 ML TOTAL FLUID REMOVAL AS TOLERATED.    INTERVENTION:  METABOLIC/ELECTROLYTE MANAGEMENT  3.0 POTASSIUM 3.0 CALCIUM DIALYSATE USED WITH HD TODAY.    INTERVENTION:  HEMODIALYSIS ACCESS SITE MANAGEMENT  RIGHT FEMORAL TEMPORARY TDC ACCESSED USING ASEPTIC TECHNIQUE.    GOAL:  SIGNS AND SYMPTOMS OF LISTED POTENTIAL PROBLEMS WILL BE ABSENT OR MANAGEABLE.    OUTCOME:  PROGRESSING.    HD PLANNED FOR 3 HOURS TODAY.  
Problem: Pain  Goal: Verbalizes/displays adequate comfort level or baseline comfort level  Description: Pt to be pain free or at pain goal using appropriate pain scales.  Pt to have prn pain medication as needed  Outcome: Progressing  Flowsheets  Taken 12/17/2024 0315  Verbalizes/displays adequate comfort level or baseline comfort level:   Encourage patient to monitor pain and request assistance   Assess pain using appropriate pain scale  Taken 12/16/2024 2324  Verbalizes/displays adequate comfort level or baseline comfort level:   Encourage patient to monitor pain and request assistance   Assess pain using appropriate pain scale  Taken 12/16/2024 1924  Verbalizes/displays adequate comfort level or baseline comfort level:   Encourage patient to monitor pain and request assistance   Assess pain using appropriate pain scale  Note: Patient will be assessed for pain using 1-10 verbal pain scale. Patient denies any pain.     Problem: Skin/Tissue Integrity  Goal: Absence of new skin breakdown  Description: 1.  Monitor for areas of redness and/or skin breakdown  2.  Assess vascular access sites hourly  3.  Every 4-6 hours minimum:  Change oxygen saturation probe site  4.  Every 4-6 hours:  If on nasal continuous positive airway pressure, respiratory therapy assess nares and determine need for appliance change or resting period.  Outcome: Progressing  Note: Patient will remain free of any new skin breakdown during length of stay. Q2 turning implemented with edges and body mobility education given.      Problem: Safety - Adult  Goal: Free from fall injury  Description: Pt to don non skid socks, fall armband and fall sign to ensure safety.  Pt to have assistance when moving from one position to another.  Outcome: Progressing  Flowsheets (Taken 12/17/2024 0549)  Free From Fall Injury: Instruct family/caregiver on patient safety  Note: Patient will remain free of any falls or injuries during length of stay. Safety interventions 
Problem: Physical Therapy - Adult  Goal: By Discharge: Performs mobility at highest level of function for planned discharge setting.  See evaluation for individualized goals.  Description: Physical Therapy Goals:  Initiated 12/13/2024 to be met within 7-10 days.    1.  Patient will move from supine to sit and sit to supine , scoot up and down, and roll side to side in bed with supervision/set-up in order to safely get into/out of bed.  2.  Patient will transfer from bed to chair and chair to bed with supervision/set-up using RW in order to safely partake in OOB mobility.   3.  Patient will perform sit to stand with supervision/set-up in order to safely partake in OOB mobility.  4.  Patient will ambulate with supervision/set-up for 50 feet using RW in order to safely navigate household distances.   5.  Patient will ascend/descend 3 stairs without handrail(s) with contact guard assistance in order to safely enter/exit home environment.    PLOF: lives with niece in 1 level house with 3 FARRAH (no rail), independent at baseline without AD    12/23/2024 1141 by Yue Dawkins, PT  Outcome: Completed  PHYSICAL THERAPY RE-EVALUATION/DISCHARGE    Patient: Gio Khalil (64 y.o. male)  Date: 12/23/2024  Primary Diagnosis: Dehydration [E86.0]  NSTEMI (non-ST elevated myocardial infarction) (HCC) [I21.4]  Acute renal failure (ARF) (HCC) [N17.9]  Influenza A (H1N1) [J10.1]  Acute renal failure, unspecified acute renal failure type (HCC) [N17.9]  Anemia, unspecified type [D64.9]  Acute metabolic encephalopathy [G93.41]  Procedure(s) (LRB):  Tunnel dialysis catheter insertion (N/A) 5 Days Post-Op   Precautions: Fall Risk, Seizure, Aspiration Risk  ASSESSMENT AND RECOMMENDATIONS:  Re-evaluation s/p prolonged admission; goals reviewed. On 4L O2. Seated in recliner. Mod I for transfers. Amb safely in room with assist for lines. Returned to seated in recliner. Encouraged amb in room, up to bathroom for toileting, and OOB majority of 
Problem: Skin/Tissue Integrity  Goal: Absence of new skin breakdown  Description: 1.  Monitor for areas of redness and/or skin breakdown  2.  Assess vascular access sites hourly  3.  Every 4-6 hours minimum:  Change oxygen saturation probe site  4.  Every 4-6 hours:  If on nasal continuous positive airway pressure, respiratory therapy assess nares and determine need for appliance change or resting period.  Outcome: Progressing  Note: Patient will remain free of any new skin breakdown during length of stay. Patient has redness present to the vikas area. Skin Guard applied to area and Q2 hour turning implemented.     Problem: Safety - Adult  Goal: Free from fall injury  Description: Pt to don non skid socks, fall armband and fall sign to ensure safety.  Pt to have assistance when moving from one position to another.  Outcome: Progressing  Flowsheets (Taken 12/13/2024 0343)  Free From Fall Injury: Instruct family/caregiver on patient safety  Note: Patient will remain free of any falls or injuries during length of stay. Non-skid yellow socks on, bed alarm on, and call light within reach.     Problem: Confusion  Goal: Confusion, delirium, dementia, or psychosis is improved or at baseline  Description: INTERVENTIONS:  1. Assess for possible contributors to thought disturbance, including medications, impaired vision or hearing, underlying metabolic abnormalities, dehydration, psychiatric diagnoses, and notify attending LIP  2. [place] high risk fall precautions, as indicated  3. Provide frequent short contacts to provide reality reorientation, refocusing and direction  4. Decrease environmental stimuli, including noise as appropriate  5. Monitor and intervene to maintain adequate nutrition, hydration, elimination, sleep and activity  6. If unable to ensure safety without constant attention obtain sitter and review sitter guidelines with assigned personnel  7. Initiate Psychosocial CNS and Spiritual Care consult, as 
2334)  Hemodynamic stability and optimal renal function maintained:   Monitor labs and assess for signs and symptoms of volume excess or deficit   Monitor intake, output and patient weight   Monitor urine specific gravity, serum osmolarity and serum sodium as indicated or ordered  12/22/2024 1126 by Socorro Kramer RN  Outcome: Progressing  Flowsheets  Taken 12/22/2024 0900 by Socorro Kramer, RN  Hemodynamic stability and optimal renal function maintained: Monitor labs and assess for signs and symptoms of volume excess or deficit  Taken 12/20/2024 0800 by Geeta Anderson RN  Hemodynamic stability and optimal renal function maintained:   Monitor labs and assess for signs and symptoms of volume excess or deficit   Monitor intake, output and patient weight  Note: Monitoring patients labs and electrolytes and replacing as needed      
stability and optimal renal function maintained  Description: Pt to have scheduled labs drawn to ensure stability of hemoglobin level and renal function level    12/21/2024 0940 by Duc De La Rosa RN  Outcome: Progressing  12/21/2024 0214 by Kathleen Kearns LPN  Outcome: Progressing     Problem: Confusion  Goal: Confusion, delirium, dementia, or psychosis is improved or at baseline  Description: INTERVENTIONS:  1. Assess for possible contributors to thought disturbance, including medications, impaired vision or hearing, underlying metabolic abnormalities, dehydration, psychiatric diagnoses, and notify attending LIP  2. [place] high risk fall precautions, as indicated  3. Provide frequent short contacts to provide reality reorientation, refocusing and direction  4. Decrease environmental stimuli, including noise as appropriate  5. Monitor and intervene to maintain adequate nutrition, hydration, elimination, sleep and activity  6. If unable to ensure safety without constant attention obtain sitter and review sitter guidelines with assigned personnel  7. Initiate Psychosocial CNS and Spiritual Care consult, as indicated  12/21/2024 0940 by Duc De La Rosa RN  Outcome: Progressing  12/21/2024 0214 by Kathleen Kearns LPN  Outcome: Progressing     Problem: Chronic Conditions and Co-morbidities  Goal: Patient's chronic conditions and co-morbidity symptoms are monitored and maintained or improved  12/21/2024 0940 by Duc De La Rosa RN  Outcome: Progressing  12/21/2024 0214 by Kathleen Kearns LPN  Outcome: Progressing     Problem: Nutrition Deficit:  Goal: Optimize nutritional status  12/21/2024 0940 by Duc De La Rosa RN  Outcome: Progressing  12/21/2024 0214 by Kathleen Kearns LPN  Outcome: Progressing     Problem: Neurosensory - Adult  Goal: Achieves stable or improved neurological status  12/21/2024 0940 by Duc De La Rosa RN  Outcome: Progressing  12/21/2024 0214 by Kathleen Kearns 
stepping with use of RW with CGA and verbal cues provided. Pt's OT sats maintain in the 90's during standing, however pt does c/o increased dizziness with stand. Pt is returned to bed comfortably with all needs met and notified nursing.     Progression toward goals:  [x]          Improving appropriately and progressing toward goals  []          Improving slowly and progressing toward goals  []          Not making progress toward goals and plan of care will be adjusted     PLAN:  Patient continues to benefit from skilled intervention to address the above impairments.  Continue treatment per established plan of care.    Further Equipment Recommendations for Discharge: bedside commode    AMPA: AM-PAC Inpatient Daily Activity Raw Score: 15    Current research shows that an AM-PAC score of 17 or less is not associated with a discharge to the patient's home setting.    This AMPA score should be considered in conjunction with interdisciplinary team recommendations to determine the most appropriate discharge setting. Patient's social support, diagnosis, medical stability, and prior level of function should also be taken into consideration.     SUBJECTIVE:   Patient stated, \"I'm dizzy.\"    OBJECTIVE DATA SUMMARY:   Cognitive/Behavioral Status:  Orientation  Overall Orientation Status: Impaired  Orientation Level: Oriented to person  Cognition  Overall Cognitive Status: Exceptions  Arousal/Alertness: Delayed responses to stimuli;Inconsistent responses to stimuli  Following Commands: Follows one step commands with increased time;Follows one step commands with repetition  Attention Span: Attends with cues to redirect  Memory: Decreased recall of recent events  Safety Judgement: Decreased awareness of need for safety;Decreased awareness of need for assistance  Problem Solving: Assistance required to generate solutions;Assistance required to implement solutions  Insights: Decreased awareness of deficits  Initiation: Requires 
required to implement solutions  Insights: Decreased awareness of deficits  Initiation: Does not require cues  Sequencing: Requires cues for some    Functional Mobility and Transfers for ADLs:   Bed Mobility:  Bed Mobility Training  Bed Mobility Training: Yes  Supine to Sit: Stand-by assistance  Sit to Supine: Contact-guard assistance  Scooting: Stand-by assistance   Transfers:  Transfer Training  Transfer Training: Yes  Interventions: Verbal cues  Sit to Stand: Contact-guard assistance  Stand to Sit: Contact-guard assistance  Stand Pivot Transfers: Contact-guard assistance  Bed to Chair: Contact-guard assistance    Balance:  Balance  Sitting: Impaired  Sitting - Static: Good (unsupported)  Sitting - Dynamic: Fair (occasional)  Standing: Impaired  Standing - Static: Fair  Standing - Dynamic: Fair    ADL Intervention:     Grooming: Stand by assistance (to brush teeth seated in recliner)    Pain:  Intensity Pre-treatment: 0/10   Intensity Post-treatment: 0/10  Scale: Numeric Rating Scale    Activity Tolerance:    Activity Tolerance: Treatment limited secondary to decreased cognition;Patient limited by endurance  Please refer to the flowsheet for vital signs taken during this treatment.  After treatment:   [x]  Patient left in no apparent distress sitting up in chair  []  Patient left in no apparent distress in bed  [x]  Call bell left within reach  []  Nursing notified  [x]  Caregiver present  []  Bed alarm activated    COMMUNICATION/EDUCATION:   Patient Education  Education Given To: Patient  Education Provided: Role of Therapy;Plan of Care;Orientation;Fall Prevention Strategies;Transfer Training  Education Method: Demonstration;Verbal;Teach Back  Barriers to Learning: Cognition  Education Outcome: Continued education needed;Verbalized understanding      Thank you for this referral.  KARL Coburn  Minutes: 21  
03/09/2022    Hyperkalemia 03/09/2022    Marijuana smoker 03/07/2022    Mental subnormality 03/07/2022    PAD (peripheral artery disease) ([facility])     Primary hypertension 03/07/2022    PVD (peripheral vascular disease) ([facility]) 07/22/2022     Past Surgical History:   Procedure Laterality Date    CARDIAC PROCEDURE N/A 12/16/2024    Left heart cath / coronary angiography performed by Lex Andrade MD at [facility] CARDIAC SURGERY    CATARACT REMOVAL Right     4 years ago    VASCULAR SURGERY Left 06/01/2023    Stents     Home Situation:   Social/Functional History  Lives With: Family  Type of Home: House  Home Layout: One level  Home Access: Stairs to enter without rails  Entrance Stairs - Number of Steps: 3  Entrance Stairs - Rails: None  Bathroom Shower/Tub: Tub/Shower unit  Bathroom Toilet: Standard  Bathroom Equipment: None  Bathroom Accessibility: Accessible  Home Equipment: None  Receives Help From: Family  Prior Level of Assist for ADLs: Independent  Prior Level of Assist for Homemaking: Independent  Prior Level of Assist for Ambulation: Independent community ambulator, with or without device  Prior Level of Assist for Transfers: Independent  Active : No  Patient's  Info: Shaniqua Choudhury and family  Mode of Transportation: Car  Occupation: Unemployed  [x]  Right hand dominant   []  Left hand dominant    Cognitive/Behavioral Status:  Orientation  Orientation Level: Oriented to place;Oriented to person;Oriented to situation  Cognition  Overall Cognitive Status: Exceptions  Arousal/Alertness: Appears intact  Following Commands: Follows one step commands consistently  Attention Span: Appears intact  Safety Judgement: Decreased awareness of need for safety;Decreased awareness of need for assistance  Insights: Impaired  Initiation: Does not require cues  Sequencing: Requires cues for some    Skin: Intact  Edema: None noted    Vision/Perceptual:    Vision  Vision: Within Functional Limits         Coordination: 
interference with medical device):   Determine that other, less restrictive measures have been tried or would not be effective before applying the restraint   Every 2 hours: Monitor safety, psychosocial status, comfort, nutrition and hydration  Taken 12/17/2024 0326 by Shyann Morton RN  Remains free of injury from restraints (restraint for interference with medical device):   Determine that other, less restrictive measures have been tried or would not be effective before applying the restraint   Every 2 hours: Monitor safety, psychosocial status, comfort, nutrition and hydration   Inform patient/family regarding the reason for restraint   Evaluate the patient's condition at the time of restraint application

## 2024-12-23 NOTE — CARE COORDINATION
[employer] ordered H2H 02, RW and delivered to the patient room form the consignment closet. Shower Chair will be delivered to the patient home.      LAMONTE Garcia     [phone]

## 2024-12-23 NOTE — DISCHARGE SUMMARY
posterolateral rib fractures. Old healed right transverse  process fractures. No acute osseous abnormality.        IMPRESSION:  1. New thickening of the right lower rectus muscle is concerning for  intramuscular hematoma.      2. Small amount of fluid in the colon suggests diarrheal illness.     3. Punctate focus of air in the bladder, recommend correlation for recent  catheterization.     4. Colonic diverticulosis.     5. Severe atherosclerotic disease.             Discharge Medications:     Current Discharge Medication List        START taking these medications    Details   atorvastatin (LIPITOR) 40 MG tablet Take 1 tablet by mouth nightly  Qty: 30 tablet, Refills: 3      metoprolol tartrate (LOPRESSOR) 25 MG tablet Take 1 tablet by mouth 2 times daily  Qty: 60 tablet, Refills: 3      nicotine (NICODERM CQ) 21 MG/24HR Place 1 patch onto the skin daily  Qty: 30 patch, Refills: 3      thiamine mononitrate (THIAMINE) 100 MG tablet Take 1 tablet by mouth daily  Qty: 90 tablet, Refills: 0           CONTINUE these medications which have CHANGED    Details   ferrous sulfate dried (SLOW IRON) 160 (50 Fe) MG TBCR extended release tablet Take 1 tab daily.  Take with vitamin C 250 mg daily to help with absorption  Qty: 30 tablet, Refills: 0    Associated Diagnoses: Iron deficiency anemia, unspecified iron deficiency anemia type           CONTINUE these medications which have NOT CHANGED    Details   diclofenac sodium (VOLTAREN) 1 % GEL Apply 4 g topically 4 times daily  Qty: 480 g, Refills: 2    Associated Diagnoses: Lumbar facet arthropathy; Chronic primary musculoskeletal pain      Ascorbic Acid (VITAMIN C) 250 MG tablet Take 1 tablet by mouth daily Take with iron supplement for better absorption  Qty: 30 tablet, Refills: 0    Associated Diagnoses: Iron deficiency anemia, unspecified iron deficiency anemia type      B Complex Vitamins (VITAMIN B COMPLEX) CAPS Take 1 tablet by mouth daily  Qty: 90 capsule, Refills: 1

## 2024-12-24 ENCOUNTER — TELEPHONE (OUTPATIENT)
Facility: CLINIC | Age: 64
End: 2024-12-24

## 2024-12-24 ENCOUNTER — CLINICAL DOCUMENTATION (OUTPATIENT)
Facility: CLINIC | Age: 64
End: 2024-12-24

## 2024-12-24 NOTE — PROGRESS NOTES
PATIENT REQUIRES TCM OUTREACH    MRN: [id number]     PATIENT:  Gio Khalil    ADMIT DATE:  12/11/24    DISCHARGE DATE:  12/23/24     ADMITTING DX: acute renal failure, metabolic encephalopathy, NSTEMI    MEDICATION CHANGES:   Start: Lipitor, Lopressor, Nicoderm CQ, vitamin C, thiamine  Stop: lisinopril  Change:    SPECIALISTS:  GI, ID, and nephrology, cardiology    HOME HEALTH/WOUND CARE/PT/OT:  home health care and DME- home O2, shower chair, rolling walker    SCHEDULED FOLLOW UP APPTS:    Future Appointments   Date Time Provider Department Center   1/3/2025  8:20 AM Bud Paez MD [facility] BS [facility]   2/24/2025  1:00 PM Tj Thurston MD [facility] NEUR BS [facility]   4/24/2025  9:30 AM Kevin Aguiar MD [facility] BS [facility]   5/7/2025 11:30 AM Mary Russell DNP [facility] BS [facility]       *Please contact patient within 2 business days and document under .TCMOFFICE.  A scheduled Hospital Follow-up for patient within 7 days is preferred*

## 2024-12-24 NOTE — TELEPHONE ENCOUNTER
Care Transitions Initial Follow Up Call    Outreach made within 2 business days of discharge: Yes    Patient: Gio Khalil Patient : 1960   MRN: [id number]  Reason for Admission: Acute renal failure   Discharge Date: 24       Spoke with: Shaniqua (Niece) caregiver     Discharge department/facility: [facility] Interactive Patient Contact:  Was patient able to fill all prescriptions: NO  Was patient instructed to bring all medications to the follow-up visit: Yes  Is patient taking all medications as directed in the discharge summary? No, discharged last night waiting to  medication  Does patient understand their discharge instructions: Yes  Does patient have questions or concerns that need addressed prior to 7-14 day follow up office visit: no    Additional needs identified to be addressed with provider  No needs identified             Scheduled appointment with PCP within 7-14 days    Follow Up  Future Appointments   Date Time Provider Department Center   1/3/2025  8:20 AM Bud Paez MD [facility] BS AMB   2025 12:45 PM Mary Russell DNP [facility] DEP   2025  1:00 PM Tj Thurston MD [facility] NEUR BS AMB   2025  9:30 AM Kevin Aguiar MD [facility] BS AMB   2025 11:30 AM Mary Russell DNP [facility]       Pau Sargent MA

## 2024-12-27 ENCOUNTER — TELEPHONE (OUTPATIENT)
Facility: CLINIC | Age: 64
End: 2024-12-27

## 2024-12-27 NOTE — TELEPHONE ENCOUNTER
Received fax from [facility] for \"clarification\"  spoke w/ pharmacist and per pharmacy pt is need of refills for medication Citalopram. Informed pharmacist that medication has remaining refills. Per pharmacist she is not sure what the fax was for. No further action needed.

## 2025-01-03 ENCOUNTER — OFFICE VISIT (OUTPATIENT)
Age: 65
End: 2025-01-03
Payer: MEDICAID

## 2025-01-03 VITALS
DIASTOLIC BLOOD PRESSURE: 60 MMHG | HEIGHT: 70 IN | BODY MASS INDEX: 17.04 KG/M2 | OXYGEN SATURATION: 90 % | HEART RATE: 81 BPM | SYSTOLIC BLOOD PRESSURE: 142 MMHG | WEIGHT: 119 LBS

## 2025-01-03 DIAGNOSIS — R01.1 HEART MURMUR: Primary | ICD-10-CM

## 2025-01-03 DIAGNOSIS — I73.9 PAD (PERIPHERAL ARTERY DISEASE) (HCC): ICD-10-CM

## 2025-01-03 DIAGNOSIS — I35.0 AORTIC VALVE STENOSIS, ETIOLOGY OF CARDIAC VALVE DISEASE UNSPECIFIED: ICD-10-CM

## 2025-01-03 DIAGNOSIS — I10 PRIMARY HYPERTENSION: ICD-10-CM

## 2025-01-03 DIAGNOSIS — I25.10 CORONARY ARTERY DISEASE INVOLVING NATIVE HEART WITHOUT ANGINA PECTORIS, UNSPECIFIED VESSEL OR LESION TYPE: ICD-10-CM

## 2025-01-03 PROCEDURE — 3077F SYST BP >= 140 MM HG: CPT | Performed by: INTERNAL MEDICINE

## 2025-01-03 PROCEDURE — 3078F DIAST BP <80 MM HG: CPT | Performed by: INTERNAL MEDICINE

## 2025-01-03 PROCEDURE — 99215 OFFICE O/P EST HI 40 MIN: CPT | Performed by: INTERNAL MEDICINE

## 2025-01-03 NOTE — PROGRESS NOTES
Dictation on: 01/03/2025  8:44 AM by: AMADOR JOSEPH [[id number]]     Wt Readings from Last 3 Encounters:   01/03/25 54 kg (119 lb)   12/23/24 54.6 kg (120 lb 6.4 oz)   12/06/24 56.2 kg (124 lb)     Past Medical History:   Diagnosis Date    CURTIS (acute kidney injury) ([facility]) 01/24/2024    Anxiety associated with depression 08/08/2023    Atherosclerosis 08/08/2023    Bilateral carotid artery stenosis 11/24/2023    Rt mod (50-69%); Lt mild (<50%)    Cataract 03/07/2022    Cataract extraction status of eye, unspecified laterality 03/07/2022    Chronic hepatitis C without hepatic coma ([facility]) 05/23/2022    Chronic pain in left foot 03/07/2022    Current smoker 03/07/2022    Daily consumption of alcohol 03/07/2022    Dyslipidemia 08/08/2023    Elevated LFTs 03/09/2022    Fracture of thoracic transverse process, closed, initial encounter ([facility]) 01/24/2024    Hepatitis C test positive 03/09/2022    Hyperkalemia 03/09/2022    Marijuana smoker 03/07/2022    Mental subnormality 03/07/2022    PAD (peripheral artery disease) ([facility])     Primary hypertension 03/07/2022    PVD (peripheral vascular disease) ([facility]) 07/22/2022       Current Outpatient Medications   Medication Sig Dispense Refill    atorvastatin (LIPITOR) 40 MG tablet Take 1 tablet by mouth nightly 30 tablet 3    ferrous sulfate dried (SLOW IRON) 160 (50 Fe) MG TBCR extended release tablet Take 1 tab daily.  Take with vitamin C 250 mg daily to help with absorption 30 tablet 0    metoprolol tartrate (LOPRESSOR) 25 MG tablet Take 1 tablet by mouth 2 times daily 60 tablet 3    nicotine (NICODERM CQ) 21 MG/24HR Place 1 patch onto the skin daily 30 patch 3    thiamine mononitrate (THIAMINE) 100 MG tablet Take 1 tablet by mouth daily 90 tablet 0    diclofenac sodium (VOLTAREN) 1 % GEL Apply 4 g topically 4 times daily 480 g 2    Ascorbic Acid (VITAMIN C) 250 MG tablet Take 1 tablet by mouth daily Take with iron supplement for better absorption 30 tablet 0    B Complex Vitamins

## 2025-01-06 ENCOUNTER — HOSPITAL ENCOUNTER (OUTPATIENT)
Facility: HOSPITAL | Age: 65
Setting detail: SPECIMEN
Discharge: HOME | End: 2025-01-09

## 2025-01-06 LAB — SENTARA SPECIMEN COLLECTION: NORMAL

## 2025-01-06 PROCEDURE — 99001 SPECIMEN HANDLING PT-LAB: CPT

## 2025-01-07 ENCOUNTER — OFFICE VISIT (OUTPATIENT)
Facility: CLINIC | Age: 65
End: 2025-01-07
Payer: MEDICAID

## 2025-01-07 VITALS
DIASTOLIC BLOOD PRESSURE: 63 MMHG | WEIGHT: 118 LBS | OXYGEN SATURATION: 94 % | BODY MASS INDEX: 16.89 KG/M2 | HEIGHT: 70 IN | SYSTOLIC BLOOD PRESSURE: 135 MMHG | TEMPERATURE: 98.2 F | RESPIRATION RATE: 16 BRPM | HEART RATE: 59 BPM

## 2025-01-07 DIAGNOSIS — G93.41 ACUTE METABOLIC ENCEPHALOPATHY: ICD-10-CM

## 2025-01-07 DIAGNOSIS — N17.9 ACUTE RENAL FAILURE, UNSPECIFIED ACUTE RENAL FAILURE TYPE (HCC): ICD-10-CM

## 2025-01-07 DIAGNOSIS — K92.2 LOWER GI BLEED: ICD-10-CM

## 2025-01-07 DIAGNOSIS — R68.81 EARLY SATIETY: ICD-10-CM

## 2025-01-07 DIAGNOSIS — I73.9 PAD (PERIPHERAL ARTERY DISEASE) (HCC): ICD-10-CM

## 2025-01-07 DIAGNOSIS — Z09 HOSPITAL DISCHARGE FOLLOW-UP: ICD-10-CM

## 2025-01-07 DIAGNOSIS — E55.9 VITAMIN D DEFICIENCY: ICD-10-CM

## 2025-01-07 DIAGNOSIS — I21.4 NSTEMI (NON-ST ELEVATED MYOCARDIAL INFARCTION) (HCC): Primary | ICD-10-CM

## 2025-01-07 DIAGNOSIS — D64.9 ANEMIA, UNSPECIFIED TYPE: ICD-10-CM

## 2025-01-07 PROCEDURE — 99214 OFFICE O/P EST MOD 30 MIN: CPT | Performed by: NURSE PRACTITIONER

## 2025-01-07 PROCEDURE — 3078F DIAST BP <80 MM HG: CPT | Performed by: NURSE PRACTITIONER

## 2025-01-07 PROCEDURE — 3075F SYST BP GE 130 - 139MM HG: CPT | Performed by: NURSE PRACTITIONER

## 2025-01-07 RX ORDER — PANTOPRAZOLE SODIUM 20 MG/1
20 TABLET, DELAYED RELEASE ORAL
Qty: 30 TABLET | Refills: 0 | Status: SHIPPED | OUTPATIENT
Start: 2025-01-07

## 2025-01-07 RX ORDER — ERGOCALCIFEROL 1.25 MG/1
50000 CAPSULE, LIQUID FILLED ORAL WEEKLY
Qty: 12 CAPSULE | Refills: 3 | Status: SHIPPED | OUTPATIENT
Start: 2025-01-07

## 2025-01-07 ASSESSMENT — PATIENT HEALTH QUESTIONNAIRE - PHQ9
8. MOVING OR SPEAKING SO SLOWLY THAT OTHER PEOPLE COULD HAVE NOTICED. OR THE OPPOSITE, BEING SO FIGETY OR RESTLESS THAT YOU HAVE BEEN MOVING AROUND A LOT MORE THAN USUAL: NOT AT ALL
SUM OF ALL RESPONSES TO PHQ QUESTIONS 1-9: 0
3. TROUBLE FALLING OR STAYING ASLEEP: NOT AT ALL
SUM OF ALL RESPONSES TO PHQ9 QUESTIONS 1 & 2: 0
2. FEELING DOWN, DEPRESSED OR HOPELESS: NOT AT ALL
SUM OF ALL RESPONSES TO PHQ QUESTIONS 1-9: 0
1. LITTLE INTEREST OR PLEASURE IN DOING THINGS: NOT AT ALL
6. FEELING BAD ABOUT YOURSELF - OR THAT YOU ARE A FAILURE OR HAVE LET YOURSELF OR YOUR FAMILY DOWN: NOT AT ALL
SUM OF ALL RESPONSES TO PHQ QUESTIONS 1-9: 0
4. FEELING TIRED OR HAVING LITTLE ENERGY: NOT AT ALL
10. IF YOU CHECKED OFF ANY PROBLEMS, HOW DIFFICULT HAVE THESE PROBLEMS MADE IT FOR YOU TO DO YOUR WORK, TAKE CARE OF THINGS AT HOME, OR GET ALONG WITH OTHER PEOPLE: NOT DIFFICULT AT ALL
5. POOR APPETITE OR OVEREATING: NOT AT ALL
SUM OF ALL RESPONSES TO PHQ QUESTIONS 1-9: 0
9. THOUGHTS THAT YOU WOULD BE BETTER OFF DEAD, OR OF HURTING YOURSELF: NOT AT ALL
7. TROUBLE CONCENTRATING ON THINGS, SUCH AS READING THE NEWSPAPER OR WATCHING TELEVISION: NOT AT ALL

## 2025-01-07 NOTE — PROGRESS NOTES
Gio Khalil is a 64 y.o. male (: 1960) presenting to address:    Chief Complaint   Patient presents with    Follow-Up from Hospital       Vitals:    25 1245   BP: 135/63   Pulse: 59   Resp: 16   Temp: 98.2 °F (36.8 °C)   SpO2: 94%       \"Have you been to the ER, urgent care clinic since your last visit?  Hospitalized since your last visit?\"    NO    “Have you seen or consulted any other health care providers outside of [facility] since your last visit?”    NO

## 2025-01-07 NOTE — PROGRESS NOTES
[facility] [address]  [address]  [place] [id number]  [phone] office/[phone] fax    1/7/2025    Gio Khalil 1960 is a pleasant White (non-) male.       History of Present Illness  The patient presents for evaluation of posthospitalization-acute renal failure, NSTEMI, metabolic encephalopathy, GI bleed, and low vitamin D.    He was recently hospitalized due to acute renal failure, non-ST elevation myocardial infarction (NSTEMI), and metabolic encephalopathy. He is under the care of Dr. Paez, a cardiologist, and has an upcoming appointment with neurology on 02/24/2025 at 1 PM for memory issues. He had a consultation with Dr. Paez on 01/03/2025 and is scheduled to see a gastroenterologist this Friday. He also has an appointment with his nephrologist next week on 01/14/2025. He has not undergone a colonoscopy or endoscopy due to concerns about his heart's ability to tolerate anesthesia. He has received 4 blood transfusions due to significant blood loss and continues to experience abdominal pain, which is suspected to be related to a gastrointestinal bleed. Despite recommendations from his cardiologist to proceed with the colonoscopy, other physicians have advised against it. He was informed that he would require lifelong dialysis, but this decision was later reversed, and his dialysis port was removed. He underwent 4 dialysis treatments during his hospital stay. He was initially placed in a stepdown unit and was informed that intubation would be the next step, but he was subsequently discharged. He is currently on oxygen therapy and has 3 oxygen tanks at home. He was placed on BiPAP for approximately 24 hours during his hospital stay. He was advised to use oxygen continuously, but he is not currently using it. His cardiologist has not provided any guidance regarding his oxygen use. He has been prescribed metoprolol twice daily, thiamine 100 mg daily,

## 2025-01-08 PROBLEM — E55.9 VITAMIN D DEFICIENCY: Status: ACTIVE | Noted: 2025-01-08

## 2025-01-08 PROBLEM — Z09 HOSPITAL DISCHARGE FOLLOW-UP: Status: ACTIVE | Noted: 2025-01-08

## 2025-01-08 PROBLEM — R68.81 EARLY SATIETY: Status: ACTIVE | Noted: 2025-01-08

## 2025-01-08 NOTE — ASSESSMENT & PLAN NOTE
He has a history of gastrointestinal bleeding and continues to experience abdominal pain. He will see a gastroenterologist on Friday.     PROCEDURE  The patient received 4 blood transfusions during his recent hospitalization.

## 2025-01-08 NOTE — ASSESSMENT & PLAN NOTE
His renal function has returned to normal, with a creatinine level of 1.2 and an eGFR greater than 60. He will follow up with nephrology on 01/14/2025.

## 2025-01-08 NOTE — ASSESSMENT & PLAN NOTE
Hemoglobin 9.9  PROCEDURE  The patient received 4 blood transfusions during his recent hospitalization.

## 2025-01-08 NOTE — ASSESSMENT & PLAN NOTE
His vitamin D level is low at 11. A prescription for vitamin D has been sent to [facility], to be taken once a week.

## 2025-01-08 NOTE — ASSESSMENT & PLAN NOTE
Monitored by specialist- no acute findings meriting change in the plan  Dr Paez, cardio, follow-up on 04/09/2025.  Continue metoprolol 25 mg twice daily  He experienced a non-ST elevation myocardial infarction during his recent hospitalization.

## 2025-01-08 NOTE — ASSESSMENT & PLAN NOTE
While in hospital GI was concerned about performing endoscopy/colonoscopy due to patient's heart status.    Patient to follow-up with GI    He will see a gastroenterologist on Friday.   and report its effectiveness to the GI specialist. He should avoid spicy and harsh foods  Initiate pantoprazole

## 2025-01-08 NOTE — ASSESSMENT & PLAN NOTE
He was hospitalized with metabolic encephalopathy. He has an upcoming neurology appointment on 02/24/2025 at 1 PM for memory problems.

## 2025-01-08 NOTE — ASSESSMENT & PLAN NOTE
Patient was hospitalized from 12/11/2024 - 12/23/2024  Admission diagnoses   NSTEMI  Acute renal failure  Influenza A  Anemia  Acute metabolic encephalopathy    He has follow-up appointments with nephrology, cardiology and neurology    The patient presents for evaluation of posthospitalization-acute renal failure, NSTEMI, metabolic encephalopathy, GI bleed, and low vitamin D.    He was recently hospitalized due to acute renal failure, non-ST elevation myocardial infarction (NSTEMI), and metabolic encephalopathy. He is under the care of Dr. Paez, a cardiologist, and has an upcoming appointment with neurology on 02/24/2025 at 1 PM for memory issues. He had a consultation with Dr. Paez on 01/03/2025 and is scheduled to see a gastroenterologist this Friday. He also has an appointment with his nephrologist next week on 01/14/2025. He has not undergone a colonoscopy or endoscopy due to concerns about his heart's ability to tolerate anesthesia. He has received 4 blood transfusions due to significant blood loss and continues to experience abdominal pain, which is suspected to be related to a gastrointestinal bleed. Despite recommendations from his cardiologist to proceed with the colonoscopy, other physicians have advised against it. He was informed that he would require lifelong dialysis, but this decision was later reversed, and his dialysis port was removed. He underwent 4 dialysis treatments during his hospital stay. He was initially placed in a stepdown unit and was informed that intubation would be the next step, but he was subsequently discharged. He is currently on oxygen therapy and has 3 oxygen tanks at home. He was placed on BiPAP for approximately 24 hours during his hospital stay. He was advised to use oxygen continuously, but he is not currently using it. His cardiologist has not provided any guidance regarding his oxygen use. He has been prescribed metoprolol twice daily, thiamine 100 mg daily, and

## 2025-01-11 PROBLEM — E86.0 DEHYDRATION: Status: RESOLVED | Noted: 2024-12-12 | Resolved: 2025-01-11

## 2025-01-13 ENCOUNTER — TRANSCRIBE ORDERS (OUTPATIENT)
Facility: HOSPITAL | Age: 65
End: 2025-01-13

## 2025-01-13 DIAGNOSIS — F17.200 SMOKER: ICD-10-CM

## 2025-01-13 DIAGNOSIS — Z79.02 ENCOUNTER FOR LONG-TERM (CURRENT) USE OF ANTIPLATELETS/ANTITHROMBOTICS: Primary | ICD-10-CM

## 2025-01-13 DIAGNOSIS — K59.00 CONSTIPATION, UNSPECIFIED CONSTIPATION TYPE: ICD-10-CM

## 2025-01-13 DIAGNOSIS — D50.9 IRON DEFICIENCY ANEMIA, UNSPECIFIED IRON DEFICIENCY ANEMIA TYPE: ICD-10-CM

## 2025-01-13 DIAGNOSIS — K62.5 RECTAL BLEEDING: ICD-10-CM

## 2025-01-13 DIAGNOSIS — R10.84 ABDOMINAL PAIN, GENERALIZED: ICD-10-CM

## 2025-01-15 ENCOUNTER — TELEPHONE (OUTPATIENT)
Age: 65
End: 2025-01-15

## 2025-01-15 NOTE — TELEPHONE ENCOUNTER
Is patient cleared for Right Femoral Endarderectomy with antegrade SFA stent placement with Dr. Mckinney. Please Advise

## 2025-01-15 NOTE — TELEPHONE ENCOUNTER
----- Message from Cristin MOSER sent at 1/14/2025  8:20 AM EST -----  Regarding: Cardiac Clearance  I wanted to follow up if this patient is cleared for Right Femoral Endarderectomy with antegrade SFA stent placement with Dr. Mckinney.    Please advise.     Thanks,  Cristin

## 2025-01-27 ENCOUNTER — OFFICE VISIT (OUTPATIENT)
Age: 65
End: 2025-01-27

## 2025-01-27 VITALS
DIASTOLIC BLOOD PRESSURE: 58 MMHG | SYSTOLIC BLOOD PRESSURE: 133 MMHG | BODY MASS INDEX: 20.12 KG/M2 | WEIGHT: 125.2 LBS | HEIGHT: 66 IN | TEMPERATURE: 99.9 F | HEART RATE: 78 BPM | OXYGEN SATURATION: 91 % | RESPIRATION RATE: 18 BRPM

## 2025-01-27 DIAGNOSIS — Z87.891 PERSONAL HISTORY OF TOBACCO USE: ICD-10-CM

## 2025-01-27 DIAGNOSIS — R09.02 HYPOXEMIA: Primary | ICD-10-CM

## 2025-01-27 ASSESSMENT — PATIENT HEALTH QUESTIONNAIRE - PHQ9
SUM OF ALL RESPONSES TO PHQ QUESTIONS 1-9: 6
1. LITTLE INTEREST OR PLEASURE IN DOING THINGS: MORE THAN HALF THE DAYS
SUM OF ALL RESPONSES TO PHQ9 QUESTIONS 1 & 2: 3
3. TROUBLE FALLING OR STAYING ASLEEP: SEVERAL DAYS
SUM OF ALL RESPONSES TO PHQ QUESTIONS 1-9: 6
7. TROUBLE CONCENTRATING ON THINGS, SUCH AS READING THE NEWSPAPER OR WATCHING TELEVISION: NOT AT ALL
2. FEELING DOWN, DEPRESSED OR HOPELESS: SEVERAL DAYS
9. THOUGHTS THAT YOU WOULD BE BETTER OFF DEAD, OR OF HURTING YOURSELF: NOT AT ALL
4. FEELING TIRED OR HAVING LITTLE ENERGY: SEVERAL DAYS
8. MOVING OR SPEAKING SO SLOWLY THAT OTHER PEOPLE COULD HAVE NOTICED. OR THE OPPOSITE, BEING SO FIGETY OR RESTLESS THAT YOU HAVE BEEN MOVING AROUND A LOT MORE THAN USUAL: NOT AT ALL
5. POOR APPETITE OR OVEREATING: NOT AT ALL
SUM OF ALL RESPONSES TO PHQ QUESTIONS 1-9: 6
6. FEELING BAD ABOUT YOURSELF - OR THAT YOU ARE A FAILURE OR HAVE LET YOURSELF OR YOUR FAMILY DOWN: SEVERAL DAYS
SUM OF ALL RESPONSES TO PHQ QUESTIONS 1-9: 6
10. IF YOU CHECKED OFF ANY PROBLEMS, HOW DIFFICULT HAVE THESE PROBLEMS MADE IT FOR YOU TO DO YOUR WORK, TAKE CARE OF THINGS AT HOME, OR GET ALONG WITH OTHER PEOPLE: SOMEWHAT DIFFICULT

## 2025-01-27 NOTE — PROGRESS NOTES
Gio Khalil presents today for   Chief Complaint   Patient presents with    Shortness of Breath       Is someone accompanying this pt? daughter    Is the patient using any DME equipment during OV? Oxygen as needed.    -DME Company unknown    Depression Screenin/27/2025     8:33 AM   PHQ-9 Questionaire   Little interest or pleasure in doing things 2   Feeling down, depressed, or hopeless 1   Trouble falling or staying asleep, or sleeping too much 1   Feeling tired or having little energy 1   Poor appetite or overeating 0   Feeling bad about yourself - or that you are a failure or have let yourself or your family down 1   Trouble concentrating on things, such as reading the newspaper or watching television 0   Moving or speaking so slowly that other people could have noticed. Or the opposite - being so fidgety or restless that you have been moving around a lot more than usual 0   Thoughts that you would be better off dead, or of hurting yourself in some way 0   PHQ-9 Total Score 6   If you checked off any problems, how difficult have these problems made it for you to do your work, take care of things at home, or get along with other people? 1       Learning Assessment:    Failed to redirect to the Timeline version of the [employer] SmartLink.    Abuse Screening:         No data to display                Fall Risk    Failed to redirect to the Timeline version of the [employer] SmartLink.    Coordination of Care:    1. Have you been to the ER, urgent care clinic since your last visit? Hospitalized since your last visit? Dec 2024 MI, dehydration  [facility]    2. Have you seen or consulted any other health care providers outside of the [facility] since your last visit? Include any pap smears or colon screening. no    Medication list has been update per patient.

## 2025-01-27 NOTE — PROGRESS NOTES
[facility]                                                       Pulmonary, Critical Care, and Sleep Medicine      Pulmonary Office Initial Consultation.    Name: Gio Khalil     : 1960     Date: 2025        Subjective:   Chief complaint: mild dyspnea, preop clearance  Patient is a 64 y.o. male with a PMHx of HFpEF, CAD, EtOH abuse, severe AS, HTN    HPI:  Today in clinic, he states he has mild shortness of breath on exertion, but otherwise denies cough, wheezing, sputum production, chest tightness, or other complaints.  He says he was referred partly for suspicion of COPD and also partly for preop eval on request of his gastroenterologist for upcoming endoscopy, and he also has upcoming peripheral vascular surgery of which she is unsure of the exact details.  History notable for hospitalization in 2024 for acute renal failure, NSTEMI, influenza infection.  At that time it was thought he would need dialysis long-term and he was sent home on home oxygen, not but he has only intermittently using it since then.  He is also manage from is no longer need dialysis and his port was removed.  He does not have a pulse ox to check his oxygen at home  Patient denies systemic symptoms to include fever, chills, nausea, vomiting, abdominal pain, night sweats, or significant weight loss  Patient has never been on any inhaler therapy.  Denies symptoms of allergic rhinitis to include nasal congestion, drainage, or sinus headache.  Has no known history of formally diagnosed  lung disease to include asthma, COPD, or ILD.  Also has no history of PE or DVT.  He is a smoker.  He significantly reduced his intake in the past month to 3 to 4 cigarettes a week.  Prior that he smoked a pack to a pack and a half or more.  Also has significant vaping.  Patient denies other illicit substance use.  Denies any recent travel history or known

## 2025-01-29 ENCOUNTER — OFFICE VISIT (OUTPATIENT)
Age: 65
End: 2025-01-29
Payer: MEDICAID

## 2025-01-29 VITALS
OXYGEN SATURATION: 94 % | HEIGHT: 66 IN | DIASTOLIC BLOOD PRESSURE: 60 MMHG | HEART RATE: 66 BPM | BODY MASS INDEX: 20.09 KG/M2 | SYSTOLIC BLOOD PRESSURE: 100 MMHG | WEIGHT: 125 LBS

## 2025-01-29 DIAGNOSIS — I73.9 PVD (PERIPHERAL VASCULAR DISEASE) (HCC): Primary | ICD-10-CM

## 2025-01-29 PROCEDURE — 3078F DIAST BP <80 MM HG: CPT | Performed by: SURGERY

## 2025-01-29 PROCEDURE — 3074F SYST BP LT 130 MM HG: CPT | Performed by: SURGERY

## 2025-01-29 PROCEDURE — 99215 OFFICE O/P EST HI 40 MIN: CPT | Performed by: SURGERY

## 2025-01-29 NOTE — PROGRESS NOTES
[facility]    Vascular Surgery Office Visit    Gio Khalil  Chief Complaint   Patient presents with    PVD (peripheral vascular disease) ([facility])     Follow Up       History:  64 y.o. male with PAD returns for follow up. He was recently seen in our office last month at which time he expressed progressive BLE claudication. He has a hx of LLE SFA PTA with DCB and bilateral LAILA/EIA stenting on 7/22/22 for LLE resting ischemic pain and severe RLE claudication.Pt did well postoperatively. He was seen recently with recurrent symptoms, consisting of bilateral LE short-distance claudication. CTA a/p was ordered and demonstrated the following: patent LAILA and EIA stents. RLE with tandem stenoses of the SFA and popliteal artery. R PFA stenosis. LLE with tandem stenoses of the SFA and popliteal artery. I recommended staged angiography, starting with the R side.  The LAILA stents do not extend into the aorta, and thus contralateral access was deemed to likely be appropriate, however we discussed possible ipsilateral access.     In the interim (Nov-Dec 2024), he was hospitalized at [facility] for acute renal failure, altered mental status, profound anemia of an unknown source, and an NSTEMI. I saw the pt in the hospital and placed a temporary dialysis catheter. There was discussion that he might not survive the hospitalization. He ultimately recovered, and his symptoms were attributed to influenza. He has since returned home with his nieces who care for them. He is accompanied today by one of his nieces. He has not returned to smoking, but this is because she hasn't permitted him to smoke. He indicates that he still wants to smoke. He really wants to return to work and have an income. He previously worked as a . He cannot work due to his BLE claudication. No distinct rest pain.       Physical Exam:    /60   Pulse 66   Ht 1.676 m (5' 6\")   Wt 56.7 kg (125 lb)   SpO2 94%   BMI 20.18 kg/m²

## 2025-02-07 PROBLEM — Z09 HOSPITAL DISCHARGE FOLLOW-UP: Status: RESOLVED | Noted: 2025-01-08 | Resolved: 2025-02-07

## 2025-02-08 DIAGNOSIS — R68.81 EARLY SATIETY: ICD-10-CM

## 2025-02-10 RX ORDER — PANTOPRAZOLE SODIUM 20 MG/1
TABLET, DELAYED RELEASE ORAL
Qty: 30 TABLET | Refills: 0 | OUTPATIENT
Start: 2025-02-10

## 2025-02-10 NOTE — TELEPHONE ENCOUNTER
I initiated this medication for short-term purposes until after he met with the GI.  He needs to follow-up with GI to see if he should continue this medication.  Thank you

## 2025-02-14 DIAGNOSIS — I73.9 PVD (PERIPHERAL VASCULAR DISEASE): ICD-10-CM

## 2025-02-14 DIAGNOSIS — R68.81 EARLY SATIETY: ICD-10-CM

## 2025-02-14 DIAGNOSIS — R09.89 BILATERAL CAROTID BRUITS: ICD-10-CM

## 2025-02-14 DIAGNOSIS — G60.9 IDIOPATHIC PERIPHERAL NEUROPATHY: ICD-10-CM

## 2025-02-14 NOTE — TELEPHONE ENCOUNTER
reviewed 2/15/2025     Gabapentin 100 Mg Capsule last filled 10/29/2025 for a 30 d/s qty 90    Last UDS: completed   Last CSA:  not on file     Last Appt: 1/7/2025     Pantoprazole last filled 1/7/2025

## 2025-02-17 RX ORDER — PANTOPRAZOLE SODIUM 20 MG/1
20 TABLET, DELAYED RELEASE ORAL
Qty: 30 TABLET | Refills: 0 | OUTPATIENT
Start: 2025-02-17

## 2025-02-17 NOTE — TELEPHONE ENCOUNTER
The pantoprazole was prescribed on a short-term basis until he followed up with his GI.    The gabapentin is not prescribed by me.  He needs to follow-up with the provider who supplies sent that prescription.  Thank you

## 2025-02-18 ENCOUNTER — HOSPITAL ENCOUNTER (OUTPATIENT)
Facility: HOSPITAL | Age: 65
End: 2025-02-18
Payer: MEDICAID

## 2025-02-18 ENCOUNTER — HOSPITAL ENCOUNTER (OUTPATIENT)
Facility: HOSPITAL | Age: 65
Discharge: HOME | End: 2025-02-21
Attending: HOSPITALIST
Payer: MEDICAID

## 2025-02-18 DIAGNOSIS — Z87.891 PERSONAL HISTORY OF TOBACCO USE: ICD-10-CM

## 2025-02-18 PROCEDURE — 71271 CT THORAX LUNG CANCER SCR C-: CPT

## 2025-02-24 ENCOUNTER — OFFICE VISIT (OUTPATIENT)
Age: 65
End: 2025-02-24
Payer: MEDICAID

## 2025-02-24 VITALS
TEMPERATURE: 99 F | BODY MASS INDEX: 18.32 KG/M2 | OXYGEN SATURATION: 97 % | DIASTOLIC BLOOD PRESSURE: 66 MMHG | HEIGHT: 66 IN | WEIGHT: 114 LBS | HEART RATE: 84 BPM | SYSTOLIC BLOOD PRESSURE: 135 MMHG

## 2025-02-24 DIAGNOSIS — R41.89 IMPAIRED COGNITION: Primary | ICD-10-CM

## 2025-02-24 DIAGNOSIS — F10.10 ALCOHOL ABUSE: ICD-10-CM

## 2025-02-24 DIAGNOSIS — R06.83 SNORING: ICD-10-CM

## 2025-02-24 DIAGNOSIS — I65.23 BILATERAL CAROTID ARTERY STENOSIS: ICD-10-CM

## 2025-02-24 DIAGNOSIS — G62.9 POLYNEUROPATHY: ICD-10-CM

## 2025-02-24 DIAGNOSIS — F17.210 CIGARETTE NICOTINE DEPENDENCE WITHOUT COMPLICATION: ICD-10-CM

## 2025-02-24 PROCEDURE — 3075F SYST BP GE 130 - 139MM HG: CPT | Performed by: PSYCHIATRY & NEUROLOGY

## 2025-02-24 PROCEDURE — 99204 OFFICE O/P NEW MOD 45 MIN: CPT | Performed by: PSYCHIATRY & NEUROLOGY

## 2025-02-24 PROCEDURE — 3078F DIAST BP <80 MM HG: CPT | Performed by: PSYCHIATRY & NEUROLOGY

## 2025-02-24 PROCEDURE — 99406 BEHAV CHNG SMOKING 3-10 MIN: CPT | Performed by: PSYCHIATRY & NEUROLOGY

## 2025-02-24 NOTE — PROGRESS NOTES
Gio Khalil is a 64 y.o. male (: 1960) presenting to address:    Chief Complaint   Patient presents with    New Patient     Memory loss        Medication list and allergies have been reviewed with Gio Khalil and updated as of today's date.     I have gone over all Medical, Surgical and Social History with Gio Khalil and updated/added the information accordingly.     
 Types: Cigarettes     Start date: 1/1/1982     Passive exposure: Past    Smokeless tobacco: Never    Tobacco comments:     Patient has not smoked in 1 week from today 1/7/2025   Vaping Use    Vaping status: Never Used   Substance and Sexual Activity    Alcohol use: Yes     Alcohol/week: 14.0 standard drinks of alcohol    Drug use: Not Currently     Types: Marijuana (Weed)     Social Determinants of Health     Financial Resource Strain: Low Risk  (11/7/2024)    Overall Financial Resource Strain (CARDIA)     Difficulty of Paying Living Expenses: Not hard at all   Food Insecurity: No Food Insecurity (12/11/2024)    Hunger Vital Sign     Worried About Running Out of Food in the Last Year: Never true     Ran Out of Food in the Last Year: Never true   Transportation Needs: No Transportation Needs (12/11/2024)    PRAPARE - Transportation     Lack of Transportation (Medical): No     Lack of Transportation (Non-Medical): No   Housing Stability: High Risk (12/11/2024)    Housing Stability Vital Sign     Unable to Pay for Housing in the Last Year: No     Number of Times Moved in the Last Year: 2     Homeless in the Last Year: No           ROS:    10 pt ros done and negative other than what is mentioned in HPI     PHYSICAL EXAM:  Constitutional  Weight: well-nourished  Ambulation: ambulates independently  Head  Mental Status  Orientation: oriented to person, oriented to place, oriented to problem, oriented to time  Neck  Lungs  Heart  Skin  Inspection: no cyanosis, no clubbing  Extremity Edema Right: none  Extremity Edema Left: none  Cranial Nerves  CN II Right: pupil normal size, pupil reactive to light and dark, visual acuity intact to confrontation  CN II Left: pupil normal size, pupil reactive to light and dark, visual acuity intact to confrontation  CN III, IV, VI: no nystagmus, extraocular muscle strength normal, normal ocular motility pursuits  CN V Right: normal sensation  CN V Left: normal sensation  CN VII Right:

## 2025-02-24 NOTE — PATIENT INSTRUCTIONS
MRI brain  EEG-brain wave test  One lab test SPEP to check for proteins that cause neuropathy  Sleep medicine referral to check for sleep apnea  Stop smoking  Amyloid Pet scan to check for Alzheimer's proteins at [facility]  Follow up in 3 months

## 2025-02-25 RX ORDER — GABAPENTIN 100 MG/1
100 CAPSULE ORAL 3 TIMES DAILY
Qty: 180 CAPSULE | Refills: 1 | Status: SHIPPED | OUTPATIENT
Start: 2025-02-25 | End: 2025-08-24

## 2025-02-26 ENCOUNTER — TELEPHONE (OUTPATIENT)
Facility: CLINIC | Age: 65
End: 2025-02-26

## 2025-02-26 DIAGNOSIS — M47.816 LUMBAR FACET ARTHROPATHY: ICD-10-CM

## 2025-02-26 DIAGNOSIS — M54.16 LUMBAR RADICULOPATHY: Primary | ICD-10-CM

## 2025-02-26 RX ORDER — PREGABALIN 150 MG/1
150 CAPSULE ORAL 2 TIMES DAILY
Qty: 180 CAPSULE | Refills: 0 | Status: SHIPPED | OUTPATIENT
Start: 2025-02-26 | End: 2025-05-27

## 2025-02-26 NOTE — TELEPHONE ENCOUNTER
Patients caregiver Ms Choudhury contacted the office requesting to speak with a nurse about medications that we discontinued. Please call     [phone]

## 2025-02-26 NOTE — TELEPHONE ENCOUNTER
Patient emergency contact Crystal Harrison called on the behalf of patient and is requesting a refill on         pregabalin (LYRICA) 150 MG capsule             [address] - [facility] EBEN AGUILAR - [place] [phone] - F [phone] [[id number]]

## 2025-02-26 NOTE — TELEPHONE ENCOUNTER
Spoke to Dr. Aguiar in regards to this message and he stated we can refill the patient Lyrica 150 mg #180 take 1 po bid with no refills. Also, patient can opt to take either the Gabapentin or the Lyrica.

## 2025-02-27 ENCOUNTER — TELEPHONE (OUTPATIENT)
Age: 65
End: 2025-02-27

## 2025-02-27 NOTE — TELEPHONE ENCOUNTER
Spoke w/ pt's caregiver and informed of PCP message. Pt's caregiver verbalized understanding and will reach out to GI and cardiology for medication clarification.

## 2025-02-27 NOTE — TELEPHONE ENCOUNTER
[facility] pharamcy calling wanting to know which prescription pt should be taking either the Gabapentin and Pregabalin . Pankaj from [facility] pharmacy [phone]

## 2025-02-27 NOTE — TELEPHONE ENCOUNTER
Spoke w/ pt's caregiver, Shaniqua (Niece) and per pt's caregiver the patient is in need of medications refills, however when she tried to obtain from the pharmacy she was informed that the medications was discontinued and canceled. Pt's caregiver is not sure if the patient is supposed to continue the following medications    Pt is currently taking the following;    Amlodipine 10 mg tablet -  Last filled 5/6/2024; Qty 93 w/ 1 refill   Metoprolol 25 mg tablet x 2 day (Last took 2 days ago)  Pantoprazole 20 mg tablet- Last filled 1/7/2025; Qty 30 w/ 0 refills

## 2025-02-27 NOTE — TELEPHONE ENCOUNTER
Patient was to follow-up with his GI in reference to the pantoprazole.  At his hospital follow-up he was to discuss with his GI if he needs to continue to take this medication due to early satiety.      He saw Dr. Rodríguez, cardiology, 1/3/2025.  He does not mention the amlodipine nor the metoprolol.  Have the niece reach out to cardiology and inquire about these refills.    Thank you

## 2025-02-27 NOTE — TELEPHONE ENCOUNTER
Called patient [phone] and spoke to the patients tarik Choudhury(HIPAA). I verified the patients name and date of birth. I informed her of the message we received from the pharmacy. She stated he has been taking them both since being prescribed them. I informed her that per Dr. Aguiar they do the same thing. He recommends the patient decides which one he would prefer to continue on  because the vascular doctor has him on the Gabapentin for his legs  and we had him on the Pregablin for the same thing and his back. She stated then they will continue him on the Gabapentin only then. I informed her that I will inform the provider and the pharmacy. She verbalized understanding.       Called [facility] [phone] and spoke to Pily Snyder [employer]. I informed her we will cancelling out the Pregablin prescription and the patient will continue taking the Gabapentin being prescribed by a different provider. She stated she has cancelled out the pregablin. No further action needed at this time.

## 2025-02-27 NOTE — TELEPHONE ENCOUNTER
Call received from [facility] pharmacy requesting to speak to a nurse about a possible drug interaction with the gabapentin rx from our office, and another medication from a different doctor.    Pharmacy # [phone].

## 2025-02-27 NOTE — TELEPHONE ENCOUNTER
Attempted to speak with pt's caregiver to relay PCP message, however pt's caregiver had to take another call and placed the call on hold, Disconnected the call and will reach out to follow up later.

## 2025-02-28 ENCOUNTER — HOSPITAL ENCOUNTER (OUTPATIENT)
Facility: HOSPITAL | Age: 65
Discharge: HOME | End: 2025-02-28
Payer: MEDICAID

## 2025-02-28 DIAGNOSIS — R19.00 ABDOMINAL MASS, UNSPECIFIED ABDOMINAL LOCATION: ICD-10-CM

## 2025-02-28 LAB — CREAT UR-MCNC: 1 MG/DL (ref 0.6–1.3)

## 2025-02-28 PROCEDURE — 82565 ASSAY OF CREATININE: CPT

## 2025-02-28 PROCEDURE — 6360000004 HC RX CONTRAST MEDICATION

## 2025-02-28 PROCEDURE — 74177 CT ABD & PELVIS W/CONTRAST: CPT

## 2025-02-28 RX ORDER — IOPAMIDOL 612 MG/ML
100 INJECTION, SOLUTION INTRAVASCULAR
Status: COMPLETED | OUTPATIENT
Start: 2025-02-28 | End: 2025-02-28

## 2025-02-28 RX ADMIN — IOPAMIDOL 100 ML: 612 INJECTION, SOLUTION INTRAVENOUS at 09:28

## 2025-03-05 DIAGNOSIS — I10 PRIMARY HYPERTENSION: ICD-10-CM

## 2025-03-06 RX ORDER — AMLODIPINE BESYLATE 10 MG/1
10 TABLET ORAL DAILY
Qty: 90 TABLET | Refills: 0 | Status: SHIPPED | OUTPATIENT
Start: 2025-03-06

## 2025-03-10 ENCOUNTER — HOSPITAL ENCOUNTER (OUTPATIENT)
Facility: HOSPITAL | Age: 65
Discharge: HOME | End: 2025-03-13
Payer: MEDICAID

## 2025-03-10 DIAGNOSIS — F17.210 CIGARETTE NICOTINE DEPENDENCE WITHOUT COMPLICATION: ICD-10-CM

## 2025-03-10 DIAGNOSIS — I65.23 BILATERAL CAROTID ARTERY STENOSIS: ICD-10-CM

## 2025-03-10 DIAGNOSIS — R41.89 IMPAIRED COGNITION: ICD-10-CM

## 2025-03-10 DIAGNOSIS — G62.9 POLYNEUROPATHY: ICD-10-CM

## 2025-03-10 DIAGNOSIS — F10.10 ALCOHOL ABUSE: ICD-10-CM

## 2025-03-10 PROCEDURE — 95816 EEG AWAKE AND DROWSY: CPT

## 2025-03-13 ENCOUNTER — TELEPHONE (OUTPATIENT)
Age: 65
End: 2025-03-13

## 2025-03-14 NOTE — PROCEDURES
[id number] [place]  [address]                                   EEG      PATIENT NAME: MANSI OSBORN               : 1960  MED REC NO: [id number]                       ROOM:   ACCOUNT NO: [id number]                       ADMIT DATE: 03/10/2025  PROVIDER: Carol Gordon MD    DATE OF SERVICE:  03/10/2025    REFERRING PHYSICIAN:  CLAIRE PEREZ    Outpatient recording     EEG was ordered by Dr. Perez.    REASON FOR EEG:  For evaluation of memory changes.    MEDICATIONS:  The patient's medications from chart review include amlodipine, atorvastatin, pregabalin, Celexa, Plavix, and aspirin.    TECHNIQUE:  The EEG technique used was a standard 10/20 system of electrode placement with an EKG.  Activation procedure used was photic stimulation.  Total duration of the recording was 21 minutes.  This is mainly an awake EEG recording.    EEG REPORT:  The background consists of posterior dominant alpha rhythms at a frequency of 9 to 10 hertz and attenuate to eye opening.  No significant asymmetry between the right and left side.  There are no obvious spikes or sharp wave discharges.  No focal slowing.  No abnormal discharges during photic stimulation.    IMPRESSION:  This is normal EEG recording.    CLINICAL CORRELATION:  Normal EEG does not rule out the possibility of seizures or epilepsy.        CAROL GORDON MD      BDA/AQS  D:  2025 14:44:58  T:  2025 15:31:37  JOB #:  [id number]

## 2025-03-18 ENCOUNTER — TELEPHONE (OUTPATIENT)
Facility: CLINIC | Age: 65
End: 2025-03-18

## 2025-03-18 ENCOUNTER — RESULTS FOLLOW-UP (OUTPATIENT)
Facility: HOSPITAL | Age: 65
End: 2025-03-18

## 2025-03-18 NOTE — TELEPHONE ENCOUNTER
[employer] [employer] called niece (on HIPAA) stating pt's blood work came back questionable and advised her to contact  his pcp please advise         Shaniqua Choudhury - [phone]

## 2025-03-28 ENCOUNTER — HOSPITAL ENCOUNTER (OUTPATIENT)
Facility: HOSPITAL | Age: 65
Discharge: HOME | End: 2025-03-31
Attending: PSYCHIATRY & NEUROLOGY
Payer: MEDICAID

## 2025-03-28 DIAGNOSIS — R41.89 IMPAIRED COGNITION: ICD-10-CM

## 2025-03-28 PROCEDURE — 70551 MRI BRAIN STEM W/O DYE: CPT

## 2025-04-03 ENCOUNTER — RESULTS FOLLOW-UP (OUTPATIENT)
Age: 65
End: 2025-04-03

## 2025-04-03 DIAGNOSIS — M79.18 MYOFASCIAL PAIN: ICD-10-CM
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
## 2025-04-03 DIAGNOSIS — M54.50 LUMBAR PAIN: ICD-10-CM

## 2025-04-03 RX ORDER — LIDOCAINE 50 MG/G
1 PATCH TOPICAL EVERY 24 HOURS
Qty: 30 PATCH | Refills: 0 | Status: ON HOLD | OUTPATIENT
Start: 2025-04-03

## 2025-04-06 ENCOUNTER — APPOINTMENT (OUTPATIENT)
Facility: HOSPITAL | Age: 65
DRG: 720 | End: 2025-04-06
Payer: MEDICAID

## 2025-04-06 ENCOUNTER — HOSPITAL ENCOUNTER (INPATIENT)
Facility: HOSPITAL | Age: 65
LOS: 17 days | Discharge: ANOTHER HOSPITAL | DRG: 720 | End: 2025-04-23
Attending: STUDENT IN AN ORGANIZED HEALTH CARE EDUCATION/TRAINING PROGRAM | Admitting: INTERNAL MEDICINE
Payer: MEDICAID

## 2025-04-06 DIAGNOSIS — I21.4 NSTEMI (NON-ST ELEVATED MYOCARDIAL INFARCTION) (HCC): Primary | ICD-10-CM

## 2025-04-06 DIAGNOSIS — R78.81 BACTEREMIA: ICD-10-CM

## 2025-04-06 DIAGNOSIS — I24.9 ACUTE CORONARY SYNDROME (HCC): ICD-10-CM

## 2025-04-06 DIAGNOSIS — I50.9 ACUTE ON CHRONIC CONGESTIVE HEART FAILURE, UNSPECIFIED HEART FAILURE TYPE (HCC): ICD-10-CM

## 2025-04-06 DIAGNOSIS — I35.0 AORTIC STENOSIS, SEVERE: ICD-10-CM

## 2025-04-06 DIAGNOSIS — I33.0 ACUTE BACTERIAL ENDOCARDITIS: ICD-10-CM

## 2025-04-06 LAB
ACB COMPLEX DNA BLD POS QL NAA+NON-PROBE: NOT DETECTED
ACB COMPLEX DNA BLD POS QL NAA+NON-PROBE: NOT DETECTED
ACCESSION NUMBER, LLC1M: ABNORMAL
ACCESSION NUMBER, LLC1M: NORMAL
ALBUMIN SERPL-MCNC: 2.8 G/DL (ref 3.4–5)
ALBUMIN/GLOB SERPL: 0.6 (ref 0.8–1.7)
ALP SERPL-CCNC: 99 U/L (ref 45–117)
ALT SERPL-CCNC: 21 U/L (ref 16–61)
AMPHET UR QL SCN: NEGATIVE
ANION GAP SERPL CALC-SCNC: 15 MMOL/L (ref 3–18)
APTT PPP: 35.7 SEC (ref 23–36.4)
APTT PPP: 49.1 SEC (ref 23–36.4)
APTT PPP: 59 SEC (ref 23–36.4)
ARTERIAL PATENCY WRIST A: ABNORMAL
AST SERPL-CCNC: 64 U/L (ref 10–38)
B FRAGILIS DNA BLD POS QL NAA+NON-PROBE: NOT DETECTED
B FRAGILIS DNA BLD POS QL NAA+NON-PROBE: NOT DETECTED
B PERT DNA SPEC QL NAA+PROBE: NOT DETECTED
BARBITURATES UR QL SCN: NEGATIVE
BASE DEFICIT BLD-SCNC: 2.6 MMOL/L
BASOPHILS # BLD: 0 K/UL (ref 0–0.1)
BASOPHILS NFR BLD: 0 % (ref 0–2)
BDY SITE: ABNORMAL
BENZODIAZ UR QL: NEGATIVE
BILIRUB SERPL-MCNC: 1 MG/DL (ref 0.2–1)
BIOFIRE TEST COMMENT: ABNORMAL
BIOFIRE TEST COMMENT: NORMAL
BLACTX-M ISLT/SPM QL: NOT DETECTED
BLAIMP ISLT/SPM QL: NOT DETECTED
BLAKPC ISLT/SPM QL: NOT DETECTED
BLAOXA-48-LIKE ISLT/SPM QL: NOT DETECTED
BLAVIM ISLT/SPM QL: NOT DETECTED
BORDETELLA PARAPERTUSSIS BY PCR: NOT DETECTED
BUN SERPL-MCNC: 18 MG/DL (ref 7–18)
BUN/CREAT SERPL: 14 (ref 12–20)
C ALBICANS DNA BLD POS QL NAA+NON-PROBE: NOT DETECTED
C ALBICANS DNA BLD POS QL NAA+NON-PROBE: NOT DETECTED
C AURIS DNA BLD POS QL NAA+NON-PROBE: NOT DETECTED
C AURIS DNA BLD POS QL NAA+NON-PROBE: NOT DETECTED
C GATTII+NEOFOR DNA BLD POS QL NAA+N-PRB: NOT DETECTED
C GATTII+NEOFOR DNA BLD POS QL NAA+N-PRB: NOT DETECTED
C GLABRATA DNA BLD POS QL NAA+NON-PROBE: NOT DETECTED
C GLABRATA DNA BLD POS QL NAA+NON-PROBE: NOT DETECTED
C KRUSEI DNA BLD POS QL NAA+NON-PROBE: NOT DETECTED
C KRUSEI DNA BLD POS QL NAA+NON-PROBE: NOT DETECTED
C PARAP DNA BLD POS QL NAA+NON-PROBE: NOT DETECTED
C PARAP DNA BLD POS QL NAA+NON-PROBE: NOT DETECTED
C PNEUM DNA SPEC QL NAA+PROBE: NOT DETECTED
C TROPICLS DNA BLD POS QL NAA+NON-PROBE: NOT DETECTED
C TROPICLS DNA BLD POS QL NAA+NON-PROBE: NOT DETECTED
CALCIUM SERPL-MCNC: 9 MG/DL (ref 8.5–10.1)
CANNABINOIDS UR QL SCN: NEGATIVE
CHLORIDE SERPL-SCNC: 96 MMOL/L (ref 100–111)
CO2 SERPL-SCNC: 19 MMOL/L (ref 21–32)
COCAINE UR QL SCN: NEGATIVE
COLISTIN RES MCR-1 ISLT/SPM QL: NOT DETECTED
CREAT SERPL-MCNC: 1.25 MG/DL (ref 0.6–1.3)
D DIMER PPP FEU-MCNC: 2.75 UG/ML(FEU)
DIFFERENTIAL METHOD BLD: ABNORMAL
E CLOAC COMP DNA BLD POS NAA+NON-PROBE: NOT DETECTED
E CLOAC COMP DNA BLD POS NAA+NON-PROBE: NOT DETECTED
E COLI DNA BLD POS QL NAA+NON-PROBE: DETECTED
E COLI DNA BLD POS QL NAA+NON-PROBE: NOT DETECTED
E FAECALIS DNA BLD POS QL NAA+NON-PROBE: NOT DETECTED
E FAECALIS DNA BLD POS QL NAA+NON-PROBE: NOT DETECTED
E FAECIUM DNA BLD POS QL NAA+NON-PROBE: NOT DETECTED
E FAECIUM DNA BLD POS QL NAA+NON-PROBE: NOT DETECTED
ENTEROBACTERALES DNA BLD POS NAA+N-PRB: DETECTED
ENTEROBACTERALES DNA BLD POS NAA+N-PRB: NOT DETECTED
EOSINOPHIL # BLD: 0 K/UL (ref 0–0.4)
EOSINOPHIL NFR BLD: 0 % (ref 0–5)
ERYTHROCYTE [DISTWIDTH] IN BLOOD BY AUTOMATED COUNT: 16.1 % (ref 11.6–14.5)
FLUAV SUBTYP SPEC NAA+PROBE: NOT DETECTED
FLUBV RNA SPEC QL NAA+PROBE: NOT DETECTED
GAS FLOW.O2 O2 DELIVERY SYS: ABNORMAL
GAS FLOW.O2 SETTING OXYMISER: 8 BPM
GLOBULIN SER CALC-MCNC: 4.8 G/DL (ref 2–4)
GLUCOSE SERPL-MCNC: 152 MG/DL (ref 74–99)
GP B STREP DNA BLD POS QL NAA+NON-PROBE: NOT DETECTED
GP B STREP DNA BLD POS QL NAA+NON-PROBE: NOT DETECTED
HADV DNA SPEC QL NAA+PROBE: NOT DETECTED
HAEM INFLU DNA BLD POS QL NAA+NON-PROBE: NOT DETECTED
HAEM INFLU DNA BLD POS QL NAA+NON-PROBE: NOT DETECTED
HCO3 BLD-SCNC: 21.5 MMOL/L (ref 21–28)
HCOV 229E RNA SPEC QL NAA+PROBE: NOT DETECTED
HCOV HKU1 RNA SPEC QL NAA+PROBE: NOT DETECTED
HCOV NL63 RNA SPEC QL NAA+PROBE: NOT DETECTED
HCOV OC43 RNA SPEC QL NAA+PROBE: NOT DETECTED
HCT VFR BLD AUTO: 28.2 % (ref 36–48)
HGB BLD-MCNC: 9.3 G/DL (ref 13–16)
HMPV RNA SPEC QL NAA+PROBE: NOT DETECTED
HPIV1 RNA SPEC QL NAA+PROBE: NOT DETECTED
HPIV2 RNA SPEC QL NAA+PROBE: NOT DETECTED
HPIV3 RNA SPEC QL NAA+PROBE: NOT DETECTED
HPIV4 RNA SPEC QL NAA+PROBE: NOT DETECTED
IMM GRANULOCYTES # BLD AUTO: 0 K/UL (ref 0–0.04)
IMM GRANULOCYTES NFR BLD AUTO: 0 % (ref 0–0.5)
INSPIRATION.DURATION SETTING TIME VENT: 0.9 SEC
IPAP/PIP/HIGH PEEP: 19
K OXYTOCA DNA BLD POS QL NAA+NON-PROBE: NOT DETECTED
K OXYTOCA DNA BLD POS QL NAA+NON-PROBE: NOT DETECTED
KLEBSIELLA SP DNA BLD POS QL NAA+NON-PRB: NOT DETECTED
L MONOCYTOG DNA BLD POS QL NAA+NON-PROBE: NOT DETECTED
L MONOCYTOG DNA BLD POS QL NAA+NON-PROBE: NOT DETECTED
LACTATE BLD-SCNC: 1.75 MMOL/L (ref 0.4–2)
LACTATE BLD-SCNC: 3.37 MMOL/L (ref 0.4–2)
LYMPHOCYTES # BLD: 2.83 K/UL (ref 0.9–3.6)
LYMPHOCYTES NFR BLD: 13 % (ref 21–52)
Lab: NORMAL
M PNEUMO DNA SPEC QL NAA+PROBE: NOT DETECTED
MAGNESIUM SERPL-MCNC: 2.1 MG/DL (ref 1.6–2.6)
MCH RBC QN AUTO: 27.8 PG (ref 24–34)
MCHC RBC AUTO-ENTMCNC: 33 G/DL (ref 31–37)
MCV RBC AUTO: 84.4 FL (ref 78–100)
METHADONE UR QL: NEGATIVE
MONOCYTES # BLD: 0.65 K/UL (ref 0.05–1.2)
MONOCYTES NFR BLD: 3 % (ref 3–10)
N MEN DNA BLD POS QL NAA+NON-PROBE: NOT DETECTED
N MEN DNA BLD POS QL NAA+NON-PROBE: NOT DETECTED
NEUTS SEG # BLD: 18.32 K/UL (ref 1.8–8)
NEUTS SEG NFR BLD: 84 % (ref 40–73)
NRBC # BLD: 0 K/UL (ref 0–0.01)
NRBC BLD-RTO: 0 PER 100 WBC
NT PRO BNP: ABNORMAL PG/ML (ref 0–900)
O2/TOTAL GAS SETTING VFR VENT: 50 %
OPIATES UR QL: NEGATIVE
P AERUGINOSA DNA BLD POS NAA+NON-PROBE: NOT DETECTED
P AERUGINOSA DNA BLD POS NAA+NON-PROBE: NOT DETECTED
PCO2 BLD: 33.5 MMHG (ref 35–48)
PCP UR QL: NEGATIVE
PEEP RESPIRATORY: 8 CMH2O
PH BLD: 7.42 (ref 7.35–7.45)
PLATELET # BLD AUTO: 283 K/UL (ref 135–420)
PLATELET COMMENT: ABNORMAL
PMV BLD AUTO: 10.5 FL (ref 9.2–11.8)
PO2 BLD: 71 MMHG (ref 83–108)
POTASSIUM SERPL-SCNC: 3.4 MMOL/L (ref 3.5–5.5)
PRESSURE SUPPORT SETTING VENT: 10 CMH2O
PROT SERPL-MCNC: 7.6 G/DL (ref 6.4–8.2)
PROTEUS SP DNA BLD POS QL NAA+NON-PROBE: NOT DETECTED
PROTEUS SP DNA BLD POS QL NAA+NON-PROBE: NOT DETECTED
RBC # BLD AUTO: 3.34 M/UL (ref 4.35–5.65)
RBC MORPH BLD: ABNORMAL
RESISTANT GENE NDM BY PCR: NOT DETECTED
RESISTANT GENE TARGETS: ABNORMAL
RESISTANT GENE TARGETS: NORMAL
RESPIRATORY RATE, POC: 33 (ref 5–40)
RSV RNA SPEC QL NAA+PROBE: NOT DETECTED
RV+EV RNA SPEC QL NAA+PROBE: NOT DETECTED
S AUREUS DNA BLD POS QL NAA+NON-PROBE: NOT DETECTED
S AUREUS DNA BLD POS QL NAA+NON-PROBE: NOT DETECTED
S AUREUS+CONS DNA BLD POS NAA+NON-PROBE: NOT DETECTED
S AUREUS+CONS DNA BLD POS NAA+NON-PROBE: NOT DETECTED
S EPIDERMIDIS DNA BLD POS QL NAA+NON-PRB: NOT DETECTED
S EPIDERMIDIS DNA BLD POS QL NAA+NON-PRB: NOT DETECTED
S LUGDUNENSIS DNA BLD POS QL NAA+NON-PRB: NOT DETECTED
S LUGDUNENSIS DNA BLD POS QL NAA+NON-PRB: NOT DETECTED
S MALTOPHILIA DNA BLD POS QL NAA+NON-PRB: NOT DETECTED
S MALTOPHILIA DNA BLD POS QL NAA+NON-PRB: NOT DETECTED
S MARCESCENS DNA BLD POS NAA+NON-PROBE: NOT DETECTED
S MARCESCENS DNA BLD POS NAA+NON-PROBE: NOT DETECTED
S PNEUM DNA BLD POS QL NAA+NON-PROBE: NOT DETECTED
S PNEUM DNA BLD POS QL NAA+NON-PROBE: NOT DETECTED
S PYO DNA BLD POS QL NAA+NON-PROBE: NOT DETECTED
S PYO DNA BLD POS QL NAA+NON-PROBE: NOT DETECTED
SALMONELLA DNA BLD POS QL NAA+NON-PROBE: NOT DETECTED
SALMONELLA DNA BLD POS QL NAA+NON-PROBE: NOT DETECTED
SAO2 % BLD: 94.5 % (ref 92–97)
SARS-COV-2 RNA RESP QL NAA+PROBE: NOT DETECTED
SERVICE CMNT-IMP: ABNORMAL
SODIUM SERPL-SCNC: 130 MMOL/L (ref 136–145)
SPECIMEN TYPE: ABNORMAL
STREPTOCOCCUS DNA BLD POS NAA+NON-PROBE: NOT DETECTED
STREPTOCOCCUS DNA BLD POS NAA+NON-PROBE: NOT DETECTED
TROPONIN I SERPL HS-MCNC: 3779 NG/L (ref 0–78)
TROPONIN I SERPL HS-MCNC: 4182 NG/L (ref 0–78)
VENTILATION MODE VENT: ABNORMAL
WBC # BLD AUTO: 21.8 K/UL (ref 4.6–13.2)

## 2025-04-06 PROCEDURE — 80307 DRUG TEST PRSMV CHEM ANLYZR: CPT

## 2025-04-06 PROCEDURE — 94761 N-INVAS EAR/PLS OXIMETRY MLT: CPT

## 2025-04-06 PROCEDURE — 82803 BLOOD GASES ANY COMBINATION: CPT

## 2025-04-06 PROCEDURE — 2140000001 HC CVICU INTERMEDIATE R&B

## 2025-04-06 PROCEDURE — 87205 SMEAR GRAM STAIN: CPT

## 2025-04-06 PROCEDURE — 94660 CPAP INITIATION&MGMT: CPT

## 2025-04-06 PROCEDURE — 87186 SC STD MICRODIL/AGAR DIL: CPT

## 2025-04-06 PROCEDURE — 1100000003 HC PRIVATE W/ TELEMETRY

## 2025-04-06 PROCEDURE — 6370000000 HC RX 637 (ALT 250 FOR IP): Performed by: INTERNAL MEDICINE

## 2025-04-06 PROCEDURE — 6360000002 HC RX W HCPCS: Performed by: INTERNAL MEDICINE

## 2025-04-06 PROCEDURE — 93005 ELECTROCARDIOGRAM TRACING: CPT | Performed by: STUDENT IN AN ORGANIZED HEALTH CARE EDUCATION/TRAINING PROGRAM

## 2025-04-06 PROCEDURE — 85379 FIBRIN DEGRADATION QUANT: CPT

## 2025-04-06 PROCEDURE — 2500000003 HC RX 250 WO HCPCS: Performed by: INTERNAL MEDICINE

## 2025-04-06 PROCEDURE — 87077 CULTURE AEROBIC IDENTIFY: CPT

## 2025-04-06 PROCEDURE — 36600 WITHDRAWAL OF ARTERIAL BLOOD: CPT

## 2025-04-06 PROCEDURE — 87154 CUL TYP ID BLD PTHGN 6+ TRGT: CPT

## 2025-04-06 PROCEDURE — 0202U NFCT DS 22 TRGT SARS-COV-2: CPT

## 2025-04-06 PROCEDURE — 83605 ASSAY OF LACTIC ACID: CPT

## 2025-04-06 PROCEDURE — 6360000002 HC RX W HCPCS: Performed by: STUDENT IN AN ORGANIZED HEALTH CARE EDUCATION/TRAINING PROGRAM

## 2025-04-06 PROCEDURE — 80053 COMPREHEN METABOLIC PANEL: CPT

## 2025-04-06 PROCEDURE — P9041 ALBUMIN (HUMAN),5%, 50ML: HCPCS | Performed by: STUDENT IN AN ORGANIZED HEALTH CARE EDUCATION/TRAINING PROGRAM

## 2025-04-06 PROCEDURE — 84484 ASSAY OF TROPONIN QUANT: CPT

## 2025-04-06 PROCEDURE — 2700000000 HC OXYGEN THERAPY PER DAY

## 2025-04-06 PROCEDURE — 87040 BLOOD CULTURE FOR BACTERIA: CPT

## 2025-04-06 PROCEDURE — 83880 ASSAY OF NATRIURETIC PEPTIDE: CPT

## 2025-04-06 PROCEDURE — 83735 ASSAY OF MAGNESIUM: CPT

## 2025-04-06 PROCEDURE — 96374 THER/PROPH/DIAG INJ IV PUSH: CPT

## 2025-04-06 PROCEDURE — 87070 CULTURE OTHR SPECIMN AEROBIC: CPT

## 2025-04-06 PROCEDURE — 2580000003 HC RX 258: Performed by: STUDENT IN AN ORGANIZED HEALTH CARE EDUCATION/TRAINING PROGRAM

## 2025-04-06 PROCEDURE — 6360000004 HC RX CONTRAST MEDICATION: Performed by: STUDENT IN AN ORGANIZED HEALTH CARE EDUCATION/TRAINING PROGRAM

## 2025-04-06 PROCEDURE — 74174 CTA ABD&PLVS W/CONTRAST: CPT

## 2025-04-06 PROCEDURE — 85025 COMPLETE CBC W/AUTO DIFF WBC: CPT

## 2025-04-06 PROCEDURE — 6370000000 HC RX 637 (ALT 250 FOR IP): Performed by: STUDENT IN AN ORGANIZED HEALTH CARE EDUCATION/TRAINING PROGRAM

## 2025-04-06 PROCEDURE — 99291 CRITICAL CARE FIRST HOUR: CPT

## 2025-04-06 PROCEDURE — 94640 AIRWAY INHALATION TREATMENT: CPT

## 2025-04-06 PROCEDURE — 71045 X-RAY EXAM CHEST 1 VIEW: CPT

## 2025-04-06 PROCEDURE — 85730 THROMBOPLASTIN TIME PARTIAL: CPT

## 2025-04-06 PROCEDURE — 99223 1ST HOSP IP/OBS HIGH 75: CPT | Performed by: INTERNAL MEDICINE

## 2025-04-06 RX ORDER — FUROSEMIDE 10 MG/ML
40 INJECTION INTRAMUSCULAR; INTRAVENOUS
Status: COMPLETED | OUTPATIENT
Start: 2025-04-06 | End: 2025-04-06

## 2025-04-06 RX ORDER — ATORVASTATIN CALCIUM 40 MG/1
80 TABLET, FILM COATED ORAL NIGHTLY
Status: DISCONTINUED | OUTPATIENT
Start: 2025-04-06 | End: 2025-04-23 | Stop reason: HOSPADM

## 2025-04-06 RX ORDER — ASPIRIN 81 MG/1
162 TABLET, CHEWABLE ORAL DAILY
Status: DISCONTINUED | OUTPATIENT
Start: 2025-04-07 | End: 2025-04-07

## 2025-04-06 RX ORDER — HEPARIN SODIUM 1000 [USP'U]/ML
60 INJECTION, SOLUTION INTRAVENOUS; SUBCUTANEOUS PRN
Status: DISCONTINUED | OUTPATIENT
Start: 2025-04-06 | End: 2025-04-09

## 2025-04-06 RX ORDER — CITALOPRAM HYDROBROMIDE 10 MG/1
10 TABLET ORAL DAILY
Status: DISCONTINUED | OUTPATIENT
Start: 2025-04-06 | End: 2025-04-23 | Stop reason: HOSPADM

## 2025-04-06 RX ORDER — HEPARIN SODIUM 10000 [USP'U]/100ML
5-30 INJECTION, SOLUTION INTRAVENOUS CONTINUOUS
Status: DISCONTINUED | OUTPATIENT
Start: 2025-04-06 | End: 2025-04-09

## 2025-04-06 RX ORDER — MORPHINE SULFATE 2 MG/ML
2 INJECTION, SOLUTION INTRAMUSCULAR; INTRAVENOUS EVERY 4 HOURS PRN
Refills: 0 | Status: DISCONTINUED | OUTPATIENT
Start: 2025-04-06 | End: 2025-04-23 | Stop reason: HOSPADM

## 2025-04-06 RX ORDER — PANTOPRAZOLE SODIUM 40 MG/1
40 TABLET, DELAYED RELEASE ORAL
Status: DISCONTINUED | OUTPATIENT
Start: 2025-04-06 | End: 2025-04-23 | Stop reason: HOSPADM

## 2025-04-06 RX ORDER — VANCOMYCIN 1.75 G/350ML
25 INJECTION, SOLUTION INTRAVENOUS ONCE
Status: COMPLETED | OUTPATIENT
Start: 2025-04-06 | End: 2025-04-06

## 2025-04-06 RX ORDER — ALBUMIN HUMAN 50 G/1000ML
25 SOLUTION INTRAVENOUS
Status: COMPLETED | OUTPATIENT
Start: 2025-04-06 | End: 2025-04-06

## 2025-04-06 RX ORDER — NITROGLYCERIN 0.4 MG/1
0.4 TABLET SUBLINGUAL EVERY 5 MIN PRN
Status: DISCONTINUED | OUTPATIENT
Start: 2025-04-06 | End: 2025-04-23 | Stop reason: HOSPADM

## 2025-04-06 RX ORDER — ALBUTEROL SULFATE 0.83 MG/ML
1.25 SOLUTION RESPIRATORY (INHALATION) EVERY 6 HOURS PRN
Status: DISCONTINUED | OUTPATIENT
Start: 2025-04-06 | End: 2025-04-07

## 2025-04-06 RX ORDER — IOPAMIDOL 755 MG/ML
100 INJECTION, SOLUTION INTRAVASCULAR
Status: COMPLETED | OUTPATIENT
Start: 2025-04-06 | End: 2025-04-06

## 2025-04-06 RX ORDER — ERGOCALCIFEROL 1.25 MG/1
50000 CAPSULE, LIQUID FILLED ORAL WEEKLY
Status: DISCONTINUED | OUTPATIENT
Start: 2025-04-07 | End: 2025-04-23 | Stop reason: HOSPADM

## 2025-04-06 RX ORDER — HEPARIN SODIUM 1000 [USP'U]/ML
30 INJECTION, SOLUTION INTRAVENOUS; SUBCUTANEOUS PRN
Status: DISCONTINUED | OUTPATIENT
Start: 2025-04-06 | End: 2025-04-09

## 2025-04-06 RX ORDER — ASPIRIN 325 MG
325 TABLET ORAL
Status: COMPLETED | OUTPATIENT
Start: 2025-04-06 | End: 2025-04-06

## 2025-04-06 RX ORDER — HEPARIN SODIUM 1000 [USP'U]/ML
60 INJECTION, SOLUTION INTRAVENOUS; SUBCUTANEOUS ONCE
Status: COMPLETED | OUTPATIENT
Start: 2025-04-06 | End: 2025-04-06

## 2025-04-06 RX ORDER — METOPROLOL TARTRATE 1 MG/ML
2.5 INJECTION, SOLUTION INTRAVENOUS EVERY 6 HOURS PRN
Status: DISCONTINUED | OUTPATIENT
Start: 2025-04-06 | End: 2025-04-23 | Stop reason: HOSPADM

## 2025-04-06 RX ORDER — ERGOCALCIFEROL 1.25 MG/1
50000 CAPSULE, LIQUID FILLED ORAL WEEKLY
Status: DISCONTINUED | OUTPATIENT
Start: 2025-04-06 | End: 2025-04-06

## 2025-04-06 RX ORDER — CLOPIDOGREL BISULFATE 75 MG/1
75 TABLET ORAL DAILY
Status: DISCONTINUED | OUTPATIENT
Start: 2025-04-06 | End: 2025-04-15

## 2025-04-06 RX ORDER — ALBUTEROL SULFATE 0.83 MG/ML
2.5 SOLUTION RESPIRATORY (INHALATION) EVERY 4 HOURS PRN
Status: DISCONTINUED | OUTPATIENT
Start: 2025-04-06 | End: 2025-04-23 | Stop reason: HOSPADM

## 2025-04-06 RX ORDER — PREGABALIN 75 MG/1
150 CAPSULE ORAL 2 TIMES DAILY
Status: DISCONTINUED | OUTPATIENT
Start: 2025-04-06 | End: 2025-04-23 | Stop reason: HOSPADM

## 2025-04-06 RX ORDER — DOXYCYCLINE 100 MG/1
100 CAPSULE ORAL EVERY 12 HOURS SCHEDULED
Status: DISCONTINUED | OUTPATIENT
Start: 2025-04-06 | End: 2025-04-07

## 2025-04-06 RX ORDER — LIDOCAINE 4 G/G
1 PATCH TOPICAL DAILY
Status: DISCONTINUED | OUTPATIENT
Start: 2025-04-06 | End: 2025-04-23 | Stop reason: HOSPADM

## 2025-04-06 RX ADMIN — IPRATROPIUM BROMIDE 0.5 MG: 0.5 SOLUTION RESPIRATORY (INHALATION) at 17:13

## 2025-04-06 RX ADMIN — METHYLPREDNISOLONE SODIUM SUCCINATE 40 MG: 40 INJECTION INTRAMUSCULAR; INTRAVENOUS at 23:39

## 2025-04-06 RX ADMIN — PANTOPRAZOLE SODIUM 40 MG: 40 TABLET, DELAYED RELEASE ORAL at 09:10

## 2025-04-06 RX ADMIN — METHYLPREDNISOLONE SODIUM SUCCINATE 40 MG: 40 INJECTION INTRAMUSCULAR; INTRAVENOUS at 08:41

## 2025-04-06 RX ADMIN — IPRATROPIUM BROMIDE 0.5 MG: 0.5 SOLUTION RESPIRATORY (INHALATION) at 19:40

## 2025-04-06 RX ADMIN — VANCOMYCIN 1250 MG: 1.75 INJECTION, SOLUTION INTRAVENOUS at 08:37

## 2025-04-06 RX ADMIN — ASPIRIN 325 MG: 325 TABLET ORAL at 06:02

## 2025-04-06 RX ADMIN — HEPARIN SODIUM 12 UNITS/KG/HR: 10000 INJECTION, SOLUTION INTRAVENOUS at 06:29

## 2025-04-06 RX ADMIN — ALBUMIN (HUMAN) 25 G: 12.5 INJECTION, SOLUTION INTRAVENOUS at 06:37

## 2025-04-06 RX ADMIN — PIPERACILLIN AND TAZOBACTAM 4500 MG: 4; .5 INJECTION, POWDER, FOR SOLUTION INTRAVENOUS at 08:01

## 2025-04-06 RX ADMIN — HEPARIN SODIUM 3100 UNITS: 1000 INJECTION INTRAVENOUS; SUBCUTANEOUS at 06:24

## 2025-04-06 RX ADMIN — DOXYCYCLINE HYCLATE 100 MG: 100 CAPSULE ORAL at 08:38

## 2025-04-06 RX ADMIN — IOPAMIDOL 100 ML: 755 INJECTION, SOLUTION INTRAVENOUS at 05:13

## 2025-04-06 RX ADMIN — IPRATROPIUM BROMIDE 0.5 MG: 0.5 SOLUTION RESPIRATORY (INHALATION) at 12:07

## 2025-04-06 RX ADMIN — CEFEPIME 2000 MG: 2 INJECTION, POWDER, FOR SOLUTION INTRAVENOUS at 23:11

## 2025-04-06 RX ADMIN — CLOPIDOGREL BISULFATE 75 MG: 75 TABLET, FILM COATED ORAL at 08:38

## 2025-04-06 RX ADMIN — CITALOPRAM HYDROBROMIDE 10 MG: 20 TABLET ORAL at 08:38

## 2025-04-06 RX ADMIN — PREGABALIN 150 MG: 75 CAPSULE ORAL at 08:38

## 2025-04-06 RX ADMIN — FUROSEMIDE 40 MG: 10 INJECTION, SOLUTION INTRAMUSCULAR; INTRAVENOUS at 06:01

## 2025-04-06 ASSESSMENT — PAIN SCALES - GENERAL
PAINLEVEL_OUTOF10: 0

## 2025-04-06 ASSESSMENT — PAIN - FUNCTIONAL ASSESSMENT: PAIN_FUNCTIONAL_ASSESSMENT: NONE - DENIES PAIN

## 2025-04-06 NOTE — ED NOTES
Dr. Granda informed this RN that d/t patient having limited access and being a difficult stick, heparin gtt is to be started in one line and albumin in the other. IV antibiotics to be started after albumin is finished.     Primary RN notified.

## 2025-04-06 NOTE — ED NOTES
Called for update to see if room ready for pt;was told room is ready yet; will inform oncoming nurse.   Medication Refill Request    Date of phone call: 20    Medication being requested: norco 10/325mg si tab po q 6 hours prn   Qty: 120    Date of last visit: 20    Date of last refill: 20    NATALIE up to date?: yes    Next Follow up?: 20    Any new pertinent information? (i.e, new medication allergies, new use of medications, change in patient's health or condition, non-compliance or inconsistency with prescribing agreement?):

## 2025-04-06 NOTE — ED TRIAGE NOTES
Pt arrived via EMS for c/o SOB since yesterday.    According to EMS, pt's family stated that pt O2 was down in the 80's so they bumped his oxygen from 2L to 5L with no improvement so they called 911. When EMS arrived, pt was in the high 70s on 5LNC. EMS placed pt on CPAP and administered a duo neb and albuterol treatment. Patient was 94% on CPAP on arrival.

## 2025-04-06 NOTE — ED NOTES
TRANSFER - OUT REPORT:    Verbal report given to AMBER Montoya on Gio Khalil  being transferred to  for routine progression of patient care       Report consisted of patient's Situation, Background, Assessment and   Recommendations(SBAR).     Information from the following report(s) Nurse Handoff Report, Index, ED Encounter Summary, ED SBAR, Adult Overview, MAR, Recent Results, Med Rec Status, Neuro Assessment, and Event Log was reviewed with the receiving nurse.    Is[place] Fall Assessment:    Presents to emergency department  because of falls (Syncope, seizure, or loss of consciousness): No  Age > 70: No  Altered Mental Status, Intoxication with alcohol or substance confusion (Disorientation, impaired judgment, poor safety awaremess, or inability to follow instructions): No  Impaired Mobility: Ambulates or transfers with assistive devices or assistance; Unable to ambulate or transer.: Yes (uses cane to ambulate)  Nursing Judgement: Yes          Lines:   Peripheral IV 04/06/25 Distal;Left Cephalic (Active)       Peripheral IV 04/06/25 Left;Posterior Hand (Active)        Opportunity for questions and clarification was provided.      Patient transported with:  O2 @ 12.5 bipap or RTs discretion lpm and Registered Nurse         cyndi

## 2025-04-06 NOTE — ED PROVIDER NOTES
Hematocrit 28.2 (*)     RDW 16.1 (*)     Neutrophils % 84 (*)     Lymphocytes % 13 (*)     Neutrophils Absolute 18.32 (*)     All other components within normal limits   D-DIMER, QUANTITATIVE - Abnormal; Notable for the following components:    D-Dimer, Quant 2.75 (*)     All other components within normal limits   APTT - Abnormal; Notable for the following components:    APTT 49.1 (*)     All other components within normal limits   TROPONIN - Abnormal; Notable for the following components:    Troponin, High Sensitivity 3,779 (*)     All other components within normal limits   APTT - Abnormal; Notable for the following components:    APTT 59.0 (*)     All other components within normal limits   POC G3: BLOOD GASES INCLUDES CALC. TCO2, HCO3, BASE EXCESS, SO2 - Abnormal; Notable for the following components:    POC pCO2 33.5 (*)     POC PO2 71 (*)     All other components within normal limits   POCT LACTIC ACID (LACTATE) - Abnormal; Notable for the following components:    POC Lactic Acid 3.37 (*)     All other components within normal limits   RESPIRATORY PANEL, MOLECULAR, WITH COVID-19   CULTURE, BLOOD 1   CULTURE, BLOOD 2   CULTURE, RESPIRATORY (WITH GRAM STAIN)   MAGNESIUM   APTT   URINE DRUG SCREEN   APTT   APTT   LIPID PANEL   POCT LACTIC ACID (LACTATE)       All other labs were within normal range or not returned as of this dictation.    EMERGENCY DEPARTMENT COURSE and DIFFERENTIAL DIAGNOSIS/MDM:   Vitals:    Vitals:    04/06/25 1815 04/06/25 1830 04/06/25 1845 04/06/25 1900   BP: 126/62 (!) 127/52 (!) 125/59 131/61   Pulse: 89 85 87 91   Resp: 27 22 23 25   Temp:       TempSrc:       SpO2: 96% 97% 97% 96%   Weight:       Height:           Medical Decision Making  Amount and/or Complexity of Data Reviewed  Labs: ordered.  Radiology: ordered.  ECG/medicine tests: ordered.    Risk  OTC drugs.  Prescription drug management.  Decision regarding hospitalization.    Patient is a 64-year-old male who presents in

## 2025-04-06 NOTE — H&P
HPI    This is a pleasantly demented 64-year-old male who lives at home and is being taking care by his niece.      The niece was at bedside and providing most of the history for this patient.  Because patient is unable to provide a reliable review of system/history.    Patient s caretaker stated that patient was doing fairly well over the last few months however over the last couple days he developed a slight cough and some increasing shortness of breath.  The niece also reported patient staying in bed and feeling tired.    Patient has a history of COPD due to heavy smoking.  He did quit smoking however over the last couple of weeks reportedly his brother has been coming home and smoke with patient.    In any case, patient presented to the ER with shortness of breath.  Patient was placed on oxygen yesterday prior to coming to the ER today.  Patient has a tank at home capable of producing 5 L/min via nasal cannula.  However despite being on supplemental oxygen, his caretaker reported patient remained hypoxic with oxygen level in the 80s.    In the ER patient was started on BiPAP due to signs of respiratory distress.  His troponin was also noted to be elevated with a repeat showing slightly decreased from 4000 to 3700.  Lactic acid was above 3.  Blood gas however was without any significant findings.  Only not decreasing his potassium noted on a CMP.  His CBC was otherwise acutely abnormal with WBC level of 21,000.    Chest x-ray and CT scan showing evidence of bilateral pleural effusion with mild aortic stenosis.    Of note patient had a recent ultrasound of the carotid showing significant carotid disease.  However an echocardiogram did not show normal EF of 55% recently as well.      Past Medical History:   Diagnosis Date    CURTIS (acute kidney injury) 01/24/2024    Anxiety associated with depression 08/08/2023    Atherosclerosis 08/08/2023    Bilateral carotid artery stenosis 11/24/2023    Rt mod (50-69%); Lt mild

## 2025-04-06 NOTE — ED NOTES
Was informed by receiving nurse  post report (per ) that the room the pt will be going to has not been cleaned and that they will call back when it is clean and ready for the pt.

## 2025-04-07 ENCOUNTER — APPOINTMENT (OUTPATIENT)
Facility: HOSPITAL | Age: 65
DRG: 720 | End: 2025-04-07
Attending: INTERNAL MEDICINE
Payer: MEDICAID

## 2025-04-07 ENCOUNTER — PATIENT MESSAGE (OUTPATIENT)
Age: 65
End: 2025-04-07

## 2025-04-07 PROBLEM — J90 PLEURAL EFFUSION, BILATERAL: Status: ACTIVE | Noted: 2025-04-07

## 2025-04-07 PROBLEM — R78.81 GRAM-NEGATIVE BACTEREMIA: Status: ACTIVE | Noted: 2025-04-07

## 2025-04-07 PROBLEM — J96.01 ACUTE HYPOXIC RESPIRATORY FAILURE (HCC): Status: ACTIVE | Noted: 2025-04-07

## 2025-04-07 PROBLEM — R78.81 GRAM-POSITIVE BACTEREMIA: Status: ACTIVE | Noted: 2025-04-07

## 2025-04-07 PROBLEM — A41.9 SEPSIS (HCC): Status: ACTIVE | Noted: 2025-04-07

## 2025-04-07 PROBLEM — I50.9 ACUTE ON CHRONIC CONGESTIVE HEART FAILURE (HCC): Status: ACTIVE | Noted: 2025-04-07

## 2025-04-07 LAB
ANION GAP SERPL CALC-SCNC: 8 MMOL/L (ref 3–18)
APPEARANCE UR: CLEAR
APTT PPP: 105.4 SEC (ref 23–36.4)
APTT PPP: 40.7 SEC (ref 23–36.4)
APTT PPP: 55.2 SEC (ref 23–36.4)
BILIRUB UR QL: NEGATIVE
BUN SERPL-MCNC: 22 MG/DL (ref 7–18)
BUN/CREAT SERPL: 22 (ref 12–20)
CALCIUM SERPL-MCNC: 9.2 MG/DL (ref 8.5–10.1)
CHLORIDE SERPL-SCNC: 103 MMOL/L (ref 100–111)
CHOLEST SERPL-MCNC: 86 MG/DL
CO2 SERPL-SCNC: 23 MMOL/L (ref 21–32)
COLOR UR: YELLOW
CREAT SERPL-MCNC: 1.01 MG/DL (ref 0.6–1.3)
ECHO AR MAX VEL PISA: 3.6 M/S
ECHO AV AREA PEAK VELOCITY: 0.9 CM2
ECHO AV AREA VTI: 0.9 CM2
ECHO AV AREA/BSA PEAK VELOCITY: 0.6 CM2/M2
ECHO AV AREA/BSA VTI: 0.6 CM2/M2
ECHO AV MEAN GRADIENT: 27 MMHG
ECHO AV MEAN VELOCITY: 2.5 M/S
ECHO AV PEAK GRADIENT: 54 MMHG
ECHO AV PEAK VELOCITY: 3.7 M/S
ECHO AV REGURGITANT PHT: 112.3 MS
ECHO AV VELOCITY RATIO: 0.27
ECHO AV VTI: 69.1 CM
ECHO BSA: 1.55 M2
ECHO EST RA PRESSURE: 8 MMHG
ECHO LA VOL A-L A2C: 51 ML (ref 18–58)
ECHO LA VOL A-L A4C: 57 ML (ref 18–58)
ECHO LA VOL BP: 50 ML (ref 18–58)
ECHO LA VOL MOD A2C: 48 ML (ref 18–58)
ECHO LA VOL MOD A4C: 51 ML (ref 18–58)
ECHO LA VOL/BSA BIPLANE: 32 ML/M2 (ref 16–34)
ECHO LA VOLUME AREA LENGTH: 54 ML
ECHO LA VOLUME INDEX A-L A2C: 32 ML/M2 (ref 16–34)
ECHO LA VOLUME INDEX A-L A4C: 36 ML/M2 (ref 16–34)
ECHO LA VOLUME INDEX AREA LENGTH: 34 ML/M2 (ref 16–34)
ECHO LA VOLUME INDEX MOD A2C: 30 ML/M2 (ref 16–34)
ECHO LA VOLUME INDEX MOD A4C: 32 ML/M2 (ref 16–34)
ECHO LV EF PHYSICIAN: 42 %
ECHO LV FRACTIONAL SHORTENING: 21 % (ref 28–44)
ECHO LV INTERNAL DIMENSION DIASTOLE INDEX: 3.35 CM/M2
ECHO LV INTERNAL DIMENSION DIASTOLIC: 5.3 CM (ref 4.2–5.9)
ECHO LV INTERNAL DIMENSION SYSTOLIC INDEX: 2.66 CM/M2
ECHO LV INTERNAL DIMENSION SYSTOLIC: 4.2 CM
ECHO LV IVSD: 0.8 CM (ref 0.6–1)
ECHO LV MASS 2D: 150.1 G (ref 88–224)
ECHO LV MASS INDEX 2D: 95 G/M2 (ref 49–115)
ECHO LV POSTERIOR WALL DIASTOLIC: 0.8 CM (ref 0.6–1)
ECHO LV RELATIVE WALL THICKNESS RATIO: 0.3
ECHO LVOT AREA: 3.1 CM2
ECHO LVOT AV VTI INDEX: 0.28
ECHO LVOT DIAM: 2 CM
ECHO LVOT MEAN GRADIENT: 2 MMHG
ECHO LVOT PEAK GRADIENT: 4 MMHG
ECHO LVOT PEAK VELOCITY: 1 M/S
ECHO LVOT STROKE VOLUME INDEX: 38.6 ML/M2
ECHO LVOT SV: 60.9 ML
ECHO LVOT VTI: 19.4 CM
ECHO RA END SYSTOLIC VOLUME APICAL 4 CHAMBER INDEX BSA: 26 ML/M2
ECHO RA VOLUME: 41 ML
ECHO RIGHT VENTRICULAR SYSTOLIC PRESSURE (RVSP): 68 MMHG
ECHO RV BASAL DIMENSION: 3.7 CM
ECHO RV TAPSE: 2.1 CM (ref 1.7–?)
ECHO TV REGURGITANT MAX VELOCITY: 3.86 M/S
ECHO TV REGURGITANT PEAK GRADIENT: 60 MMHG
GLUCOSE SERPL-MCNC: 145 MG/DL (ref 74–99)
GLUCOSE UR STRIP.AUTO-MCNC: NEGATIVE MG/DL
HDLC SERPL-MCNC: 31 MG/DL (ref 40–60)
HDLC SERPL: 2.8 (ref 0–5)
HGB UR QL STRIP: NEGATIVE
KETONES UR QL STRIP.AUTO: NEGATIVE MG/DL
L PNEUMO AG UR QL IA: NEGATIVE
LDLC SERPL CALC-MCNC: 42 MG/DL (ref 0–100)
LEUKOCYTE ESTERASE UR QL STRIP.AUTO: NEGATIVE
LIPID PANEL: ABNORMAL
MAGNESIUM SERPL-MCNC: 2.5 MG/DL (ref 1.6–2.6)
NITRITE UR QL STRIP.AUTO: NEGATIVE
PH UR STRIP: 5.5 (ref 5–8)
POTASSIUM SERPL-SCNC: 3.8 MMOL/L (ref 3.5–5.5)
PROCALCITONIN SERPL-MCNC: 7.84 NG/ML
PROT UR STRIP-MCNC: NEGATIVE MG/DL
S PNEUM AG UR QL: NEGATIVE
SODIUM SERPL-SCNC: 134 MMOL/L (ref 136–145)
SP GR UR REFRACTOMETRY: 1.01 (ref 1–1.03)
TRIGL SERPL-MCNC: 65 MG/DL
UROBILINOGEN UR QL STRIP.AUTO: 0.2 EU/DL (ref 0.2–1)
VLDLC SERPL CALC-MCNC: 13 MG/DL

## 2025-04-07 PROCEDURE — 6360000002 HC RX W HCPCS: Performed by: INTERNAL MEDICINE

## 2025-04-07 PROCEDURE — 2140000001 HC CVICU INTERMEDIATE R&B

## 2025-04-07 PROCEDURE — 85730 THROMBOPLASTIN TIME PARTIAL: CPT

## 2025-04-07 PROCEDURE — 2580000003 HC RX 258: Performed by: INTERNAL MEDICINE

## 2025-04-07 PROCEDURE — 94660 CPAP INITIATION&MGMT: CPT

## 2025-04-07 PROCEDURE — 99222 1ST HOSP IP/OBS MODERATE 55: CPT | Performed by: INTERNAL MEDICINE

## 2025-04-07 PROCEDURE — 87449 NOS EACH ORGANISM AG IA: CPT

## 2025-04-07 PROCEDURE — 36415 COLL VENOUS BLD VENIPUNCTURE: CPT

## 2025-04-07 PROCEDURE — 99232 SBSQ HOSP IP/OBS MODERATE 35: CPT | Performed by: HOSPITALIST

## 2025-04-07 PROCEDURE — APPSS15 APP SPLIT SHARED TIME 0-15 MINUTES

## 2025-04-07 PROCEDURE — 99223 1ST HOSP IP/OBS HIGH 75: CPT | Performed by: INTERNAL MEDICINE

## 2025-04-07 PROCEDURE — 1100000003 HC PRIVATE W/ TELEMETRY

## 2025-04-07 PROCEDURE — 81003 URINALYSIS AUTO W/O SCOPE: CPT

## 2025-04-07 PROCEDURE — 6360000002 HC RX W HCPCS: Performed by: HOSPITALIST

## 2025-04-07 PROCEDURE — 2700000000 HC OXYGEN THERAPY PER DAY

## 2025-04-07 PROCEDURE — 2500000003 HC RX 250 WO HCPCS: Performed by: INTERNAL MEDICINE

## 2025-04-07 PROCEDURE — 93321 DOPPLER ECHO F-UP/LMTD STD: CPT

## 2025-04-07 PROCEDURE — 6370000000 HC RX 637 (ALT 250 FOR IP): Performed by: INTERNAL MEDICINE

## 2025-04-07 PROCEDURE — 94640 AIRWAY INHALATION TREATMENT: CPT

## 2025-04-07 PROCEDURE — 84145 PROCALCITONIN (PCT): CPT

## 2025-04-07 PROCEDURE — 2580000003 HC RX 258: Performed by: HOSPITALIST

## 2025-04-07 PROCEDURE — 93306 TTE W/DOPPLER COMPLETE: CPT | Performed by: INTERNAL MEDICINE

## 2025-04-07 PROCEDURE — 83735 ASSAY OF MAGNESIUM: CPT

## 2025-04-07 PROCEDURE — 80048 BASIC METABOLIC PNL TOTAL CA: CPT

## 2025-04-07 PROCEDURE — 6360000002 HC RX W HCPCS

## 2025-04-07 PROCEDURE — 80061 LIPID PANEL: CPT

## 2025-04-07 PROCEDURE — 5A0935A ASSISTANCE WITH RESPIRATORY VENTILATION, LESS THAN 24 CONSECUTIVE HOURS, HIGH NASAL FLOW/VELOCITY: ICD-10-PCS | Performed by: INTERNAL MEDICINE

## 2025-04-07 PROCEDURE — 87086 URINE CULTURE/COLONY COUNT: CPT

## 2025-04-07 PROCEDURE — 94761 N-INVAS EAR/PLS OXIMETRY MLT: CPT

## 2025-04-07 RX ORDER — FUROSEMIDE 10 MG/ML
20 INJECTION INTRAMUSCULAR; INTRAVENOUS 2 TIMES DAILY
Status: DISCONTINUED | OUTPATIENT
Start: 2025-04-07 | End: 2025-04-09

## 2025-04-07 RX ORDER — ARFORMOTEROL TARTRATE 15 UG/2ML
15 SOLUTION RESPIRATORY (INHALATION)
Status: DISCONTINUED | OUTPATIENT
Start: 2025-04-07 | End: 2025-04-20

## 2025-04-07 RX ORDER — BUDESONIDE 1 MG/2ML
1 INHALANT ORAL 2 TIMES DAILY
Status: DISCONTINUED | OUTPATIENT
Start: 2025-04-07 | End: 2025-04-20

## 2025-04-07 RX ORDER — ASPIRIN 81 MG/1
81 TABLET, CHEWABLE ORAL DAILY
Status: DISCONTINUED | OUTPATIENT
Start: 2025-04-07 | End: 2025-04-23 | Stop reason: HOSPADM

## 2025-04-07 RX ADMIN — HEPARIN SODIUM 16 UNITS/KG/HR: 10000 INJECTION, SOLUTION INTRAVENOUS at 12:27

## 2025-04-07 RX ADMIN — METHYLPREDNISOLONE SODIUM SUCCINATE 40 MG: 40 INJECTION INTRAMUSCULAR; INTRAVENOUS at 21:19

## 2025-04-07 RX ADMIN — CEFTRIAXONE SODIUM 2000 MG: 2 INJECTION, POWDER, FOR SOLUTION INTRAMUSCULAR; INTRAVENOUS at 23:22

## 2025-04-07 RX ADMIN — HEPARIN SODIUM 3100 UNITS: 1000 INJECTION INTRAVENOUS; SUBCUTANEOUS at 03:03

## 2025-04-07 RX ADMIN — DOXYCYCLINE 100 MG: 100 INJECTION, POWDER, LYOPHILIZED, FOR SOLUTION INTRAVENOUS at 15:10

## 2025-04-07 RX ADMIN — BUDESONIDE 1 MG: 1 SUSPENSION RESPIRATORY (INHALATION) at 20:36

## 2025-04-07 RX ADMIN — IPRATROPIUM BROMIDE 0.5 MG: 0.5 SOLUTION RESPIRATORY (INHALATION) at 11:52

## 2025-04-07 RX ADMIN — ATORVASTATIN CALCIUM 80 MG: 40 TABLET, FILM COATED ORAL at 21:21

## 2025-04-07 RX ADMIN — HEPARIN SODIUM 14 UNITS/KG/HR: 10000 INJECTION, SOLUTION INTRAVENOUS at 03:10

## 2025-04-07 RX ADMIN — HEPARIN SODIUM 3100 UNITS: 1000 INJECTION INTRAVENOUS; SUBCUTANEOUS at 12:27

## 2025-04-07 RX ADMIN — METHYLPREDNISOLONE SODIUM SUCCINATE 40 MG: 40 INJECTION INTRAMUSCULAR; INTRAVENOUS at 11:21

## 2025-04-07 RX ADMIN — IPRATROPIUM BROMIDE 0.5 MG: 0.5 SOLUTION RESPIRATORY (INHALATION) at 15:32

## 2025-04-07 RX ADMIN — IPRATROPIUM BROMIDE 0.5 MG: 0.5 SOLUTION RESPIRATORY (INHALATION) at 20:36

## 2025-04-07 RX ADMIN — CEFEPIME 2000 MG: 2 INJECTION, POWDER, FOR SOLUTION INTRAVENOUS at 11:19

## 2025-04-07 RX ADMIN — FUROSEMIDE 20 MG: 10 INJECTION, SOLUTION INTRAMUSCULAR; INTRAVENOUS at 15:45

## 2025-04-07 RX ADMIN — HEPARIN SODIUM 16 UNITS/KG/HR: 10000 INJECTION, SOLUTION INTRAVENOUS at 19:48

## 2025-04-07 RX ADMIN — ARFORMOTEROL TARTRATE 15 MCG: 15 SOLUTION RESPIRATORY (INHALATION) at 20:36

## 2025-04-07 RX ADMIN — IPRATROPIUM BROMIDE 0.5 MG: 0.5 SOLUTION RESPIRATORY (INHALATION) at 08:06

## 2025-04-07 RX ADMIN — PREGABALIN 150 MG: 75 CAPSULE ORAL at 21:21

## 2025-04-07 ASSESSMENT — PAIN SCALES - GENERAL
PAINLEVEL_OUTOF10: 0

## 2025-04-07 NOTE — NURSE NAVIGATOR
Met with patient provided education on living with heart failure, reinforcing low sodium diet, fluid restriction, heart failure zones, will continue to follow.  HF Navigator discussed cardiac [facility] information with pt:    [  x  ] Pt does plan on attnding via   [   ] inperson          [ x  ] zoom  [    ] Pt does not wish to attend    Bernie Randhawa RN  4/7/2025

## 2025-04-07 NOTE — ED NOTES
Assumed care of pt. Informed pt awaiting bringing pt upstairs. This nurse will call respiratory for transport and accompany pt to floor. Pt is awake in bed quietly.      Relative at bedside.     Attempted to inform 3N of Pt arrival. No answer. Will try back.

## 2025-04-07 NOTE — CARE COORDINATION
Chart reviewed and met with pt and Tarik Xie  at bedside, permission received from patient to discuss discharge planning with niece present. HIPAA/Demographics verified. CM provided pt/ niece with SNF List. At this time pt not interested in SNF if recommended.   04/07/25 2459   Service Assessment   Patient Orientation Alert and Oriented   Cognition Alert   History Provided By Patient;Other (see comment)  (tarik Choudhury)   Primary Caregiver Self   Accompanied By/Relationship tarik Anne-Marie Choudhury   Support Systems Family Members   Patient's Healthcare Decision Maker is: Patient Declined (Legal Next of Kin Remains as Decision Maker)   PCP Verified by CM Yes   Last Visit to PCP Within last 3 months   Prior Functional Level Assistance with the following:;Housework;Shopping;Cooking;Mobility   Current Functional Level Assistance with the following:   Can patient return to prior living arrangement Yes   Ability to make needs known: Good   Family able to assist with home care needs: Yes   Would you like for me to discuss the discharge plan with any other family members/significant others, and if so, who? Yes  (tarik Choudhury)   Financial Resources Medicaid   Community Resources None   CM/SW Referral   (discharge planning)   Social/Functional History   Lives With Family  (tairk Choudhury)   Type of Home House   Home Layout One level   Home Access Stairs to enter with rails   Entrance Stairs - Number of Steps 3   Bathroom Shower/Tub Tub/Shower unit   Bathroom Toilet Standard   Bathroom Equipment None   Bathroom Accessibility Accessible   Home Equipment Wheelchair - Manual;Walker - Rolling;Cane;Oxygen  (home 02 @ 2L PRN)   Receives Help From Family   Prior Level of Assist for ADLs Needs assistance   Ambulation Assistance Independent   Prior Level of Assist for Transfers Independent   Active  No   Patient's  Info family   Mode of Transportation Car   Occupation Unemployed

## 2025-04-08 LAB
ANION GAP SERPL CALC-SCNC: 6 MMOL/L (ref 3–18)
APTT PPP: 58.9 SEC (ref 23–36.4)
APTT PPP: 67.2 SEC (ref 23–36.4)
APTT PPP: 70.2 SEC (ref 23–36.4)
APTT PPP: 91.1 SEC (ref 23–36.4)
BACTERIA SPEC CULT: NORMAL
BUN SERPL-MCNC: 23 MG/DL (ref 7–18)
BUN/CREAT SERPL: 22 (ref 12–20)
CALCIUM SERPL-MCNC: 9.3 MG/DL (ref 8.5–10.1)
CHLORIDE SERPL-SCNC: 103 MMOL/L (ref 100–111)
CO2 SERPL-SCNC: 25 MMOL/L (ref 21–32)
CREAT SERPL-MCNC: 1.05 MG/DL (ref 0.6–1.3)
EKG ATRIAL RATE: 111 BPM
EKG ATRIAL RATE: 99 BPM
EKG DIAGNOSIS: NORMAL
EKG DIAGNOSIS: NORMAL
EKG P AXIS: 59 DEGREES
EKG P AXIS: 97 DEGREES
EKG P-R INTERVAL: 134 MS
EKG P-R INTERVAL: 160 MS
EKG Q-T INTERVAL: 364 MS
EKG Q-T INTERVAL: 388 MS
EKG QRS DURATION: 84 MS
EKG QRS DURATION: 88 MS
EKG QTC CALCULATION (BAZETT): 495 MS
EKG QTC CALCULATION (BAZETT): 497 MS
EKG R AXIS: -3 DEGREES
EKG R AXIS: 27 DEGREES
EKG T AXIS: 81 DEGREES
EKG T AXIS: 85 DEGREES
EKG VENTRICULAR RATE: 111 BPM
EKG VENTRICULAR RATE: 99 BPM
ERYTHROCYTE [DISTWIDTH] IN BLOOD BY AUTOMATED COUNT: 16.7 % (ref 11.6–14.5)
GLUCOSE SERPL-MCNC: 146 MG/DL (ref 74–99)
GRAM STN SPEC: NORMAL
HCT VFR BLD AUTO: 22.2 % (ref 36–48)
HGB BLD-MCNC: 7.2 G/DL (ref 13–16)
MAGNESIUM SERPL-MCNC: 2.7 MG/DL (ref 1.6–2.6)
MCH RBC QN AUTO: 28.1 PG (ref 24–34)
MCHC RBC AUTO-ENTMCNC: 32.4 G/DL (ref 31–37)
MCV RBC AUTO: 86.7 FL (ref 78–100)
NRBC # BLD: 0 K/UL (ref 0–0.01)
NRBC BLD-RTO: 0 PER 100 WBC
PLATELET # BLD AUTO: 284 K/UL (ref 135–420)
PMV BLD AUTO: 11.2 FL (ref 9.2–11.8)
POTASSIUM SERPL-SCNC: 3.2 MMOL/L (ref 3.5–5.5)
RBC # BLD AUTO: 2.56 M/UL (ref 4.35–5.65)
SERVICE CMNT-IMP: NORMAL
SODIUM SERPL-SCNC: 134 MMOL/L (ref 136–145)
WBC # BLD AUTO: 14.4 K/UL (ref 4.6–13.2)

## 2025-04-08 PROCEDURE — 36415 COLL VENOUS BLD VENIPUNCTURE: CPT

## 2025-04-08 PROCEDURE — 1100000003 HC PRIVATE W/ TELEMETRY

## 2025-04-08 PROCEDURE — 6370000000 HC RX 637 (ALT 250 FOR IP): Performed by: HOSPITALIST

## 2025-04-08 PROCEDURE — 6360000002 HC RX W HCPCS: Performed by: INTERNAL MEDICINE

## 2025-04-08 PROCEDURE — 11719 TRIM NAIL(S) ANY NUMBER: CPT

## 2025-04-08 PROCEDURE — 94761 N-INVAS EAR/PLS OXIMETRY MLT: CPT

## 2025-04-08 PROCEDURE — 99232 SBSQ HOSP IP/OBS MODERATE 35: CPT | Performed by: INTERNAL MEDICINE

## 2025-04-08 PROCEDURE — 6360000002 HC RX W HCPCS

## 2025-04-08 PROCEDURE — 94664 DEMO&/EVAL PT USE INHALER: CPT

## 2025-04-08 PROCEDURE — 2140000001 HC CVICU INTERMEDIATE R&B

## 2025-04-08 PROCEDURE — 85027 COMPLETE CBC AUTOMATED: CPT

## 2025-04-08 PROCEDURE — 99232 SBSQ HOSP IP/OBS MODERATE 35: CPT | Performed by: HOSPITALIST

## 2025-04-08 PROCEDURE — 0HBRXZZ EXCISION OF TOE NAIL, EXTERNAL APPROACH: ICD-10-PCS | Performed by: HOSPITALIST

## 2025-04-08 PROCEDURE — 2500000003 HC RX 250 WO HCPCS: Performed by: INTERNAL MEDICINE

## 2025-04-08 PROCEDURE — 93010 ELECTROCARDIOGRAM REPORT: CPT | Performed by: INTERNAL MEDICINE

## 2025-04-08 PROCEDURE — 80048 BASIC METABOLIC PNL TOTAL CA: CPT

## 2025-04-08 PROCEDURE — 6360000002 HC RX W HCPCS: Performed by: HOSPITALIST

## 2025-04-08 PROCEDURE — 11721 DEBRIDE NAIL 6 OR MORE: CPT

## 2025-04-08 PROCEDURE — 94660 CPAP INITIATION&MGMT: CPT

## 2025-04-08 PROCEDURE — 83735 ASSAY OF MAGNESIUM: CPT

## 2025-04-08 PROCEDURE — 85730 THROMBOPLASTIN TIME PARTIAL: CPT

## 2025-04-08 PROCEDURE — 99233 SBSQ HOSP IP/OBS HIGH 50: CPT | Performed by: INTERNAL MEDICINE

## 2025-04-08 PROCEDURE — 87040 BLOOD CULTURE FOR BACTERIA: CPT

## 2025-04-08 PROCEDURE — 6370000000 HC RX 637 (ALT 250 FOR IP): Performed by: INTERNAL MEDICINE

## 2025-04-08 PROCEDURE — 94640 AIRWAY INHALATION TREATMENT: CPT

## 2025-04-08 PROCEDURE — 2700000000 HC OXYGEN THERAPY PER DAY

## 2025-04-08 RX ORDER — MULTIVITAMIN WITH IRON
1 TABLET ORAL DAILY
Status: DISCONTINUED | OUTPATIENT
Start: 2025-04-08 | End: 2025-04-23 | Stop reason: HOSPADM

## 2025-04-08 RX ORDER — POTASSIUM CHLORIDE 7.45 MG/ML
10 INJECTION INTRAVENOUS PRN
Status: DISCONTINUED | OUTPATIENT
Start: 2025-04-08 | End: 2025-04-23 | Stop reason: HOSPADM

## 2025-04-08 RX ORDER — POTASSIUM CHLORIDE 1500 MG/1
40 TABLET, EXTENDED RELEASE ORAL PRN
Status: DISCONTINUED | OUTPATIENT
Start: 2025-04-08 | End: 2025-04-23 | Stop reason: HOSPADM

## 2025-04-08 RX ADMIN — THERA TABS 1 TABLET: TAB at 15:25

## 2025-04-08 RX ADMIN — IPRATROPIUM BROMIDE 0.5 MG: 0.5 SOLUTION RESPIRATORY (INHALATION) at 11:30

## 2025-04-08 RX ADMIN — FUROSEMIDE 20 MG: 10 INJECTION, SOLUTION INTRAMUSCULAR; INTRAVENOUS at 15:24

## 2025-04-08 RX ADMIN — HEPARIN SODIUM 1600 UNITS: 1000 INJECTION INTRAVENOUS; SUBCUTANEOUS at 06:15

## 2025-04-08 RX ADMIN — CEFTRIAXONE SODIUM 2000 MG: 2 INJECTION, POWDER, FOR SOLUTION INTRAMUSCULAR; INTRAVENOUS at 23:36

## 2025-04-08 RX ADMIN — ATORVASTATIN CALCIUM 80 MG: 40 TABLET, FILM COATED ORAL at 21:19

## 2025-04-08 RX ADMIN — IPRATROPIUM BROMIDE 0.5 MG: 0.5 SOLUTION RESPIRATORY (INHALATION) at 07:45

## 2025-04-08 RX ADMIN — IPRATROPIUM BROMIDE 0.5 MG: 0.5 SOLUTION RESPIRATORY (INHALATION) at 15:21

## 2025-04-08 RX ADMIN — PREGABALIN 150 MG: 75 CAPSULE ORAL at 21:19

## 2025-04-08 RX ADMIN — IPRATROPIUM BROMIDE 0.5 MG: 0.5 SOLUTION RESPIRATORY (INHALATION) at 20:17

## 2025-04-08 RX ADMIN — BUDESONIDE 1 MG: 1 SUSPENSION RESPIRATORY (INHALATION) at 07:45

## 2025-04-08 RX ADMIN — BUDESONIDE 1 MG: 1 SUSPENSION RESPIRATORY (INHALATION) at 20:17

## 2025-04-08 RX ADMIN — METHYLPREDNISOLONE SODIUM SUCCINATE 40 MG: 40 INJECTION INTRAMUSCULAR; INTRAVENOUS at 09:10

## 2025-04-08 RX ADMIN — HEPARIN SODIUM 3100 UNITS: 1000 INJECTION INTRAVENOUS; SUBCUTANEOUS at 23:34

## 2025-04-08 RX ADMIN — METHYLPREDNISOLONE SODIUM SUCCINATE 40 MG: 40 INJECTION INTRAMUSCULAR; INTRAVENOUS at 21:19

## 2025-04-08 RX ADMIN — POTASSIUM CHLORIDE 10 MEQ: 7.46 INJECTION, SOLUTION INTRAVENOUS at 13:34

## 2025-04-08 RX ADMIN — ARFORMOTEROL TARTRATE 15 MCG: 15 SOLUTION RESPIRATORY (INHALATION) at 07:45

## 2025-04-08 RX ADMIN — ARFORMOTEROL TARTRATE 15 MCG: 15 SOLUTION RESPIRATORY (INHALATION) at 20:17

## 2025-04-08 RX ADMIN — POTASSIUM CHLORIDE 10 MEQ: 7.46 INJECTION, SOLUTION INTRAVENOUS at 12:11

## 2025-04-08 ASSESSMENT — PAIN SCALES - GENERAL
PAINLEVEL_OUTOF10: 0

## 2025-04-09 ENCOUNTER — APPOINTMENT (OUTPATIENT)
Facility: HOSPITAL | Age: 65
DRG: 720 | End: 2025-04-09
Payer: MEDICAID

## 2025-04-09 LAB
ANION GAP SERPL CALC-SCNC: 6 MMOL/L (ref 3–18)
APTT PPP: 127.6 SEC (ref 23–36.4)
APTT PPP: 37.1 SEC (ref 23–36.4)
BACTERIA SPEC CULT: NORMAL
BUN SERPL-MCNC: 30 MG/DL (ref 7–18)
BUN/CREAT SERPL: 29 (ref 12–20)
CALCIUM SERPL-MCNC: 9.1 MG/DL (ref 8.5–10.1)
CHLORIDE SERPL-SCNC: 104 MMOL/L (ref 100–111)
CO2 SERPL-SCNC: 26 MMOL/L (ref 21–32)
CREAT SERPL-MCNC: 1.05 MG/DL (ref 0.6–1.3)
ERYTHROCYTE [DISTWIDTH] IN BLOOD BY AUTOMATED COUNT: 17 % (ref 11.6–14.5)
GLUCOSE SERPL-MCNC: 131 MG/DL (ref 74–99)
HCT VFR BLD AUTO: 22.4 % (ref 36–48)
HGB BLD-MCNC: 7.2 G/DL (ref 13–16)
MCH RBC QN AUTO: 28.1 PG (ref 24–34)
MCHC RBC AUTO-ENTMCNC: 32.1 G/DL (ref 31–37)
MCV RBC AUTO: 87.5 FL (ref 78–100)
NRBC # BLD: 0 K/UL (ref 0–0.01)
NRBC BLD-RTO: 0 PER 100 WBC
NT PRO BNP: ABNORMAL PG/ML (ref 0–900)
PLATELET # BLD AUTO: 278 K/UL (ref 135–420)
PMV BLD AUTO: 11.1 FL (ref 9.2–11.8)
POTASSIUM SERPL-SCNC: 3.7 MMOL/L (ref 3.5–5.5)
PROCALCITONIN SERPL-MCNC: 2.83 NG/ML
RBC # BLD AUTO: 2.56 M/UL (ref 4.35–5.65)
SERVICE CMNT-IMP: NORMAL
SODIUM SERPL-SCNC: 136 MMOL/L (ref 136–145)
WBC # BLD AUTO: 19.4 K/UL (ref 4.6–13.2)

## 2025-04-09 PROCEDURE — 6360000002 HC RX W HCPCS: Performed by: INTERNAL MEDICINE

## 2025-04-09 PROCEDURE — APPSS15 APP SPLIT SHARED TIME 0-15 MINUTES

## 2025-04-09 PROCEDURE — 6360000002 HC RX W HCPCS

## 2025-04-09 PROCEDURE — 2140000001 HC CVICU INTERMEDIATE R&B

## 2025-04-09 PROCEDURE — 6370000000 HC RX 637 (ALT 250 FOR IP): Performed by: PHYSICIAN ASSISTANT

## 2025-04-09 PROCEDURE — 99232 SBSQ HOSP IP/OBS MODERATE 35: CPT | Performed by: HOSPITALIST

## 2025-04-09 PROCEDURE — 94640 AIRWAY INHALATION TREATMENT: CPT

## 2025-04-09 PROCEDURE — 6360000002 HC RX W HCPCS: Performed by: HOSPITALIST

## 2025-04-09 PROCEDURE — 94660 CPAP INITIATION&MGMT: CPT

## 2025-04-09 PROCEDURE — 6370000000 HC RX 637 (ALT 250 FOR IP): Performed by: INTERNAL MEDICINE

## 2025-04-09 PROCEDURE — 83880 ASSAY OF NATRIURETIC PEPTIDE: CPT

## 2025-04-09 PROCEDURE — 99232 SBSQ HOSP IP/OBS MODERATE 35: CPT | Performed by: INTERNAL MEDICINE

## 2025-04-09 PROCEDURE — 2700000000 HC OXYGEN THERAPY PER DAY

## 2025-04-09 PROCEDURE — 85730 THROMBOPLASTIN TIME PARTIAL: CPT

## 2025-04-09 PROCEDURE — 2500000003 HC RX 250 WO HCPCS: Performed by: INTERNAL MEDICINE

## 2025-04-09 PROCEDURE — 80048 BASIC METABOLIC PNL TOTAL CA: CPT

## 2025-04-09 PROCEDURE — 6370000000 HC RX 637 (ALT 250 FOR IP): Performed by: HOSPITALIST

## 2025-04-09 PROCEDURE — 84145 PROCALCITONIN (PCT): CPT

## 2025-04-09 PROCEDURE — 94761 N-INVAS EAR/PLS OXIMETRY MLT: CPT

## 2025-04-09 PROCEDURE — 71045 X-RAY EXAM CHEST 1 VIEW: CPT

## 2025-04-09 PROCEDURE — 1100000003 HC PRIVATE W/ TELEMETRY

## 2025-04-09 PROCEDURE — 87154 CUL TYP ID BLD PTHGN 6+ TRGT: CPT

## 2025-04-09 PROCEDURE — 99223 1ST HOSP IP/OBS HIGH 75: CPT | Performed by: INTERNAL MEDICINE

## 2025-04-09 PROCEDURE — 85027 COMPLETE CBC AUTOMATED: CPT

## 2025-04-09 PROCEDURE — 36415 COLL VENOUS BLD VENIPUNCTURE: CPT

## 2025-04-09 RX ORDER — BUSPIRONE HYDROCHLORIDE 5 MG/1
5 TABLET ORAL 3 TIMES DAILY
Status: DISCONTINUED | OUTPATIENT
Start: 2025-04-09 | End: 2025-04-23 | Stop reason: HOSPADM

## 2025-04-09 RX ORDER — METOPROLOL SUCCINATE 25 MG/1
25 TABLET, EXTENDED RELEASE ORAL DAILY
Status: DISCONTINUED | OUTPATIENT
Start: 2025-04-09 | End: 2025-04-23 | Stop reason: HOSPADM

## 2025-04-09 RX ORDER — FUROSEMIDE 10 MG/ML
40 INJECTION INTRAMUSCULAR; INTRAVENOUS 2 TIMES DAILY
Status: DISCONTINUED | OUTPATIENT
Start: 2025-04-09 | End: 2025-04-10

## 2025-04-09 RX ADMIN — BUDESONIDE 1 MG: 1 SUSPENSION RESPIRATORY (INHALATION) at 07:22

## 2025-04-09 RX ADMIN — ASPIRIN 81 MG CHEWABLE TABLET 81 MG: 81 TABLET CHEWABLE at 09:03

## 2025-04-09 RX ADMIN — FUROSEMIDE 40 MG: 10 INJECTION, SOLUTION INTRAMUSCULAR; INTRAVENOUS at 16:00

## 2025-04-09 RX ADMIN — CITALOPRAM HYDROBROMIDE 10 MG: 20 TABLET ORAL at 09:03

## 2025-04-09 RX ADMIN — METHYLPREDNISOLONE SODIUM SUCCINATE 40 MG: 40 INJECTION INTRAMUSCULAR; INTRAVENOUS at 21:42

## 2025-04-09 RX ADMIN — BUDESONIDE 1 MG: 1 SUSPENSION RESPIRATORY (INHALATION) at 19:30

## 2025-04-09 RX ADMIN — FUROSEMIDE 20 MG: 10 INJECTION, SOLUTION INTRAMUSCULAR; INTRAVENOUS at 05:38

## 2025-04-09 RX ADMIN — ARFORMOTEROL TARTRATE 15 MCG: 15 SOLUTION RESPIRATORY (INHALATION) at 07:22

## 2025-04-09 RX ADMIN — IPRATROPIUM BROMIDE 0.5 MG: 0.5 SOLUTION RESPIRATORY (INHALATION) at 19:30

## 2025-04-09 RX ADMIN — IPRATROPIUM BROMIDE 0.5 MG: 0.5 SOLUTION RESPIRATORY (INHALATION) at 07:22

## 2025-04-09 RX ADMIN — PANTOPRAZOLE SODIUM 40 MG: 40 TABLET, DELAYED RELEASE ORAL at 07:46

## 2025-04-09 RX ADMIN — IPRATROPIUM BROMIDE 0.5 MG: 0.5 SOLUTION RESPIRATORY (INHALATION) at 14:54

## 2025-04-09 RX ADMIN — ATORVASTATIN CALCIUM 80 MG: 40 TABLET, FILM COATED ORAL at 21:43

## 2025-04-09 RX ADMIN — PREGABALIN 150 MG: 75 CAPSULE ORAL at 21:42

## 2025-04-09 RX ADMIN — BUSPIRONE HYDROCHLORIDE 5 MG: 5 TABLET ORAL at 21:42

## 2025-04-09 RX ADMIN — ARFORMOTEROL TARTRATE 15 MCG: 15 SOLUTION RESPIRATORY (INHALATION) at 19:30

## 2025-04-09 RX ADMIN — PREGABALIN 150 MG: 75 CAPSULE ORAL at 09:03

## 2025-04-09 RX ADMIN — THERA TABS 1 TABLET: TAB at 09:03

## 2025-04-09 RX ADMIN — ALBUTEROL SULFATE 2.5 MG: 2.5 SOLUTION RESPIRATORY (INHALATION) at 05:56

## 2025-04-09 RX ADMIN — CLOPIDOGREL BISULFATE 75 MG: 75 TABLET, FILM COATED ORAL at 09:03

## 2025-04-09 RX ADMIN — METHYLPREDNISOLONE SODIUM SUCCINATE 40 MG: 40 INJECTION INTRAMUSCULAR; INTRAVENOUS at 09:05

## 2025-04-09 RX ADMIN — IPRATROPIUM BROMIDE 0.5 MG: 0.5 SOLUTION RESPIRATORY (INHALATION) at 10:36

## 2025-04-09 RX ADMIN — METOPROLOL SUCCINATE 25 MG: 25 TABLET, EXTENDED RELEASE ORAL at 11:23

## 2025-04-09 RX ADMIN — BUSPIRONE HYDROCHLORIDE 5 MG: 5 TABLET ORAL at 16:00

## 2025-04-09 ASSESSMENT — PAIN SCALES - GENERAL
PAINLEVEL_OUTOF10: 0

## 2025-04-09 NOTE — ACP (ADVANCE CARE PLANNING)
Advance Care Planning     Palliative Team Advance Care Planning (ACP) Conversation    Date of Conversation: 04/09/25     ACP documents on file prior to discussion:  -Power of  for Healthcare  -Portable DNR    Healthcare Decision Maker:    Primary Decision Maker: Anne-Marie Choudhury - Niece/Nephew - [phone]    Secondary Decision Maker: Ruth Choudhury - Niece/Nephew - [phone]     Conversation Summary:    Visited patient for new consult along with Palliative team member Dr. PRECIOUS Owen. Patient is sitting up in bed, awake and alert. Nephew Jose at the bedside. Patient is known to our team, last visits: 12/12/24, 12/18/24 and 12/19/24. Patient is able to participate in his health care conversations, however, would benefit from assistance of MPOA for any future complex medical decisions.    Patient remains on hi eliel oxygen at 35/70. Denies any pain or discomfort at this time. Endorses a productive cough. States he has oxygen at home, and only used it the last two days before hospital admission. Admits to smoking \"5\" cigarettes on Sunday.   Able to tell us who is PCP is, unable to recall names of heart or lung doctors.     Patient does have an Advanced Medical Directive on file as well as a DDNR. Reviewed both forms with patient, remains in agreement with his choices. No changes needed at this time.  Briefly discussed the role of hospice, patient stated, \"I want to live, and not ready for that yet\". He is agreeable to continue with the current treatment plan including \"DNR\" status.   Patient and nephew were appreciative of our visit and agreeable to future visits during this admission.    Palliative team remains available to provide support to Mr Khalil and his family during this hospital stay.    Resuscitation Status:   Code Status: DNR     Documentation Completed:  -No new documents completed.    Jana Garcia RN  Palliative Medicine Inpatient RN  Palliative COPE Line: [phone]

## 2025-04-10 LAB
ACB COMPLEX DNA BLD POS QL NAA+NON-PROBE: NOT DETECTED
ACCESSION NUMBER, LLC1M: NORMAL
ANION GAP SERPL CALC-SCNC: 8 MMOL/L (ref 3–18)
ANION GAP SERPL CALC-SCNC: 9 MMOL/L (ref 3–18)
B FRAGILIS DNA BLD POS QL NAA+NON-PROBE: NOT DETECTED
BIOFIRE TEST COMMENT: NORMAL
BUN SERPL-MCNC: 27 MG/DL (ref 7–18)
BUN SERPL-MCNC: 28 MG/DL (ref 7–18)
BUN/CREAT SERPL: 25 (ref 12–20)
BUN/CREAT SERPL: 28 (ref 12–20)
C ALBICANS DNA BLD POS QL NAA+NON-PROBE: NOT DETECTED
C AURIS DNA BLD POS QL NAA+NON-PROBE: NOT DETECTED
C GATTII+NEOFOR DNA BLD POS QL NAA+N-PRB: NOT DETECTED
C GLABRATA DNA BLD POS QL NAA+NON-PROBE: NOT DETECTED
C KRUSEI DNA BLD POS QL NAA+NON-PROBE: NOT DETECTED
C PARAP DNA BLD POS QL NAA+NON-PROBE: NOT DETECTED
C TROPICLS DNA BLD POS QL NAA+NON-PROBE: NOT DETECTED
CALCIUM SERPL-MCNC: 8.5 MG/DL (ref 8.5–10.1)
CALCIUM SERPL-MCNC: 8.9 MG/DL (ref 8.5–10.1)
CHLORIDE SERPL-SCNC: 100 MMOL/L (ref 100–111)
CHLORIDE SERPL-SCNC: 99 MMOL/L (ref 100–111)
CO2 SERPL-SCNC: 26 MMOL/L (ref 21–32)
CO2 SERPL-SCNC: 27 MMOL/L (ref 21–32)
CREAT SERPL-MCNC: 0.98 MG/DL (ref 0.6–1.3)
CREAT SERPL-MCNC: 1.13 MG/DL (ref 0.6–1.3)
E CLOAC COMP DNA BLD POS NAA+NON-PROBE: NOT DETECTED
E COLI DNA BLD POS QL NAA+NON-PROBE: NOT DETECTED
E FAECALIS DNA BLD POS QL NAA+NON-PROBE: NOT DETECTED
E FAECIUM DNA BLD POS QL NAA+NON-PROBE: NOT DETECTED
ENTEROBACTERALES DNA BLD POS NAA+N-PRB: NOT DETECTED
ERYTHROCYTE [DISTWIDTH] IN BLOOD BY AUTOMATED COUNT: 17.3 % (ref 11.6–14.5)
GLUCOSE SERPL-MCNC: 111 MG/DL (ref 74–99)
GLUCOSE SERPL-MCNC: 176 MG/DL (ref 74–99)
GP B STREP DNA BLD POS QL NAA+NON-PROBE: NOT DETECTED
HAEM INFLU DNA BLD POS QL NAA+NON-PROBE: NOT DETECTED
HCT VFR BLD AUTO: 26 % (ref 36–48)
HGB BLD-MCNC: 8.1 G/DL (ref 13–16)
K OXYTOCA DNA BLD POS QL NAA+NON-PROBE: NOT DETECTED
KLEBSIELLA SP DNA BLD POS QL NAA+NON-PRB: NOT DETECTED
KLEBSIELLA SP DNA BLD POS QL NAA+NON-PRB: NOT DETECTED
L MONOCYTOG DNA BLD POS QL NAA+NON-PROBE: NOT DETECTED
MCH RBC QN AUTO: 28 PG (ref 24–34)
MCHC RBC AUTO-ENTMCNC: 31.2 G/DL (ref 31–37)
MCV RBC AUTO: 90 FL (ref 78–100)
N MEN DNA BLD POS QL NAA+NON-PROBE: NOT DETECTED
NRBC # BLD: 0.05 K/UL (ref 0–0.01)
NRBC BLD-RTO: 0.3 PER 100 WBC
P AERUGINOSA DNA BLD POS NAA+NON-PROBE: NOT DETECTED
PLATELET # BLD AUTO: 295 K/UL (ref 135–420)
PMV BLD AUTO: 10.8 FL (ref 9.2–11.8)
POTASSIUM SERPL-SCNC: 3.7 MMOL/L (ref 3.5–5.5)
POTASSIUM SERPL-SCNC: 3.7 MMOL/L (ref 3.5–5.5)
PROTEUS SP DNA BLD POS QL NAA+NON-PROBE: NOT DETECTED
RBC # BLD AUTO: 2.89 M/UL (ref 4.35–5.65)
RESISTANT GENE TARGETS: NORMAL
S AUREUS DNA BLD POS QL NAA+NON-PROBE: NOT DETECTED
S AUREUS+CONS DNA BLD POS NAA+NON-PROBE: NOT DETECTED
S EPIDERMIDIS DNA BLD POS QL NAA+NON-PRB: NOT DETECTED
S LUGDUNENSIS DNA BLD POS QL NAA+NON-PRB: NOT DETECTED
S MALTOPHILIA DNA BLD POS QL NAA+NON-PRB: NOT DETECTED
S MARCESCENS DNA BLD POS NAA+NON-PROBE: NOT DETECTED
S PNEUM DNA BLD POS QL NAA+NON-PROBE: NOT DETECTED
S PYO DNA BLD POS QL NAA+NON-PROBE: NOT DETECTED
SALMONELLA DNA BLD POS QL NAA+NON-PROBE: NOT DETECTED
SODIUM SERPL-SCNC: 134 MMOL/L (ref 136–145)
SODIUM SERPL-SCNC: 135 MMOL/L (ref 136–145)
STREPTOCOCCUS DNA BLD POS NAA+NON-PROBE: NOT DETECTED
WBC # BLD AUTO: 15.4 K/UL (ref 4.6–13.2)

## 2025-04-10 PROCEDURE — 2580000003 HC RX 258: Performed by: INTERNAL MEDICINE

## 2025-04-10 PROCEDURE — 94640 AIRWAY INHALATION TREATMENT: CPT

## 2025-04-10 PROCEDURE — 1100000003 HC PRIVATE W/ TELEMETRY

## 2025-04-10 PROCEDURE — 80048 BASIC METABOLIC PNL TOTAL CA: CPT

## 2025-04-10 PROCEDURE — 2500000003 HC RX 250 WO HCPCS: Performed by: INTERNAL MEDICINE

## 2025-04-10 PROCEDURE — 85027 COMPLETE CBC AUTOMATED: CPT

## 2025-04-10 PROCEDURE — 6370000000 HC RX 637 (ALT 250 FOR IP): Performed by: INTERNAL MEDICINE

## 2025-04-10 PROCEDURE — 6360000002 HC RX W HCPCS

## 2025-04-10 PROCEDURE — 6370000000 HC RX 637 (ALT 250 FOR IP): Performed by: PHYSICIAN ASSISTANT

## 2025-04-10 PROCEDURE — APPSS15 APP SPLIT SHARED TIME 0-15 MINUTES: Performed by: PHYSICIAN ASSISTANT

## 2025-04-10 PROCEDURE — 99232 SBSQ HOSP IP/OBS MODERATE 35: CPT | Performed by: HOSPITALIST

## 2025-04-10 PROCEDURE — 94660 CPAP INITIATION&MGMT: CPT

## 2025-04-10 PROCEDURE — 6360000002 HC RX W HCPCS: Performed by: INTERNAL MEDICINE

## 2025-04-10 PROCEDURE — 36415 COLL VENOUS BLD VENIPUNCTURE: CPT

## 2025-04-10 PROCEDURE — 99233 SBSQ HOSP IP/OBS HIGH 50: CPT | Performed by: INTERNAL MEDICINE

## 2025-04-10 PROCEDURE — 2140000001 HC CVICU INTERMEDIATE R&B

## 2025-04-10 PROCEDURE — 6370000000 HC RX 637 (ALT 250 FOR IP): Performed by: HOSPITALIST

## 2025-04-10 PROCEDURE — 2700000000 HC OXYGEN THERAPY PER DAY

## 2025-04-10 PROCEDURE — 94761 N-INVAS EAR/PLS OXIMETRY MLT: CPT

## 2025-04-10 RX ADMIN — ARFORMOTEROL TARTRATE 15 MCG: 15 SOLUTION RESPIRATORY (INHALATION) at 20:44

## 2025-04-10 RX ADMIN — PANTOPRAZOLE SODIUM 40 MG: 40 TABLET, DELAYED RELEASE ORAL at 06:18

## 2025-04-10 RX ADMIN — BUSPIRONE HYDROCHLORIDE 5 MG: 5 TABLET ORAL at 09:14

## 2025-04-10 RX ADMIN — PIPERACILLIN AND TAZOBACTAM 3375 MG: 3; .375 INJECTION, POWDER, LYOPHILIZED, FOR SOLUTION INTRAVENOUS at 06:07

## 2025-04-10 RX ADMIN — BUSPIRONE HYDROCHLORIDE 5 MG: 5 TABLET ORAL at 20:39

## 2025-04-10 RX ADMIN — CLOPIDOGREL BISULFATE 75 MG: 75 TABLET, FILM COATED ORAL at 09:14

## 2025-04-10 RX ADMIN — IPRATROPIUM BROMIDE 0.5 MG: 0.5 SOLUTION RESPIRATORY (INHALATION) at 08:35

## 2025-04-10 RX ADMIN — PIPERACILLIN AND TAZOBACTAM 3375 MG: 3; .375 INJECTION, POWDER, LYOPHILIZED, FOR SOLUTION INTRAVENOUS at 14:30

## 2025-04-10 RX ADMIN — THERA TABS 1 TABLET: TAB at 09:14

## 2025-04-10 RX ADMIN — BUDESONIDE 1 MG: 1 SUSPENSION RESPIRATORY (INHALATION) at 20:44

## 2025-04-10 RX ADMIN — ASPIRIN 81 MG CHEWABLE TABLET 81 MG: 81 TABLET CHEWABLE at 09:14

## 2025-04-10 RX ADMIN — PREGABALIN 150 MG: 75 CAPSULE ORAL at 20:39

## 2025-04-10 RX ADMIN — PIPERACILLIN AND TAZOBACTAM 3375 MG: 3; .375 INJECTION, POWDER, LYOPHILIZED, FOR SOLUTION INTRAVENOUS at 21:49

## 2025-04-10 RX ADMIN — METHYLPREDNISOLONE SODIUM SUCCINATE 40 MG: 40 INJECTION INTRAMUSCULAR; INTRAVENOUS at 09:13

## 2025-04-10 RX ADMIN — IPRATROPIUM BROMIDE 0.5 MG: 0.5 SOLUTION RESPIRATORY (INHALATION) at 11:58

## 2025-04-10 RX ADMIN — BUDESONIDE 1 MG: 1 SUSPENSION RESPIRATORY (INHALATION) at 08:35

## 2025-04-10 RX ADMIN — PIPERACILLIN AND TAZOBACTAM 4500 MG: 4; .5 INJECTION, POWDER, FOR SOLUTION INTRAVENOUS at 01:30

## 2025-04-10 RX ADMIN — BUSPIRONE HYDROCHLORIDE 5 MG: 5 TABLET ORAL at 14:51

## 2025-04-10 RX ADMIN — ATORVASTATIN CALCIUM 80 MG: 40 TABLET, FILM COATED ORAL at 20:39

## 2025-04-10 RX ADMIN — PREGABALIN 150 MG: 75 CAPSULE ORAL at 09:14

## 2025-04-10 RX ADMIN — METOPROLOL SUCCINATE 25 MG: 25 TABLET, EXTENDED RELEASE ORAL at 09:14

## 2025-04-10 RX ADMIN — IPRATROPIUM BROMIDE 0.5 MG: 0.5 SOLUTION RESPIRATORY (INHALATION) at 20:44

## 2025-04-10 RX ADMIN — ARFORMOTEROL TARTRATE 15 MCG: 15 SOLUTION RESPIRATORY (INHALATION) at 08:35

## 2025-04-10 RX ADMIN — CITALOPRAM HYDROBROMIDE 10 MG: 20 TABLET ORAL at 09:14

## 2025-04-10 RX ADMIN — FUROSEMIDE 40 MG: 10 INJECTION, SOLUTION INTRAMUSCULAR; INTRAVENOUS at 03:55

## 2025-04-10 RX ADMIN — IPRATROPIUM BROMIDE 0.5 MG: 0.5 SOLUTION RESPIRATORY (INHALATION) at 16:26

## 2025-04-10 RX ADMIN — FUROSEMIDE 5 MG/HR: 10 INJECTION, SOLUTION INTRAMUSCULAR; INTRAVENOUS at 15:15

## 2025-04-10 ASSESSMENT — PAIN SCALES - GENERAL
PAINLEVEL_OUTOF10: 0

## 2025-04-10 NOTE — NURSE NAVIGATOR
Followed up with patient reinforced low sodium diet, fluid restriction, heart failure zones, will continue to follow    Bernie Randhawa RN  4/10/2025

## 2025-04-11 ENCOUNTER — APPOINTMENT (OUTPATIENT)
Facility: HOSPITAL | Age: 65
DRG: 720 | End: 2025-04-11
Payer: MEDICAID

## 2025-04-11 LAB
ANION GAP SERPL CALC-SCNC: 9 MMOL/L (ref 3–18)
BACTERIA SPEC CULT: ABNORMAL
BUN SERPL-MCNC: 27 MG/DL (ref 7–18)
BUN/CREAT SERPL: 22 (ref 12–20)
CALCIUM SERPL-MCNC: 8.7 MG/DL (ref 8.5–10.1)
CHLORIDE SERPL-SCNC: 98 MMOL/L (ref 100–111)
CO2 SERPL-SCNC: 28 MMOL/L (ref 21–32)
CREAT SERPL-MCNC: 1.21 MG/DL (ref 0.6–1.3)
GLUCOSE SERPL-MCNC: 165 MG/DL (ref 74–99)
GRAM STN SPEC: ABNORMAL
POTASSIUM SERPL-SCNC: 3.6 MMOL/L (ref 3.5–5.5)
SERVICE CMNT-IMP: ABNORMAL
SODIUM SERPL-SCNC: 135 MMOL/L (ref 136–145)

## 2025-04-11 PROCEDURE — 6360000002 HC RX W HCPCS: Performed by: INTERNAL MEDICINE

## 2025-04-11 PROCEDURE — 94660 CPAP INITIATION&MGMT: CPT

## 2025-04-11 PROCEDURE — 87040 BLOOD CULTURE FOR BACTERIA: CPT

## 2025-04-11 PROCEDURE — 2500000003 HC RX 250 WO HCPCS: Performed by: PHYSICIAN ASSISTANT

## 2025-04-11 PROCEDURE — 1100000003 HC PRIVATE W/ TELEMETRY

## 2025-04-11 PROCEDURE — 80048 BASIC METABOLIC PNL TOTAL CA: CPT

## 2025-04-11 PROCEDURE — 2500000003 HC RX 250 WO HCPCS: Performed by: INTERNAL MEDICINE

## 2025-04-11 PROCEDURE — 6360000002 HC RX W HCPCS: Performed by: PHYSICIAN ASSISTANT

## 2025-04-11 PROCEDURE — 94761 N-INVAS EAR/PLS OXIMETRY MLT: CPT

## 2025-04-11 PROCEDURE — 94640 AIRWAY INHALATION TREATMENT: CPT

## 2025-04-11 PROCEDURE — 6370000000 HC RX 637 (ALT 250 FOR IP): Performed by: INTERNAL MEDICINE

## 2025-04-11 PROCEDURE — 6370000000 HC RX 637 (ALT 250 FOR IP): Performed by: PHYSICIAN ASSISTANT

## 2025-04-11 PROCEDURE — 2580000003 HC RX 258: Performed by: INTERNAL MEDICINE

## 2025-04-11 PROCEDURE — 99232 SBSQ HOSP IP/OBS MODERATE 35: CPT | Performed by: INTERNAL MEDICINE

## 2025-04-11 PROCEDURE — 2700000000 HC OXYGEN THERAPY PER DAY

## 2025-04-11 PROCEDURE — 36415 COLL VENOUS BLD VENIPUNCTURE: CPT

## 2025-04-11 PROCEDURE — APPSS15 APP SPLIT SHARED TIME 0-15 MINUTES: Performed by: PHYSICIAN ASSISTANT

## 2025-04-11 PROCEDURE — 97530 THERAPEUTIC ACTIVITIES: CPT

## 2025-04-11 PROCEDURE — 97162 PT EVAL MOD COMPLEX 30 MIN: CPT

## 2025-04-11 PROCEDURE — 99232 SBSQ HOSP IP/OBS MODERATE 35: CPT | Performed by: HOSPITALIST

## 2025-04-11 PROCEDURE — 71045 X-RAY EXAM CHEST 1 VIEW: CPT

## 2025-04-11 PROCEDURE — 6370000000 HC RX 637 (ALT 250 FOR IP): Performed by: HOSPITALIST

## 2025-04-11 PROCEDURE — 6360000002 HC RX W HCPCS

## 2025-04-11 PROCEDURE — 2140000001 HC CVICU INTERMEDIATE R&B

## 2025-04-11 RX ORDER — POLYETHYLENE GLYCOL 3350 17 G/17G
34 POWDER, FOR SOLUTION ORAL DAILY
Status: DISCONTINUED | OUTPATIENT
Start: 2025-04-11 | End: 2025-04-23 | Stop reason: HOSPADM

## 2025-04-11 RX ORDER — FUROSEMIDE 10 MG/ML
20 INJECTION INTRAMUSCULAR; INTRAVENOUS 2 TIMES DAILY
Status: DISCONTINUED | OUTPATIENT
Start: 2025-04-11 | End: 2025-04-22

## 2025-04-11 RX ADMIN — IPRATROPIUM BROMIDE 0.5 MG: 0.5 SOLUTION RESPIRATORY (INHALATION) at 11:48

## 2025-04-11 RX ADMIN — PIPERACILLIN AND TAZOBACTAM 3375 MG: 3; .375 INJECTION, POWDER, LYOPHILIZED, FOR SOLUTION INTRAVENOUS at 05:17

## 2025-04-11 RX ADMIN — IPRATROPIUM BROMIDE 0.5 MG: 0.5 SOLUTION RESPIRATORY (INHALATION) at 16:28

## 2025-04-11 RX ADMIN — METHYLPREDNISOLONE SODIUM SUCCINATE 40 MG: 40 INJECTION INTRAMUSCULAR; INTRAVENOUS at 09:33

## 2025-04-11 RX ADMIN — PREGABALIN 150 MG: 75 CAPSULE ORAL at 09:33

## 2025-04-11 RX ADMIN — FUROSEMIDE 20 MG: 10 INJECTION, SOLUTION INTRAMUSCULAR; INTRAVENOUS at 16:19

## 2025-04-11 RX ADMIN — POLYETHYLENE GLYCOL 3350 34 G: 17 POWDER, FOR SOLUTION ORAL at 16:19

## 2025-04-11 RX ADMIN — BUDESONIDE 1 MG: 1 SUSPENSION RESPIRATORY (INHALATION) at 07:28

## 2025-04-11 RX ADMIN — CLOPIDOGREL BISULFATE 75 MG: 75 TABLET, FILM COATED ORAL at 09:33

## 2025-04-11 RX ADMIN — IPRATROPIUM BROMIDE 0.5 MG: 0.5 SOLUTION RESPIRATORY (INHALATION) at 07:28

## 2025-04-11 RX ADMIN — SODIUM CHLORIDE 3000 MG: 9 INJECTION, SOLUTION INTRAVENOUS at 20:34

## 2025-04-11 RX ADMIN — ARFORMOTEROL TARTRATE 15 MCG: 15 SOLUTION RESPIRATORY (INHALATION) at 20:30

## 2025-04-11 RX ADMIN — BUDESONIDE 1 MG: 1 SUSPENSION RESPIRATORY (INHALATION) at 20:30

## 2025-04-11 RX ADMIN — IPRATROPIUM BROMIDE 0.5 MG: 0.5 SOLUTION RESPIRATORY (INHALATION) at 20:30

## 2025-04-11 RX ADMIN — BUSPIRONE HYDROCHLORIDE 5 MG: 5 TABLET ORAL at 16:19

## 2025-04-11 RX ADMIN — THERA TABS 1 TABLET: TAB at 09:33

## 2025-04-11 RX ADMIN — BUSPIRONE HYDROCHLORIDE 5 MG: 5 TABLET ORAL at 20:32

## 2025-04-11 RX ADMIN — ARFORMOTEROL TARTRATE 15 MCG: 15 SOLUTION RESPIRATORY (INHALATION) at 07:28

## 2025-04-11 RX ADMIN — CITALOPRAM HYDROBROMIDE 10 MG: 20 TABLET ORAL at 09:33

## 2025-04-11 RX ADMIN — ASPIRIN 81 MG CHEWABLE TABLET 81 MG: 81 TABLET CHEWABLE at 09:33

## 2025-04-11 RX ADMIN — BUSPIRONE HYDROCHLORIDE 5 MG: 5 TABLET ORAL at 09:33

## 2025-04-11 RX ADMIN — SODIUM CHLORIDE 3000 MG: 9 INJECTION, SOLUTION INTRAVENOUS at 16:16

## 2025-04-11 RX ADMIN — PREGABALIN 150 MG: 75 CAPSULE ORAL at 20:32

## 2025-04-11 RX ADMIN — WATER 2000 MG: 1 INJECTION INTRAMUSCULAR; INTRAVENOUS; SUBCUTANEOUS at 16:17

## 2025-04-11 RX ADMIN — ATORVASTATIN CALCIUM 80 MG: 40 TABLET, FILM COATED ORAL at 20:32

## 2025-04-11 ASSESSMENT — PAIN SCALES - GENERAL
PAINLEVEL_OUTOF10: 0

## 2025-04-11 NOTE — SIGNIFICANT EVENT
Called by nursing of low diastolic BP causing MAP to be below 65. UOP -1L past day. Bicarb and Scr bumped up indicating he may be intravascularly depleted. Will hold Lasix drip for rest of night and allow fluids to resettle.     DO Ivan Boykin [facility]  Hospitalist  Internal Medicine

## 2025-04-11 NOTE — CARE COORDINATION
When medically ready patient dispo will be home with supportive niece.    Simi Doshi BSW   Case Management

## 2025-04-12 PROBLEM — I35.1 SEVERE AORTIC VALVE REGURGITATION: Status: ACTIVE | Noted: 2025-04-12

## 2025-04-12 PROBLEM — I27.20 MODERATE PULMONARY HYPERTENSION (HCC): Status: ACTIVE | Noted: 2025-04-12

## 2025-04-12 LAB
ANION GAP SERPL CALC-SCNC: 7 MMOL/L (ref 3–18)
BUN SERPL-MCNC: 30 MG/DL (ref 7–18)
BUN/CREAT SERPL: 32 (ref 12–20)
CALCIUM SERPL-MCNC: 8.8 MG/DL (ref 8.5–10.1)
CHLORIDE SERPL-SCNC: 97 MMOL/L (ref 100–111)
CO2 SERPL-SCNC: 30 MMOL/L (ref 21–32)
CREAT SERPL-MCNC: 0.94 MG/DL (ref 0.6–1.3)
GLUCOSE SERPL-MCNC: 104 MG/DL (ref 74–99)
POTASSIUM SERPL-SCNC: 3.9 MMOL/L (ref 3.5–5.5)
SODIUM SERPL-SCNC: 134 MMOL/L (ref 136–145)
TSH SERPL DL<=0.05 MIU/L-ACNC: 6.1 UIU/ML (ref 0.36–3.74)

## 2025-04-12 PROCEDURE — 6370000000 HC RX 637 (ALT 250 FOR IP): Performed by: PHYSICIAN ASSISTANT

## 2025-04-12 PROCEDURE — 2700000000 HC OXYGEN THERAPY PER DAY

## 2025-04-12 PROCEDURE — 6370000000 HC RX 637 (ALT 250 FOR IP): Performed by: INTERNAL MEDICINE

## 2025-04-12 PROCEDURE — 99232 SBSQ HOSP IP/OBS MODERATE 35: CPT | Performed by: INTERNAL MEDICINE

## 2025-04-12 PROCEDURE — 2500000003 HC RX 250 WO HCPCS: Performed by: INTERNAL MEDICINE

## 2025-04-12 PROCEDURE — 2500000003 HC RX 250 WO HCPCS: Performed by: PHYSICIAN ASSISTANT

## 2025-04-12 PROCEDURE — 94640 AIRWAY INHALATION TREATMENT: CPT

## 2025-04-12 PROCEDURE — 6360000002 HC RX W HCPCS: Performed by: INTERNAL MEDICINE

## 2025-04-12 PROCEDURE — 84443 ASSAY THYROID STIM HORMONE: CPT

## 2025-04-12 PROCEDURE — 94761 N-INVAS EAR/PLS OXIMETRY MLT: CPT

## 2025-04-12 PROCEDURE — 2580000003 HC RX 258: Performed by: INTERNAL MEDICINE

## 2025-04-12 PROCEDURE — 99232 SBSQ HOSP IP/OBS MODERATE 35: CPT | Performed by: STUDENT IN AN ORGANIZED HEALTH CARE EDUCATION/TRAINING PROGRAM

## 2025-04-12 PROCEDURE — 97165 OT EVAL LOW COMPLEX 30 MIN: CPT

## 2025-04-12 PROCEDURE — 80048 BASIC METABOLIC PNL TOTAL CA: CPT

## 2025-04-12 PROCEDURE — 2140000001 HC CVICU INTERMEDIATE R&B

## 2025-04-12 PROCEDURE — 6370000000 HC RX 637 (ALT 250 FOR IP): Performed by: HOSPITALIST

## 2025-04-12 PROCEDURE — 6360000002 HC RX W HCPCS: Performed by: PHYSICIAN ASSISTANT

## 2025-04-12 PROCEDURE — 36415 COLL VENOUS BLD VENIPUNCTURE: CPT

## 2025-04-12 PROCEDURE — 99232 SBSQ HOSP IP/OBS MODERATE 35: CPT | Performed by: HOSPITALIST

## 2025-04-12 PROCEDURE — 6360000002 HC RX W HCPCS

## 2025-04-12 RX ADMIN — METOPROLOL SUCCINATE 25 MG: 25 TABLET, EXTENDED RELEASE ORAL at 08:18

## 2025-04-12 RX ADMIN — CLOPIDOGREL BISULFATE 75 MG: 75 TABLET, FILM COATED ORAL at 08:18

## 2025-04-12 RX ADMIN — ATORVASTATIN CALCIUM 80 MG: 40 TABLET, FILM COATED ORAL at 21:21

## 2025-04-12 RX ADMIN — BUSPIRONE HYDROCHLORIDE 5 MG: 5 TABLET ORAL at 08:22

## 2025-04-12 RX ADMIN — SODIUM CHLORIDE 3000 MG: 9 INJECTION, SOLUTION INTRAVENOUS at 03:04

## 2025-04-12 RX ADMIN — BUSPIRONE HYDROCHLORIDE 5 MG: 5 TABLET ORAL at 21:21

## 2025-04-12 RX ADMIN — WATER 2000 MG: 1 INJECTION INTRAMUSCULAR; INTRAVENOUS; SUBCUTANEOUS at 03:41

## 2025-04-12 RX ADMIN — IPRATROPIUM BROMIDE 0.5 MG: 0.5 SOLUTION RESPIRATORY (INHALATION) at 07:27

## 2025-04-12 RX ADMIN — FUROSEMIDE 20 MG: 10 INJECTION, SOLUTION INTRAMUSCULAR; INTRAVENOUS at 08:19

## 2025-04-12 RX ADMIN — PANTOPRAZOLE SODIUM 40 MG: 40 TABLET, DELAYED RELEASE ORAL at 05:44

## 2025-04-12 RX ADMIN — ARFORMOTEROL TARTRATE 15 MCG: 15 SOLUTION RESPIRATORY (INHALATION) at 07:27

## 2025-04-12 RX ADMIN — PREGABALIN 150 MG: 75 CAPSULE ORAL at 21:21

## 2025-04-12 RX ADMIN — FUROSEMIDE 20 MG: 10 INJECTION, SOLUTION INTRAMUSCULAR; INTRAVENOUS at 17:05

## 2025-04-12 RX ADMIN — IPRATROPIUM BROMIDE 0.5 MG: 0.5 SOLUTION RESPIRATORY (INHALATION) at 15:57

## 2025-04-12 RX ADMIN — CITALOPRAM HYDROBROMIDE 10 MG: 20 TABLET ORAL at 08:19

## 2025-04-12 RX ADMIN — THERA TABS 1 TABLET: TAB at 08:18

## 2025-04-12 RX ADMIN — BUDESONIDE 1 MG: 1 SUSPENSION RESPIRATORY (INHALATION) at 19:24

## 2025-04-12 RX ADMIN — ARFORMOTEROL TARTRATE 15 MCG: 15 SOLUTION RESPIRATORY (INHALATION) at 19:24

## 2025-04-12 RX ADMIN — ASPIRIN 81 MG CHEWABLE TABLET 81 MG: 81 TABLET CHEWABLE at 08:19

## 2025-04-12 RX ADMIN — IPRATROPIUM BROMIDE 0.5 MG: 0.5 SOLUTION RESPIRATORY (INHALATION) at 19:24

## 2025-04-12 RX ADMIN — BUSPIRONE HYDROCHLORIDE 5 MG: 5 TABLET ORAL at 17:03

## 2025-04-12 RX ADMIN — BUDESONIDE 1 MG: 1 SUSPENSION RESPIRATORY (INHALATION) at 07:27

## 2025-04-12 RX ADMIN — SODIUM CHLORIDE 3000 MG: 9 INJECTION, SOLUTION INTRAVENOUS at 17:09

## 2025-04-12 RX ADMIN — METHYLPREDNISOLONE SODIUM SUCCINATE 40 MG: 40 INJECTION INTRAMUSCULAR; INTRAVENOUS at 08:20

## 2025-04-12 RX ADMIN — SODIUM CHLORIDE 3000 MG: 9 INJECTION, SOLUTION INTRAVENOUS at 08:25

## 2025-04-12 RX ADMIN — PREGABALIN 150 MG: 75 CAPSULE ORAL at 08:18

## 2025-04-12 RX ADMIN — WATER 2000 MG: 1 INJECTION INTRAMUSCULAR; INTRAVENOUS; SUBCUTANEOUS at 17:04

## 2025-04-12 ASSESSMENT — PAIN SCALES - GENERAL
PAINLEVEL_OUTOF10: 0

## 2025-04-13 LAB
ANION GAP SERPL CALC-SCNC: 7 MMOL/L (ref 3–18)
BUN SERPL-MCNC: 22 MG/DL (ref 7–18)
BUN/CREAT SERPL: 22 (ref 12–20)
CALCIUM SERPL-MCNC: 8.7 MG/DL (ref 8.5–10.1)
CHLORIDE SERPL-SCNC: 95 MMOL/L (ref 100–111)
CO2 SERPL-SCNC: 33 MMOL/L (ref 21–32)
CREAT SERPL-MCNC: 0.99 MG/DL (ref 0.6–1.3)
GLUCOSE SERPL-MCNC: 98 MG/DL (ref 74–99)
POTASSIUM SERPL-SCNC: 3.8 MMOL/L (ref 3.5–5.5)
SODIUM SERPL-SCNC: 135 MMOL/L (ref 136–145)

## 2025-04-13 PROCEDURE — 6370000000 HC RX 637 (ALT 250 FOR IP): Performed by: PHYSICIAN ASSISTANT

## 2025-04-13 PROCEDURE — 2700000000 HC OXYGEN THERAPY PER DAY

## 2025-04-13 PROCEDURE — 2500000003 HC RX 250 WO HCPCS: Performed by: PHYSICIAN ASSISTANT

## 2025-04-13 PROCEDURE — 94640 AIRWAY INHALATION TREATMENT: CPT

## 2025-04-13 PROCEDURE — 6360000002 HC RX W HCPCS: Performed by: PHYSICIAN ASSISTANT

## 2025-04-13 PROCEDURE — 36415 COLL VENOUS BLD VENIPUNCTURE: CPT

## 2025-04-13 PROCEDURE — 99232 SBSQ HOSP IP/OBS MODERATE 35: CPT | Performed by: STUDENT IN AN ORGANIZED HEALTH CARE EDUCATION/TRAINING PROGRAM

## 2025-04-13 PROCEDURE — 80048 BASIC METABOLIC PNL TOTAL CA: CPT

## 2025-04-13 PROCEDURE — 6370000000 HC RX 637 (ALT 250 FOR IP): Performed by: HOSPITALIST

## 2025-04-13 PROCEDURE — 2140000001 HC CVICU INTERMEDIATE R&B

## 2025-04-13 PROCEDURE — 6360000002 HC RX W HCPCS: Performed by: INTERNAL MEDICINE

## 2025-04-13 PROCEDURE — 2500000003 HC RX 250 WO HCPCS: Performed by: INTERNAL MEDICINE

## 2025-04-13 PROCEDURE — 6370000000 HC RX 637 (ALT 250 FOR IP): Performed by: INTERNAL MEDICINE

## 2025-04-13 PROCEDURE — 6360000002 HC RX W HCPCS

## 2025-04-13 PROCEDURE — 2580000003 HC RX 258: Performed by: INTERNAL MEDICINE

## 2025-04-13 PROCEDURE — 99232 SBSQ HOSP IP/OBS MODERATE 35: CPT | Performed by: HOSPITALIST

## 2025-04-13 PROCEDURE — 94760 N-INVAS EAR/PLS OXIMETRY 1: CPT

## 2025-04-13 RX ADMIN — FUROSEMIDE 20 MG: 10 INJECTION, SOLUTION INTRAMUSCULAR; INTRAVENOUS at 17:38

## 2025-04-13 RX ADMIN — SODIUM CHLORIDE 3000 MG: 9 INJECTION, SOLUTION INTRAVENOUS at 20:56

## 2025-04-13 RX ADMIN — BUSPIRONE HYDROCHLORIDE 5 MG: 5 TABLET ORAL at 20:27

## 2025-04-13 RX ADMIN — ARFORMOTEROL TARTRATE 15 MCG: 15 SOLUTION RESPIRATORY (INHALATION) at 08:37

## 2025-04-13 RX ADMIN — CLOPIDOGREL BISULFATE 75 MG: 75 TABLET, FILM COATED ORAL at 07:30

## 2025-04-13 RX ADMIN — SODIUM CHLORIDE 3000 MG: 9 INJECTION, SOLUTION INTRAVENOUS at 00:04

## 2025-04-13 RX ADMIN — FUROSEMIDE 20 MG: 10 INJECTION, SOLUTION INTRAMUSCULAR; INTRAVENOUS at 07:31

## 2025-04-13 RX ADMIN — METOPROLOL SUCCINATE 25 MG: 25 TABLET, EXTENDED RELEASE ORAL at 07:30

## 2025-04-13 RX ADMIN — WATER 2000 MG: 1 INJECTION INTRAMUSCULAR; INTRAVENOUS; SUBCUTANEOUS at 04:17

## 2025-04-13 RX ADMIN — PANTOPRAZOLE SODIUM 40 MG: 40 TABLET, DELAYED RELEASE ORAL at 06:23

## 2025-04-13 RX ADMIN — IPRATROPIUM BROMIDE 0.5 MG: 0.5 SOLUTION RESPIRATORY (INHALATION) at 12:21

## 2025-04-13 RX ADMIN — BUSPIRONE HYDROCHLORIDE 5 MG: 5 TABLET ORAL at 14:32

## 2025-04-13 RX ADMIN — BUSPIRONE HYDROCHLORIDE 5 MG: 5 TABLET ORAL at 07:30

## 2025-04-13 RX ADMIN — ARFORMOTEROL TARTRATE 15 MCG: 15 SOLUTION RESPIRATORY (INHALATION) at 19:41

## 2025-04-13 RX ADMIN — BUDESONIDE 1 MG: 1 SUSPENSION RESPIRATORY (INHALATION) at 19:41

## 2025-04-13 RX ADMIN — IPRATROPIUM BROMIDE 0.5 MG: 0.5 SOLUTION RESPIRATORY (INHALATION) at 08:38

## 2025-04-13 RX ADMIN — SODIUM CHLORIDE 3000 MG: 9 INJECTION, SOLUTION INTRAVENOUS at 06:22

## 2025-04-13 RX ADMIN — METHYLPREDNISOLONE SODIUM SUCCINATE 40 MG: 40 INJECTION INTRAMUSCULAR; INTRAVENOUS at 07:30

## 2025-04-13 RX ADMIN — IPRATROPIUM BROMIDE 0.5 MG: 0.5 SOLUTION RESPIRATORY (INHALATION) at 15:28

## 2025-04-13 RX ADMIN — PREGABALIN 150 MG: 75 CAPSULE ORAL at 07:29

## 2025-04-13 RX ADMIN — BUDESONIDE 1 MG: 1 SUSPENSION RESPIRATORY (INHALATION) at 08:38

## 2025-04-13 RX ADMIN — IPRATROPIUM BROMIDE 0.5 MG: 0.5 SOLUTION RESPIRATORY (INHALATION) at 19:41

## 2025-04-13 RX ADMIN — CITALOPRAM HYDROBROMIDE 10 MG: 20 TABLET ORAL at 07:30

## 2025-04-13 RX ADMIN — ASPIRIN 81 MG CHEWABLE TABLET 81 MG: 81 TABLET CHEWABLE at 07:29

## 2025-04-13 RX ADMIN — PREGABALIN 150 MG: 75 CAPSULE ORAL at 20:27

## 2025-04-13 RX ADMIN — THERA TABS 1 TABLET: TAB at 07:29

## 2025-04-13 RX ADMIN — ATORVASTATIN CALCIUM 80 MG: 40 TABLET, FILM COATED ORAL at 20:28

## 2025-04-13 RX ADMIN — WATER 2000 MG: 1 INJECTION INTRAMUSCULAR; INTRAVENOUS; SUBCUTANEOUS at 15:16

## 2025-04-13 RX ADMIN — SODIUM CHLORIDE 3000 MG: 9 INJECTION, SOLUTION INTRAVENOUS at 14:32

## 2025-04-13 ASSESSMENT — PAIN SCALES - GENERAL
PAINLEVEL_OUTOF10: 0

## 2025-04-14 ENCOUNTER — ANESTHESIA (OUTPATIENT)
Facility: HOSPITAL | Age: 65
End: 2025-04-14
Payer: MEDICAID

## 2025-04-14 ENCOUNTER — ANESTHESIA EVENT (OUTPATIENT)
Facility: HOSPITAL | Age: 65
End: 2025-04-14
Payer: MEDICAID

## 2025-04-14 ENCOUNTER — APPOINTMENT (OUTPATIENT)
Facility: HOSPITAL | Age: 65
DRG: 720 | End: 2025-04-14
Payer: MEDICAID

## 2025-04-14 ENCOUNTER — HOSPITAL ENCOUNTER (OUTPATIENT)
Facility: HOSPITAL | Age: 65
Discharge: HOME | End: 2025-04-16
Attending: INTERNAL MEDICINE
Payer: MEDICAID

## 2025-04-14 VITALS
HEART RATE: 66 BPM | OXYGEN SATURATION: 99 % | BODY MASS INDEX: 19.83 KG/M2 | DIASTOLIC BLOOD PRESSURE: 60 MMHG | SYSTOLIC BLOOD PRESSURE: 145 MMHG | WEIGHT: 119 LBS | RESPIRATION RATE: 13 BRPM | HEIGHT: 65 IN

## 2025-04-14 DIAGNOSIS — I50.9 CONGESTIVE HEART FAILURE, UNSPECIFIED HF CHRONICITY, UNSPECIFIED HEART FAILURE TYPE (HCC): ICD-10-CM

## 2025-04-14 PROBLEM — I33.0 ACUTE BACTERIAL ENDOCARDITIS: Status: ACTIVE | Noted: 2025-04-14

## 2025-04-14 LAB
ALBUMIN SERPL-MCNC: 3.1 G/DL (ref 3.4–5)
ALBUMIN/GLOB SERPL: 0.7 (ref 0.8–1.7)
ALP SERPL-CCNC: 70 U/L (ref 45–117)
ALT SERPL-CCNC: 28 U/L (ref 16–61)
ANION GAP BLD CALC-SCNC: ABNORMAL MMOL/L (ref 10–20)
ANION GAP SERPL CALC-SCNC: 5 MMOL/L (ref 3–18)
AST SERPL-CCNC: 32 U/L (ref 10–38)
BASE EXCESS BLD CALC-SCNC: 11.5 MMOL/L
BDY SITE: ABNORMAL
BILIRUB DIRECT SERPL-MCNC: 0.2 MG/DL (ref 0–0.2)
BILIRUB SERPL-MCNC: 0.3 MG/DL (ref 0.2–1)
BODY TEMPERATURE: 97.4
BUN SERPL-MCNC: 29 MG/DL (ref 7–18)
BUN/CREAT SERPL: 34 (ref 12–20)
CA-I BLD-MCNC: 1.16 MMOL/L (ref 1.15–1.33)
CALCIUM SERPL-MCNC: 8.7 MG/DL (ref 8.5–10.1)
CHLORIDE BLD-SCNC: 96 MMOL/L (ref 98–107)
CHLORIDE SERPL-SCNC: 95 MMOL/L (ref 100–111)
CO2 BLD-SCNC: 34 MMOL/L (ref 22–29)
CO2 SERPL-SCNC: 33 MMOL/L (ref 21–32)
CREAT BLD-MCNC: 1.02 MG/DL (ref 0.6–1.3)
CREAT SERPL-MCNC: 0.85 MG/DL (ref 0.6–1.3)
ECHO BSA: 1.57 M2
ECHO LV EF PHYSICIAN: 45 %
EST. AVERAGE GLUCOSE BLD GHB EST-MCNC: 100 MG/DL
FIO2 ON VENT: 21 %
GAS FLOW.O2 O2 DELIVERY SYS: ABNORMAL
GLOBULIN SER CALC-MCNC: 4.4 G/DL (ref 2–4)
GLUCOSE BLD-MCNC: 95 MG/DL (ref 74–99)
GLUCOSE SERPL-MCNC: 109 MG/DL (ref 74–99)
HBA1C MFR BLD: 5.1 % (ref 4.2–5.6)
HCO3 BLD-SCNC: 35.2 MMOL/L (ref 21–28)
INR PPP: 0.9 (ref 0.9–1.1)
LACTATE BLD-SCNC: 1.76 MMOL/L (ref 0.4–2)
PCO2 BLD: 40.4 MMHG (ref 35–48)
PH BLD: 7.55 (ref 7.35–7.45)
PO2 BLD: 53 MMHG (ref 83–108)
POTASSIUM BLD-SCNC: 3.5 MMOL/L (ref 3.5–5.1)
POTASSIUM SERPL-SCNC: 3.9 MMOL/L (ref 3.5–5.5)
PROT SERPL-MCNC: 7.5 G/DL (ref 6.4–8.2)
PROTHROMBIN TIME: 12.1 SEC (ref 11.9–14.9)
RESPIRATORY RATE, POC: 13 (ref 5–40)
SAO2 % BLD: 91 % (ref 94–98)
SERVICE CMNT-IMP: ABNORMAL
SODIUM BLD-SCNC: 137 MMOL/L (ref 136–145)
SODIUM SERPL-SCNC: 133 MMOL/L (ref 136–145)
SPECIMEN SITE: ABNORMAL

## 2025-04-14 PROCEDURE — 93312 ECHO TRANSESOPHAGEAL: CPT

## 2025-04-14 PROCEDURE — 83605 ASSAY OF LACTIC ACID: CPT

## 2025-04-14 PROCEDURE — 80048 BASIC METABOLIC PNL TOTAL CA: CPT

## 2025-04-14 PROCEDURE — 6370000000 HC RX 637 (ALT 250 FOR IP): Performed by: PHYSICIAN ASSISTANT

## 2025-04-14 PROCEDURE — APPSS15 APP SPLIT SHARED TIME 0-15 MINUTES

## 2025-04-14 PROCEDURE — 2700000000 HC OXYGEN THERAPY PER DAY

## 2025-04-14 PROCEDURE — 84295 ASSAY OF SERUM SODIUM: CPT

## 2025-04-14 PROCEDURE — 82330 ASSAY OF CALCIUM: CPT

## 2025-04-14 PROCEDURE — 6360000002 HC RX W HCPCS: Performed by: INTERNAL MEDICINE

## 2025-04-14 PROCEDURE — 99233 SBSQ HOSP IP/OBS HIGH 50: CPT | Performed by: INTERNAL MEDICINE

## 2025-04-14 PROCEDURE — 6360000002 HC RX W HCPCS: Performed by: NURSE ANESTHETIST, CERTIFIED REGISTERED

## 2025-04-14 PROCEDURE — 71045 X-RAY EXAM CHEST 1 VIEW: CPT

## 2025-04-14 PROCEDURE — 97116 GAIT TRAINING THERAPY: CPT

## 2025-04-14 PROCEDURE — 2140000001 HC CVICU INTERMEDIATE R&B

## 2025-04-14 PROCEDURE — 84132 ASSAY OF SERUM POTASSIUM: CPT

## 2025-04-14 PROCEDURE — 3700000000 HC ANESTHESIA ATTENDED CARE

## 2025-04-14 PROCEDURE — 6370000000 HC RX 637 (ALT 250 FOR IP): Performed by: HOSPITALIST

## 2025-04-14 PROCEDURE — 2580000003 HC RX 258: Performed by: NURSE ANESTHETIST, CERTIFIED REGISTERED

## 2025-04-14 PROCEDURE — 85610 PROTHROMBIN TIME: CPT

## 2025-04-14 PROCEDURE — 6360000002 HC RX W HCPCS: Performed by: PHYSICIAN ASSISTANT

## 2025-04-14 PROCEDURE — 94640 AIRWAY INHALATION TREATMENT: CPT

## 2025-04-14 PROCEDURE — 7100000010 HC PHASE II RECOVERY - FIRST 15 MIN

## 2025-04-14 PROCEDURE — 6370000000 HC RX 637 (ALT 250 FOR IP): Performed by: INTERNAL MEDICINE

## 2025-04-14 PROCEDURE — 99232 SBSQ HOSP IP/OBS MODERATE 35: CPT | Performed by: INTERNAL MEDICINE

## 2025-04-14 PROCEDURE — 83036 HEMOGLOBIN GLYCOSYLATED A1C: CPT

## 2025-04-14 PROCEDURE — 3700000001 HC ADD 15 MINUTES (ANESTHESIA)

## 2025-04-14 PROCEDURE — 6360000002 HC RX W HCPCS

## 2025-04-14 PROCEDURE — 85014 HEMATOCRIT: CPT

## 2025-04-14 PROCEDURE — 99232 SBSQ HOSP IP/OBS MODERATE 35: CPT | Performed by: HOSPITALIST

## 2025-04-14 PROCEDURE — 2500000003 HC RX 250 WO HCPCS: Performed by: INTERNAL MEDICINE

## 2025-04-14 PROCEDURE — 82947 ASSAY GLUCOSE BLOOD QUANT: CPT

## 2025-04-14 PROCEDURE — 97530 THERAPEUTIC ACTIVITIES: CPT

## 2025-04-14 PROCEDURE — 36600 WITHDRAWAL OF ARTERIAL BLOOD: CPT

## 2025-04-14 PROCEDURE — 80076 HEPATIC FUNCTION PANEL: CPT

## 2025-04-14 PROCEDURE — 36415 COLL VENOUS BLD VENIPUNCTURE: CPT

## 2025-04-14 PROCEDURE — 2580000003 HC RX 258: Performed by: INTERNAL MEDICINE

## 2025-04-14 PROCEDURE — 2500000003 HC RX 250 WO HCPCS: Performed by: PHYSICIAN ASSISTANT

## 2025-04-14 PROCEDURE — 94761 N-INVAS EAR/PLS OXIMETRY MLT: CPT

## 2025-04-14 PROCEDURE — 82803 BLOOD GASES ANY COMBINATION: CPT

## 2025-04-14 RX ORDER — MUPIROCIN 20 MG/G
OINTMENT TOPICAL 2 TIMES DAILY
OUTPATIENT
Start: 2025-04-14 | End: 2025-04-20

## 2025-04-14 RX ORDER — LIDOCAINE HYDROCHLORIDE 20 MG/ML
INJECTION, SOLUTION EPIDURAL; INFILTRATION; INTRACAUDAL; PERINEURAL
Status: DISCONTINUED | OUTPATIENT
Start: 2025-04-14 | End: 2025-04-14 | Stop reason: SDUPTHER

## 2025-04-14 RX ORDER — SODIUM CHLORIDE 0.9 % (FLUSH) 0.9 %
5-40 SYRINGE (ML) INJECTION EVERY 12 HOURS SCHEDULED
Status: DISCONTINUED | OUTPATIENT
Start: 2025-04-14 | End: 2025-04-23 | Stop reason: HOSPADM

## 2025-04-14 RX ORDER — CHLORHEXIDINE GLUCONATE 40 MG/ML
SOLUTION TOPICAL SEE ADMIN INSTRUCTIONS
OUTPATIENT
Start: 2025-04-14

## 2025-04-14 RX ORDER — ACYCLOVIR 200 MG/1
10 CAPSULE ORAL ONCE
Status: DISCONTINUED | OUTPATIENT
Start: 2025-04-14 | End: 2025-04-17 | Stop reason: HOSPADM

## 2025-04-14 RX ORDER — SODIUM CHLORIDE 9 MG/ML
INJECTION, SOLUTION INTRAVENOUS
Status: DISCONTINUED | OUTPATIENT
Start: 2025-04-14 | End: 2025-04-14 | Stop reason: SDUPTHER

## 2025-04-14 RX ORDER — CHLORHEXIDINE GLUCONATE ORAL RINSE 1.2 MG/ML
15 SOLUTION DENTAL 2 TIMES DAILY
OUTPATIENT
Start: 2025-04-15 | End: 2025-04-25

## 2025-04-14 RX ORDER — PROPOFOL 10 MG/ML
INJECTION, EMULSION INTRAVENOUS
Status: DISCONTINUED | OUTPATIENT
Start: 2025-04-14 | End: 2025-04-14 | Stop reason: SDUPTHER

## 2025-04-14 RX ADMIN — LIDOCAINE HYDROCHLORIDE 100 MG: 20 INJECTION, SOLUTION EPIDURAL; INFILTRATION; INTRACAUDAL; PERINEURAL at 15:16

## 2025-04-14 RX ADMIN — ERGOCALCIFEROL 50000 UNITS: 1.25 CAPSULE ORAL at 09:21

## 2025-04-14 RX ADMIN — PANTOPRAZOLE SODIUM 40 MG: 40 TABLET, DELAYED RELEASE ORAL at 05:56

## 2025-04-14 RX ADMIN — WATER 2000 MG: 1 INJECTION INTRAMUSCULAR; INTRAVENOUS; SUBCUTANEOUS at 03:56

## 2025-04-14 RX ADMIN — BUDESONIDE 1 MG: 1 SUSPENSION RESPIRATORY (INHALATION) at 19:21

## 2025-04-14 RX ADMIN — ARFORMOTEROL TARTRATE 15 MCG: 15 SOLUTION RESPIRATORY (INHALATION) at 07:09

## 2025-04-14 RX ADMIN — CITALOPRAM HYDROBROMIDE 10 MG: 20 TABLET ORAL at 09:21

## 2025-04-14 RX ADMIN — SODIUM CHLORIDE 3000 MG: 9 INJECTION, SOLUTION INTRAVENOUS at 18:20

## 2025-04-14 RX ADMIN — SODIUM CHLORIDE 3000 MG: 9 INJECTION, SOLUTION INTRAVENOUS at 00:07

## 2025-04-14 RX ADMIN — ASPIRIN 81 MG CHEWABLE TABLET 81 MG: 81 TABLET CHEWABLE at 09:23

## 2025-04-14 RX ADMIN — SODIUM CHLORIDE: 9 INJECTION, SOLUTION INTRAVENOUS at 15:00

## 2025-04-14 RX ADMIN — IPRATROPIUM BROMIDE 0.5 MG: 0.5 SOLUTION RESPIRATORY (INHALATION) at 11:57

## 2025-04-14 RX ADMIN — METOPROLOL SUCCINATE 25 MG: 25 TABLET, EXTENDED RELEASE ORAL at 09:21

## 2025-04-14 RX ADMIN — SODIUM CHLORIDE, PRESERVATIVE FREE 10 ML: 5 INJECTION INTRAVENOUS at 22:07

## 2025-04-14 RX ADMIN — PREGABALIN 150 MG: 75 CAPSULE ORAL at 09:21

## 2025-04-14 RX ADMIN — IPRATROPIUM BROMIDE 0.5 MG: 0.5 SOLUTION RESPIRATORY (INHALATION) at 19:21

## 2025-04-14 RX ADMIN — CLOPIDOGREL BISULFATE 75 MG: 75 TABLET, FILM COATED ORAL at 09:23

## 2025-04-14 RX ADMIN — THERA TABS 1 TABLET: TAB at 09:21

## 2025-04-14 RX ADMIN — IPRATROPIUM BROMIDE 0.5 MG: 0.5 SOLUTION RESPIRATORY (INHALATION) at 07:09

## 2025-04-14 RX ADMIN — FUROSEMIDE 20 MG: 10 INJECTION, SOLUTION INTRAMUSCULAR; INTRAVENOUS at 16:37

## 2025-04-14 RX ADMIN — BUSPIRONE HYDROCHLORIDE 5 MG: 5 TABLET ORAL at 22:06

## 2025-04-14 RX ADMIN — SODIUM CHLORIDE 3000 MG: 9 INJECTION, SOLUTION INTRAVENOUS at 11:54

## 2025-04-14 RX ADMIN — PROPOFOL 100 MG: 10 INJECTION, EMULSION INTRAVENOUS at 15:16

## 2025-04-14 RX ADMIN — PREGABALIN 150 MG: 75 CAPSULE ORAL at 21:06

## 2025-04-14 RX ADMIN — PROPOFOL 30 MG: 10 INJECTION, EMULSION INTRAVENOUS at 15:19

## 2025-04-14 RX ADMIN — BUDESONIDE 1 MG: 1 SUSPENSION RESPIRATORY (INHALATION) at 07:09

## 2025-04-14 RX ADMIN — POLYETHYLENE GLYCOL 3350 34 G: 17 POWDER, FOR SOLUTION ORAL at 09:22

## 2025-04-14 RX ADMIN — ARFORMOTEROL TARTRATE 15 MCG: 15 SOLUTION RESPIRATORY (INHALATION) at 19:21

## 2025-04-14 RX ADMIN — ATORVASTATIN CALCIUM 80 MG: 40 TABLET, FILM COATED ORAL at 22:06

## 2025-04-14 RX ADMIN — BUSPIRONE HYDROCHLORIDE 5 MG: 5 TABLET ORAL at 09:21

## 2025-04-14 RX ADMIN — SODIUM CHLORIDE 3000 MG: 9 INJECTION, SOLUTION INTRAVENOUS at 05:53

## 2025-04-14 RX ADMIN — FUROSEMIDE 20 MG: 10 INJECTION, SOLUTION INTRAMUSCULAR; INTRAVENOUS at 09:21

## 2025-04-14 RX ADMIN — WATER 2000 MG: 1 INJECTION INTRAMUSCULAR; INTRAVENOUS; SUBCUTANEOUS at 16:37

## 2025-04-14 ASSESSMENT — PAIN SCALES - GENERAL: PAINLEVEL_OUTOF10: 0

## 2025-04-14 NOTE — NURSE NAVIGATOR
Followed up with patient reinforced low sodium diet, fluid restriction, heart failure zones, will continue to follow    Bernie Randhawa RN  4/14/2025

## 2025-04-14 NOTE — ANESTHESIA PRE PROCEDURE
Anesthesia Plan      MAC     ASA 3             Anesthetic plan and risks discussed with patient.                    MARIAA ANDERSON MD   4/14/2025

## 2025-04-14 NOTE — ANESTHESIA POSTPROCEDURE EVALUATION
Department of Anesthesiology  Postprocedure Note    Patient: Gio Khalil  MRN: [id number]  YOB: 1960  Date of evaluation: 4/14/2025    Procedure Summary       Date: 04/14/25 Room / Location: [facility] CARDIAC CATH LAB; [facility] NON-INVASIVE CARDIOLOGY    Anesthesia Start: 1500 Anesthesia Stop: 1526    Procedure: TIKA (PRN CONTRAST/BUBBLE/3D) Diagnosis: Congestive heart failure, unspecified HF chronicity, unspecified heart failure type (HCC)    Scheduled Providers: Bud Paez MD Responsible Provider: Tavo Aguilar Jr., MD    Anesthesia Type: MAC ASA Status: 3            Anesthesia Type: MAC    Susie Phase I:      Susie Phase II: Susie Score: 10    Anesthesia Post Evaluation    Patient location during evaluation: bedside  Airway patency: patent  Cardiovascular status: hemodynamically stable  Respiratory status: acceptable  Hydration status: stable  Pain management: adequate    No notable events documented.

## 2025-04-14 NOTE — CONSULTS
[facility] - Consult Note    Date of  Admission: 4/6/2025  3:26 AM   Primary Care Physician:  Mary Russell DNP     Assessment:       Chest pain with elevated cardiac biomarkers  Cardiac catheterization completed 12/16/2024.        Diagnostic  Dominance: Right  Left Main   The vessel was visualized by angiography and is moderate in size. Size of vessel >=2.0 mm. There is moderate disease. dLM 30-40% stenosis      Left Anterior Descending   The vessel was visualized by angiography and is moderate in size. Size of vessel >=2.0 mm. pLAD to mLAD 40-50% stenosis      Left Circumflex   The vessel was visualized by angiography and is moderate in size. Size of vessel >=2.0 mm. pLCFX 30% stenosis      Right Coronary Artery   The vessel was visualized by angiography and is moderate in size. Size of vessel >=2.0 mm. There is mild disease. mRCA 10-30% stenosis      3.   Echocardiogram, 12/6/2024.  Normal left ventricular systolic function.  EF 55 to 60%.  Moderate to severe stenosis of the aortic valve.  Moderate regurgitation.  Valve area 0.7 cm².  Peak velocity 3.8 m/s.  4.   MRI brain, 3/28/2025.  Chronic bilateral gangliocapsular infarcts.  No acute intracranial findings  5.   Carotid disease        Patient Active Problem List   Diagnosis    Marijuana smoker    Hypertension    PAD (peripheral artery disease)    Smoker    PVD (peripheral vascular disease)    Elevated rheumatoid factor    Atherosclerosis    Alcohol abuse    Anxiety associated with depression    Hyperlipidemia    Bilateral carotid artery stenosis    Decreased GFR    Lumbar radiculopathy    Lumbar spondylosis    Melena    Environmental and seasonal allergies    Memory problem    Iron deficiency anemia    Acute renal failure    Encounter for palliative care    Goals of care, counseling/discussion    Acute metabolic encephalopathy    Aortic stenosis, severe    Anemia    ETOH abuse    Influenza A (H1N1)    NSTEMI (non-ST elevated myocardial 
[facility]  -  Consult  4/14/2025    Gio Khalil is a 64 y.o. male who is being seen on consult for Endocarditis, at Dr. Fenton's request.    Assessment:   Endocarditis  TIKA today revealed vegetation on the aortic valve in the setting of AS and SR  Sepsis -Ecoli  bacteremia-  on rocephin per ID  Acute hypoxic respiratory failure  Acute HF with moderately reduced EF  NSTEMI   Dementia: oriented to self only, cared for by his niece  DNR- currently DNR  CAD   s/p [facility] 12/16/2024 with findings as follows, dLM 30-40% stenosis, Prox/mid LAD 40-50% stenosis, Dominant RCA with mRCA 10-30% stenosis, pLCx 30% stenosis  Aortic stenosis and regurg- TAVR vs SAVR consult  Pulmonary HTN-  RVSP 68mmhg  PAD  HTN  H/o GI bleed- 11/24, hgb 7.9  Cachetic BMI 19.8  Agree to the above assessment and plan.  Patient is 64 years old male presented presented with acute heart failure decreased ejection fraction EF 45%.  Patient had a multiple comorbidities includes chronic artery disease, dementia, peripheral vascular disease, cerebrovascular disease, history of hepatitis C, history of acute kidney failure and sepsis.  Patient was found to have E. coli and Enterococcus bacteremia.  TIKA showed severe aortic stenosis with vegetations.  I discussed the above findings with the patient and his niece.  I explained that this surgical intervention is a high risk due to the poor general condition of the patient.  I explained that nonintervention setting of aortic stenosis and endocarditis has a grave prognosis.  Patient's niece mentions that the patient at home able to walk and lives relatively normal life.  Patient and his niece are interested in proceeding with surgical valve replacement.  Patient will need an aortic valve replacement possible CABG.  Patient will need cardiac cath prior to the procedure.  BMI Readings from Last 2 Encounters:   04/14/25 19.22 kg/m²   04/14/25 19.80 kg/m²       Patient Active Problem List    
Infectious Disease Consultation Note        Reason: Sepsis, E. coli/gram-positive bacteremia    Current abx Prior abx   Cefepime, doxycycline since 4/6 Piperacillin/tazobactam on 4/6     Lines:       Assessment :    64 y.o. male with past medical history significant for NSTEMI, tobacco use disorder, emphysema, peripheral artery disease, hypertension, who presented to [facility] on 4/6/25 with complaints of dyspnea in the past 24 hours.      Clinical presentation consistent with sepsis-present on admission due to gram-negative/gram-positive bacteremia-positive blood culture 4/6, probable UTI    Gram-positive cocci in blood culture 4/6 could represent contamination versus true bloodstream infection.  Both sets could have been done at the same time although level to be drawn 5 minutes apart and hence still could be contamination, frequent occurrence in the ED    Exact source of E. coli bacteremia not entirely clear.  It usually originates from GI/ source.  No hydronephrosis or GI pathology noted on CTA 4/6/25.  Unfortunately no urine analysis or culture sent on admission to determine this.  Patient denies any dysuria or hematuria.  Monitor for undiagnosed GI/ malignancy.  Discussed with caregiver at bedside.  Patient has not had a colonoscopy since he is a high anesthesia risk.  Apparently he was scheduled to have PET scan as outpatient per neurologist    Acute hypoxic respiratory failure-likely due to decompensated CHF.  Cardiology/pulmonary evaluation appreciated    Recommendations:    Discontinue cefepime, doxycycline.  Start ceftriaxone  Follow-up ID of gram-positive cocci in blood culture and susceptibility of E. coli and blood culture 4/6  Repeat blood culture tomorrow to determine clearance of bacteremia  Follow-up pulmonary, cardiology recommendations  Wean oxygen as tolerated  Follow-up palliative care recommendation regarding goals of care    Thank you for consultation request. Above plan was discussed in 
Palliative Medicine Consult  [facility]: [phone]  [facility]: [phone]     Patient Name: Gio Khalil  YOB: 1960    Date of Service: 4/9/2025  Provider: Bruno Owen MD  Hospital Day: 4  Admit Date: 4/6/2025  Referring Provider:  Federico Champion MD     Reason for Consult: goals of care discussion/ACP/symptoms management      SUMMARY:     Gio Khalil is a 64 y.o. with a past history of anxiety, COPD, hepatitis C, marijuana use, peripheral artery disease, hypertension, who was admitted on 4/6/2025 from home with a diagnosis of non-STEMI and acute hypoxic respiratory failure secondary to COPD exacerbation.  Patient initially presented to emergency room with complaint of shortness of breath and cough.  Patient was found to have non-STEMI started on heparin drip.  Patient was also started on nebulizer treatment, low-dose steroid and oxygen.  Patient was seen by cardiology and pulmonary service.  CTA chest was done.  Due to patient's advanced lung disease and new diagnosis of CHF with current medical issues leading to Palliative Medicine involvement include: To establish goals of care.    April 9, 2025: Patient was seen in presence of medical staff member and family member.  Patient is awake and able to answer.  Questions appropriately but remains somewhat confused about his overall health.  Continues to dyspnea on exertion and mild cough.  Denies any chest or abdominal pain.  No nausea or vomiting.     HISTORY:     History obtained from: Medical records and patient    CHIEF COMPLAINT: Shortness of breath    HPI/SUBJECTIVE:    The patient is:   [x] Verbal and participatory  [] Non-participatory due to:         PHYSICAL EXAM:     From RN flowsheet:  Wt Readings from Last 3 Encounters:   04/07/25 51.7 kg (114 lb)   02/24/25 51.7 kg (114 lb)   01/29/25 56.7 kg (125 lb)       /62   Pulse 100   Temp 97.8 °F (36.6 °C) (Oral)   Resp 24   Ht 1.676 m 
Room 366  Physician order for fingernails trimming/toenails debridement received from Dr. Champion.   Introduced myself & services.   Explained procedure: assessment, hygiene, education, & nail service and no questions asked.   Informed of possible complications: nip/cut skin, infection, & nail splits or crumbles, and no questions asked.   Pt verbalized understanding of procedure and associated risks and agrees to have services provided at this time.   Toenails debrided to tolerance due to restless legs/ticklish feet.  [facility]    1. History:   Chief Complaint   Patient presents with    Shortness of Breath   ,   Active Ambulatory Problems     Diagnosis Date Noted    Marijuana smoker 03/07/2022    Hypertension 03/07/2022    PAD (peripheral artery disease) 07/19/2022    Smoker 03/07/2022    PVD (peripheral vascular disease) 07/22/2022    Elevated rheumatoid factor 02/07/2023    Atherosclerosis 08/08/2023    Alcohol abuse 08/08/2023    Anxiety associated with depression 08/08/2023    Hyperlipidemia 08/08/2023    Bilateral carotid artery stenosis 11/24/2023    Decreased GFR 05/06/2024    Lumbar radiculopathy 07/11/2024    Lumbar spondylosis 08/22/2024    Melena 11/07/2024    Environmental and seasonal allergies 11/07/2024    Memory problem 11/07/2024    Iron deficiency anemia 11/12/2024    Acute renal failure 12/11/2024    Encounter for palliative care 12/12/2024    Goals of care, counseling/discussion 12/12/2024    Acute metabolic encephalopathy 12/12/2024    Aortic stenosis, severe 12/12/2024    Anemia 12/12/2024    ETOH abuse 12/12/2024    Influenza A (H1N1) 12/12/2024    NSTEMI (non-ST elevated myocardial infarction) (HCC) 12/12/2024    Lower GI bleed 12/12/2024    Chronic heart failure with preserved ejection fraction (HCC) 12/12/2024    Coronary artery disease due to lipid rich plaque 12/12/2024    Delirium 12/18/2024    Vitamin D deficiency 01/08/2025    Early satiety 01/08/2025     Resolved 
 Comment: Please see BCID Interpretation Guide in EPIC Links       Respiratory Panel, Molecular, with COVID-19 (Restricted: peds pts or suitable admitted adults) [[id number]] Collected: 04/06/25 0340    Order Status: Completed Specimen: Nasopharyngeal Updated: 04/06/25 0507     Adenovirus by PCR Not detected        Coronavirus 229E by PCR Not detected        Coronavirus HKU1 by PCR Not detected        Coronavirus NL63 by PCR Not detected        Coronavirus OC43 by PCR Not detected        SARS-CoV-2, PCR Not detected        Human Metapneumovirus by PCR Not detected        Rhinovirus Enterovirus PCR Not detected        Influenza A by PCR Not detected        Influenza B PCR Not detected        Parainfluenza 1 PCR Not detected        Parainfluenza 2 PCR Not detected        Parainfluenza 3 PCR Not detected        Parainfluenza 4 PCR Not detected        Respiratory Syncytial Virus by PCR Not detected        Bordetella parapertussis by PCR Not detected        Bordetella pertussis by PCR Not detected        Chlamydophila Pneumonia PCR Not detected        Mycoplasma pneumo by PCR Not detected                 CARDIO:  Telemetry:normal sinus rhythm  Encounter Date: 04/06/25   EKG 12 Lead   Result Value    Ventricular Rate 99    Atrial Rate 99    P-R Interval 134    QRS Duration 84    Q-T Interval 388    QTc Calculation (Bazett) 497    P Axis 59    R Axis -3    T Axis 81    Diagnosis      Normal sinus rhythm  Possible Left atrial enlargement  Septal infarct (cited on or before 07-MAR-2022)  T wave abnormality, consider anterolateral ischemia  Abnormal ECG  When compared with ECG of 06-APR-2025 03:36,  No significant change was found         No valid procedures specified.    MEDS: Please also see MAR  Current Facility-Administered Medications   Medication Dose Route Frequency Provider Last Rate Last Admin    aspirin chewable tablet 81 mg  81 mg Oral Daily Melina Matthews PA-C        doxycycline (VIBRAMYCIN) 100 mg in sodium

## 2025-04-14 NOTE — PROGRESS NOTES
Cath holding summary:    1230: Received nurse handoff report from AMBER Turner on Gio Khalil    1300: Patient ambulated from waiting area without difficulty, placed on monitor NSR. A&O x 4, no c/o pain. NPO since midnight, ID and allergies verified. H&P reviewed, med rec completed. PIV x 3  in place, blood sent to lab. Groin prep completed, consent ready for signature.    1523: s/p patient tolerated procedure well, still awakening from anesthesia.    1543: Patient awake from anesthesia, alert and oriented x 4, no complaints of pain.     1550: Verbal report given to AMBER Turner on Gio Khalil  being transferred to [facility] for routine post-op. Report consisted of patient's Situation, Background, Assessment and Recommendations (SBAR). Information from the following report(s) Nurse Handoff Report, Adult Overview, Surgery Report, and MAR was reviewed with the receiving nurse. Opportunity for questions and clarification was provided. Patient transported with: Tech    1600: Patient transported to inpatient room via bed with tech and family.

## 2025-04-15 LAB
ALBUMIN SERPL-MCNC: 2.4 G/DL (ref 3.4–5)
ALBUMIN/GLOB SERPL: 0.7 (ref 0.8–1.7)
ALP SERPL-CCNC: 58 U/L (ref 45–117)
ALT SERPL-CCNC: 25 U/L (ref 16–61)
ANION GAP SERPL CALC-SCNC: 6 MMOL/L (ref 3–18)
AST SERPL-CCNC: 27 U/L (ref 10–38)
BASOPHILS # BLD: 0.02 K/UL (ref 0–0.1)
BASOPHILS NFR BLD: 0.1 % (ref 0–2)
BILIRUB DIRECT SERPL-MCNC: 0.1 MG/DL (ref 0–0.2)
BILIRUB SERPL-MCNC: 0.7 MG/DL (ref 0.2–1)
BUN SERPL-MCNC: 19 MG/DL (ref 7–18)
BUN/CREAT SERPL: 22 (ref 12–20)
CALCIUM SERPL-MCNC: 8.2 MG/DL (ref 8.5–10.1)
CHLORIDE SERPL-SCNC: 96 MMOL/L (ref 100–111)
CO2 SERPL-SCNC: 34 MMOL/L (ref 21–32)
CREAT SERPL-MCNC: 0.88 MG/DL (ref 0.6–1.3)
DIFFERENTIAL METHOD BLD: ABNORMAL
EOSINOPHIL # BLD: 0.66 K/UL (ref 0–0.4)
EOSINOPHIL NFR BLD: 4.8 % (ref 0–5)
ERYTHROCYTE [DISTWIDTH] IN BLOOD BY AUTOMATED COUNT: 21.4 % (ref 11.6–14.5)
GLOBULIN SER CALC-MCNC: 3.5 G/DL (ref 2–4)
GLUCOSE SERPL-MCNC: 105 MG/DL (ref 74–99)
HCT VFR BLD AUTO: 25.8 % (ref 36–48)
HGB BLD-MCNC: 8.1 G/DL (ref 13–16)
IMM GRANULOCYTES # BLD AUTO: 0.15 K/UL (ref 0–0.04)
IMM GRANULOCYTES NFR BLD AUTO: 1.1 % (ref 0–0.5)
LYMPHOCYTES # BLD: 2.39 K/UL (ref 0.9–3.6)
LYMPHOCYTES NFR BLD: 17.6 % (ref 21–52)
MCH RBC QN AUTO: 28.8 PG (ref 24–34)
MCHC RBC AUTO-ENTMCNC: 31.4 G/DL (ref 31–37)
MCV RBC AUTO: 91.8 FL (ref 78–100)
MONOCYTES # BLD: 1.13 K/UL (ref 0.05–1.2)
MONOCYTES NFR BLD: 8.3 % (ref 3–10)
NEUTS SEG # BLD: 9.26 K/UL (ref 1.8–8)
NEUTS SEG NFR BLD: 68.1 % (ref 40–73)
NRBC # BLD: 0 K/UL (ref 0–0.01)
NRBC BLD-RTO: 0 PER 100 WBC
PLATELET # BLD AUTO: 267 K/UL (ref 135–420)
PMV BLD AUTO: 11 FL (ref 9.2–11.8)
POTASSIUM SERPL-SCNC: 3.9 MMOL/L (ref 3.5–5.5)
PROT SERPL-MCNC: 5.9 G/DL (ref 6.4–8.2)
RBC # BLD AUTO: 2.81 M/UL (ref 4.35–5.65)
SODIUM SERPL-SCNC: 136 MMOL/L (ref 136–145)
WBC # BLD AUTO: 13.6 K/UL (ref 4.6–13.2)

## 2025-04-15 PROCEDURE — 2500000003 HC RX 250 WO HCPCS: Performed by: INTERNAL MEDICINE

## 2025-04-15 PROCEDURE — 36415 COLL VENOUS BLD VENIPUNCTURE: CPT

## 2025-04-15 PROCEDURE — 85576 BLOOD PLATELET AGGREGATION: CPT

## 2025-04-15 PROCEDURE — 6360000002 HC RX W HCPCS: Performed by: PHYSICIAN ASSISTANT

## 2025-04-15 PROCEDURE — 97530 THERAPEUTIC ACTIVITIES: CPT

## 2025-04-15 PROCEDURE — 2580000003 HC RX 258: Performed by: INTERNAL MEDICINE

## 2025-04-15 PROCEDURE — 80048 BASIC METABOLIC PNL TOTAL CA: CPT

## 2025-04-15 PROCEDURE — 97116 GAIT TRAINING THERAPY: CPT

## 2025-04-15 PROCEDURE — 6370000000 HC RX 637 (ALT 250 FOR IP): Performed by: PHYSICIAN ASSISTANT

## 2025-04-15 PROCEDURE — 2500000003 HC RX 250 WO HCPCS: Performed by: PHYSICIAN ASSISTANT

## 2025-04-15 PROCEDURE — 6370000000 HC RX 637 (ALT 250 FOR IP): Performed by: INTERNAL MEDICINE

## 2025-04-15 PROCEDURE — 97535 SELF CARE MNGMENT TRAINING: CPT

## 2025-04-15 PROCEDURE — 80076 HEPATIC FUNCTION PANEL: CPT

## 2025-04-15 PROCEDURE — 2700000000 HC OXYGEN THERAPY PER DAY

## 2025-04-15 PROCEDURE — 2140000001 HC CVICU INTERMEDIATE R&B

## 2025-04-15 PROCEDURE — 99232 SBSQ HOSP IP/OBS MODERATE 35: CPT | Performed by: INTERNAL MEDICINE

## 2025-04-15 PROCEDURE — 94640 AIRWAY INHALATION TREATMENT: CPT

## 2025-04-15 PROCEDURE — 99232 SBSQ HOSP IP/OBS MODERATE 35: CPT | Performed by: HOSPITALIST

## 2025-04-15 PROCEDURE — 6360000002 HC RX W HCPCS

## 2025-04-15 PROCEDURE — 94761 N-INVAS EAR/PLS OXIMETRY MLT: CPT

## 2025-04-15 PROCEDURE — 6370000000 HC RX 637 (ALT 250 FOR IP): Performed by: HOSPITALIST

## 2025-04-15 PROCEDURE — 6360000002 HC RX W HCPCS: Performed by: INTERNAL MEDICINE

## 2025-04-15 PROCEDURE — 85025 COMPLETE CBC W/AUTO DIFF WBC: CPT

## 2025-04-15 RX ADMIN — BUSPIRONE HYDROCHLORIDE 5 MG: 5 TABLET ORAL at 20:41

## 2025-04-15 RX ADMIN — WATER 2000 MG: 1 INJECTION INTRAMUSCULAR; INTRAVENOUS; SUBCUTANEOUS at 17:14

## 2025-04-15 RX ADMIN — BUSPIRONE HYDROCHLORIDE 5 MG: 5 TABLET ORAL at 14:23

## 2025-04-15 RX ADMIN — SODIUM CHLORIDE 3000 MG: 9 INJECTION, SOLUTION INTRAVENOUS at 00:04

## 2025-04-15 RX ADMIN — METOPROLOL SUCCINATE 25 MG: 25 TABLET, EXTENDED RELEASE ORAL at 08:44

## 2025-04-15 RX ADMIN — SODIUM CHLORIDE, PRESERVATIVE FREE 10 ML: 5 INJECTION INTRAVENOUS at 08:39

## 2025-04-15 RX ADMIN — CITALOPRAM HYDROBROMIDE 10 MG: 20 TABLET ORAL at 08:44

## 2025-04-15 RX ADMIN — ASPIRIN 81 MG CHEWABLE TABLET 81 MG: 81 TABLET CHEWABLE at 08:45

## 2025-04-15 RX ADMIN — IPRATROPIUM BROMIDE 0.5 MG: 0.5 SOLUTION RESPIRATORY (INHALATION) at 15:50

## 2025-04-15 RX ADMIN — BUDESONIDE 1 MG: 1 SUSPENSION RESPIRATORY (INHALATION) at 19:23

## 2025-04-15 RX ADMIN — SODIUM CHLORIDE, PRESERVATIVE FREE 10 ML: 5 INJECTION INTRAVENOUS at 20:44

## 2025-04-15 RX ADMIN — IPRATROPIUM BROMIDE 0.5 MG: 0.5 SOLUTION RESPIRATORY (INHALATION) at 19:23

## 2025-04-15 RX ADMIN — AMPICILLIN SODIUM 2000 MG: 2 INJECTION, POWDER, FOR SOLUTION INTRAMUSCULAR; INTRAVENOUS at 12:37

## 2025-04-15 RX ADMIN — SODIUM CHLORIDE 3000 MG: 9 INJECTION, SOLUTION INTRAVENOUS at 06:15

## 2025-04-15 RX ADMIN — ARFORMOTEROL TARTRATE 15 MCG: 15 SOLUTION RESPIRATORY (INHALATION) at 19:23

## 2025-04-15 RX ADMIN — PANTOPRAZOLE SODIUM 40 MG: 40 TABLET, DELAYED RELEASE ORAL at 06:15

## 2025-04-15 RX ADMIN — PREGABALIN 150 MG: 75 CAPSULE ORAL at 20:41

## 2025-04-15 RX ADMIN — FUROSEMIDE 20 MG: 10 INJECTION, SOLUTION INTRAMUSCULAR; INTRAVENOUS at 08:45

## 2025-04-15 RX ADMIN — BUDESONIDE 1 MG: 1 SUSPENSION RESPIRATORY (INHALATION) at 07:00

## 2025-04-15 RX ADMIN — IPRATROPIUM BROMIDE 0.5 MG: 0.5 SOLUTION RESPIRATORY (INHALATION) at 07:00

## 2025-04-15 RX ADMIN — AMPICILLIN SODIUM 2000 MG: 2 INJECTION, POWDER, FOR SOLUTION INTRAMUSCULAR; INTRAVENOUS at 23:59

## 2025-04-15 RX ADMIN — ATORVASTATIN CALCIUM 80 MG: 40 TABLET, FILM COATED ORAL at 20:41

## 2025-04-15 RX ADMIN — FUROSEMIDE 20 MG: 10 INJECTION, SOLUTION INTRAMUSCULAR; INTRAVENOUS at 18:25

## 2025-04-15 RX ADMIN — BUSPIRONE HYDROCHLORIDE 5 MG: 5 TABLET ORAL at 08:44

## 2025-04-15 RX ADMIN — WATER 2000 MG: 1 INJECTION INTRAMUSCULAR; INTRAVENOUS; SUBCUTANEOUS at 04:07

## 2025-04-15 RX ADMIN — PREGABALIN 150 MG: 75 CAPSULE ORAL at 08:45

## 2025-04-15 RX ADMIN — THERA TABS 1 TABLET: TAB at 08:45

## 2025-04-15 RX ADMIN — AMPICILLIN SODIUM 2000 MG: 2 INJECTION, POWDER, FOR SOLUTION INTRAMUSCULAR; INTRAVENOUS at 17:19

## 2025-04-15 RX ADMIN — AMPICILLIN SODIUM 2000 MG: 2 INJECTION, POWDER, FOR SOLUTION INTRAMUSCULAR; INTRAVENOUS at 20:40

## 2025-04-15 RX ADMIN — ARFORMOTEROL TARTRATE 15 MCG: 15 SOLUTION RESPIRATORY (INHALATION) at 07:00

## 2025-04-15 ASSESSMENT — PAIN SCALES - GENERAL
PAINLEVEL_OUTOF10: 0

## 2025-04-16 LAB
ANION GAP SERPL CALC-SCNC: 4 MMOL/L (ref 3–18)
BASOPHILS # BLD: 0.01 K/UL (ref 0–0.1)
BASOPHILS NFR BLD: 0.1 % (ref 0–2)
BUN SERPL-MCNC: 18 MG/DL (ref 7–18)
BUN/CREAT SERPL: 18 (ref 12–20)
CALCIUM SERPL-MCNC: 8.5 MG/DL (ref 8.5–10.1)
CHLORIDE SERPL-SCNC: 98 MMOL/L (ref 100–111)
CO2 SERPL-SCNC: 33 MMOL/L (ref 21–32)
CREAT SERPL-MCNC: 0.98 MG/DL (ref 0.6–1.3)
DIFFERENTIAL METHOD BLD: ABNORMAL
ECHO BSA: 1.57 M2
EOSINOPHIL # BLD: 0.56 K/UL (ref 0–0.4)
EOSINOPHIL NFR BLD: 4.8 % (ref 0–5)
ERYTHROCYTE [DISTWIDTH] IN BLOOD BY AUTOMATED COUNT: 21.7 % (ref 11.6–14.5)
GLUCOSE SERPL-MCNC: 103 MG/DL (ref 74–99)
HCT VFR BLD AUTO: 24.7 % (ref 36–48)
HGB BLD-MCNC: 7.7 G/DL (ref 13–16)
IMM GRANULOCYTES # BLD AUTO: 0.06 K/UL (ref 0–0.04)
IMM GRANULOCYTES NFR BLD AUTO: 0.5 % (ref 0–0.5)
LYMPHOCYTES # BLD: 1.9 K/UL (ref 0.9–3.6)
LYMPHOCYTES NFR BLD: 16.4 % (ref 21–52)
MCH RBC QN AUTO: 28.7 PG (ref 24–34)
MCHC RBC AUTO-ENTMCNC: 31.2 G/DL (ref 31–37)
MCV RBC AUTO: 92.2 FL (ref 78–100)
MONOCYTES # BLD: 1 K/UL (ref 0.05–1.2)
MONOCYTES NFR BLD: 8.6 % (ref 3–10)
NEUTS SEG # BLD: 8.07 K/UL (ref 1.8–8)
NEUTS SEG NFR BLD: 69.6 % (ref 40–73)
NRBC # BLD: 0 K/UL (ref 0–0.01)
NRBC BLD-RTO: 0 PER 100 WBC
PLATELET # BLD AUTO: 233 K/UL (ref 135–420)
PMV BLD AUTO: 11.4 FL (ref 9.2–11.8)
POTASSIUM SERPL-SCNC: 3.9 MMOL/L (ref 3.5–5.5)
RBC # BLD AUTO: 2.68 M/UL (ref 4.35–5.65)
SODIUM SERPL-SCNC: 135 MMOL/L (ref 136–145)
WBC # BLD AUTO: 11.6 K/UL (ref 4.6–13.2)

## 2025-04-16 PROCEDURE — 80048 BASIC METABOLIC PNL TOTAL CA: CPT

## 2025-04-16 PROCEDURE — 6360000002 HC RX W HCPCS: Performed by: PHYSICIAN ASSISTANT

## 2025-04-16 PROCEDURE — 85025 COMPLETE CBC W/AUTO DIFF WBC: CPT

## 2025-04-16 PROCEDURE — 6360000002 HC RX W HCPCS

## 2025-04-16 PROCEDURE — 99232 SBSQ HOSP IP/OBS MODERATE 35: CPT | Performed by: INTERNAL MEDICINE

## 2025-04-16 PROCEDURE — 2580000003 HC RX 258: Performed by: INTERNAL MEDICINE

## 2025-04-16 PROCEDURE — 2500000003 HC RX 250 WO HCPCS: Performed by: INTERNAL MEDICINE

## 2025-04-16 PROCEDURE — 2500000003 HC RX 250 WO HCPCS: Performed by: PHYSICIAN ASSISTANT

## 2025-04-16 PROCEDURE — 6360000002 HC RX W HCPCS: Performed by: INTERNAL MEDICINE

## 2025-04-16 PROCEDURE — 7100000010 HC PHASE II RECOVERY - FIRST 15 MIN: Performed by: INTERNAL MEDICINE

## 2025-04-16 PROCEDURE — 7100000011 HC PHASE II RECOVERY - ADDTL 15 MIN: Performed by: INTERNAL MEDICINE

## 2025-04-16 PROCEDURE — 6370000000 HC RX 637 (ALT 250 FOR IP): Performed by: HOSPITALIST

## 2025-04-16 PROCEDURE — C1894 INTRO/SHEATH, NON-LASER: HCPCS | Performed by: INTERNAL MEDICINE

## 2025-04-16 PROCEDURE — 6370000000 HC RX 637 (ALT 250 FOR IP): Performed by: PHYSICIAN ASSISTANT

## 2025-04-16 PROCEDURE — 2709999900 HC NON-CHARGEABLE SUPPLY: Performed by: INTERNAL MEDICINE

## 2025-04-16 PROCEDURE — 94640 AIRWAY INHALATION TREATMENT: CPT

## 2025-04-16 PROCEDURE — C1751 CATH, INF, PER/CENT/MIDLINE: HCPCS | Performed by: INTERNAL MEDICINE

## 2025-04-16 PROCEDURE — 6370000000 HC RX 637 (ALT 250 FOR IP): Performed by: INTERNAL MEDICINE

## 2025-04-16 PROCEDURE — 2700000000 HC OXYGEN THERAPY PER DAY

## 2025-04-16 PROCEDURE — 3E033XZ INTRODUCTION OF VASOPRESSOR INTO PERIPHERAL VEIN, PERCUTANEOUS APPROACH: ICD-10-PCS | Performed by: INTERNAL MEDICINE

## 2025-04-16 PROCEDURE — 93460 R&L HRT ART/VENTRICLE ANGIO: CPT | Performed by: INTERNAL MEDICINE

## 2025-04-16 PROCEDURE — 6360000004 HC RX CONTRAST MEDICATION: Performed by: INTERNAL MEDICINE

## 2025-04-16 PROCEDURE — 94761 N-INVAS EAR/PLS OXIMETRY MLT: CPT

## 2025-04-16 PROCEDURE — 36415 COLL VENOUS BLD VENIPUNCTURE: CPT

## 2025-04-16 PROCEDURE — 2140000001 HC CVICU INTERMEDIATE R&B

## 2025-04-16 PROCEDURE — C1769 GUIDE WIRE: HCPCS | Performed by: INTERNAL MEDICINE

## 2025-04-16 RX ORDER — FENTANYL CITRATE 50 UG/ML
INJECTION, SOLUTION INTRAMUSCULAR; INTRAVENOUS PRN
Status: DISCONTINUED | OUTPATIENT
Start: 2025-04-16 | End: 2025-04-16 | Stop reason: HOSPADM

## 2025-04-16 RX ORDER — PHENYLEPHRINE HCL IN 0.9% NACL 1 MG/10 ML
SYRINGE (ML) INTRAVENOUS PRN
Status: DISCONTINUED | OUTPATIENT
Start: 2025-04-16 | End: 2025-04-16 | Stop reason: HOSPADM

## 2025-04-16 RX ORDER — PREGABALIN 75 MG/1
75 CAPSULE ORAL 2 TIMES DAILY
Qty: 60 CAPSULE | Refills: 2 | OUTPATIENT
Start: 2025-04-16 | End: 2025-06-15

## 2025-04-16 RX ORDER — MIDAZOLAM HYDROCHLORIDE 1 MG/ML
INJECTION, SOLUTION INTRAMUSCULAR; INTRAVENOUS PRN
Status: DISCONTINUED | OUTPATIENT
Start: 2025-04-16 | End: 2025-04-16 | Stop reason: HOSPADM

## 2025-04-16 RX ORDER — HEPARIN SODIUM 1000 [USP'U]/ML
INJECTION, SOLUTION INTRAVENOUS; SUBCUTANEOUS PRN
Status: DISCONTINUED | OUTPATIENT
Start: 2025-04-16 | End: 2025-04-16 | Stop reason: HOSPADM

## 2025-04-16 RX ORDER — IOPAMIDOL 612 MG/ML
INJECTION, SOLUTION INTRAVASCULAR PRN
Status: DISCONTINUED | OUTPATIENT
Start: 2025-04-16 | End: 2025-04-16 | Stop reason: HOSPADM

## 2025-04-16 RX ADMIN — PANTOPRAZOLE SODIUM 40 MG: 40 TABLET, DELAYED RELEASE ORAL at 06:55

## 2025-04-16 RX ADMIN — IPRATROPIUM BROMIDE 0.5 MG: 0.5 SOLUTION RESPIRATORY (INHALATION) at 15:15

## 2025-04-16 RX ADMIN — FUROSEMIDE 20 MG: 10 INJECTION, SOLUTION INTRAMUSCULAR; INTRAVENOUS at 08:03

## 2025-04-16 RX ADMIN — AMPICILLIN SODIUM 2000 MG: 2 INJECTION, POWDER, FOR SOLUTION INTRAMUSCULAR; INTRAVENOUS at 22:04

## 2025-04-16 RX ADMIN — IPRATROPIUM BROMIDE 0.5 MG: 0.5 SOLUTION RESPIRATORY (INHALATION) at 07:24

## 2025-04-16 RX ADMIN — IPRATROPIUM BROMIDE 0.5 MG: 0.5 SOLUTION RESPIRATORY (INHALATION) at 20:46

## 2025-04-16 RX ADMIN — SODIUM CHLORIDE, PRESERVATIVE FREE 10 ML: 5 INJECTION INTRAVENOUS at 22:02

## 2025-04-16 RX ADMIN — BUSPIRONE HYDROCHLORIDE 5 MG: 5 TABLET ORAL at 14:59

## 2025-04-16 RX ADMIN — CITALOPRAM HYDROBROMIDE 10 MG: 20 TABLET ORAL at 08:03

## 2025-04-16 RX ADMIN — THERA TABS 1 TABLET: TAB at 08:03

## 2025-04-16 RX ADMIN — ARFORMOTEROL TARTRATE 15 MCG: 15 SOLUTION RESPIRATORY (INHALATION) at 20:46

## 2025-04-16 RX ADMIN — PREGABALIN 150 MG: 75 CAPSULE ORAL at 08:02

## 2025-04-16 RX ADMIN — AMPICILLIN SODIUM 2000 MG: 2 INJECTION, POWDER, FOR SOLUTION INTRAMUSCULAR; INTRAVENOUS at 18:17

## 2025-04-16 RX ADMIN — BUDESONIDE 1 MG: 1 SUSPENSION RESPIRATORY (INHALATION) at 07:24

## 2025-04-16 RX ADMIN — AMPICILLIN SODIUM 2000 MG: 2 INJECTION, POWDER, FOR SOLUTION INTRAMUSCULAR; INTRAVENOUS at 14:50

## 2025-04-16 RX ADMIN — METOPROLOL SUCCINATE 25 MG: 25 TABLET, EXTENDED RELEASE ORAL at 08:03

## 2025-04-16 RX ADMIN — WATER 2000 MG: 1 INJECTION INTRAMUSCULAR; INTRAVENOUS; SUBCUTANEOUS at 14:54

## 2025-04-16 RX ADMIN — PREGABALIN 150 MG: 75 CAPSULE ORAL at 22:01

## 2025-04-16 RX ADMIN — ASPIRIN 81 MG CHEWABLE TABLET 81 MG: 81 TABLET CHEWABLE at 08:03

## 2025-04-16 RX ADMIN — AMPICILLIN SODIUM 2000 MG: 2 INJECTION, POWDER, FOR SOLUTION INTRAMUSCULAR; INTRAVENOUS at 04:08

## 2025-04-16 RX ADMIN — ARFORMOTEROL TARTRATE 15 MCG: 15 SOLUTION RESPIRATORY (INHALATION) at 07:24

## 2025-04-16 RX ADMIN — BUSPIRONE HYDROCHLORIDE 5 MG: 5 TABLET ORAL at 22:01

## 2025-04-16 RX ADMIN — ATORVASTATIN CALCIUM 80 MG: 40 TABLET, FILM COATED ORAL at 22:01

## 2025-04-16 RX ADMIN — BUSPIRONE HYDROCHLORIDE 5 MG: 5 TABLET ORAL at 08:03

## 2025-04-16 RX ADMIN — SODIUM CHLORIDE, PRESERVATIVE FREE 10 ML: 5 INJECTION INTRAVENOUS at 08:03

## 2025-04-16 RX ADMIN — WATER 2000 MG: 1 INJECTION INTRAMUSCULAR; INTRAVENOUS; SUBCUTANEOUS at 04:02

## 2025-04-16 RX ADMIN — BUDESONIDE 1 MG: 1 SUSPENSION RESPIRATORY (INHALATION) at 20:46

## 2025-04-16 RX ADMIN — AMPICILLIN SODIUM 2000 MG: 2 INJECTION, POWDER, FOR SOLUTION INTRAMUSCULAR; INTRAVENOUS at 08:16

## 2025-04-16 RX ADMIN — FUROSEMIDE 20 MG: 10 INJECTION, SOLUTION INTRAMUSCULAR; INTRAVENOUS at 18:14

## 2025-04-16 ASSESSMENT — PAIN SCALES - GENERAL
PAINLEVEL_OUTOF10: 0

## 2025-04-16 NOTE — NURSE NAVIGATOR
HF Navigator attempted education this day; however, pt unable to be seen due to:    [  ] lethargy/confusion  [  ] off floor for testing   [  ] RN care  [  ] Pain   [ X resting with eyes closed-- ] Other    Will follow up next day as indicated.    Bernie Randhawa RN  4/16/2025

## 2025-04-16 NOTE — PALLIATIVE CARE
Palliative Medicine Consult  [facility]: [phone])       [facility]: [phone]     Goals of care defined, PM team will sign off.   No further palliative intervention required.  Thank you for the Palliative Medicine consult and allowing us to participate in the care of Mr. Gio Khalil.        GOALS OF CARE: DNR/DNI       Sonja RAHMAN, RN  Palliative Medicine Inpatient RN  [facility]   Palliative COP[facility] Line: [phone])

## 2025-04-17 LAB
ALBUMIN SERPL-MCNC: 2.3 G/DL (ref 3.4–5)
ALBUMIN/GLOB SERPL: 0.7 (ref 0.8–1.7)
ALP SERPL-CCNC: 57 U/L (ref 45–117)
ALT SERPL-CCNC: 20 U/L (ref 16–61)
ANION GAP SERPL CALC-SCNC: 3 MMOL/L (ref 3–18)
AST SERPL-CCNC: 23 U/L (ref 10–38)
BACTERIA SPEC CULT: NORMAL
BASOPHILS # BLD: 0.02 K/UL (ref 0–0.1)
BASOPHILS NFR BLD: 0.2 % (ref 0–2)
BILIRUB SERPL-MCNC: 0.2 MG/DL (ref 0.2–1)
BUN SERPL-MCNC: 15 MG/DL (ref 7–18)
BUN/CREAT SERPL: 15 (ref 12–20)
CALCIUM SERPL-MCNC: 8.2 MG/DL (ref 8.5–10.1)
CHLORIDE SERPL-SCNC: 99 MMOL/L (ref 100–111)
CO2 SERPL-SCNC: 32 MMOL/L (ref 21–32)
CREAT SERPL-MCNC: 0.97 MG/DL (ref 0.6–1.3)
DIFFERENTIAL METHOD BLD: ABNORMAL
EOSINOPHIL # BLD: 0.73 K/UL (ref 0–0.4)
EOSINOPHIL NFR BLD: 7 % (ref 0–5)
ERYTHROCYTE [DISTWIDTH] IN BLOOD BY AUTOMATED COUNT: 22.4 % (ref 11.6–14.5)
GLOBULIN SER CALC-MCNC: 3.4 G/DL (ref 2–4)
GLUCOSE SERPL-MCNC: 89 MG/DL (ref 74–99)
HCT VFR BLD AUTO: 24.6 % (ref 36–48)
HGB BLD-MCNC: 7.6 G/DL (ref 13–16)
IMM GRANULOCYTES # BLD AUTO: 0.06 K/UL (ref 0–0.04)
IMM GRANULOCYTES NFR BLD AUTO: 0.6 % (ref 0–0.5)
LYMPHOCYTES # BLD: 1.74 K/UL (ref 0.9–3.6)
LYMPHOCYTES NFR BLD: 16.7 % (ref 21–52)
MCH RBC QN AUTO: 29.2 PG (ref 24–34)
MCHC RBC AUTO-ENTMCNC: 30.9 G/DL (ref 31–37)
MCV RBC AUTO: 94.6 FL (ref 78–100)
MONOCYTES # BLD: 0.97 K/UL (ref 0.05–1.2)
MONOCYTES NFR BLD: 9.3 % (ref 3–10)
NEUTS SEG # BLD: 6.92 K/UL (ref 1.8–8)
NEUTS SEG NFR BLD: 66.2 % (ref 40–73)
NRBC # BLD: 0 K/UL (ref 0–0.01)
NRBC BLD-RTO: 0 PER 100 WBC
PLATELET # BLD AUTO: 206 K/UL (ref 135–420)
PMV BLD AUTO: 11.5 FL (ref 9.2–11.8)
POTASSIUM SERPL-SCNC: 4.1 MMOL/L (ref 3.5–5.5)
PROT SERPL-MCNC: 5.7 G/DL (ref 6.4–8.2)
RBC # BLD AUTO: 2.6 M/UL (ref 4.35–5.65)
SERVICE CMNT-IMP: NORMAL
SODIUM SERPL-SCNC: 134 MMOL/L (ref 136–145)
WBC # BLD AUTO: 10.4 K/UL (ref 4.6–13.2)

## 2025-04-17 PROCEDURE — 99232 SBSQ HOSP IP/OBS MODERATE 35: CPT | Performed by: HOSPITALIST

## 2025-04-17 PROCEDURE — 6360000002 HC RX W HCPCS

## 2025-04-17 PROCEDURE — 94664 DEMO&/EVAL PT USE INHALER: CPT

## 2025-04-17 PROCEDURE — 6370000000 HC RX 637 (ALT 250 FOR IP): Performed by: INTERNAL MEDICINE

## 2025-04-17 PROCEDURE — 6360000002 HC RX W HCPCS: Performed by: INTERNAL MEDICINE

## 2025-04-17 PROCEDURE — 6360000002 HC RX W HCPCS: Performed by: PHYSICIAN ASSISTANT

## 2025-04-17 PROCEDURE — 6370000000 HC RX 637 (ALT 250 FOR IP): Performed by: HOSPITALIST

## 2025-04-17 PROCEDURE — 97116 GAIT TRAINING THERAPY: CPT

## 2025-04-17 PROCEDURE — 85025 COMPLETE CBC W/AUTO DIFF WBC: CPT

## 2025-04-17 PROCEDURE — 80053 COMPREHEN METABOLIC PANEL: CPT

## 2025-04-17 PROCEDURE — 94669 MECHANICAL CHEST WALL OSCILL: CPT

## 2025-04-17 PROCEDURE — 97530 THERAPEUTIC ACTIVITIES: CPT

## 2025-04-17 PROCEDURE — 2500000003 HC RX 250 WO HCPCS: Performed by: INTERNAL MEDICINE

## 2025-04-17 PROCEDURE — 94640 AIRWAY INHALATION TREATMENT: CPT

## 2025-04-17 PROCEDURE — 2500000003 HC RX 250 WO HCPCS: Performed by: PHYSICIAN ASSISTANT

## 2025-04-17 PROCEDURE — 6370000000 HC RX 637 (ALT 250 FOR IP): Performed by: PHYSICIAN ASSISTANT

## 2025-04-17 PROCEDURE — 2140000001 HC CVICU INTERMEDIATE R&B

## 2025-04-17 PROCEDURE — 2700000000 HC OXYGEN THERAPY PER DAY

## 2025-04-17 PROCEDURE — 2580000003 HC RX 258: Performed by: INTERNAL MEDICINE

## 2025-04-17 PROCEDURE — 99232 SBSQ HOSP IP/OBS MODERATE 35: CPT | Performed by: INTERNAL MEDICINE

## 2025-04-17 PROCEDURE — 36415 COLL VENOUS BLD VENIPUNCTURE: CPT

## 2025-04-17 PROCEDURE — 94761 N-INVAS EAR/PLS OXIMETRY MLT: CPT

## 2025-04-17 RX ORDER — LORAZEPAM 0.5 MG/1
0.5 TABLET ORAL EVERY 6 HOURS PRN
Status: DISCONTINUED | OUTPATIENT
Start: 2025-04-17 | End: 2025-04-23 | Stop reason: HOSPADM

## 2025-04-17 RX ADMIN — CITALOPRAM HYDROBROMIDE 10 MG: 20 TABLET ORAL at 08:14

## 2025-04-17 RX ADMIN — ARFORMOTEROL TARTRATE 15 MCG: 15 SOLUTION RESPIRATORY (INHALATION) at 21:10

## 2025-04-17 RX ADMIN — AMPICILLIN SODIUM 2000 MG: 2 INJECTION, POWDER, FOR SOLUTION INTRAMUSCULAR; INTRAVENOUS at 10:14

## 2025-04-17 RX ADMIN — METOPROLOL SUCCINATE 25 MG: 25 TABLET, EXTENDED RELEASE ORAL at 08:14

## 2025-04-17 RX ADMIN — WATER 2000 MG: 1 INJECTION INTRAMUSCULAR; INTRAVENOUS; SUBCUTANEOUS at 04:23

## 2025-04-17 RX ADMIN — BUDESONIDE 1 MG: 1 SUSPENSION RESPIRATORY (INHALATION) at 21:10

## 2025-04-17 RX ADMIN — PREGABALIN 150 MG: 75 CAPSULE ORAL at 22:08

## 2025-04-17 RX ADMIN — AMPICILLIN SODIUM 2000 MG: 2 INJECTION, POWDER, FOR SOLUTION INTRAMUSCULAR; INTRAVENOUS at 22:37

## 2025-04-17 RX ADMIN — PREGABALIN 150 MG: 75 CAPSULE ORAL at 08:30

## 2025-04-17 RX ADMIN — ARFORMOTEROL TARTRATE 15 MCG: 15 SOLUTION RESPIRATORY (INHALATION) at 07:32

## 2025-04-17 RX ADMIN — AMPICILLIN SODIUM 2000 MG: 2 INJECTION, POWDER, FOR SOLUTION INTRAMUSCULAR; INTRAVENOUS at 06:25

## 2025-04-17 RX ADMIN — POLYETHYLENE GLYCOL 3350 34 G: 17 POWDER, FOR SOLUTION ORAL at 08:17

## 2025-04-17 RX ADMIN — WATER 2000 MG: 1 INJECTION INTRAMUSCULAR; INTRAVENOUS; SUBCUTANEOUS at 16:00

## 2025-04-17 RX ADMIN — FUROSEMIDE 20 MG: 10 INJECTION, SOLUTION INTRAMUSCULAR; INTRAVENOUS at 08:14

## 2025-04-17 RX ADMIN — BUSPIRONE HYDROCHLORIDE 5 MG: 5 TABLET ORAL at 08:14

## 2025-04-17 RX ADMIN — BUSPIRONE HYDROCHLORIDE 5 MG: 5 TABLET ORAL at 14:12

## 2025-04-17 RX ADMIN — ASPIRIN 81 MG CHEWABLE TABLET 81 MG: 81 TABLET CHEWABLE at 08:14

## 2025-04-17 RX ADMIN — IPRATROPIUM BROMIDE 0.5 MG: 0.5 SOLUTION RESPIRATORY (INHALATION) at 07:32

## 2025-04-17 RX ADMIN — SODIUM CHLORIDE, PRESERVATIVE FREE 10 ML: 5 INJECTION INTRAVENOUS at 08:15

## 2025-04-17 RX ADMIN — AMPICILLIN SODIUM 2000 MG: 2 INJECTION, POWDER, FOR SOLUTION INTRAMUSCULAR; INTRAVENOUS at 02:09

## 2025-04-17 RX ADMIN — IPRATROPIUM BROMIDE 0.5 MG: 0.5 SOLUTION RESPIRATORY (INHALATION) at 10:33

## 2025-04-17 RX ADMIN — THERA TABS 1 TABLET: TAB at 08:14

## 2025-04-17 RX ADMIN — PANTOPRAZOLE SODIUM 40 MG: 40 TABLET, DELAYED RELEASE ORAL at 06:24

## 2025-04-17 RX ADMIN — IPRATROPIUM BROMIDE 0.5 MG: 0.5 SOLUTION RESPIRATORY (INHALATION) at 21:10

## 2025-04-17 RX ADMIN — AMPICILLIN SODIUM 2000 MG: 2 INJECTION, POWDER, FOR SOLUTION INTRAMUSCULAR; INTRAVENOUS at 14:14

## 2025-04-17 RX ADMIN — BUSPIRONE HYDROCHLORIDE 5 MG: 5 TABLET ORAL at 22:09

## 2025-04-17 RX ADMIN — ATORVASTATIN CALCIUM 80 MG: 40 TABLET, FILM COATED ORAL at 22:09

## 2025-04-17 RX ADMIN — BUDESONIDE 1 MG: 1 SUSPENSION RESPIRATORY (INHALATION) at 07:32

## 2025-04-17 RX ADMIN — IPRATROPIUM BROMIDE 0.5 MG: 0.5 SOLUTION RESPIRATORY (INHALATION) at 15:11

## 2025-04-17 RX ADMIN — SODIUM CHLORIDE, PRESERVATIVE FREE 10 ML: 5 INJECTION INTRAVENOUS at 22:09

## 2025-04-17 ASSESSMENT — PAIN SCALES - GENERAL
PAINLEVEL_OUTOF10: 0

## 2025-04-17 NOTE — NURSE NAVIGATOR
Followed up with patient reinforced low sodium diet, fluid restriction, heart failure zones, will continue to follow  Discussed  pre op[ of CABG.    Bernie Randhawa RN  4/17/2025

## 2025-04-18 LAB — PLT FUNCTION P2Y12: 8 PRU (ref 182–335)

## 2025-04-18 PROCEDURE — 6360000002 HC RX W HCPCS: Performed by: PHYSICIAN ASSISTANT

## 2025-04-18 PROCEDURE — 6360000002 HC RX W HCPCS: Performed by: INTERNAL MEDICINE

## 2025-04-18 PROCEDURE — 2580000003 HC RX 258: Performed by: INTERNAL MEDICINE

## 2025-04-18 PROCEDURE — 6360000002 HC RX W HCPCS

## 2025-04-18 PROCEDURE — 6370000000 HC RX 637 (ALT 250 FOR IP): Performed by: PHYSICIAN ASSISTANT

## 2025-04-18 PROCEDURE — 2500000003 HC RX 250 WO HCPCS: Performed by: PHYSICIAN ASSISTANT

## 2025-04-18 PROCEDURE — 2500000003 HC RX 250 WO HCPCS: Performed by: INTERNAL MEDICINE

## 2025-04-18 PROCEDURE — 6370000000 HC RX 637 (ALT 250 FOR IP): Performed by: INTERNAL MEDICINE

## 2025-04-18 PROCEDURE — 97530 THERAPEUTIC ACTIVITIES: CPT

## 2025-04-18 PROCEDURE — 6370000000 HC RX 637 (ALT 250 FOR IP): Performed by: HOSPITALIST

## 2025-04-18 PROCEDURE — 94640 AIRWAY INHALATION TREATMENT: CPT

## 2025-04-18 PROCEDURE — 99232 SBSQ HOSP IP/OBS MODERATE 35: CPT | Performed by: STUDENT IN AN ORGANIZED HEALTH CARE EDUCATION/TRAINING PROGRAM

## 2025-04-18 PROCEDURE — 2140000001 HC CVICU INTERMEDIATE R&B

## 2025-04-18 PROCEDURE — 97535 SELF CARE MNGMENT TRAINING: CPT

## 2025-04-18 PROCEDURE — 94760 N-INVAS EAR/PLS OXIMETRY 1: CPT

## 2025-04-18 RX ADMIN — WATER 2000 MG: 1 INJECTION INTRAMUSCULAR; INTRAVENOUS; SUBCUTANEOUS at 16:23

## 2025-04-18 RX ADMIN — SODIUM CHLORIDE, PRESERVATIVE FREE 10 ML: 5 INJECTION INTRAVENOUS at 09:00

## 2025-04-18 RX ADMIN — BUDESONIDE 1 MG: 1 SUSPENSION RESPIRATORY (INHALATION) at 08:13

## 2025-04-18 RX ADMIN — IPRATROPIUM BROMIDE 0.5 MG: 0.5 SOLUTION RESPIRATORY (INHALATION) at 08:13

## 2025-04-18 RX ADMIN — BUDESONIDE 1 MG: 1 SUSPENSION RESPIRATORY (INHALATION) at 19:29

## 2025-04-18 RX ADMIN — ARFORMOTEROL TARTRATE 15 MCG: 15 SOLUTION RESPIRATORY (INHALATION) at 19:29

## 2025-04-18 RX ADMIN — BUSPIRONE HYDROCHLORIDE 5 MG: 5 TABLET ORAL at 21:14

## 2025-04-18 RX ADMIN — AMPICILLIN SODIUM 2000 MG: 2 INJECTION, POWDER, FOR SOLUTION INTRAMUSCULAR; INTRAVENOUS at 06:16

## 2025-04-18 RX ADMIN — WATER 2000 MG: 1 INJECTION INTRAMUSCULAR; INTRAVENOUS; SUBCUTANEOUS at 03:55

## 2025-04-18 RX ADMIN — METOPROLOL SUCCINATE 25 MG: 25 TABLET, EXTENDED RELEASE ORAL at 08:59

## 2025-04-18 RX ADMIN — IPRATROPIUM BROMIDE 0.5 MG: 0.5 SOLUTION RESPIRATORY (INHALATION) at 15:49

## 2025-04-18 RX ADMIN — AMPICILLIN SODIUM 2000 MG: 2 INJECTION, POWDER, FOR SOLUTION INTRAMUSCULAR; INTRAVENOUS at 17:49

## 2025-04-18 RX ADMIN — CITALOPRAM HYDROBROMIDE 10 MG: 20 TABLET ORAL at 09:00

## 2025-04-18 RX ADMIN — AMPICILLIN SODIUM 2000 MG: 2 INJECTION, POWDER, FOR SOLUTION INTRAMUSCULAR; INTRAVENOUS at 10:01

## 2025-04-18 RX ADMIN — THERA TABS 1 TABLET: TAB at 08:59

## 2025-04-18 RX ADMIN — FUROSEMIDE 20 MG: 10 INJECTION, SOLUTION INTRAMUSCULAR; INTRAVENOUS at 16:23

## 2025-04-18 RX ADMIN — PREGABALIN 150 MG: 75 CAPSULE ORAL at 21:14

## 2025-04-18 RX ADMIN — IPRATROPIUM BROMIDE 0.5 MG: 0.5 SOLUTION RESPIRATORY (INHALATION) at 12:35

## 2025-04-18 RX ADMIN — ARFORMOTEROL TARTRATE 15 MCG: 15 SOLUTION RESPIRATORY (INHALATION) at 08:13

## 2025-04-18 RX ADMIN — BUSPIRONE HYDROCHLORIDE 5 MG: 5 TABLET ORAL at 09:00

## 2025-04-18 RX ADMIN — AMPICILLIN SODIUM 2000 MG: 2 INJECTION, POWDER, FOR SOLUTION INTRAMUSCULAR; INTRAVENOUS at 03:16

## 2025-04-18 RX ADMIN — BUSPIRONE HYDROCHLORIDE 5 MG: 5 TABLET ORAL at 14:37

## 2025-04-18 RX ADMIN — AMPICILLIN SODIUM 2000 MG: 2 INJECTION, POWDER, FOR SOLUTION INTRAMUSCULAR; INTRAVENOUS at 14:03

## 2025-04-18 RX ADMIN — PREGABALIN 150 MG: 75 CAPSULE ORAL at 08:59

## 2025-04-18 RX ADMIN — IPRATROPIUM BROMIDE 0.5 MG: 0.5 SOLUTION RESPIRATORY (INHALATION) at 19:29

## 2025-04-18 RX ADMIN — ASPIRIN 81 MG CHEWABLE TABLET 81 MG: 81 TABLET CHEWABLE at 09:00

## 2025-04-18 RX ADMIN — ATORVASTATIN CALCIUM 80 MG: 40 TABLET, FILM COATED ORAL at 21:14

## 2025-04-18 RX ADMIN — SODIUM CHLORIDE, PRESERVATIVE FREE 10 ML: 5 INJECTION INTRAVENOUS at 21:15

## 2025-04-18 RX ADMIN — AMPICILLIN SODIUM 2000 MG: 2 INJECTION, POWDER, FOR SOLUTION INTRAMUSCULAR; INTRAVENOUS at 21:17

## 2025-04-18 RX ADMIN — PANTOPRAZOLE SODIUM 40 MG: 40 TABLET, DELAYED RELEASE ORAL at 06:13

## 2025-04-18 ASSESSMENT — PAIN SCALES - GENERAL
PAINLEVEL_OUTOF10: 0

## 2025-04-19 LAB
ANION GAP SERPL CALC-SCNC: 5 MMOL/L (ref 3–18)
BUN SERPL-MCNC: 14 MG/DL (ref 7–18)
BUN/CREAT SERPL: 16 (ref 12–20)
CALCIUM SERPL-MCNC: 8.6 MG/DL (ref 8.5–10.1)
CHLORIDE SERPL-SCNC: 104 MMOL/L (ref 100–111)
CO2 SERPL-SCNC: 27 MMOL/L (ref 21–32)
CREAT SERPL-MCNC: 0.86 MG/DL (ref 0.6–1.3)
ERYTHROCYTE [DISTWIDTH] IN BLOOD BY AUTOMATED COUNT: 21.9 % (ref 11.6–14.5)
GLUCOSE SERPL-MCNC: 85 MG/DL (ref 74–99)
HCT VFR BLD AUTO: 23 % (ref 36–48)
HGB BLD-MCNC: 7.1 G/DL (ref 13–16)
MCH RBC QN AUTO: 28.9 PG (ref 24–34)
MCHC RBC AUTO-ENTMCNC: 30.9 G/DL (ref 31–37)
MCV RBC AUTO: 93.5 FL (ref 78–100)
NRBC # BLD: 0 K/UL (ref 0–0.01)
NRBC BLD-RTO: 0 PER 100 WBC
PLATELET # BLD AUTO: 174 K/UL (ref 135–420)
PMV BLD AUTO: 11.6 FL (ref 9.2–11.8)
POTASSIUM SERPL-SCNC: 4 MMOL/L (ref 3.5–5.5)
RBC # BLD AUTO: 2.46 M/UL (ref 4.35–5.65)
SODIUM SERPL-SCNC: 136 MMOL/L (ref 136–145)
WBC # BLD AUTO: 8.2 K/UL (ref 4.6–13.2)

## 2025-04-19 PROCEDURE — 6370000000 HC RX 637 (ALT 250 FOR IP): Performed by: INTERNAL MEDICINE

## 2025-04-19 PROCEDURE — 6360000002 HC RX W HCPCS: Performed by: INTERNAL MEDICINE

## 2025-04-19 PROCEDURE — 2580000003 HC RX 258: Performed by: INTERNAL MEDICINE

## 2025-04-19 PROCEDURE — 2140000001 HC CVICU INTERMEDIATE R&B

## 2025-04-19 PROCEDURE — 2500000003 HC RX 250 WO HCPCS: Performed by: INTERNAL MEDICINE

## 2025-04-19 PROCEDURE — 80048 BASIC METABOLIC PNL TOTAL CA: CPT

## 2025-04-19 PROCEDURE — 6370000000 HC RX 637 (ALT 250 FOR IP): Performed by: PHYSICIAN ASSISTANT

## 2025-04-19 PROCEDURE — 36415 COLL VENOUS BLD VENIPUNCTURE: CPT

## 2025-04-19 PROCEDURE — 94760 N-INVAS EAR/PLS OXIMETRY 1: CPT

## 2025-04-19 PROCEDURE — 85027 COMPLETE CBC AUTOMATED: CPT

## 2025-04-19 PROCEDURE — 6360000002 HC RX W HCPCS: Performed by: PHYSICIAN ASSISTANT

## 2025-04-19 PROCEDURE — 99232 SBSQ HOSP IP/OBS MODERATE 35: CPT | Performed by: STUDENT IN AN ORGANIZED HEALTH CARE EDUCATION/TRAINING PROGRAM

## 2025-04-19 PROCEDURE — 6360000002 HC RX W HCPCS

## 2025-04-19 PROCEDURE — 94640 AIRWAY INHALATION TREATMENT: CPT

## 2025-04-19 PROCEDURE — 6370000000 HC RX 637 (ALT 250 FOR IP): Performed by: HOSPITALIST

## 2025-04-19 PROCEDURE — 2500000003 HC RX 250 WO HCPCS: Performed by: PHYSICIAN ASSISTANT

## 2025-04-19 RX ADMIN — FUROSEMIDE 20 MG: 10 INJECTION, SOLUTION INTRAMUSCULAR; INTRAVENOUS at 18:24

## 2025-04-19 RX ADMIN — IPRATROPIUM BROMIDE 0.5 MG: 0.5 SOLUTION RESPIRATORY (INHALATION) at 13:15

## 2025-04-19 RX ADMIN — SODIUM CHLORIDE, PRESERVATIVE FREE 10 ML: 5 INJECTION INTRAVENOUS at 08:11

## 2025-04-19 RX ADMIN — ASPIRIN 81 MG CHEWABLE TABLET 81 MG: 81 TABLET CHEWABLE at 08:10

## 2025-04-19 RX ADMIN — AMPICILLIN SODIUM 2000 MG: 2 INJECTION, POWDER, FOR SOLUTION INTRAMUSCULAR; INTRAVENOUS at 14:29

## 2025-04-19 RX ADMIN — ARFORMOTEROL TARTRATE 15 MCG: 15 SOLUTION RESPIRATORY (INHALATION) at 20:26

## 2025-04-19 RX ADMIN — PREGABALIN 150 MG: 75 CAPSULE ORAL at 08:30

## 2025-04-19 RX ADMIN — PREGABALIN 150 MG: 75 CAPSULE ORAL at 21:09

## 2025-04-19 RX ADMIN — PANTOPRAZOLE SODIUM 40 MG: 40 TABLET, DELAYED RELEASE ORAL at 06:32

## 2025-04-19 RX ADMIN — WATER 2000 MG: 1 INJECTION INTRAMUSCULAR; INTRAVENOUS; SUBCUTANEOUS at 16:23

## 2025-04-19 RX ADMIN — IPRATROPIUM BROMIDE 0.5 MG: 0.5 SOLUTION RESPIRATORY (INHALATION) at 20:26

## 2025-04-19 RX ADMIN — SODIUM CHLORIDE, PRESERVATIVE FREE 10 ML: 5 INJECTION INTRAVENOUS at 21:10

## 2025-04-19 RX ADMIN — BUDESONIDE 1 MG: 1 SUSPENSION RESPIRATORY (INHALATION) at 20:26

## 2025-04-19 RX ADMIN — BUDESONIDE 1 MG: 1 SUSPENSION RESPIRATORY (INHALATION) at 08:32

## 2025-04-19 RX ADMIN — WATER 2000 MG: 1 INJECTION INTRAMUSCULAR; INTRAVENOUS; SUBCUTANEOUS at 04:01

## 2025-04-19 RX ADMIN — AMPICILLIN SODIUM 2000 MG: 2 INJECTION, POWDER, FOR SOLUTION INTRAMUSCULAR; INTRAVENOUS at 22:18

## 2025-04-19 RX ADMIN — IPRATROPIUM BROMIDE 0.5 MG: 0.5 SOLUTION RESPIRATORY (INHALATION) at 15:57

## 2025-04-19 RX ADMIN — FUROSEMIDE 20 MG: 10 INJECTION, SOLUTION INTRAMUSCULAR; INTRAVENOUS at 08:10

## 2025-04-19 RX ADMIN — THERA TABS 1 TABLET: TAB at 08:10

## 2025-04-19 RX ADMIN — ARFORMOTEROL TARTRATE 15 MCG: 15 SOLUTION RESPIRATORY (INHALATION) at 08:31

## 2025-04-19 RX ADMIN — AMPICILLIN SODIUM 2000 MG: 2 INJECTION, POWDER, FOR SOLUTION INTRAMUSCULAR; INTRAVENOUS at 02:07

## 2025-04-19 RX ADMIN — ATORVASTATIN CALCIUM 80 MG: 40 TABLET, FILM COATED ORAL at 21:09

## 2025-04-19 RX ADMIN — CITALOPRAM HYDROBROMIDE 10 MG: 20 TABLET ORAL at 08:10

## 2025-04-19 RX ADMIN — AMPICILLIN SODIUM 2000 MG: 2 INJECTION, POWDER, FOR SOLUTION INTRAMUSCULAR; INTRAVENOUS at 06:31

## 2025-04-19 RX ADMIN — AMPICILLIN SODIUM 2000 MG: 2 INJECTION, POWDER, FOR SOLUTION INTRAMUSCULAR; INTRAVENOUS at 10:46

## 2025-04-19 RX ADMIN — BUSPIRONE HYDROCHLORIDE 5 MG: 5 TABLET ORAL at 14:36

## 2025-04-19 RX ADMIN — METOPROLOL SUCCINATE 25 MG: 25 TABLET, EXTENDED RELEASE ORAL at 08:10

## 2025-04-19 RX ADMIN — IPRATROPIUM BROMIDE 0.5 MG: 0.5 SOLUTION RESPIRATORY (INHALATION) at 08:32

## 2025-04-19 RX ADMIN — BUSPIRONE HYDROCHLORIDE 5 MG: 5 TABLET ORAL at 21:10

## 2025-04-19 RX ADMIN — BUSPIRONE HYDROCHLORIDE 5 MG: 5 TABLET ORAL at 08:10

## 2025-04-19 RX ADMIN — AMPICILLIN SODIUM 2000 MG: 2 INJECTION, POWDER, FOR SOLUTION INTRAMUSCULAR; INTRAVENOUS at 18:29

## 2025-04-19 ASSESSMENT — PAIN SCALES - GENERAL
PAINLEVEL_OUTOF10: 0

## 2025-04-20 LAB
ANION GAP SERPL CALC-SCNC: 1 MMOL/L (ref 3–18)
BUN SERPL-MCNC: 19 MG/DL (ref 7–18)
BUN/CREAT SERPL: 17 (ref 12–20)
CALCIUM SERPL-MCNC: 8.9 MG/DL (ref 8.5–10.1)
CHLORIDE SERPL-SCNC: 104 MMOL/L (ref 100–111)
CO2 SERPL-SCNC: 29 MMOL/L (ref 21–32)
CREAT SERPL-MCNC: 1.15 MG/DL (ref 0.6–1.3)
ERYTHROCYTE [DISTWIDTH] IN BLOOD BY AUTOMATED COUNT: 21.9 % (ref 11.6–14.5)
GLUCOSE SERPL-MCNC: 91 MG/DL (ref 74–99)
HCT VFR BLD AUTO: 23.6 % (ref 36–48)
HGB BLD-MCNC: 7.2 G/DL (ref 13–16)
MCH RBC QN AUTO: 28.3 PG (ref 24–34)
MCHC RBC AUTO-ENTMCNC: 30.5 G/DL (ref 31–37)
MCV RBC AUTO: 92.9 FL (ref 78–100)
NRBC # BLD: 0 K/UL (ref 0–0.01)
NRBC BLD-RTO: 0 PER 100 WBC
PLATELET # BLD AUTO: 181 K/UL (ref 135–420)
PMV BLD AUTO: 11.6 FL (ref 9.2–11.8)
POTASSIUM SERPL-SCNC: 4.3 MMOL/L (ref 3.5–5.5)
RBC # BLD AUTO: 2.54 M/UL (ref 4.35–5.65)
SODIUM SERPL-SCNC: 134 MMOL/L (ref 136–145)
WBC # BLD AUTO: 9 K/UL (ref 4.6–13.2)

## 2025-04-20 PROCEDURE — 2140000001 HC CVICU INTERMEDIATE R&B

## 2025-04-20 PROCEDURE — 6360000002 HC RX W HCPCS: Performed by: INTERNAL MEDICINE

## 2025-04-20 PROCEDURE — 99232 SBSQ HOSP IP/OBS MODERATE 35: CPT | Performed by: STUDENT IN AN ORGANIZED HEALTH CARE EDUCATION/TRAINING PROGRAM

## 2025-04-20 PROCEDURE — 80048 BASIC METABOLIC PNL TOTAL CA: CPT

## 2025-04-20 PROCEDURE — 6370000000 HC RX 637 (ALT 250 FOR IP): Performed by: PHYSICIAN ASSISTANT

## 2025-04-20 PROCEDURE — 94640 AIRWAY INHALATION TREATMENT: CPT

## 2025-04-20 PROCEDURE — 6370000000 HC RX 637 (ALT 250 FOR IP): Performed by: INTERNAL MEDICINE

## 2025-04-20 PROCEDURE — 6360000002 HC RX W HCPCS

## 2025-04-20 PROCEDURE — 2500000003 HC RX 250 WO HCPCS: Performed by: PHYSICIAN ASSISTANT

## 2025-04-20 PROCEDURE — 36415 COLL VENOUS BLD VENIPUNCTURE: CPT

## 2025-04-20 PROCEDURE — 94669 MECHANICAL CHEST WALL OSCILL: CPT

## 2025-04-20 PROCEDURE — 2700000000 HC OXYGEN THERAPY PER DAY

## 2025-04-20 PROCEDURE — 6360000002 HC RX W HCPCS: Performed by: PHYSICIAN ASSISTANT

## 2025-04-20 PROCEDURE — 2500000003 HC RX 250 WO HCPCS: Performed by: INTERNAL MEDICINE

## 2025-04-20 PROCEDURE — 94761 N-INVAS EAR/PLS OXIMETRY MLT: CPT

## 2025-04-20 PROCEDURE — 6370000000 HC RX 637 (ALT 250 FOR IP): Performed by: HOSPITALIST

## 2025-04-20 PROCEDURE — 2580000003 HC RX 258: Performed by: INTERNAL MEDICINE

## 2025-04-20 PROCEDURE — 85027 COMPLETE CBC AUTOMATED: CPT

## 2025-04-20 PROCEDURE — 6370000000 HC RX 637 (ALT 250 FOR IP): Performed by: STUDENT IN AN ORGANIZED HEALTH CARE EDUCATION/TRAINING PROGRAM

## 2025-04-20 RX ORDER — IPRATROPIUM BROMIDE AND ALBUTEROL SULFATE 2.5; .5 MG/3ML; MG/3ML
1 SOLUTION RESPIRATORY (INHALATION)
Status: DISCONTINUED | OUTPATIENT
Start: 2025-04-20 | End: 2025-04-23 | Stop reason: HOSPADM

## 2025-04-20 RX ADMIN — BUSPIRONE HYDROCHLORIDE 5 MG: 5 TABLET ORAL at 20:18

## 2025-04-20 RX ADMIN — IPRATROPIUM BROMIDE AND ALBUTEROL SULFATE 1 DOSE: .5; 3 SOLUTION RESPIRATORY (INHALATION) at 20:02

## 2025-04-20 RX ADMIN — ATORVASTATIN CALCIUM 80 MG: 40 TABLET, FILM COATED ORAL at 20:17

## 2025-04-20 RX ADMIN — WATER 2000 MG: 1 INJECTION INTRAMUSCULAR; INTRAVENOUS; SUBCUTANEOUS at 03:28

## 2025-04-20 RX ADMIN — FUROSEMIDE 20 MG: 10 INJECTION, SOLUTION INTRAMUSCULAR; INTRAVENOUS at 09:30

## 2025-04-20 RX ADMIN — THERA TABS 1 TABLET: TAB at 09:30

## 2025-04-20 RX ADMIN — AMPICILLIN SODIUM 2000 MG: 2 INJECTION, POWDER, FOR SOLUTION INTRAMUSCULAR; INTRAVENOUS at 09:51

## 2025-04-20 RX ADMIN — PREGABALIN 150 MG: 75 CAPSULE ORAL at 09:30

## 2025-04-20 RX ADMIN — AMPICILLIN SODIUM 2000 MG: 2 INJECTION, POWDER, FOR SOLUTION INTRAMUSCULAR; INTRAVENOUS at 23:45

## 2025-04-20 RX ADMIN — ARFORMOTEROL TARTRATE 15 MCG: 15 SOLUTION RESPIRATORY (INHALATION) at 07:55

## 2025-04-20 RX ADMIN — BUSPIRONE HYDROCHLORIDE 5 MG: 5 TABLET ORAL at 09:30

## 2025-04-20 RX ADMIN — WATER 2000 MG: 1 INJECTION INTRAMUSCULAR; INTRAVENOUS; SUBCUTANEOUS at 16:02

## 2025-04-20 RX ADMIN — PANTOPRAZOLE SODIUM 40 MG: 40 TABLET, DELAYED RELEASE ORAL at 05:37

## 2025-04-20 RX ADMIN — ASPIRIN 81 MG CHEWABLE TABLET 81 MG: 81 TABLET CHEWABLE at 09:30

## 2025-04-20 RX ADMIN — IPRATROPIUM BROMIDE 0.5 MG: 0.5 SOLUTION RESPIRATORY (INHALATION) at 08:01

## 2025-04-20 RX ADMIN — AMPICILLIN SODIUM 2000 MG: 2 INJECTION, POWDER, FOR SOLUTION INTRAMUSCULAR; INTRAVENOUS at 14:10

## 2025-04-20 RX ADMIN — AMPICILLIN SODIUM 2000 MG: 2 INJECTION, POWDER, FOR SOLUTION INTRAMUSCULAR; INTRAVENOUS at 18:08

## 2025-04-20 RX ADMIN — SODIUM CHLORIDE, PRESERVATIVE FREE 10 ML: 5 INJECTION INTRAVENOUS at 09:47

## 2025-04-20 RX ADMIN — PREGABALIN 150 MG: 75 CAPSULE ORAL at 20:17

## 2025-04-20 RX ADMIN — AMPICILLIN SODIUM 2000 MG: 2 INJECTION, POWDER, FOR SOLUTION INTRAMUSCULAR; INTRAVENOUS at 02:55

## 2025-04-20 RX ADMIN — SODIUM CHLORIDE, PRESERVATIVE FREE 10 ML: 5 INJECTION INTRAVENOUS at 20:18

## 2025-04-20 RX ADMIN — BUSPIRONE HYDROCHLORIDE 5 MG: 5 TABLET ORAL at 14:05

## 2025-04-20 RX ADMIN — CITALOPRAM HYDROBROMIDE 10 MG: 20 TABLET ORAL at 09:30

## 2025-04-20 RX ADMIN — METOPROLOL SUCCINATE 25 MG: 25 TABLET, EXTENDED RELEASE ORAL at 09:30

## 2025-04-20 RX ADMIN — AMPICILLIN SODIUM 2000 MG: 2 INJECTION, POWDER, FOR SOLUTION INTRAMUSCULAR; INTRAVENOUS at 05:40

## 2025-04-20 RX ADMIN — FUROSEMIDE 20 MG: 10 INJECTION, SOLUTION INTRAMUSCULAR; INTRAVENOUS at 18:08

## 2025-04-20 RX ADMIN — BUDESONIDE 1 MG: 1 SUSPENSION RESPIRATORY (INHALATION) at 07:55

## 2025-04-20 ASSESSMENT — PAIN SCALES - GENERAL
PAINLEVEL_OUTOF10: 0

## 2025-04-21 ENCOUNTER — TELEPHONE (OUTPATIENT)
Age: 65
End: 2025-04-21

## 2025-04-21 ENCOUNTER — APPOINTMENT (OUTPATIENT)
Facility: HOSPITAL | Age: 65
DRG: 720 | End: 2025-04-21
Payer: MEDICAID

## 2025-04-21 LAB
ANION GAP SERPL CALC-SCNC: 5 MMOL/L (ref 3–18)
ARTERIAL PATENCY WRIST A: POSITIVE
BASE EXCESS BLD CALC-SCNC: 1.6 MMOL/L
BDY SITE: ABNORMAL
BUN SERPL-MCNC: 25 MG/DL (ref 7–18)
BUN/CREAT SERPL: 24 (ref 12–20)
CALCIUM SERPL-MCNC: 8.9 MG/DL (ref 8.5–10.1)
CHLORIDE SERPL-SCNC: 104 MMOL/L (ref 100–111)
CO2 SERPL-SCNC: 27 MMOL/L (ref 21–32)
CREAT SERPL-MCNC: 1.05 MG/DL (ref 0.6–1.3)
ERYTHROCYTE [DISTWIDTH] IN BLOOD BY AUTOMATED COUNT: 21.9 % (ref 11.6–14.5)
GLUCOSE BLD STRIP.AUTO-MCNC: 126 MG/DL (ref 70–110)
GLUCOSE SERPL-MCNC: 85 MG/DL (ref 74–99)
HCO3 BLD-SCNC: 30.4 MMOL/L (ref 21–28)
HCT VFR BLD AUTO: 23.9 % (ref 36–48)
HGB BLD-MCNC: 7.5 G/DL (ref 13–16)
MCH RBC QN AUTO: 29.2 PG (ref 24–34)
MCHC RBC AUTO-ENTMCNC: 31.4 G/DL (ref 31–37)
MCV RBC AUTO: 93 FL (ref 78–100)
NRBC # BLD: 0 K/UL (ref 0–0.01)
NRBC BLD-RTO: 0 PER 100 WBC
O2/TOTAL GAS SETTING VFR VENT: 100 %
PCO2 BLD: 65.5 MMHG (ref 35–48)
PH BLD: 7.28 (ref 7.35–7.45)
PLATELET # BLD AUTO: 179 K/UL (ref 135–420)
PMV BLD AUTO: 12 FL (ref 9.2–11.8)
PO2 BLD: 91 MMHG (ref 83–108)
POTASSIUM SERPL-SCNC: 4 MMOL/L (ref 3.5–5.5)
RBC # BLD AUTO: 2.57 M/UL (ref 4.35–5.65)
SAO2 % BLD: 95.5 % (ref 92–97)
SERVICE CMNT-IMP: ABNORMAL
SODIUM SERPL-SCNC: 136 MMOL/L (ref 136–145)
SPECIMEN TYPE: ABNORMAL
WBC # BLD AUTO: 8.8 K/UL (ref 4.6–13.2)

## 2025-04-21 PROCEDURE — 6360000002 HC RX W HCPCS

## 2025-04-21 PROCEDURE — 6360000002 HC RX W HCPCS: Performed by: INTERNAL MEDICINE

## 2025-04-21 PROCEDURE — 2700000000 HC OXYGEN THERAPY PER DAY

## 2025-04-21 PROCEDURE — 37799 UNLISTED PX VASCULAR SURGERY: CPT

## 2025-04-21 PROCEDURE — 6360000002 HC RX W HCPCS: Performed by: HOSPITALIST

## 2025-04-21 PROCEDURE — 36600 WITHDRAWAL OF ARTERIAL BLOOD: CPT

## 2025-04-21 PROCEDURE — 6370000000 HC RX 637 (ALT 250 FOR IP): Performed by: STUDENT IN AN ORGANIZED HEALTH CARE EDUCATION/TRAINING PROGRAM

## 2025-04-21 PROCEDURE — 6370000000 HC RX 637 (ALT 250 FOR IP): Performed by: INTERNAL MEDICINE

## 2025-04-21 PROCEDURE — 82803 BLOOD GASES ANY COMBINATION: CPT

## 2025-04-21 PROCEDURE — 71045 X-RAY EXAM CHEST 1 VIEW: CPT

## 2025-04-21 PROCEDURE — 2580000003 HC RX 258: Performed by: INTERNAL MEDICINE

## 2025-04-21 PROCEDURE — 94640 AIRWAY INHALATION TREATMENT: CPT

## 2025-04-21 PROCEDURE — 94664 DEMO&/EVAL PT USE INHALER: CPT

## 2025-04-21 PROCEDURE — 6370000000 HC RX 637 (ALT 250 FOR IP): Performed by: HOSPITALIST

## 2025-04-21 PROCEDURE — 6370000000 HC RX 637 (ALT 250 FOR IP): Performed by: PHYSICIAN ASSISTANT

## 2025-04-21 PROCEDURE — 2140000001 HC CVICU INTERMEDIATE R&B

## 2025-04-21 PROCEDURE — 80048 BASIC METABOLIC PNL TOTAL CA: CPT

## 2025-04-21 PROCEDURE — 36415 COLL VENOUS BLD VENIPUNCTURE: CPT

## 2025-04-21 PROCEDURE — 85027 COMPLETE CBC AUTOMATED: CPT

## 2025-04-21 PROCEDURE — 2500000003 HC RX 250 WO HCPCS: Performed by: PHYSICIAN ASSISTANT

## 2025-04-21 PROCEDURE — 2500000003 HC RX 250 WO HCPCS: Performed by: INTERNAL MEDICINE

## 2025-04-21 PROCEDURE — 99232 SBSQ HOSP IP/OBS MODERATE 35: CPT | Performed by: STUDENT IN AN ORGANIZED HEALTH CARE EDUCATION/TRAINING PROGRAM

## 2025-04-21 PROCEDURE — 82962 GLUCOSE BLOOD TEST: CPT

## 2025-04-21 PROCEDURE — 94761 N-INVAS EAR/PLS OXIMETRY MLT: CPT

## 2025-04-21 PROCEDURE — 6360000002 HC RX W HCPCS: Performed by: PHYSICIAN ASSISTANT

## 2025-04-21 PROCEDURE — 94660 CPAP INITIATION&MGMT: CPT

## 2025-04-21 RX ORDER — FUROSEMIDE 10 MG/ML
40 INJECTION INTRAMUSCULAR; INTRAVENOUS ONCE
Status: COMPLETED | OUTPATIENT
Start: 2025-04-21 | End: 2025-04-21

## 2025-04-21 RX ORDER — DIAZEPAM 10 MG/2ML
1.25 INJECTION, SOLUTION INTRAMUSCULAR; INTRAVENOUS ONCE
Status: COMPLETED | OUTPATIENT
Start: 2025-04-21 | End: 2025-04-21

## 2025-04-21 RX ORDER — PREDNISONE 20 MG/1
40 TABLET ORAL ONCE
Status: COMPLETED | OUTPATIENT
Start: 2025-04-21 | End: 2025-04-21

## 2025-04-21 RX ORDER — MAGNESIUM SULFATE IN WATER 40 MG/ML
2000 INJECTION, SOLUTION INTRAVENOUS ONCE
Status: COMPLETED | OUTPATIENT
Start: 2025-04-21 | End: 2025-04-21

## 2025-04-21 RX ADMIN — WATER 2000 MG: 1 INJECTION INTRAMUSCULAR; INTRAVENOUS; SUBCUTANEOUS at 16:29

## 2025-04-21 RX ADMIN — IPRATROPIUM BROMIDE AND ALBUTEROL SULFATE 1 DOSE: .5; 3 SOLUTION RESPIRATORY (INHALATION) at 19:47

## 2025-04-21 RX ADMIN — METOPROLOL SUCCINATE 25 MG: 25 TABLET, EXTENDED RELEASE ORAL at 11:20

## 2025-04-21 RX ADMIN — AMPICILLIN SODIUM 2000 MG: 2 INJECTION, POWDER, FOR SOLUTION INTRAMUSCULAR; INTRAVENOUS at 02:34

## 2025-04-21 RX ADMIN — IPRATROPIUM BROMIDE AND ALBUTEROL SULFATE 1 DOSE: .5; 3 SOLUTION RESPIRATORY (INHALATION) at 11:34

## 2025-04-21 RX ADMIN — WATER 2000 MG: 1 INJECTION INTRAMUSCULAR; INTRAVENOUS; SUBCUTANEOUS at 04:23

## 2025-04-21 RX ADMIN — CITALOPRAM HYDROBROMIDE 10 MG: 20 TABLET ORAL at 11:22

## 2025-04-21 RX ADMIN — AMPICILLIN SODIUM 2000 MG: 2 INJECTION, POWDER, FOR SOLUTION INTRAMUSCULAR; INTRAVENOUS at 21:24

## 2025-04-21 RX ADMIN — AMPICILLIN SODIUM 2000 MG: 2 INJECTION, POWDER, FOR SOLUTION INTRAMUSCULAR; INTRAVENOUS at 10:24

## 2025-04-21 RX ADMIN — FUROSEMIDE 40 MG: 10 INJECTION, SOLUTION INTRAMUSCULAR; INTRAVENOUS at 08:02

## 2025-04-21 RX ADMIN — SODIUM CHLORIDE, PRESERVATIVE FREE 10 ML: 5 INJECTION INTRAVENOUS at 21:07

## 2025-04-21 RX ADMIN — AMPICILLIN SODIUM 2000 MG: 2 INJECTION, POWDER, FOR SOLUTION INTRAMUSCULAR; INTRAVENOUS at 17:35

## 2025-04-21 RX ADMIN — PREDNISONE 40 MG: 20 TABLET ORAL at 17:19

## 2025-04-21 RX ADMIN — PANTOPRAZOLE SODIUM 40 MG: 40 TABLET, DELAYED RELEASE ORAL at 06:03

## 2025-04-21 RX ADMIN — FUROSEMIDE 20 MG: 10 INJECTION, SOLUTION INTRAMUSCULAR; INTRAVENOUS at 10:13

## 2025-04-21 RX ADMIN — AMPICILLIN SODIUM 2000 MG: 2 INJECTION, POWDER, FOR SOLUTION INTRAMUSCULAR; INTRAVENOUS at 14:19

## 2025-04-21 RX ADMIN — BUSPIRONE HYDROCHLORIDE 5 MG: 5 TABLET ORAL at 11:19

## 2025-04-21 RX ADMIN — IPRATROPIUM BROMIDE AND ALBUTEROL SULFATE 1 DOSE: .5; 3 SOLUTION RESPIRATORY (INHALATION) at 07:18

## 2025-04-21 RX ADMIN — AMPICILLIN SODIUM 2000 MG: 2 INJECTION, POWDER, FOR SOLUTION INTRAMUSCULAR; INTRAVENOUS at 06:05

## 2025-04-21 RX ADMIN — BUSPIRONE HYDROCHLORIDE 5 MG: 5 TABLET ORAL at 21:07

## 2025-04-21 RX ADMIN — ERGOCALCIFEROL 50000 UNITS: 1.25 CAPSULE ORAL at 11:21

## 2025-04-21 RX ADMIN — THERA TABS 1 TABLET: TAB at 11:20

## 2025-04-21 RX ADMIN — SODIUM CHLORIDE, PRESERVATIVE FREE 10 ML: 5 INJECTION INTRAVENOUS at 10:25

## 2025-04-21 RX ADMIN — PREGABALIN 150 MG: 75 CAPSULE ORAL at 11:21

## 2025-04-21 RX ADMIN — ASPIRIN 81 MG CHEWABLE TABLET 81 MG: 81 TABLET CHEWABLE at 11:18

## 2025-04-21 RX ADMIN — FUROSEMIDE 20 MG: 10 INJECTION, SOLUTION INTRAMUSCULAR; INTRAVENOUS at 17:32

## 2025-04-21 RX ADMIN — PREGABALIN 150 MG: 75 CAPSULE ORAL at 21:07

## 2025-04-21 RX ADMIN — MAGNESIUM SULFATE HEPTAHYDRATE 2000 MG: 40 INJECTION, SOLUTION INTRAVENOUS at 08:34

## 2025-04-21 RX ADMIN — DIAZEPAM 1.25 MG: 5 INJECTION, SOLUTION INTRAMUSCULAR; INTRAVENOUS at 08:31

## 2025-04-21 RX ADMIN — ATORVASTATIN CALCIUM 80 MG: 40 TABLET, FILM COATED ORAL at 21:07

## 2025-04-21 RX ADMIN — ALBUTEROL SULFATE 2.5 MG: 2.5 SOLUTION RESPIRATORY (INHALATION) at 08:38

## 2025-04-21 RX ADMIN — BUSPIRONE HYDROCHLORIDE 5 MG: 5 TABLET ORAL at 14:22

## 2025-04-21 RX ADMIN — ALBUTEROL SULFATE 2.5 MG: 2.5 SOLUTION RESPIRATORY (INHALATION) at 07:31

## 2025-04-21 ASSESSMENT — PAIN SCALES - GENERAL
PAINLEVEL_OUTOF10: 0
PAINLEVEL_OUTOF10: 0

## 2025-04-21 ASSESSMENT — ENCOUNTER SYMPTOMS: SHORTNESS OF BREATH: 1

## 2025-04-21 NOTE — SIGNIFICANT EVENT
[facility]  Rapid/Medical Response Team Note      Patient:    Gio Khalil 64 y.o. male, : 1960  Patient MRN: [id number]    Admission Date: 2025   Admission Diagnosis: Acute coronary syndrome (HCC) [I24.9]  NSTEMI (non-ST elevated myocardial infarction) (HCC) [I21.4]  Acute on chronic congestive heart failure, unspecified heart failure type (HCC) [I50.9]      RAPID RESPONSE  Rapid response called for: Respiratory distress, desats    RT reports pt was desatting on prior level of supplemental O2. Shortly after our arrival, escalated to non-rebreather at maximum FiO2. Pt in tripod position, appears anxious.    Pertinent background: pt was admitted with NSTEMI on  and had TIKA on  that showed endocarditis. Also had left and right heart cath on  - no intervention done during this cath. Pt has CHF with last EF 45% this admission; most recently has been on Lasix 20 mg IV BID, last given last night. Pt has COPD, had already 1 duoneb treatment at 07:18 today.    CXR with bilateral pleural effusions, grossly similar to prior XR from   ABG pH 7.27, pCO2 65, pO2 91  08:04 - lasix 40 mg IV x1 given  08:06 - started Bipap due to persistent increased WOB and acidotic ABG  08:06 - discussed with hospitalist  08:20 gave another duonebs     OBJECTIVE    RRT/MRT start time:  07:39       RRT/MRT end time: 0830  Vitals:    25 0333   BP: (!) 137/40   Pulse: 72   Resp: 21   Temp: 97.7 °F (36.5 °C)   SpO2: 94%        Physical Exam:  Gen: sitting in tripod position, +acute respiratory distress   HEENT: normocephalic, atraumatic  CV: tachycardic, regular rhythm, S1/S2 present without M/R/G    Pulm: +accessory muscle use, tripod position, coarse breath sounds bilaterally initially -> on repeat exam, diffuse wheezing  Abd: S/NT/ND  MSK: no lower extremity edema  Skin: warm, dry, intact  Neuro: no gross deficits appreciated   Psych: alert, +anxious      ASSESSMENT/PLAN AND DISPOSITION  Gio GEORGE

## 2025-04-21 NOTE — TELEPHONE ENCOUNTER
Patient's niece called in and stated that her uncle is in the hospital @ [facility]. Patient is not doing too well per the niece

## 2025-04-22 ENCOUNTER — APPOINTMENT (OUTPATIENT)
Facility: HOSPITAL | Age: 65
DRG: 720 | End: 2025-04-22
Payer: MEDICAID

## 2025-04-22 LAB
ANION GAP SERPL CALC-SCNC: 5 MMOL/L (ref 3–18)
BUN SERPL-MCNC: 26 MG/DL (ref 7–18)
BUN/CREAT SERPL: 23 (ref 12–20)
CALCIUM SERPL-MCNC: 8.4 MG/DL (ref 8.5–10.1)
CHLORIDE SERPL-SCNC: 101 MMOL/L (ref 100–111)
CO2 SERPL-SCNC: 28 MMOL/L (ref 21–32)
CREAT SERPL-MCNC: 1.15 MG/DL (ref 0.6–1.3)
ERYTHROCYTE [DISTWIDTH] IN BLOOD BY AUTOMATED COUNT: 21.2 % (ref 11.6–14.5)
GLUCOSE SERPL-MCNC: 156 MG/DL (ref 74–99)
HCT VFR BLD AUTO: 21.5 % (ref 36–48)
HCT VFR BLD AUTO: 26.8 % (ref 36–48)
HGB BLD-MCNC: 6.8 G/DL (ref 13–16)
HGB BLD-MCNC: 8.6 G/DL (ref 13–16)
HISTORY CHECK: NORMAL
MCH RBC QN AUTO: 29.2 PG (ref 24–34)
MCHC RBC AUTO-ENTMCNC: 31.6 G/DL (ref 31–37)
MCV RBC AUTO: 92.3 FL (ref 78–100)
NRBC # BLD: 0 K/UL (ref 0–0.01)
NRBC BLD-RTO: 0 PER 100 WBC
PLATELET # BLD AUTO: 162 K/UL (ref 135–420)
PMV BLD AUTO: 11.6 FL (ref 9.2–11.8)
POTASSIUM SERPL-SCNC: 4.3 MMOL/L (ref 3.5–5.5)
RBC # BLD AUTO: 2.33 M/UL (ref 4.35–5.65)
SODIUM SERPL-SCNC: 134 MMOL/L (ref 136–145)
WBC # BLD AUTO: 4.7 K/UL (ref 4.6–13.2)

## 2025-04-22 PROCEDURE — 85027 COMPLETE CBC AUTOMATED: CPT

## 2025-04-22 PROCEDURE — 99232 SBSQ HOSP IP/OBS MODERATE 35: CPT | Performed by: PHYSICIAN ASSISTANT

## 2025-04-22 PROCEDURE — 99232 SBSQ HOSP IP/OBS MODERATE 35: CPT | Performed by: STUDENT IN AN ORGANIZED HEALTH CARE EDUCATION/TRAINING PROGRAM

## 2025-04-22 PROCEDURE — 86901 BLOOD TYPING SEROLOGIC RH(D): CPT

## 2025-04-22 PROCEDURE — 94669 MECHANICAL CHEST WALL OSCILL: CPT

## 2025-04-22 PROCEDURE — 80048 BASIC METABOLIC PNL TOTAL CA: CPT

## 2025-04-22 PROCEDURE — 94640 AIRWAY INHALATION TREATMENT: CPT

## 2025-04-22 PROCEDURE — 6370000000 HC RX 637 (ALT 250 FOR IP): Performed by: HOSPITALIST

## 2025-04-22 PROCEDURE — 2140000001 HC CVICU INTERMEDIATE R&B

## 2025-04-22 PROCEDURE — 30233N1 TRANSFUSION OF NONAUTOLOGOUS RED BLOOD CELLS INTO PERIPHERAL VEIN, PERCUTANEOUS APPROACH: ICD-10-PCS | Performed by: STUDENT IN AN ORGANIZED HEALTH CARE EDUCATION/TRAINING PROGRAM

## 2025-04-22 PROCEDURE — 97535 SELF CARE MNGMENT TRAINING: CPT

## 2025-04-22 PROCEDURE — 36415 COLL VENOUS BLD VENIPUNCTURE: CPT

## 2025-04-22 PROCEDURE — 6370000000 HC RX 637 (ALT 250 FOR IP): Performed by: INTERNAL MEDICINE

## 2025-04-22 PROCEDURE — 2700000000 HC OXYGEN THERAPY PER DAY

## 2025-04-22 PROCEDURE — 85014 HEMATOCRIT: CPT

## 2025-04-22 PROCEDURE — 6370000000 HC RX 637 (ALT 250 FOR IP): Performed by: STUDENT IN AN ORGANIZED HEALTH CARE EDUCATION/TRAINING PROGRAM

## 2025-04-22 PROCEDURE — 85018 HEMOGLOBIN: CPT

## 2025-04-22 PROCEDURE — 36430 TRANSFUSION BLD/BLD COMPNT: CPT

## 2025-04-22 PROCEDURE — 6360000002 HC RX W HCPCS: Performed by: INTERNAL MEDICINE

## 2025-04-22 PROCEDURE — 2500000003 HC RX 250 WO HCPCS: Performed by: INTERNAL MEDICINE

## 2025-04-22 PROCEDURE — 2580000003 HC RX 258: Performed by: INTERNAL MEDICINE

## 2025-04-22 PROCEDURE — 71046 X-RAY EXAM CHEST 2 VIEWS: CPT

## 2025-04-22 PROCEDURE — 86900 BLOOD TYPING SEROLOGIC ABO: CPT

## 2025-04-22 PROCEDURE — 2500000003 HC RX 250 WO HCPCS: Performed by: PHYSICIAN ASSISTANT

## 2025-04-22 PROCEDURE — 6370000000 HC RX 637 (ALT 250 FOR IP): Performed by: PHYSICIAN ASSISTANT

## 2025-04-22 PROCEDURE — 6360000002 HC RX W HCPCS: Performed by: PHYSICIAN ASSISTANT

## 2025-04-22 PROCEDURE — P9016 RBC LEUKOCYTES REDUCED: HCPCS

## 2025-04-22 PROCEDURE — 86923 COMPATIBILITY TEST ELECTRIC: CPT

## 2025-04-22 PROCEDURE — 86850 RBC ANTIBODY SCREEN: CPT

## 2025-04-22 PROCEDURE — 94761 N-INVAS EAR/PLS OXIMETRY MLT: CPT

## 2025-04-22 PROCEDURE — 97168 OT RE-EVAL EST PLAN CARE: CPT

## 2025-04-22 RX ORDER — SODIUM CHLORIDE 9 MG/ML
INJECTION, SOLUTION INTRAVENOUS PRN
Status: DISCONTINUED | OUTPATIENT
Start: 2025-04-22 | End: 2025-04-23 | Stop reason: HOSPADM

## 2025-04-22 RX ORDER — FUROSEMIDE 10 MG/ML
40 INJECTION INTRAMUSCULAR; INTRAVENOUS 2 TIMES DAILY
Status: DISCONTINUED | OUTPATIENT
Start: 2025-04-22 | End: 2025-04-23 | Stop reason: HOSPADM

## 2025-04-22 RX ADMIN — ASPIRIN 81 MG CHEWABLE TABLET 81 MG: 81 TABLET CHEWABLE at 08:36

## 2025-04-22 RX ADMIN — SODIUM CHLORIDE, PRESERVATIVE FREE 10 ML: 5 INJECTION INTRAVENOUS at 08:36

## 2025-04-22 RX ADMIN — METOPROLOL SUCCINATE 25 MG: 25 TABLET, EXTENDED RELEASE ORAL at 08:36

## 2025-04-22 RX ADMIN — PANTOPRAZOLE SODIUM 40 MG: 40 TABLET, DELAYED RELEASE ORAL at 08:36

## 2025-04-22 RX ADMIN — AMPICILLIN SODIUM 2000 MG: 2 INJECTION, POWDER, FOR SOLUTION INTRAMUSCULAR; INTRAVENOUS at 03:13

## 2025-04-22 RX ADMIN — AMPICILLIN SODIUM 2000 MG: 2 INJECTION, POWDER, FOR SOLUTION INTRAMUSCULAR; INTRAVENOUS at 22:00

## 2025-04-22 RX ADMIN — SODIUM CHLORIDE, PRESERVATIVE FREE 10 ML: 5 INJECTION INTRAVENOUS at 20:46

## 2025-04-22 RX ADMIN — BUSPIRONE HYDROCHLORIDE 5 MG: 5 TABLET ORAL at 20:46

## 2025-04-22 RX ADMIN — IPRATROPIUM BROMIDE AND ALBUTEROL SULFATE 1 DOSE: .5; 3 SOLUTION RESPIRATORY (INHALATION) at 20:26

## 2025-04-22 RX ADMIN — ATORVASTATIN CALCIUM 80 MG: 40 TABLET, FILM COATED ORAL at 20:46

## 2025-04-22 RX ADMIN — AMPICILLIN SODIUM 2000 MG: 2 INJECTION, POWDER, FOR SOLUTION INTRAMUSCULAR; INTRAVENOUS at 11:42

## 2025-04-22 RX ADMIN — BUSPIRONE HYDROCHLORIDE 5 MG: 5 TABLET ORAL at 16:34

## 2025-04-22 RX ADMIN — WATER 2000 MG: 1 INJECTION INTRAMUSCULAR; INTRAVENOUS; SUBCUTANEOUS at 16:33

## 2025-04-22 RX ADMIN — CITALOPRAM HYDROBROMIDE 10 MG: 20 TABLET ORAL at 08:36

## 2025-04-22 RX ADMIN — WATER 2000 MG: 1 INJECTION INTRAMUSCULAR; INTRAVENOUS; SUBCUTANEOUS at 03:13

## 2025-04-22 RX ADMIN — PREGABALIN 150 MG: 75 CAPSULE ORAL at 08:36

## 2025-04-22 RX ADMIN — BUSPIRONE HYDROCHLORIDE 5 MG: 5 TABLET ORAL at 08:36

## 2025-04-22 RX ADMIN — FUROSEMIDE 20 MG: 10 INJECTION, SOLUTION INTRAMUSCULAR; INTRAVENOUS at 08:37

## 2025-04-22 RX ADMIN — PREGABALIN 150 MG: 75 CAPSULE ORAL at 20:46

## 2025-04-22 RX ADMIN — AMPICILLIN SODIUM 2000 MG: 2 INJECTION, POWDER, FOR SOLUTION INTRAMUSCULAR; INTRAVENOUS at 18:21

## 2025-04-22 RX ADMIN — THERA TABS 1 TABLET: TAB at 08:36

## 2025-04-22 RX ADMIN — NITROGLYCERIN 0.4 MG: 0.4 TABLET SUBLINGUAL at 08:41

## 2025-04-22 RX ADMIN — FUROSEMIDE 40 MG: 10 INJECTION, SOLUTION INTRAMUSCULAR; INTRAVENOUS at 16:34

## 2025-04-22 ASSESSMENT — PAIN SCALES - GENERAL
PAINLEVEL_OUTOF10: 0

## 2025-04-22 ASSESSMENT — ENCOUNTER SYMPTOMS: SHORTNESS OF BREATH: 1

## 2025-04-22 NOTE — CONSENT
Informed Consent for Blood Component Transfusion Note    I have discussed with the patient the rationale for blood component transfusion; its benefits in treating or preventing fatigue, organ damage, or death; and its risk which includes mild transfusion reactions, rare risk of blood borne infection, or more serious but rare reactions. I have discussed the alternatives to transfusion, including the risk and consequences of not receiving transfusion. The patient had an opportunity to ask questions and had agreed to proceed with transfusion of blood components.    Electronically signed by Ada Man DO on 4/22/25 at 9:55 AM EDT

## 2025-04-23 VITALS
WEIGHT: 119 LBS | BODY MASS INDEX: 19.83 KG/M2 | OXYGEN SATURATION: 96 % | RESPIRATION RATE: 16 BRPM | HEART RATE: 80 BPM | DIASTOLIC BLOOD PRESSURE: 51 MMHG | SYSTOLIC BLOOD PRESSURE: 149 MMHG | HEIGHT: 65 IN | TEMPERATURE: 97.8 F

## 2025-04-23 LAB
ABO + RH BLD: NORMAL
BLD PROD TYP BPU: NORMAL
BLOOD BANK BLOOD PRODUCT EXPIRATION DATE: NORMAL
BLOOD BANK DISPENSE STATUS: NORMAL
BLOOD BANK ISBT PRODUCT BLOOD TYPE: 6200
BLOOD BANK PRODUCT CODE: NORMAL
BLOOD BANK UNIT TYPE AND RH: NORMAL
BLOOD GROUP ANTIBODIES SERPL: NORMAL
BPU ID: NORMAL
CALLED TO: NORMAL
CROSSMATCH RESULT: NORMAL
SPECIMEN EXP DATE BLD: NORMAL
UNIT DIVISION: 0
UNIT ISSUE DATE/TIME: NORMAL

## 2025-04-23 PROCEDURE — 6360000002 HC RX W HCPCS: Performed by: HOSPITALIST

## 2025-04-23 PROCEDURE — 99239 HOSP IP/OBS DSCHRG MGMT >30: CPT | Performed by: STUDENT IN AN ORGANIZED HEALTH CARE EDUCATION/TRAINING PROGRAM

## 2025-04-23 RX ADMIN — ALBUTEROL SULFATE 2.5 MG: 2.5 SOLUTION RESPIRATORY (INHALATION) at 00:50

## 2025-04-23 ASSESSMENT — PAIN SCALES - GENERAL: PAINLEVEL_OUTOF10: 0

## 2025-04-23 NOTE — PROGRESS NOTES
04/08/25 2134   NIV Type   Suction Setup and Functional Yes   NIV Started/Stopped On   Equipment Type v60   Mode Bilevel   Mask Type Full face mask   Mask Size Medium   Bonnet size Medium   Assessment   Pulse (!) 124   Heart Rate Source Monitor   Respirations 24   SpO2 94 %   Level of Consciousness 0   Comfort Level Good   Mask Compliance Good   Skin Assessment Clean, dry, & intact   Skin Protection for O2 Device Yes   Orientation Middle   Location Nose   Intervention(s) Skin Barrier   Breath Sounds   Respiratory Pattern Tachypneic   Breath Sounds Bilateral Diminished   Right Upper Lobe Diminished   Right Middle Lobe Diminished   Right Lower Lobe Diminished   Left Upper Lobe Diminished   Left Lower Lobe Diminished   Settings/Measurements   PIP Observed 16 cm H20   IPAP 16 cmH20   CPAP/EPAP 6 cmH2O   Vt (Measured) 629 mL   Rate Ordered 8   Insp Rise Time (%) 3 %   FiO2  80 %   I Time/ I Time % 0.8 s   Minute Volume (L/min) 14.6 Liters   Mask Leak (lpm) 41 lpm   Patient's Home Machine No   Alarm Settings   Alarms On Y   Low Pressure (cmH2O) 8 cmH2O   High Pressure (cmH2O) 40 cmH2O   Apnea (secs) 20 secs   RR Low (bpm) 8   RR High (bpm) 40 br/min     Removed HHFNC and placed pt on BiPAP at documented settings. Patient was feeling anxious and appeared to be tachypneic during labs being drawn. RR was 32 and now down to 22 on BiPAP for ~5mins of use. Patient states that he is feeling better.     HHFNC on standby at bedside. Call bell within reach. RN aware of pt on BiPAP at this time.   
  [facility]  Pulmonary, Critical Care, and Sleep Medicine    Name: Gio Khalil MRN: [id number]   : 1960 Hospital: [facility]   Date: 2025        Pulmonary Medicine: Daily Progress Note    Admission Date:   2025  LOS: 8  MAR reviewed and pertinent medications noted or modified as needed    IMPRESSION:   Acute hypoxic respiratory failure requirng HHFNC and BiPAP  secondary to pulmonary edema from CHF exacerbation  NSTEMI , type II  Acute decompensated heart failure with systolic dysfunction, HFrEF 40-45% down from 55--60% in 2024  BNP 22,070 --> 20,839 pg/mL  Valvulopathy with severe aortic regurgitation  Bacterial Sepsis   Secondary to E. coli bacteremia, on cultures   Blood cultures  with Enterococcus gallinarum grp D  Severe AS, AR, TIKA pending   Bilateral pleural effusion, likely due to CHF  Compressive atelectasis, secondary to pleural effusion   Pulmonary hypertension with RVSP 68mmHg, multifactorial, WHO group 2/3 from left heart /lung disease  Pulmonary emphysema  on CT imaging with possible COPD exacerbation given hypoxia, productive cough, fevers  No PFTs on record, no outpatient inhalers, as needed supplemental oxygen at home  Severe sepsis, with leukocytosis, tachycardia, tachypnea and lactic acidosis.    Concerning sources: Bacteremia and  GPC's in respiratory culture  E. Coli / Enterococcus group D bacteremia, likely secondary to UTI, also with GPCs 1/2 BC   Polysubstance abuse   Tobacco use disorder, with 90 pack year smoking, smoked since age 20, cut back in January  Marijuana use  Chronic hepatitis C  Resting tremor right hand  Peripheral artery disease,  Cachectic with BMI Body mass index is 18.4 kg/m².  CODE STATUS: DNR No additional code details       Patient Active Problem List   Diagnosis    Marijuana smoker    Hypertension    PAD (peripheral artery disease)    Smoker    PVD (peripheral vascular disease)    Elevated 
  [facility]  Pulmonary, Critical Care, and Sleep Medicine    Name: Gio Khalil MRN: [id number]   : 1960 Hospital: [facility]   Date: 4/15/2025        Pulmonary Medicine: Daily Progress Note    Admission Date:   2025  LOS: 9  MAR reviewed and pertinent medications noted or modified as needed    IMPRESSION:   Acute hypoxic respiratory failure requirng HHFNC and BiPAP  secondary to pulmonary edema from CHF exacerbation.  Now on regular NC and stable  NSTEMI , type II  Acute decompensated heart failure with systolic dysfunction, HFrEF 40-45% down from 55--60% in 2024  BNP 22,070 --> 20,839 pg/mL  Valvulopathy with severe aortic regurgitation  Bacterial Sepsis   Secondary to E. coli bacteremia, on cultures   Blood cultures  with Enterococcus gallinarum grp D  Severe AS, AR, TIKA showed aortic valve endocarditis  Bilateral pleural effusion, likely due to CHF  Compressive atelectasis, secondary to pleural effusion   Pulmonary hypertension with RVSP 68mmHg, multifactorial, WHO group 2/3 from left heart /lung disease  Pulmonary emphysema  on CT imaging with possible COPD exacerbation given hypoxia, productive cough, fevers  No PFTs on record, no outpatient inhalers, as needed supplemental oxygen at home  Severe sepsis, with leukocytosis, tachycardia, tachypnea and lactic acidosis.    Concerning sources: Bacteremia and  GPC's in respiratory culture  E. Coli / Enterococcus group D bacteremia, likely secondary to UTI, also with GPCs 1/2 BC   Polysubstance abuse   Tobacco use disorder, with 90 pack year smoking, smoked since age 20, cut back in January  Marijuana use  Chronic hepatitis C  Resting tremor right hand  Peripheral artery disease,  Cachectic with BMI Body mass index is 18.4 kg/m².  CODE STATUS: DNR No additional code details       Patient Active Problem List   Diagnosis    Marijuana smoker    Hypertension    PAD (peripheral artery disease)    
  [facility]  Pulmonary, Critical Care, and Sleep Medicine    Name: Gio Khalil MRN: [id number]   : 1960 Hospital: [facility]   Date: 2025        Pulmonary Medicine: Daily Progress Note    Admission Date:   2025  LOS: 2  MAR reviewed and pertinent medications noted or modified as needed    IMPRESSION:   Acute hypoxic respiratory failure requirng HHFNC and BiPAP  secondary to pulmonary edema from CHF exacerbation  NSTEMI with troponin up to 4182, down trending  Acute decompensated heart failure with systolic dysfunction, HFrEF 40-45% down from 55--60% in 2024  BNP 22,070 pg/mL  Valvulopathy with severe aortic regurgitation  Bilateral pleural effusion, likely due to CHF  Compressive atelectasis, secondary to pleural effusion   Lactic acidosis, resolved  Pulmonary hypertension with RVSP 68mmHg, multifactorial, WHO group 2/3 from left heart lung disease  Pulmonary emphysema  on CT imaging with possible COPD exacerbation given hypoxia, productive cough, fevers  No PFTs on record, no outpatient inhalers, as needed supplemental oxygen at home  Severe sepsis, with leukocytosis, tachycardia, tachypnea and lactic acidosis.    Concerning sources: Bacteremia, versus GPC's in respiratory culture  E. 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  CODE STATUS: DNR No additional code details       Patient Active Problem List   Diagnosis    Marijuana smoker    Hypertension    PAD (peripheral artery disease)    Smoker    PVD (peripheral vascular disease)    Elevated 
  [facility]  Pulmonary, Critical Care, and Sleep Medicine    Name: Gio Khalil MRN: [id number]   : 1960 Hospital: [facility]   Date: 4/10/2025        Pulmonary Medicine: Daily Progress Note    Admission Date:   2025  LOS: 4  MAR reviewed and pertinent medications noted or modified as needed    IMPRESSION:   Acute hypoxic respiratory failure requirng HHFNC and BiPAP  secondary to pulmonary edema from CHF exacerbation  NSTEMI with troponin up to 4182, down trending  Acute decompensated heart failure with systolic dysfunction, HFrEF 40-45% down from 55--60% in 2024  BNP 22,070 --> 20,839 pg/mL  Valvulopathy with severe aortic regurgitation  Severe AS  Bilateral pleural effusion, likely due to CHF  Compressive atelectasis, secondary to pleural effusion   Pulmonary hypertension with RVSP 68mmHg, multifactorial, WHO group 2/3 from left heart /lung disease  Pulmonary emphysema  on CT imaging with possible COPD exacerbation given hypoxia, productive cough, fevers  No PFTs on record, no outpatient inhalers, as needed supplemental oxygen at home  Severe sepsis, with leukocytosis, tachycardia, tachypnea and lactic acidosis.    Concerning sources: Bacteremia, versus GPC's in respiratory culture  E. 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  CODE STATUS: DNR No additional code details       Patient Active Problem List   Diagnosis    Marijuana smoker    Hypertension    PAD (peripheral artery disease)    Smoker    PVD (peripheral vascular 
  [facility]  Pulmonary, Critical Care, and Sleep Medicine    Name: Gio Khalil MRN: [id number]   : 1960 Hospital: [facility]   Date: 2025        Pulmonary Medicine: Daily Progress Note    Admission Date:   2025  LOS: 5  MAR reviewed and pertinent medications noted or modified as needed    IMPRESSION:   Acute hypoxic respiratory failure requirng HHFNC and BiPAP  secondary to pulmonary edema from CHF exacerbation  NSTEMI , type II  Acute decompensated heart failure with systolic dysfunction, HFrEF 40-45% down from 55--60% in 2024  BNP 22,070 --> 20,839 pg/mL  Valvulopathy with severe aortic regurgitation  Severe AS, AR  Bilateral pleural effusion, likely due to CHF  Compressive atelectasis, secondary to pleural effusion   Pulmonary hypertension with RVSP 68mmHg, multifactorial, WHO group 2/3 from left heart /lung disease  Pulmonary emphysema  on CT imaging with possible COPD exacerbation given hypoxia, productive cough, fevers  No PFTs on record, no outpatient inhalers, as needed supplemental oxygen at home  Severe sepsis, with leukocytosis, tachycardia, tachypnea and lactic acidosis.    Concerning sources: Bacteremia and  GPC's in respiratory culture  E. Coli / Enterococcus group D bacteremia, likely secondary to UTI, also with GPCs 1/2 BC   Polysubstance abuse   Tobacco use disorder, with 90 pack year smoking, smoked since age 20, cut back in January  Marijuana use  Chronic hepatitis C  Resting tremor right hand  Peripheral artery disease,  Cachectic with BMI Body mass index is 18.4 kg/m².  CODE STATUS: DNR No additional code details       Patient Active Problem List   Diagnosis    Marijuana smoker    Hypertension    PAD (peripheral artery disease)    Smoker    PVD (peripheral vascular disease)    Elevated rheumatoid factor    Atherosclerosis    Alcohol abuse    Anxiety associated with depression    Hyperlipidemia    Bilateral carotid artery stenosis 
  Physician Progress Note      PATIENT:               MANSI OSBORN  CSN #:                  [id number]  :                       1960  ADMIT DATE:       2025 3:26 AM  DISCH DATE:  RESPONDING  PROVIDER #:        Damaso Pickens DO          QUERY TEXT:    Please clarify the patient?s nutritional status:    The clinical indicators include:  On Nutrition CP  states \"Malnutrition Status:  Moderate malnutrition \"  \"Malnutrition Assessment:  Malnutrition Status:  Moderate malnutrition (25 9025)  Context:  Chronic Illness  Findings of the 6 clinical characteristics of malnutrition:  Energy Intake:  75% or less estimated energy requirements for 1 month or   longer  Weight Loss:  Mild weight loss (-5% x6 months)  Body Fat Loss:  Mild body fat loss  Muscle Mass Loss:  Mild muscle mass loss Clavicles (pectoralis & deltoids),   Scapula (trapezius), Temples (temporalis)  Fluid Accumulation:  No fluid accumulation   Strength:  Not Performed\"  On Progress Notes  by Desmond Vaughn signed by Theodore Oliver DO   states \" Cachectic with BMI Body mass index is 18.4 kg/m?\".  Anthropometric Measures: Height: 167.6 cm (5' 5.98\"), Current Body Weight:   52.1 kg (114 lb 14.4 oz), 80.9% IBW.  Current BMI (kg/m2): 18.6  Nutrition Diagnosis: Moderate malnutrition, in context of chronic illness   related to impaired respiratory function as evidenced by criteria as   identified in malnutrition assessment  Treatment - Continue Current Diet, Vitamin Supplement, Start Oral Nutrition   Supplement, RD Consult.    Thank you  Chava Bautista S CDS  Options provided:  -- Protein calorie malnutrition moderate  -- Other - I will add my own diagnosis  -- Disagree - Not applicable / Not valid  -- Disagree - Clinically unable to determine / Unknown  -- Refer to Clinical Documentation Reviewer    PROVIDER RESPONSE TEXT:    This patient has moderate protein calorie malnutrition.    Query created by: Chava Bautista 
 Latest Reference Range & Units 04/21/25 07:58   POC pH 7.35 - 7.45   7.28 (L)   POC pCO2 35.0 - 48.0 MMHG 65.5 (H)   POC PO2 83 - 108 MMHG 91   POC HCO3 21 - 28 MMOL/L 30.4 (H)   POC O2 SAT 92 - 97 % 95.5   POC Bertrand's Test -   Positive   FIO2 % 100   Base Excess mmol/L 1.6   (L): Data is abnormally low  (H): Data is abnormally high  Rapid called for patient in respiratory distress this am post using the restroom  RT gave two HHN and patient still  feels he can't breath RT placed patient on BiPAP per ABG and MD during rapid.  Patient feels his breathing is better at  this time will continue to monitor his respiratory status.  
 responded to Rapid Response for  Gio Khalil, who is a 64 y.o.,male,     The  provided the following Interventions:  Provided crisis spiritual care and support.   Offered prayers.    Chart reviewed.    The following outcomes were achieved:  Patient alert and engaged with staff. Examine by staff and remain on unit.     Assessment:  There are no further spiritual or Spiritism issues which require intervention at this time.     Plan:  Chaplains will continue to follow and will provide spiritual care as needed.   recommends bedside caregivers page  on duty if patient or family shows signs of acute spiritual or emotional distress.      [facility] [place]  Staff   Spiritual Health   [phone]  
0108: Bed available at [facility]. Report given to Mitali Barrett RN at [facility].     0118: Transport at bedside. EMTALA completed. Report given. Patient transported to [facility] via transport.   
0515- pt bp 117/43, rechecked and noted to be 112/36, 112/42. currently on lasix gtt, diastolic runs low per trend of vitals MD perfect served     0530-MD held lasix gtt, this writer stopped gtt and pt made aware  
0742: Bedside and Verbal shift change report given to AMBER rAias/AMBER Sauer (oncoming nurse) by AMBER Curtis (offgoing nurse). Report included the following information Nurse Handoff Report, Adult Overview, and MAR.       Review of Systems    Physical Exam  Skin:           0840: Patient ambulated to sit in recliner nurse at stand by patient gate steady     1850: Patient spent the day in recliner and ate 50% of all meals patient is back in bed.     Bedside and Verbal shift change report given to AMBER Camarillo (oncoming nurse) by AMBER Sauer/AMBER Arias (offgoing nurse). Report included the following information Nurse Handoff Report, Adult Overview, and MAR.   
0745: Bedside and Verbal shift change report given to AMBER Carvajal (oncoming nurse) by AMBER Melgar (offgoing nurse). Report included the following information Nurse Handoff Report, Adult Overview, and Intake/Output.       Review of Systems    Physical Exam  Skin:           0945: Verbal shift change report given to AMBER Daily nurse in cath lab by AMBER Sauer. Report included the following information Nurse Handoff Report, Adult Overview, and Intake/Output.     Patient went to cath lab for procedure vis transport    Patient returned to floor no complaints of pain    Patient diet reinstated        1910: Bedside and Verbal shift change report given to AMBER Henry (oncoming nurse) by AMBER Carvajal (offgoing nurse). Report included the following information Nurse Handoff Report, Adult Overview, and Alarm Parameters.       
4 Eyes Skin Assessment     NAME:  Gio Khalil  YOB: 1960  MEDICAL RECORD NUMBER:  [id number]    The patient is being assessed for  Shift Handoff    I agree that at least one RN has performed a thorough Head to Toe Skin Assessment on the patient. ALL assessment sites listed below have been assessed.      Areas assessed by both nurses:    Head, Face, Ears, Shoulders, Back, Chest, Arms, Elbows, Hands, Sacrum. Buttock, Coccyx, Ischium, Legs. Feet and Heels, and Under Medical Devices         Does the Patient have a Wound? Yes wound(s) were present on assessment. LDA wound assessment was Initiated and completed by RN       Sergey Prevention initiated by RN: Yes  Wound Care Orders initiated by RN: No    Pressure Injury (Stage 3,4, Unstageable, DTI, NWPT, and Complex wounds) if present, place Wound referral order by RN under : No    New Ostomies, if present place, Ostomy referral order under : No     Nurse 1 eSignature: Electronically signed by Elaine Huff RN on 4/17/25 at 2:59 AM EDT    **SHARE this note so that the co-signing nurse can place an eSignature**    Nurse 2 eSignature: {Esignature:[id number]}    
4 Eyes Skin Assessment     NAME:  Gio Khalil  YOB: 1960  MEDICAL RECORD NUMBER:  [id number]    The patient is being assessed for  Shift Handoff    I agree that at least one RN has performed a thorough Head to Toe Skin Assessment on the patient. ALL assessment sites listed below have been assessed.      Areas assessed by both nurses:    Head, Face, Ears, Shoulders, Back, Chest, Arms, Elbows, Hands, Sacrum. Buttock, Coccyx, Ischium, Legs. Feet and Heels, and Under Medical Devices         Does the Patient have a Wound? Yes wound(s) were present on assessment. LDA wound assessment was Initiated and completed by RN blanchable redness on sacrum       Sergey Prevention initiated by RN: Yes  Wound Care Orders initiated by RN: No    Pressure Injury (Stage 3,4, Unstageable, DTI, NWPT, and Complex wounds) if present, place Wound referral order by RN under : No    New Ostomies, if present place, Ostomy referral order under : No     Nurse 1 eSignature: Electronically signed by Elaine Huff RN on 4/17/25 at 2:58 AM EDT    **SHARE this note so that the co-signing nurse can place an eSignature**    Nurse 2 eSignature: Electronically signed by AMBER Sauer/AMBER Quintanilla on 4/17/25 at 2:58 AM EDT    
4 Eyes Skin Assessment     NAME:  Gio Khalil  YOB: 1960  MEDICAL RECORD NUMBER:  [id number]    The patient is being assessed for  Shift Handoff    I agree that at least one RN has performed a thorough Head to Toe Skin Assessment on the patient. ALL assessment sites listed below have been assessed.      Areas assessed by both nurses:    Head, Face, Ears, Shoulders, Back, Chest, Arms, Elbows, Hands, Sacrum. Buttock, Coccyx, Ischium, Legs. Feet and Heels, and Under Medical Devices         Does the Patient have a Wound? No noted wound(s)       Sergey Prevention initiated by RN: No  Wound Care Orders initiated by RN: No    Pressure Injury (Stage 3,4, Unstageable, DTI, NWPT, and Complex wounds) if present, place Wound referral order by RN under : No    New Ostomies, if present place, Ostomy referral order under : No     Nurse 1 eSignature: Electronically signed by Elaine Huff RN on 4/10/25 at 7:16 AM EDT    **SHARE this note so that the co-signing nurse can place an eSignature**    Nurse 2 eSignature: {Esignature:[id number]}    
4 Eyes Skin Assessment     NAME:  Gio Khalil  YOB: 1960  MEDICAL RECORD NUMBER:  [id number]    The patient is being assessed for  Shift Handoff    I agree that at least one RN has performed a thorough Head to Toe Skin Assessment on the patient. ALL assessment sites listed below have been assessed.      Areas assessed by both nurses:    Head, Face, Ears, Shoulders, Back, Chest, Arms, Elbows, Hands, Sacrum. Buttock, Coccyx, Ischium, and Legs. Feet and Heels        Does the Patient have a Wound? No noted wound(s)       Sergey Prevention initiated by RN: Yes  Wound Care Orders initiated by RN: No    Pressure Injury (Stage 3,4, Unstageable, DTI, NWPT, and Complex wounds) if present, place Wound referral order by RN under : No    New Ostomies, if present place, Ostomy referral order under : No     Nurse 1 eSignature: Electronically signed by Cristiane Villagran RN on 4/19/25 at 12:03 PM EDT    **SHARE this note so that the co-signing nurse can place an eSignature**    Nurse 2 eSignature: Electronically signed by Rabia Grigsby RN on 4/20/25 at 7:49 PM EDT    
4 Eyes Skin Assessment     NAME:  Gio Khalil  YOB: 1960  MEDICAL RECORD NUMBER:  [id number]    The patient is being assessed for  Shift Handoff    I agree that at least one RN has performed a thorough Head to Toe Skin Assessment on the patient. ALL assessment sites listed below have been assessed.      Areas assessed by both nurses:    Head, Face, Ears, Shoulders, Back, Chest, Arms, Elbows, Hands, Sacrum. Buttock, Coccyx, Ischium, Legs. Feet and Heels, and Under Medical Devices         Does the Patient have a Wound? No noted wound(s) Redness on sacrum       Sergey Prevention initiated by RN: Yes  Wound Care Orders initiated by RN: No    Pressure Injury (Stage 3,4, Unstageable, DTI, NWPT, and Complex wounds) if present, place Wound referral order by RN under : No    New Ostomies, if present place, Ostomy referral order under : No     Nurse 1 eSignature: Electronically signed by Elaine Huff RN on 4/18/25 at 7:00 AM EDT    **SHARE this note so that the co-signing nurse can place an eSignature**    Nurse 2 eSignature: {Esignature:[id number]}    
65 Y/o male  Admitted: 04/06/25 C/o shortness of breath came on suddenly. Per EMS hypoxic working hard to breathe.     History: vascular pathology    Diagnosis: respiratory distress  
Advance Care Planning   Healthcare Decision Maker:    Primary Decision Maker: Anne-Marie Choudhury - Niece/Nephew - [phone]    Secondary Decision Maker: Ruth Choudhury - Niece/Nephew - [phone]    Today we documented Decision Maker(s) consistent with ACP documents on file.       Spiritual Health History and Assessment/Progress Note  [facility]    (P) Palliative Care,  ,  ,      Name: Gio Khalil MRN: [id number]    Age: 64 y.o.     Sex: male   Language: English   Episcopal: Non-Orthodox   NSTEMI (non-ST elevated myocardial infarction) ([facility])     Date: 4/8/2025            Total Time Calculated: (P) 7 min              Spiritual Assessment began in [address]        Referral/Consult From: (P) Palliative Care   Encounter Overview/Reason: (P) Palliative Care  Service Provided For: (P) Patient and family together    Keila, Belief, Meaning:   Patient has beliefs or practices that help with coping during difficult times  Family/Friends have beliefs or practices that help with coping during difficult times      Importance and Influence:  Patient has spiritual/personal beliefs that influence decisions regarding their health  Family/Friends have spiritual/personal beliefs that influence decisions regarding the patient's health    Community:  Patient feels well-supported. Support system includes: Extended family  Family/Friends are connected with a spiritual community:    Assessment and Plan of Care:     Patient Interventions include: Facilitated expression of thoughts and feelings  Family/Friends Interventions include: Facilitated expression of thoughts and feelings    Patient Plan of Care: Spiritual Care available upon further referral  Family/Friends Plan of Care: Spiritual Care available upon further referral    Electronically signed by SHANNAN Douglas on 4/8/2025 at 5:18 PM      
Asked to round with Anne-Marie montanez and pt. Niece was more prominent in conversation but pt was alert, oriented, and could teach back information shared with him regarding care and incentive spirometer usage. Niece wanted to have clearer communication with CV Surgery.     Educated pt and family of recovery needs after a large surgery regarding valve replacement. Niece verbalized understanding and teach back. Talked about code states. Pt replied yes to DNR currently, but if he needed a surgery or procedure that full code and to be intubated (breathing tube) that it was okay.     Updated that I will reach out to CV team in morning when Dr. Austin is on schedule.   
Assumed care patient from night shift RT. Patient is alert and resting comfortably. Decreased settings as patient states he is feeling better. Will continue to tritrate as needed.        04/06/25 0715   Settings/Measurements   PIP Observed 16 cm H20   IPAP (S)  16 cmH20   CPAP/EPAP (S)  6 cmH2O   Vt (Measured) 402 mL   Rate Ordered 8   Insp Rise Time (%) 3 %   FiO2  (S)  40 %   I Time/ I Time % 0.9 s   Mask Leak (lpm) 59 lpm   Patient's Home Machine No   Alarm Settings   Alarms On Y   Low Pressure (cmH2O) 8 cmH2O   High Pressure (cmH2O) 40 cmH2O   Apnea (secs) 20 secs   RR High (bpm) 40 br/min       
Attempted to place patient on Nasal Cannula. Patient immediately became SOB and was placed back on Bipap and is resting comfortable, Explained to patient that for now he will have to remain on Bipap at this time to assist with his breathing. Patient and daughter understood and agreed.        
Back on Bipap for increased WOB...   04/07/25 1044   NIV Type   NIV Started/Stopped On   Mode Bilevel   Mask Type Full face mask   Mask Size Small   Assessment   Pulse (!) 108   Respirations 28   SpO2 96 %   Comfort Level Good   Settings/Measurements   PIP Observed 16 cm H20   IPAP 16 cmH20   CPAP/EPAP 6 cmH2O   Vt (Measured) 474 mL   Rate Ordered 8   Insp Rise Time (%) 3 %   FiO2  70 %   Minute Volume (L/min) 13.7 Liters   Mask Leak (lpm) 74 lpm   Patient's Home Machine No   Alarm Settings   Alarms On Y   Low Pressure (cmH2O) 8 cmH2O   High Pressure (cmH2O) 40 cmH2O   RR Low (bpm) 8   RR High (bpm) 40 br/min     >  
Bedside and Verbal shift change report given to AMBER Camarillo (oncoming nurse) by AMBER Whitehead and AMBER Arias (offgoing nurse). Report included the following information Nurse Handoff Report, Adult Overview, Intake/Output, MAR, and Recent Results.     
Bedside and Verbal shift change report given to AMBER Capellan (oncoming nurse) by AMBER Arias (offgoing nurse). Report included the following information Nurse Handoff Report, Index, Adult Overview, Intake/Output, MAR, Cardiac Rhythm Sinus Tachy, Neuro Assessment, and Event Log.    
Bedside and Verbal shift change report given to AMBER Carvajal (oncoming nurse) by AMBER Mcmillan (offgoing nurse). Report included the following information Nurse Handoff Report, Adult Overview, and Alarm Parameters.       Review of Systems    Physical Exam  Skin:         1300: Patient walked around the unit with occupational health     1921:Bedside and Verbal shift change report given to AMBER Melgar (oncoming nurse) by AMBER Carvajal (offgoing nurse). Report included the following information Nurse Handoff Report, Index, and Adult Overview.   
Bedside and Verbal shift change report given to AMBER Henry (oncoming nurse) by AMBER Quintanilla (offgoing nurse). Report included the following information Nurse Handoff Report and Adult Overview.    
Bedside and Verbal shift change report given to AMBER Quintanilla (oncoming nurse) by AMBER Brown (offgoing nurse). Report included the following information Nurse Handoff Report and Adult Overview.    
Bedside and Verbal shift change report given to AMBER Whitehead and AMBER Arias (oncoming nurse) by AMBER Camarillo (offgoing nurse). Report included the following information Nurse Handoff Report, Intake/Output, MAR, and Recent Results.     
Bedside shift change report given to AMBER Montez  (oncoming nurse) by AMBER Butts (offgoing nurse). Report included the following information Nurse Handoff Report, Adult Overview, Surgery Report, Recent Results, Med Rec Status, Quality Measures, and Neuro Assessment.      4 Eyes Skin Assessment     NAME:  Gio Khalil  YOB: 1960  MEDICAL RECORD NUMBER:  [id number]    The patient is being assessed for  Shift Handoff    I agree that at least one RN has performed a thorough Head to Toe Skin Assessment on the patient. ALL assessment sites listed below have been assessed.      Areas assessed by both nurses:    Arms, Elbows, Hands, Sacrum. Buttock, Coccyx, Ischium, and Legs. Feet and Heels        Does the Patient have a Wound? No noted wound(s)       Sergey Prevention initiated by RN: No  Wound Care Orders initiated by RN: No    Pressure Injury (Stage 3,4, Unstageable, DTI, NWPT, and Complex wounds) if present, place Wound referral order by RN under : No    New Ostomies, if present place, Ostomy referral order under : No     Nurse 1 eSignature: Electronically signed by Alondra Daniels RN on 4/11/25 at 8:39 PM EDT    **SHARE this note so that the co-signing nurse can place an eSignature**    Nurse 2 eSignature: Beckie Bocanegra RN   
Called niece and notified her I have updated CV surgery PAJanee via perfect serve to ask for chart review and discussion with pt and family sometime today. She verbalized understanding and I have updated primary nurse, [place].   
[facility] - [facility]    Admit Date: 4/6/2025  Attending Cardiologist: Dr. Paez    Assessment:     -Acute respiratory failure/COPD exacerbation  CTA chest 4/6/2025 with no acute aortic abnormality; probable mild pulmonary edema with moderate bilateral pleural effusions; emphysema  -Bacteremia, probable E. Coli per preliminary Bcx results  -Leukocytosis, WBC 21.8K on presentation 4/6/2025  -Cardiomyopathy, Echo 4/7/2025 with LVEF 40-45% with global hypokinesis  -Elevated HS-troponin, 6969-1806, similar to last HS-troponin 3913 on 12/12/2024  -CAD, moderate, NSTEMI 12/2024 with troponin 10K, s/p LHC 12/16/2024 with findings as follows  dLM 30-40% stenosis  Prox/mid LAD 40-50% stenosis  Dominant RCA with mRCA 10-30% stenosis  pLCx 30% stenosis  -Aortic stenosis, moderate-severe with mean gradient in 30's with GAIL 0.7 cm2 and index 0.3  -Peripheral arterial disease  -Hx GI bleed, Hgb down to 7.9 in 11/2024; colonoscopy deferred due to valve disease  -HTN  -Dyslipidemia    Primary cardiologist is Dr. Paez    Plan:     -Echo with LVEF 40-45%.  Soft BP's limit GDMT for CMY.  Continue IV Lasix as tolerated.  -Follow-up Mg level.  Recommend to maintain K > 4, Mg > 2 from cardiac standpoint.  Replacement per primary team.  -Continue ASA, Plavix, Lipitor.  -Okay to d/c Heparin infusion from cardiac standpoint; pt has completed > 48 hours therapy.  -Recommend to maintain Hgb > 8 from cardiac standpoint.  Defer evaluation/management of anemia to primary team.  -Antibiotics per ID team, appreciate assistance.  -Pulmonology following, appreciate assistance.  -Wean O2 as tolerated.  -Consider palliative evaluation.    Subjective:     No new complaints.     Objective:      Patient Vitals for the past 8 hrs:   Temp Pulse Resp BP SpO2   04/07/25 1100 -- 96 -- -- --   04/07/25 1044 -- (!) 108 28 -- 96 %   04/07/25 1029 -- -- -- (!) 142/57 --   04/07/25 0942 -- 96 24 -- 91 %   04/07/25 0807 -- 94 24 -- 92 % 
[facility]  Admit Date: 4/6/2025    Assessment:     Sepsis, E.Coli bacteremia, likely trigger for recent events, ID input appreciated  Acute on chronic respiratory failure  NSTEMI, likely demain ischemia with underlying CAD.  Last cath 12/2024 with CAD but medically treated  Severe AS  CM with EF 40-45%  COPD  PAD  H/o GIB    Plan:     I discussed with patient and family.  Very difficult situation with progressive AS and CHF, although now with E.coli bacteremia.    Any procedure is very high risk at this time.   Will plan ongoing treatment for E.coli, ongoing workup for source before any valve surgery can be considered.  Consider palliative care evaluation to discuss goals of care.    Subjective:     Ongoing dyspnea, on bipap.  No chest pain.    Objective:      Patient Vitals for the past 8 hrs:   Temp Pulse Resp BP SpO2   04/07/25 1300 -- 85 -- -- --   04/07/25 1245 97.6 °F (36.4 °C) 85 24 118/65 97 %   04/07/25 1153 -- 94 22 -- 99 %   04/07/25 1100 -- 96 -- -- --   04/07/25 1044 -- (!) 108 28 -- 96 %   04/07/25 1029 -- -- -- (!) 142/57 --   04/07/25 0942 -- 96 24 -- 91 %   04/07/25 0807 -- 94 24 -- 92 %         Patient Vitals for the past 96 hrs:   Weight   04/07/25 1029 51.7 kg (114 lb)   04/06/25 2203 51.9 kg (114 lb 8 oz)   04/06/25 0344 52.2 kg (115 lb)                    Intake/Output Summary (Last 24 hours) at 4/7/2025 1519  Last data filed at 4/7/2025 0122  Gross per 24 hour   Intake --   Output 150 ml   Net -150 ml       Physical Exam:  General:  alert, appears stated age, and cooperative  Neck:  nontender  Lungs:  clear to auscultation bilaterally  Heart:  SANA  Abdomen:  abdomen is soft without significant tenderness, masses, organomegaly or guarding  Extremities:  extremities normal, atraumatic, no cyanosis or edema    Data Review:   Last Left ventricular Function (EF):    Lab Results   Component Value Date    LVEF 60 11/11/2022       Labs: Results:       Chemistry Recent Labs 
Cardiology Progress Note      Admit Date: 4/6/2025  Attending Cardiologist: Dr. Jesus Owen     Assessment:     -Acute respiratory failure, multifactorial in setting of COPD exacerbation and CHF.   -Sepsis, Bacteremia, unclear source   -Leukocytosis, WBC 21.8K on presentation 4/6/2025  -Acute HFmrEF,  new dx this admission, previously normal LV function in 12/2024.   Echo 4/7/2025 with LVEF 40-45% with global hypokinesis  -NSTEMI, likely type 2 in setting of respiratory failure and sepsis.   -CAD including Left Main s/p LHC 12/16/2024 with findings as follows  dLM 30-40% stenosis, Prox/mid LAD 40-50% stenosis, Dominant RCA with mRCA 10-30% stenosis, pLCx 30% stenosis  -Valvular heart disease   Low flow/low gradient AS, AV mean gradient is 27 mmHg. AV peak velocity is 3.7 m/s.   Severe AR  -Pulmonary hypertension, RVSP 68 mmHg   -Peripheral arterial disease  -Hx GI bleed, Hgb down to 7.9 in 11/2024; colonoscopy deferred due to valve disease  -HTN  -Dyslipidemia  -Cachetic      Primary cardiologist is Dr. Paez    Plan:       I saw, evaluated, interviewed and examined the patient personally.  Dyspnea improved according to patient  No chest pain or chest tightness.  Aortic stenosis murmur  No significant fluid overload.  No Rales    Bacteremia noted.  Gentle diuresis.  Infusion stopped and now on IV Lasix but  ID requested TIKA however respiratory status still tenuous to consider.  Would observe over the weekend and consider evaluation again on Monday      Jesus Owen MD       Respiratory status improving, Aggressive diuresis limited with underlying severe AS  Avoid significant volume depletion and hypotension with underlying AS and pulmonary hypertension.   Lasix gtt transitioned to IV lasix 20 mg BID, monitor strict I/Os and renal indices.   Continue ASA, plavix and statin with left main disease. Structural heart consult for consideration of BAV to temporize, as he is currently a suboptimal candidate for TAVR or 
Cardiology Progress Note      Admit Date: 4/6/2025  Attending Cardiologist: Dr. Paez     Assessment:     -Acute respiratory failure, multifactorial in setting of COPD exacerbation and CHF.   -Aortic Valve Endocarditis, maximum dimension 1.2 cm, s/p TIKA 4/14/25   -Sepsis   -Acute HFmrEF,  new dx this admission, previously normal LV function in 12/2024.   Echo 4/7/2025 with LVEF 40-45% with global hypokinesis  -NSTEMI, likely type 2 in setting of respiratory failure and sepsis.   -CAD including Left Main s/p LHC 12/16/2024 with findings as follows  dLM 30-40% stenosis, Prox/mid LAD 40-50% stenosis, Dominant RCA with mRCA 10-30% stenosis, pLCx 30% stenosis  -Valvular heart disease   Low flow/low gradient AS, AV mean gradient is 27 mmHg. AV peak velocity is 3.7 m/s.   Severe AR  -Pulmonary hypertension, RVSP 68 mmHg   -Peripheral arterial disease, on plavix.   -Hx GI bleed, Hgb down to 7.9 in 11/2024  -HTN  -Dyslipidemia     Primary cardiologist is Dr. Paez    Plan:     NPO after MN for Left and right heart cath tomorrow am.   CTS consulted for AV endocarditis, appreciate involvement.   Continue IV lasix 20 mg BID, negative net ~ 10 L this admission, monitor renal indices/Avoid significant volume depletion/hypotension with underlying AS and pulmonary hypertension.   Continue ASA, statin.   Plavix held.   GDMT: Toprol XL. Avoid ace/arb/arni perioperatively.   IV abx per ID, appreciate involvement.     Subjective:     SOB improving from admission, currently on 2 L O2 NC.     Objective:      Patient Vitals for the past 8 hrs:   Temp Pulse Resp BP SpO2   04/15/25 0700 97.3 °F (36.3 °C) 72 15 (!) 124/39 100 %   04/15/25 0400 98 °F (36.7 °C) 69 17 (!) 119/45 98 %         Patient Vitals for the past 96 hrs:   Weight   04/14/25 0637 54 kg (119 lb)         TELE: normal sinus rhythm               Current Facility-Administered Medications   Medication Dose Route Frequency    sodium chloride flush 0.9 % injection 5-40 mL  
Cardiology Progress Note      Admit Date: 4/6/2025  Attending Cardiologist: Dr. Paez     Assessment:     -Acute respiratory failure, multifactorial in setting of COPD exacerbation and CHF.   -Sepsis, Bacteremia, unclear source   -Leukocytosis, WBC 21.8K on presentation 4/6/2025  -Acute HFmrEF,  new dx this admission, previously normal LV function in 12/2024.   Echo 4/7/2025 with LVEF 40-45% with global hypokinesis  -NSTEMI, likely type 2 in setting of respiratory failure and sepsis.   -CAD including Left Main s/p LHC 12/16/2024 with findings as follows  dLM 30-40% stenosis, Prox/mid LAD 40-50% stenosis, Dominant RCA with mRCA 10-30% stenosis, pLCx 30% stenosis  -Valvular heart disease   Low flow/low gradient AS, AV mean gradient is 27 mmHg. AV peak velocity is 3.7 m/s.   Severe AR  -Pulmonary hypertension, RVSP 68 mmHg   -Peripheral arterial disease  -Hx GI bleed, Hgb down to 7.9 in 11/2024; colonoscopy deferred due to valve disease  -HTN  -Dyslipidemia  -Cachetic      Primary cardiologist is Dr. Paez    Plan:     NPO for tentative TIKA this afternoon   Continue IV lasix 20 mg BID, negative net ~ 9 L this admission, monitor renal indices/Avoid significant volume depletion/hypotension with underlying AS and pulmonary hypertension.   Continue ASA, plavix and statin with left main disease.   Structural heart consult for consideration of BAV to temporize, as he is currently a suboptimal candidate for TAVR or SAVR with bacteremia.   GDMT: Toprol XL.     Subjective:     SOB improving from admission, currently on 2 L O2 NC.     Objective:      Patient Vitals for the past 8 hrs:   Temp Pulse Resp BP SpO2   04/14/25 0814 97.5 °F (36.4 °C) 72 15 (!) 128/41 94 %   04/14/25 0709 -- 70 14 -- 100 %   04/14/25 0500 -- 70 -- -- --   04/14/25 0357 97.7 °F (36.5 °C) 72 14 (!) 143/40 93 %   04/14/25 0300 -- 73 -- -- --   04/14/25 0100 -- 71 -- -- --         Patient Vitals for the past 96 hrs:   Weight   04/14/25 0637 54 kg (119 
Cardiology Progress Note    Admit Date: 4/6/2025  Attending Cardiologist: Dr. Andrade    IMPRESSION  Acute respiratory failure multifactorial in setting of COPD, CHF exacerbation  Sepsis with bacteremia of unclear source  Acute on chronic heart failure medium reduced ejection fraction, new diagnosis previously normal LV function December 2024 with 2D echo April 2025 ejection fraction 40 to 45% global hypokinesis  NSTEMI likely type II in setting of respiratory failure and sepsis  Coronary artery disease including left main status post left heart catheterization December 2024 with distal left main 30 to 40% stenosis proximal to mid LAD 40-50 stenosis, dominant RCA with mid RCA 10 to 30% stenosis and proximal left circumflex 30% stenosis  Severe aortic valve regurgitation by 2D echo 4/7/2025  Possible severe aortic valve stenosis with mildly reduced ejection fraction with AV mean gradient 27 mmHg peak velocity 3.7 m/s by 2D echo 4/7/2025  Tricuspid valve mild to moderate regurgitation by 2D echo 4/7/2025  Moderate pulmonary hypertension by 2D echo 4/7/2025  Carotid artery stenosis vascular ultrasound 3/26/2025 moderate stenosis of the right internal carotid artery  Peripheral arterial disease  History of GI bleed  Hypertension - Normotensive to Stage II pressure systolic, hypotensive diastolic  Hyperlipidemia     PLAN  Monitor fluid status, adjust as necessary, fluid restriction, salt intake <2g per day.  Continue Lasix 20 mg IV twice daily, -8.2L from 4/13/2025  Recommend Guideline Directed medical therapy as can tolerate.  Continue metoprolol succinate 25 mg p.o. daily  Monitor blood pressure, adjust antihypertensive medications as necessary.  Statin if can tolerate prior to discharge.  Continue Lipitor 80 mg p.o. at bedtime  Continue aspirin 81 mg p.o. daily, plavix 75mg po daily DAPT with history of NSTEMI 12/2024.    The benefits and risks of cardiac catheterization including right and left heart catheterization 
Cardiology Progress Note    Admit Date: 4/6/2025  Attending Cardiologist: Dr. Andrade    IMPRESSION  Acute respiratory failure multifactorial in setting of COPD, CHF exacerbation  Sepsis with bacteremia of unclear source  Acute on chronic heart failure medium reduced ejection fraction, new diagnosis previously normal LV function December 2024 with 2D echo April 2025 ejection fraction 40 to 45% global hypokinesis  NSTEMI likely type II in setting of respiratory failure and sepsis  Coronary artery disease including left main status post left heart catheterization December 2024 with distal left main 30 to 40% stenosis proximal to mid LAD 40-50 stenosis, dominant RCA with mid RCA 10 to 30% stenosis and proximal left circumflex 30% stenosis  Severe aortic valve regurgitation by 2D echo 4/7/2025  Possible severe aortic valve stenosis with mildly reduced ejection fraction with AV mean gradient 27 mmHg peak velocity 3.7 m/s by 2D echo 4/7/2025  Tricuspid valve mild to moderate regurgitation by 2D echo 4/7/2025  Moderate pulmonary hypertension by 2D echo 4/7/2025  Carotid artery stenosis vascular ultrasound 3/26/2025 moderate stenosis of the right internal carotid artery  Peripheral arterial disease  History of GI bleed  Hypertension - Normotensive to Stage II pressure systolic, hypotensive diastolic  Hyperlipidemia     PLAN  Monitor fluid status, adjust as necessary, fluid restriction, salt intake <2g per day.  Continue Lasix 20 mg IV twice daily, -8.2L from 4/13/2025  Recommend Guideline Directed medical therapy as can tolerate.  Continue metoprolol succinate 25 mg p.o. daily  Transesophageal echocardiogram on Monday after optimization w Dr. Paez.  NPO MN.    Monitor blood pressure, adjust antihypertensive medications as necessary.  Statin if can tolerate prior to discharge.  Continue Lipitor 80 mg p.o. at bedtime  Continue aspirin 81 mg p.o. daily, plavix 75mg po daily DAPT with history of NSTEMI 12/2024.       Thank you 
Cardiology Progress Note    Admit Date: 4/6/2025  Attending Cardiologist: Dr. Andrade    IMPRESSION  Acute respiratory failure multifactorial in setting of COPD, CHF exacerbation  Sepsis with bacteremia of unclear source  Acute on chronic heart failure medium reduced ejection fraction, new diagnosis previously normal LV function December 2024 with 2D echo April 2025 ejection fraction 40 to 45% global hypokinesis  NSTEMI likely type II in setting of respiratory failure and sepsis  Coronary artery disease including left main status post left heart radiation December 2024 with distal left main 30 to 40% stenosis proximal to mid LAD 40-50 send stenosis, dominant RCA with mid RCA 10 to 30% stenosis and proximal left circumflex 30% stenosis  Severe aortic valve regurgitation by 2D echo 4/7/2025  Possible severe aortic valve stenosis with mildly reduced ejection fraction with AV mean gradient 27 mmHg peak velocity 3.7 m/s by 2D echo 4/7/2025  Tricuspid valve mild to moderate regurgitation by 2D echo 4/7/2025  Moderate pulmonary hypertension by 2D echo 4/7/2025  Carotid artery stenosis vascular ultrasound 3/26/2025 moderate stenosis of the right internal carotid artery  Peripheral arterial disease  History of GI bleed  Hypertension - Normotensive to Stage II pressure systolic, hypotensive diastolic  Hyperlipidemia    PLAN  Monitor fluid status, adjust as necessary, fluid restriction, salt intake <2g per day.  Continue Lasix 20 mg IV twice daily  Recommend Guideline Directed medical therapy as can tolerate.  Continue metoprolol succinate 25 mg p.o. daily  Transesophageal echocardiogram on Monday after optimization w Dr. Paez  Monitor blood pressure, adjust antihypertensive medications as necessary.  Statin if can tolerate prior to discharge.  Continue Lipitor 80 mg p.o. at bedtime  Continue aspirin 81 mg p.o. daily    Thank you for this consultation and for allowing us to participate in this patient's care.    Primary 
[facility]          Chief Complaint:  Follow up for AV IE    Interval History:  up to chair.      ROS:    denies leg swelling, SOB, cough.     Exam:   BP (!) 117/31 Comment: rn aware  Pulse 77   Temp 97.8 °F (36.6 °C) (Oral)   Resp 18   Ht 1.651 m (5' 5\")   Wt 54 kg (119 lb)   SpO2 98%   BMI 19.80 kg/m²      Const:  alert NAD   CV:   RRR without murmur or rub.  PMI not displaced.  No peripheral edema   Respiratory:  Clear to ascultation without wheezes, rales or rhonchi.  No accessory muscle use.      Assessment:    Endocarditis  TIKA evealed vegetation on the aortic valve in the setting of AS and SR  Sepsis -Ecoli  bacteremia-  on rocephin per ID  Acute hypoxic respiratory failure  Acute HF with moderately reduced EF  NSTEMI   Dementia: oriented to self only, cared for by his niece  DNR- currently DNR  CAD   s/p LHC 12/16/2024 with findings as follows, dLM 30-40% stenosis, Prox/mid LAD 40-50% stenosis, Dominant RCA with mRCA 10-30% stenosis, pLCx 30% stenosis  Aortic stenosis and regurg- TAVR vs SAVR consult  Pulmonary HTN-  RVSP 68mmhg  PAD  HTN  Anemia H/o GI bleed- 11/24, hgb 7.9 > 8.1  Cachetic BMI 19.8    PLAN:    Needs [facility] and [facility]  Will plan open heart surgery this admission  
[facility]        Rounded with Dr. MORENO  Chief Complaint:  Follow up for AV IE    Interval History: RRT called yesterday for ONOFRE diuresed and placed on BIPAP. On NC this am.     ROS:    denies leg swelling, or  cough.     Exam:   BP (!) 128/39   Pulse 71   Temp 97.7 °F (36.5 °C) (Oral)   Resp 17   Ht 1.651 m (5' 5\")   Wt 54 kg (119 lb)   SpO2 99%   BMI 19.80 kg/m²      Const:  alert NAD   CV:   RRR without murmur or rub.  PMI not displaced.  No peripheral edema   Respiratory:  Clear to ascultation without wheezes, rales or rhonchi.  No accessory muscle use.      Assessment:    Endocarditis  TIKA evealed vegetation on the aortic valve in the setting of AS and SR  Sepsis -E. gallinarum bacteremia-    Acute hypoxic respiratory failure  Acute HF with moderately reduced EF  NSTEMI   Dementia: oriented to self and place, cared for by his niece  DNR- currently DNR  CAD   s/p C 12/16/2024 with findings as follows, dLM 30-40% stenosis, Prox/mid LAD 40-50% stenosis, Dominant RCA with mRCA 10-30% stenosis, pLCx 30% stenosis  Aortic stenosis and regurg- TAVR vs SAVR consult  Pulmonary HTN-  RVSP 68mmhg  PAD  HTN  Anemia H/o GI bleed- 11/24,  Cachetic BMI 19.8    PLAN:    Increased lasix to 40mg PO BID today  Consider transfer to tertiary center for high risk Aortic valve surgery. Dr. Moreno d/w Dr. Man  
Carotid Duplex ordered 4/14 Tech on call reached out via Perfect [employer] with regards to this patient having a recent Carotid Duplex performed on 3/26/2025.  As per note message on Perfect Serve went to covering Janee Houston.  Awaiting confirmation if this is to be cancelled.  Janee informed tech to reach out to SARATH Hills who place order, the tech was unable to get ahold of SARATH Real and left message with RN.      I received no response throughout the day and reached out at 4:55pm and the answering service answered.     Still awaiting call back from SARATH Real with regards to if the patient needs a repeated Duplex Carotid exam.    Ext [phone]  
Cath holding summary:    1045: Patient via bed from  without difficulty, placed on monitor Sinus rhythm. A&O x4, no c/o pain. NPO since midnight, ID and allergies verified. H&P reviewed, med rec completed. PIV x3 noted. Groin prep completed, consent ready for signature.    1115: Verbal report given to Yulia keturah Khalil being transferred to Cath Lab for ordered procedure. Report consisted of patient's Situation, Background, Assessment and Recommendations (SBAR). Information from the following report(s) Nurse Handoff Report, Adult Overview, Intake/Output, MAR, Recent Results, Med Rec Status, Cardiac Rhythm Sinus rhythm, Pre Procedure Checklist, and Procedure Verification was reviewed with the receiving nurse. Opportunity for questions and clarification was provided.    1235: Verbal report received from Yulia Khalil being received from Cath Lab for routine post-op. Report consisted of patient's Situation, Background, Assessment and Recommendations (SBAR). Information from the following report(s) Nurse Handoff Report, Adult Overview, Surgery Report, Intake/Output, MAR, Recent Results, Med Rec Status, Cardiac Rhythm Sinus rhythm, and Event Log was reviewed with the receiving nurse. Pt A&O x4, no c/o pain. Opportunity for questions and clarification provided.  Procedure: Cardiac Cath  Intervention: No  If yes, antiplatelet administered: N/A  Site: Right, Arm  Pain: 0    1330: R band removed from right wrist per protocol. No bleeding or hematoma noted, site covered with hemostatic dressing and tegaderm. Patient denies pain, pulse palpable and brisk cap refill distal to site.  Cath site instructions and post care including s/s of bleeding and methods of bleeding prevention reviewed with patient who verbalized understanding.    1330: Verbal report given to Parvin CLARK on Gio Khalil  being transferred to  for routine progression of patient care. Report consisted of patient's Situation, Background, 
Comprehensive Nutrition Assessment    Type and Reason for Visit:  Initial, Positive nutrition screen    Nutrition Recommendations/Plan:   Continue current diet as tolerated.  Order Ensure Plus High Protein (each provides 350 kcal, 20g protein) BID  Recommend/order MVI/min supplementation daily, Daily wts.  Continue to monitor tolerance of PO, compliance of oral supplements, weight, labs, and plan of care during admission.     Malnutrition Assessment:  Malnutrition Status:  Moderate malnutrition (04/08/25 1452)    Context:  Chronic Illness     Findings of the 6 clinical characteristics of malnutrition:  Energy Intake:  75% or less estimated energy requirements for 1 month or longer  Weight Loss:  Mild weight loss (-5% x6 months)     Body Fat Loss:  Mild body fat loss     Muscle Mass Loss:  Mild muscle mass loss Clavicles (pectoralis & deltoids), Scapula (trapezius), Temples (temporalis)  Fluid Accumulation:  No fluid accumulation     Strength:  Not Performed    Nutrition History and Allergies:      Past Medical History:   Diagnosis Date    CURTIS (acute kidney injury) 01/24/2024    Anxiety associated with depression 08/08/2023    Atherosclerosis 08/08/2023    Bilateral carotid artery stenosis 11/24/2023    Rt mod (50-69%); Lt mild (<50%)    Cataract 03/07/2022    Cataract extraction status of eye, unspecified laterality 03/07/2022    Chronic hepatitis C without hepatic coma (HCC) 05/23/2022    Chronic pain in left foot 03/07/2022    Current smoker 03/07/2022    Daily consumption of alcohol 03/07/2022    Dyslipidemia 08/08/2023    Elevated LFTs 03/09/2022    Fracture of thoracic transverse process, closed, initial encounter ([facility]) 01/24/2024    Hepatitis C test positive 03/09/2022    Hyperkalemia 03/09/2022    Marijuana smoker 03/07/2022    Mental subnormality 03/07/2022    PAD (peripheral artery disease)     Primary hypertension 03/07/2022    PVD (peripheral vascular disease) 07/22/2022     PTA: Per pt decreased 
Comprehensive Nutrition Assessment    Type and Reason for Visit:  Reassess    Nutrition Recommendations/Plan:   Continue current diet as tolerated.  Modify ONS to: Ensure Plus High Protein (each provides 350 kcal, 20g protein) TID  Continue multivitamin supplementation daily, Daily wts.  Continue to monitor tolerance of PO, compliance of oral supplements, weight, labs, and plan of care during admission.     Malnutrition Assessment:  Malnutrition Status:  Moderate malnutrition (04/08/25 1452)    Context:  Chronic Illness     Findings of the 6 clinical characteristics of malnutrition:  Energy Intake:  75% or less estimated energy requirements for 1 month or longer  Weight Loss:  Mild weight loss (-5% x6 months)     Body Fat Loss:  Mild body fat loss     Muscle Mass Loss:  Mild muscle mass loss Clavicles (pectoralis & deltoids), Scapula (trapezius), Temples (temporalis)  Fluid Accumulation:  No fluid accumulation     Strength:  Not Performed    Nutrition Assessment:    Admitted for NSTEMI (non-ST elevated myocardial infarction, COPD, sepsis. S/p TIKA 4/15, RRT called this morning 2/2 respiratory failure; pt placed on BiPAP; diet orders changed to NPO. Pt seen for follow up; asleep, niece (Shaniqua) present during RD visit. Per niece, pt was consuming >75% of meals prior to this morning. Niece states pt was not receiving ONS as ordered. Per flowsheet average po intake 90% x5 documented meals. At time of note diet was advanced to regular and ONS ordered for TID; will modify to appropriate ONS for pt. Pt's niece had no nutrition related questions/concerns at this time. Will continue to monitor per protocol.      Meal Intake: Provides on average ~100% kcal, 100% protein of estimated needs  Patient Vitals for the past 168 hrs:   PO Meals Eaten (%)   04/21/25 1030 0%   04/20/25 0925 51 - 75%   04/18/25 1819 76 - 100%   04/18/25 0811 76 - 100%       Nutrition Related Findings:    Pertinent Meds: 
During the rapid two bags of magnesium were pulled from the omnicell, only one bag was given to the patient.  The first bag was not given to the patient due to mishap during medication prep.  
Goal is to titrate FiO2 to maintain SpO2 >92%     At 1159 titrated FiO2 down to 45%, SpO2 94%     Now at 35L/45%   
I have assisted w/ transfer by completing Emtala Document    Discharge Summary to be completed by Ada Man, DO   
Infectious Disease Progress Note        Reason: Sepsis, E. coli/gram-positive bacteremia    Current abx Prior abx     Pip/tazo 4/6, restarted 4/10 Piperacillin/tazobactam on 4/6  Cefepime 4/6-4/7  Ceftriaxone 4/7-4/9  doxycycline since 4/6-4/7     Lines:       Assessment :    64 y.o. male with past medical history significant for NSTEMI, tobacco use disorder, emphysema, peripheral artery disease, hypertension, who presented to [facility] on 4/6/25 with complaints of dyspnea in the past 24 hours.      Clinical presentation consistent with sepsis-present on admission due to E.coli/enterococcus gallinarum bacteremia-positive blood culture 4/6,  4/8      Exact source of E. Col/enterococcus i bacteremia not entirely clear.  It usually originates from GI/ source.  No hydronephrosis or GI pathology noted on CTA 4/6/25.  Discussed with caregiver .  Patient has not had a colonoscopy since he is a high anesthesia risk.  Apparently he was scheduled to have PET scan as outpatient per neurologist    Persistent Enterococcus gallinarum bacteremia highly concerning for undiagnosed endovascular infection/endocarditis    Acute hypoxic respiratory failure-likely due to decompensated CHF.  Cardiology/pulmonary evaluation appreciated      Subjective sense of improvement  Improving oxygenation.  On 7L oxygen    Still with bilateral rales/fluid overload      Recommendations:    Discontinue pip/tazo - start ceftriaxone 2 g IV q 12 hours. Add amp/sulbactam   Repeat blood culture today to determine clearance of bacteremia   Ideally needs a transesophageal echocardiogram to evaluate for endocarditis.  However looking at patient's current respiratory status, I am not sure if patient is a good candidate for TIKA.  Message sent to cardiologist, dr. RON Owen  follow-up pulmonary recommendations  Wean oxygen as tolerated  Follow-up palliative care recommendation regarding goals of care-recommend transition to comfort measures/hospice if repeat blood 
Infectious Disease Progress Note        Reason: Sepsis, E. coli/gram-positive bacteremia    Current abx Prior abx   Cefepime, doxycycline since 4/6 Piperacillin/tazobactam on 4/6     Lines:       Assessment :    64 y.o. male with past medical history significant for NSTEMI, tobacco use disorder, emphysema, peripheral artery disease, hypertension, who presented to [facility] on 4/6/25 with complaints of dyspnea in the past 24 hours.      Clinical presentation consistent with sepsis-present on admission due to E.coli/entercoccus bacteremia-positive blood culture 4/6, negative blood culture 4/8      Exact source of E. Col/enterococcus i bacteremia not entirely clear.  It usually originates from GI/ source.  No hydronephrosis or GI pathology noted on CTA 4/6/25.  Unfortunately no urine analysis or culture sent on admission to determine this.  Patient denies any dysuria or hematuria.  Monitor for undiagnosed GI/ malignancy.  Discussed with caregiver .  Patient has not had a colonoscopy since he is a high anesthesia risk.  Apparently he was scheduled to have PET scan as outpatient per neurologist    Acute hypoxic respiratory failure-likely due to decompensated CHF.  Cardiology/pulmonary evaluation appreciated      Subjective sense of improvement    Worsening leukocytosis noted today-could be due to steroids.  Monitor for partially treated bacteremia    Recommendations:    D/c ceftriaxone. Start pip/tazo to cover for e.coli, entercoccus  Follow-up repeat blood culture 4/8 to determine clearance of bacteremia  Follow-up pulmonary, cardiology recommendations  Wean oxygen as tolerated  Follow-up palliative care recommendation regarding goals of care       Above plan was discussed in details with patient,  dr. Champion. Please call me if any further questions or concerns. Will continue to participate in the care of this patient.  HPI:    States that he feels better.  Denies worsening shortness of breath, chest pain.  Past Medical 
Infectious Disease Progress Note        Reason: Sepsis, E. coli/gram-positive bacteremia    Current abx Prior abx   Cefepime, doxycycline since 4/6 Piperacillin/tazobactam on 4/6     Lines:       Assessment :    64 y.o. male with past medical history significant for NSTEMI, tobacco use disorder, emphysema, peripheral artery disease, hypertension, who presented to [facility] on 4/6/25 with complaints of dyspnea in the past 24 hours.      Clinical presentation consistent with sepsis-present on admission due to E.coli/gram-positive bacteremia-positive blood culture 4/6, probable UTI    Gram-positive cocci in blood culture 4/6 could represent contamination versus true bloodstream infection.  Both sets could have been done at the same time although level to be drawn 5 minutes apart and hence still could be contamination, frequent occurrence in the ED    Exact source of E. coli bacteremia not entirely clear.  It usually originates from GI/ source.  No hydronephrosis or GI pathology noted on CTA 4/6/25.  Unfortunately no urine analysis or culture sent on admission to determine this.  Patient denies any dysuria or hematuria.  Monitor for undiagnosed GI/ malignancy.  Discussed with caregiver at bedside.  Patient has not had a colonoscopy since he is a high anesthesia risk.  Apparently he was scheduled to have PET scan as outpatient per neurologist    Acute hypoxic respiratory failure-likely due to decompensated CHF.  Cardiology/pulmonary evaluation appreciated    Improving leukocytosis.  Subjective sense of improvement    Recommendations:    cont ceftriaxone  Follow-up ID of gram-positive cocci in blood culture and susceptibility of E. coli in blood culture 4/6  Repeat blood culture today to determine clearance of bacteremia  Follow-up pulmonary, cardiology recommendations  Wean oxygen as tolerated  Follow-up palliative care recommendation regarding goals of care       Above plan was discussed in details with patient,   
Infectious Disease Progress Note      Reason: Sepsis, E. coli/gram-positive bacteremia    Current abx Prior abx     CTX 2 grams q 12 (4/11 -  Amp-sul 3 grams q 6 (4/11 -  Piperacillin/tazobactam on 4/6  Cefepime 4/6-4/7  Ceftriaxone 4/7-4/9  doxycycline since 4/6-4/7  Pip/tazo 4/6, restarted 4/10       Assessment :    64 y.o. male with past medical history significant for NSTEMI, tobacco use disorder, emphysema, peripheral artery disease, hypertension, who presented to [facility] on 4/6/25 with complaints of dyspnea in the past 24 hours.      Clinical presentation consistent with sepsis-present on admission due to E.coli/enterococcus gallinarum bacteremia-positive blood culture 4/6, 4/8.    Bcx from 4/11 remain negative. 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 Will f/u TIKA results to determine need for additional treatment.    Recommendations:    Continue ceftriaxone 2 g IV q 12 hours (dosed for endocarditis)  Continue amp/sulbactam   F/u TIKA results  follow-up pulmonary recommendations  Wean oxygen as tolerated  Follow-up palliative care recommendation regarding goals of care-recommend transition to comfort measures/hospice if repeat blood cultures done today returned positive.   Follow-up blood cultures till finalized since concurrent antibiotics will delay the positivity    Please call me if any further questions or concerns. Will continue to participate in the care of this patient.    HPI:  Pt seen at bedside today, he does not have any acute issues or complaints this morning. He does endorse overall feeling slightly better, especially from a breathing perspective. No CP, fevers, N/V, or abdominal pain.     Past Medical History:   Diagnosis Date    CURTIS (acute kidney injury) 01/24/2024    Anxiety associated with depression 08/08/2023    Atherosclerosis 08/08/2023    Bilateral carotid artery stenosis 11/24/2023    Rt mod (50-69%); Lt mild (<50%)    Cataract 03/07/2022    
Infectious Disease Progress Note      Reason: Sepsis, E. coli/gram-positive bacteremia    Current abx Prior abx     CTX 2 grams q 12 (4/11 -  Amp-sul 3 grams q 6 (4/11 -  Piperacillin/tazobactam on 4/6  Cefepime 4/6-4/7  Ceftriaxone 4/7-4/9  doxycycline since 4/6-4/7  Pip/tazo 4/6, restarted 4/10       Assessment :    64 y.o. male with past medical history significant for NSTEMI, tobacco use disorder, emphysema, peripheral artery disease, hypertension, who presented to [facility] on 4/6/25 with complaints of dyspnea in the past 24 hours.      Clinical presentation consistent with sepsis-present on admission due to E.coli/enterococcus gallinarum bacteremia-positive blood culture 4/6, 4/8.    No new ID recommendations today. As mentioned previously, the source of the E coli and Enterococcus bacteremia is unclear - these organisms generally are of the GI tract, and his evaluation thus far does not suggest any GI pathology. The persistence of the Enterococcus is concerning for a possible endovascular infection, and I agree with the plans for TIKA on Monday to more definitively evaluate. Bcx from 4/11 remains negative.       Recommendations:    Continue ceftriaxone 2 g IV q 12 hours.   Continue amp/sulbactam   Repeat blood culture today to determine clearance of bacteremia   Ideally needs a transesophageal echocardiogram to evaluate for endocarditis, tentatively scheduled for Monday  follow-up pulmonary recommendations  Wean oxygen as tolerated  Follow-up palliative care recommendation regarding goals of care-recommend transition to comfort measures/hospice if repeat blood cultures done today returned positive.   Follow-up blood cultures till finalized since concurrent antibiotics will delay the positivity    Will plan to see patient on Monday, unless any clinical changes.     Please call me if any further questions or concerns. Will continue to participate in the care of this patient.    HPI:  Pt seen at bedside today, he does 
Infectious Disease Progress Note      Reason: Sepsis, E. coli/gram-positive bacteremia    Current abx Prior abx     CTX 2 grams q 12 (4/11 -  Amp-sul 3 grams q 6 (4/11 -  Piperacillin/tazobactam on 4/6  Cefepime 4/6-4/7  Ceftriaxone 4/7-4/9  doxycycline since 4/6-4/7  Pip/tazo 4/6, restarted 4/10       Assessment :    64 y.o. 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     Interval events notable for TIKA demonstrating 1.2 x 0.5 cm vegetation on the aortic valve with severe aortic regurgitation and with EF of 40 - 45%.     In the setting of vegetation size (>10 mm) and associated valvular pathology, agree with discussion with CT surgery regarding surgical intervention.     Bcx from 4/11 remain negative, suggesting clearance of bacteremia. I suspect that the vegetation is most likely due to Enterococcus (instead of E coli), and will tailor him to CTX + ampicillin (Enterococcus is ampicillin susceptible), as he does not appear to have a clear indication for anaerobic coverage. This will also address the E coli bacteremia, which is CTX susceptible. Will need at least 6 weeks of IV antibiotics.     Recommendations:    Continue ceftriaxone 2 g IV q 12 hours (dosed for endocarditis)  Stop unasyn; start ampicillin 2 grams every 4 hours  follow-up Cardiology/CT surgery recommendations  Wean oxygen as tolerated  Follow-up blood cultures final results    Please call me if any further questions or concerns. Discussed with pt, SARATH Ly with Cardiology. Will continue to participate in the care of this patient.    HPI:  Pt seen at bedside today. We discussed his TIKA results. He tolerated the procedure well yesterday. He does report some loose stools yesterday, with mild abdominal pain. 
Infectious Disease Progress Note      Reason: Sepsis, E. coli/gram-positive bacteremia    Current abx Prior abx     CTX 2 grams q 12 hours (4/11 -  Ampicillin 2 grams q 4 hours (4/16 - Piperacillin/tazobactam on 4/6  Cefepime 4/6-4/7  Ceftriaxone 4/7-4/9  doxycycline since 4/6-4/7  Pip/tazo 4/6, restarted 4/10  Amp-sul 3 grams q 6 (4/11 - 4/15)       Assessment :    64 y.o. male with past medical history significant for NSTEMI, tobacco use disorder, emphysema, peripheral artery disease, hypertension, who presented to [facility] on 4/6/25 with complaints of dyspnea with evidence of aortic valve endocarditis in the setting of Enterococcal and E.coli bacteremia.     Interval events noted - plan for RHC/LHC today, and valvular surgery during this admission.     No new ID recommendations. Bcx from 4/11 remains negative, suggesting clearance. 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 If patient undergoes valvular surgery this admission with removal of the vegetation, then he may not need 6 weeks of IV therapy (usually will continue at least 2 weeks postoperatively)    Recommendations:    Continue ceftriaxone 2 g IV q 12 hours (dosed for endocarditis)  Continue ampicillin 2 grams every 4 hours  Please send intraoperative specimens for culture aerobic/anaerobic culture  follow-up Cardiology/CT surgery recommendations  Wean oxygen as tolerated    Please call me if any further questions or concerns. Discussed with pt. Will plan to see pt on Friday, unless any clinical changes.     HPI:  Pt seen at bedside today. He does not have any acute issues or complaints. I spoke with his niece at length yesterday about his infection and planned treatment course. Spoke with nurse - he had one BM overnight, no diarrhea.    Past Medical History:   Diagnosis Date    CURTIS (acute kidney injury) 01/24/2024    Anxiety associated with depression 08/08/2023    
Infectious Disease Progress Note      Reason: Sepsis, E. coli/gram-positive bacteremia    Current abx Prior abx     CTX 2 grams q 12 hours (4/11 -  Ampicillin 2 grams q 4 hours (4/16 - Piperacillin/tazobactam on 4/6  Cefepime 4/6-4/7  Ceftriaxone 4/7-4/9  doxycycline since 4/6-4/7  Pip/tazo 4/6, restarted 4/10  Amp-sul 3 grams q 6 (4/11 - 4/15)       Assessment :    64 y.o. male with past medical history significant for NSTEMI, tobacco use disorder, emphysema, peripheral artery disease, hypertension, who presented to [facility] on 4/6/25 with complaints of dyspnea with evidence of aortic valve endocarditis in the setting of Enterococcal and E.coli bacteremia.      audible murmur on exam, but stable. Denies any new neurological symptoms, nor any focal neuro deficits identified today. Brain MRI from 3/28 with no acute neuro abnormalities.     Continue dual beta lactam therapy to treat Enterococcal endocarditis, which will also treat the E coli bacteremia as well.     Await thoracic surgery recommendations    Recommendations:  Continue ceftriaxone 2 g IV q 12 hours (dosed for endocarditis)  Continue ampicillin 2 grams every 4 hours  follow-up Cardiology/CT surgery recommendations  Duration of IV antibiotics based on further surgical plans    D/w niece at bedside, patient. Please call me if any further questions or concerns.     HPI:  Pt seen at bedside today. He does not have any acute issues, does report loose stools but no abdominal pain.. Denies any HA, vision changes, or any neuro related symptoms.     Past Medical History:   Diagnosis Date    CURTIS (acute kidney injury) 01/24/2024    Anxiety associated with depression 08/08/2023    Atherosclerosis 08/08/2023    Bilateral carotid artery stenosis 11/24/2023    Rt mod (50-69%); Lt mild (<50%)    Cataract 03/07/2022    Cataract extraction status of eye, unspecified laterality 03/07/2022    Chronic hepatitis C without hepatic coma (HCC) 05/23/2022    Chronic pain in left foot 
Initiate Vapotherm for Hypoxia.   04/07/25 0942   Oxygen Therapy/Pulse Ox   O2 Therapy Oxygen humidified   O2 Device Heated high flow cannula   O2 Flow Rate (L/min) 35 L/min   FiO2  80 %   Pulse 96   Respirations 24   SpO2 91 %       
[facility]  Progress Note    Patient: Gio Khalil Age: 64 y.o. : 1960 MR#: [id number] SSN: [id number]  Date/Time: 2025     Subjective:     Patient seen evaluated, lying in bed, no acute distress.    64-year-old male with a history of COPD, chronic hypoxic respiratory failure on 2 to 4 L nasal cannula at baseline, history of smoking, history of coronary artery disease, hypertension, hyperlipidemia, severe aortic stenosis, anemia of chronic disease, chronic CHF presents to the emergency room secondary to shortness of breath.  Patient is currently on BiPAP.  Patient was also noted to have an elevated troponin, is on heparin GTT, cardiology consulted.  Lactic acid elevated at the time of admission, patient started on broad-spectrum IV antibiotics.  Blood cultures positive.      Assessment/Plan:     Sepsis secondary to E. coli bacteremia, continue broad-spectrum IV antibiotics, ID consulted.  Elevated lactic acid secondary to #1-continue to monitor, improving  Bacteremia-blood cultures positive for E. coli continue IV antibiotics  Elevated troponin secondary to demand ischemia versus CAD, continue IV heparin GTT, cardiology on board.  Acute on chronic hypoxic respiratory failure-continue BiPAP support, pulmonology consulted.  Elevated BNP secondary to CHF, echocardiogram from  shows an EF 40-45%.  Will diurese with IV Lasix, continue to monitor creatinine  Bilateral pleural effusions-diurese with IV Lasix.  Severe aortic stenosis  COPD with exacerbation-continue bronchodilator therapy and IV steroids.  Leukocytosis secondary to sepsis and steroids  DVT prophylaxis-heparin GTT  Full code    Treatment plan discussed with patient and niece at bedside            Anticipated Discharge and Disposition:    CHEIKH Champion MD  25      Case discussed with:  [x]Patient  [x]Family  [x]Nursing  []Case Management  DVT Prophylaxis:  []Lovenox  []Hep SQ  
[facility]van Patrick [facility] Hospitalist Group  Progress Note    Patient: Gio Khalil Age: 64 y.o. : 1960 MR#: [id number] SSN: [id number]  Date/Time: 2025     Subjective:   Patient seen and examined at bedside.  No pain complaints this morning.  Breathing well.  Patient is to receive a right and left heart catheterization today.  Pending further cardiothoracic surgery workup    Disposition SNF versus home health  Discharge 2025    Review of systems  General: No fevers or chills.  Cardiovascular: No chest pain or pressure. No palpitations.   Pulmonary: No shortness of breath, cough or wheeze.   Gastrointestinal: No abdominal pain, nausea, vomiting or diarrhea.   Genitourinary: No urinary frequency, urgency, hesitancy or dysuria.   Musculoskeletal: No joint or muscle pain, no back pain, no recent trauma.    Neurologic: No headache, numbness, tingling or weakness.   Assessment/Plan:   Sepsis due to Ecoli bacteremia  Elevated lactic acid due to above  E coli bacteremia  Acute hypoxic respiratory failure  Acute HF with moderately reduced EF  NSTEMI  CAD  Severe AR  Pulmonary HTN  PAD  HTN  History of GI Bleed     Admitted to 3n  Cardiac monitoring  Cardiology following  TIKA noted AV endocarditis with significant vegetation  Left and right heart cath planned  Continue Rocephin and switch to ampicillin per infectious disease  Structural cardiology consult  Patient is s/p IV heparin  Aspirin and statin  Hold Plavix  BiPAP as needed  Pulmonary following  Continue IV diuresis with lasix  Continue bronchodilator therapy, steroids off at this time  Discussed with family and patient and they are wanting to proceed with any surgical intervention to fix the valve.      I spent 40 minutes with the patient in face-to-face consultation, of which greater than 50% was spent in counseling and coordination of care as described above.     Case discussed with:  [x]Patient  []Family  []Nursing  []Case 
Ivan Patrick [facility] Hospitalist Group  Progress Note    Patient: Gio Khalil Age: 64 y.o. : 1960 MR#: [id number] SSN: [id number]  Date/Time: 2025     Subjective:     Patient seen evaluated, lying in bed, no acute distress.    64-year-old male with a history of COPD, chronic hypoxic respiratory failure on 2 to 4 L nasal cannula at baseline, history of smoking, history of coronary artery disease, hypertension, hyperlipidemia, severe aortic stenosis, anemia of chronic disease, chronic CHF presents to the emergency room secondary to shortness of breath.  Patient is currently on BiPAP.  Patient was also noted to have an elevated troponin, is on heparin GTT, cardiology consulted.  Lactic acid elevated at the time of admission, patient started on broad-spectrum IV antibiotics.  Blood cultures positive.    -patient seen and evaluated, sitting up in bed, no acute distress.  Patient remains on BiPAP.  Appreciate pulmonology, cardiology and ID input.  Continue IV antibiotics.  Given patient's bacteremia he is not a candidate for replacement of aortic valve at this time.  Will continue to follow, cardiology recommending palliative care input.  Acute hypoxic respiratory failure-patient remains on BiPAP.      Assessment/Plan:     Sepsis secondary to E. coli bacteremia, continue broad-spectrum IV antibiotics, ID consulted.  Elevated lactic acid secondary to #1-continue to monitor, improving  Bacteremia-blood cultures positive for E. coli continue IV antibiotics  Elevated troponin secondary to demand ischemia versus CAD, continue IV heparin GTT, cardiology on board.  Acute on chronic hypoxic respiratory failure-continue BiPAP support, pulmonology consulted.  Elevated BNP secondary to CHF, echocardiogram from  shows an EF 40-45%.  Will diurese with IV Lasix, continue to monitor creatinine  Bilateral pleural effusions-diurese with IV Lasix.  Severe aortic stenosis  COPD with exacerbation-continue 
Ivan Patrick [facility] Hospitalist Group  Progress Note    Patient: Gio Khalil Age: 64 y.o. : 1960 MR#: [id number] SSN: [id number]  Date/Time: 2025     Subjective:   Patient seen and examined at bedside.    No pain complaints this morning.  Breathing well.    Patient is to receive a right and left heart catheterization today.    Pending further cardiothoracic surgery workup    Disposition SNF versus home health  Discharge 2025    Review of systems  General: No fevers or chills.  Cardiovascular: No chest pain or pressure. No palpitations.   Pulmonary: No shortness of breath, cough or wheeze.   Gastrointestinal: No abdominal pain, nausea, vomiting or diarrhea.   Genitourinary: No urinary frequency, urgency, hesitancy or dysuria.   Musculoskeletal: No joint or muscle pain, no back pain, no recent trauma.    Neurologic: No headache, numbness, tingling or weakness.   Assessment/Plan:   Sepsis due to Ecoli bacteremia  Elevated lactic acid due to above  E coli bacteremia  Acute hypoxic respiratory failure  Acute HF with moderately reduced EF  NSTEMI  CAD  Severe AR  Pulmonary HTN  PAD  HTN  History of GI Bleed     Admitted to 3n  Cardiac monitoring  Cardiology following  TIKA noted AV endocarditis with significant vegetation  Left and right heart cath planned  Continue Rocephin and switch to ampicillin per infectious disease  Structural cardiology consult, plan for surgery on , will follow postprocedure  Patient is s/p IV heparin  Aspirin and statin  Hold Plavix  BiPAP as needed  Pulmonary following  Continue IV diuresis with lasix  Continue bronchodilator therapy, steroids off at this time  Discussed with family and patient and they are wanting to proceed with any surgical intervention to fix the valve.  Surgery scheduled for       I spent 40 minutes with the patient in face-to-face consultation, of which greater than 50% was spent in counseling and coordination of care as 
[facility]van Patrick [facility] Hospitalist Group  Progress Note  Date:2025       Room:[facility]  Patient Name:Gio Khalil     YOB: 1960     Age:64 y.o.        Subjective    Subjective:  Symptoms:  Stable.  He reports shortness of breath and anxiety.    Diet:  Adequate intake.    Activity level: Normal.       Review of Systems   Respiratory:  Positive for shortness of breath.      This morning patient had rapid response due to worsening shortness of breath.  Patient was given DuoNebs, low-dose Valium, magnesium, and started on BiPAP.    When evaluated patient shortness of breath has improved and anxiety is improving.    Patient's niece was at bedside.  She is frustrated that there is no date for open heart surgery.    Disposition: Pending date of AV valve replacement to determine dispo date.    Objective         Vitals Last 24 Hours:  TEMPERATURE:  Temp  Av.6 °F (36.4 °C)  Min: 97.3 °F (36.3 °C)  Max: 97.9 °F (36.6 °C)  RESPIRATIONS RANGE: Resp  Av.4  Min: 21  Max: 25  PULSE OXIMETRY RANGE: SpO2  Av.1 %  Min: 90 %  Max: 100 %  PULSE RANGE: Pulse  Av.4  Min: 70  Max: 96  BLOOD PRESSURE RANGE: Systolic (24hrs), Av , Min:106 , Max:198     ; Diastolic (24hrs), Av, Min:38, Max:63      I/O (24Hr):    Intake/Output Summary (Last 24 hours) at 2025 1558  Last data filed at 2025 1424  Gross per 24 hour   Intake 369.55 ml   Output 3690 ml   Net -3320.45 ml     Objective:  General Appearance:  Comfortable.    Vital signs: (most recent): Blood pressure (!) 122/39, pulse 71, temperature 97.3 °F (36.3 °C), temperature source Oral, resp. rate 21, height 1.651 m (5' 5\"), weight 54 kg (119 lb), SpO2 96%.    Output: Producing urine.    HEENT: Normal HEENT exam.    Lungs:  Normal effort and normal respiratory rate.  Breath sounds clear to auscultation.    Heart: Normal rate.  Regular rhythm.  Positive for murmur.    Abdomen: Abdomen is soft and flat.  Bowel sounds are 
Ivan Patrick [facility] Hospitalist Group  Progress Note    Patient: Gio Khalil Age: 64 y.o. : 1960 MR#: [id number] SSN: [id number]  Date/Time: 2025     Subjective:     Patient seen evaluated, lying in bed, no acute distress.    64-year-old male with a history of COPD, chronic hypoxic respiratory failure on 2 to 4 L nasal cannula at baseline, history of smoking, history of coronary artery disease, hypertension, hyperlipidemia, severe aortic stenosis, anemia of chronic disease, chronic CHF presents to the emergency room secondary to shortness of breath.  Patient is currently on BiPAP.  Patient was also noted to have an elevated troponin, is on heparin GTT, cardiology consulted.  Lactic acid elevated at the time of admission, patient started on broad-spectrum IV antibiotics.  Blood cultures positive.    -patient seen and evaluated, sitting up in bed, no acute distress.  Patient remains on BiPAP.  Appreciate pulmonology, cardiology and ID input.  Continue IV antibiotics.  Given patient's bacteremia he is not a candidate for replacement of aortic valve at this time.  Will continue to follow, cardiology recommending palliative care input.  Acute hypoxic respiratory failure-patient remains on BiPAP.    -patient seen and evaluated, sitting up in bed, no acute distress.  Patient is on high flow nasal cannula.  Appreciate pulmonology, cardiology and ID input.  Continue IV antibiotics for bacteremia.  Acute hypoxic respiratory failure, patient is off BiPAP and currently on high flow.  Continue to monitor oxygen levels.  Per cardiology, can DC heparin GTT, continue aspirin Plavix and statin.  Aggressive diuresis.  Will continue to follow    4/10-patient seen and evaluated, sitting up in bed, no acute distress.  Patient remains on high flow nasal cannula.  Continue current treatment plan.  Will follow    -patient seen and evaluated, sitting up in bed, no acute distress.  Patient remains 
[facility]van Patrick [facility] Hospitalist Group  Progress Note    Patient: Gio Khalil Age: 64 y.o. : 1960 MR#: [id number] SSN: [id number]  Date/Time: 2025     Subjective:     Patient seen evaluated, lying in bed, no acute distress.    64-year-old male with a history of COPD, chronic hypoxic respiratory failure on 2 to 4 L nasal cannula at baseline, history of smoking, history of coronary artery disease, hypertension, hyperlipidemia, severe aortic stenosis, anemia of chronic disease, chronic CHF presents to the emergency room secondary to shortness of breath.  Patient is currently on BiPAP.  Patient was also noted to have an elevated troponin, is on heparin GTT, cardiology consulted.  Lactic acid elevated at the time of admission, patient started on broad-spectrum IV antibiotics.  Blood cultures positive.    -patient seen and evaluated, sitting up in bed, no acute distress.  Patient remains on BiPAP.  Appreciate pulmonology, cardiology and ID input.  Continue IV antibiotics.  Given patient's bacteremia he is not a candidate for replacement of aortic valve at this time.  Will continue to follow, cardiology recommending palliative care input.  Acute hypoxic respiratory failure-patient remains on BiPAP.    -patient seen and evaluated, sitting up in bed, no acute distress.  Patient is on high flow nasal cannula.  Appreciate pulmonology, cardiology and ID input.  Continue IV antibiotics for bacteremia.  Acute hypoxic respiratory failure, patient is off BiPAP and currently on high flow.  Continue to monitor oxygen levels.  Per cardiology, can DC heparin GTT, continue aspirin Plavix and statin.  Aggressive diuresis.  Will continue to follow      Assessment/Plan:     Sepsis secondary to E. coli bacteremia, continue broad-spectrum IV antibiotics, ID consulted.  Repeat blood cultures negative x 22 hours.  Elevated lactic acid secondary to #1- continue to monitor, improving  Bacteremia-blood 
Ivan Patrick [facility] Hospitalist Group  Progress Note    Patient: Gio Khalil Age: 64 y.o. : 1960 MR#: [id number] SSN: [id number]  Date/Time: 2025     Subjective:   Seen and examined bedside. NPO for TIKA this afternoon. Reports SOB has improved since admission.    Dispo:  DC 25    Review of systems  General: No fevers or chills.  Cardiovascular: No chest pain or pressure. No palpitations.   Pulmonary: No shortness of breath, cough or wheeze.   Gastrointestinal: No abdominal pain, nausea, vomiting or diarrhea.   Genitourinary: No urinary frequency, urgency, hesitancy or dysuria.   Musculoskeletal: No joint or muscle pain, no back pain, no recent trauma.    Neurologic: No headache, numbness, tingling or weakness.   Assessment/Plan:   Sepsis due to Ecoli bacteremia  Elevated lactic acid due to above  E coli bacteremia  Acute hypoxic respiratory failure  Acute HF with moderately reduced EF  NSTEMI  CAD  Severe AR  Pulmonary HTN  PAD  HTN  History of GI Bleed    Admitted to 3n  Cardiac monitoring  Cardiology following  TIKA for today.  Rocephin to continue per ID  Structural cardiology consult  Patient is s/p IV heparin  Continue DAPT and statin  BiPAP as needed  Pulmonary following  Continue IV diuresis with lasix  Continue bronchodilator therapy, steroids off at this time      I spent 40 minutes with the patient in face-to-face consultation, of which greater than 50% was spent in counseling and coordination of care as described above.    Case discussed with:  [x]Patient  []Family  []Nursing  []Case Management  DVT Prophylaxis:  []Lovenox  []Hep SQ  []SCDs  []Coumadin   []Eliquis/Xarelto     Objective:   VS: BP (!) 145/60   Pulse 66   Temp 97.9 °F (36.6 °C) (Oral)   Resp 14   Ht 1.676 m (5' 5.98\")   Wt 54 kg (119 lb)   SpO2 100%   BMI 19.22 kg/m²    Tmax/24hrs: Temp (24hrs), Av.5 °F (36.4 °C), Min:97.1 °F (36.2 °C), Max:97.9 °F (36.6 °C)  IOBRIEF  Intake/Output Summary 
Ivan Patrick [facility] Hospitalist Group  Progress Note    Patient: Gio Khalil Age: 64 y.o. : 1960 MR#: [id number] SSN: [id number]  Date/Time: 2025     Subjective:     Patient seen evaluated, lying in bed, no acute distress.    64-year-old male with a history of COPD, chronic hypoxic respiratory failure on 2 to 4 L nasal cannula at baseline, history of smoking, history of coronary artery disease, hypertension, hyperlipidemia, severe aortic stenosis, anemia of chronic disease, chronic CHF presents to the emergency room secondary to shortness of breath.  Patient is currently on BiPAP.  Patient was also noted to have an elevated troponin, is on heparin GTT, cardiology consulted.  Lactic acid elevated at the time of admission, patient started on broad-spectrum IV antibiotics.  Blood cultures positive.    -patient seen and evaluated, sitting up in bed, no acute distress.  Patient remains on BiPAP.  Appreciate pulmonology, cardiology and ID input.  Continue IV antibiotics.  Given patient's bacteremia he is not a candidate for replacement of aortic valve at this time.  Will continue to follow, cardiology recommending palliative care input.  Acute hypoxic respiratory failure-patient remains on BiPAP.    -patient seen and evaluated, sitting up in bed, no acute distress.  Patient is on high flow nasal cannula.  Appreciate pulmonology, cardiology and ID input.  Continue IV antibiotics for bacteremia.  Acute hypoxic respiratory failure, patient is off BiPAP and currently on high flow.  Continue to monitor oxygen levels.  Per cardiology, can DC heparin GTT, continue aspirin Plavix and statin.  Aggressive diuresis.  Will continue to follow    4/10-patient seen and evaluated, sitting up in bed, no acute distress.  Patient remains on high flow nasal cannula.  Continue current treatment plan.  Will follow    -patient seen and evaluated, sitting up in bed, no acute distress.  Patient remains 
Ivan Patrick [facility] Hospitalist Group  Progress Note    Patient: Gio Khalil Age: 64 y.o. : 1960 MR#: [id number] SSN: [id number]  Date/Time: 2025     Subjective:     Patient seen evaluated, lying in bed, no acute distress.    64-year-old male with a history of COPD, chronic hypoxic respiratory failure on 2 to 4 L nasal cannula at baseline, history of smoking, history of coronary artery disease, hypertension, hyperlipidemia, severe aortic stenosis, anemia of chronic disease, chronic CHF presents to the emergency room secondary to shortness of breath.  Patient is currently on BiPAP.  Patient was also noted to have an elevated troponin, is on heparin GTT, cardiology consulted.  Lactic acid elevated at the time of admission, patient started on broad-spectrum IV antibiotics.  Blood cultures positive.    -patient seen and evaluated, sitting up in bed, no acute distress.  Patient remains on BiPAP.  Appreciate pulmonology, cardiology and ID input.  Continue IV antibiotics.  Given patient's bacteremia he is not a candidate for replacement of aortic valve at this time.  Will continue to follow, cardiology recommending palliative care input.  Acute hypoxic respiratory failure-patient remains on BiPAP.    -patient seen and evaluated, sitting up in bed, no acute distress.  Patient is on high flow nasal cannula.  Appreciate pulmonology, cardiology and ID input.  Continue IV antibiotics for bacteremia.  Acute hypoxic respiratory failure, patient is off BiPAP and currently on high flow.  Continue to monitor oxygen levels.  Per cardiology, can DC heparin GTT, continue aspirin Plavix and statin.  Aggressive diuresis.  Will continue to follow    4/10-patient seen and evaluated, sitting up in bed, no acute distress.  Patient remains on high flow nasal cannula.  Continue current treatment plan.  Will follow    -patient seen and evaluated, sitting up in bed, no acute distress.  Patient remains 
[facility]van [facility] Hospitalist Group  Progress Note  Date:2025       Room:[facility]  Patient Name:Gio Khalil     YOB: 1960     Age:64 y.o.        Subjective    Subjective:  Symptoms:  Stable.    Diet:  Adequate intake.    Activity level: Normal.       Review of Systems    No acute events overnight.  Resting comfortably in bed.  Denies chest pain.      Disposition: Pending date of AV valve replacement to determine dispo date.    Objective         Vitals Last 24 Hours:  TEMPERATURE:  Temp  Av.6 °F (36.4 °C)  Min: 97.4 °F (36.3 °C)  Max: 97.7 °F (36.5 °C)  RESPIRATIONS RANGE: Resp  Av.9  Min: 18  Max: 24  PULSE OXIMETRY RANGE: SpO2  Av.4 %  Min: 90 %  Max: 99 %  PULSE RANGE: Pulse  Av.6  Min: 69  Max: 81  BLOOD PRESSURE RANGE: Systolic (24hrs), Av , Min:120 , Max:140     ; Diastolic (24hrs), Av, Min:30, Max:59      I/O (24Hr):    Intake/Output Summary (Last 24 hours) at 2025 1151  Last data filed at 2025 0819  Gross per 24 hour   Intake --   Output 2900 ml   Net -2900 ml     Objective:  General Appearance:  Comfortable.    Vital signs: (most recent): Blood pressure (!) 140/30, pulse 81, temperature 97.7 °F (36.5 °C), temperature source Oral, resp. rate 20, height 1.651 m (5' 5\"), weight 54 kg (119 lb), SpO2 90%.    Output: Producing urine.    HEENT: Normal HEENT exam.    Lungs:  Normal effort and normal respiratory rate.  Breath sounds clear to auscultation.    Heart: Normal rate.  Regular rhythm.  Positive for murmur.    Abdomen: Abdomen is soft and flat.  Bowel sounds are normal.   There is no abdominal tenderness.     Extremities: Normal range of motion.    Pulses: Distal pulses are intact.    Neurological: Patient is alert and oriented to person, place and time.    Skin:  Warm and dry.          Labs/Imaging/Diagnostics    Labs:  CBC:  Recent Labs     25  0339 25  0307   WBC 8.2 9.0   RBC 2.46* 2.54*   HGB 7.1* 7.2*   HCT 23.0* 
NEB PROTOCOL:  ASSESSMENT    Patient  Gio Khalil     64 y.o.   male     4/20/2025  1:03 PM    Breath Sounds Pre Procedure:    End expiratory Wheezing at the bases                                          Breath Sounds Post Procedure:   Diminished                                              Breathing pattern: Pre procedure   Regular           Post procedure   Regular     Cough: Pre procedure  NPC                Post procedure NPC      Heart Rate: Pre procedure 88            Post procedure 87      Resp Rate: Pre procedure  18            Post procedure  18        Nebulizer Therapy: Current medications  Brovana, Budesonide and Atrovent       Problem List:   Patient Active Problem List   Diagnosis    Marijuana smoker    Hypertension    PAD (peripheral artery disease)    Smoker    PVD (peripheral vascular disease)    Elevated rheumatoid factor    Atherosclerosis    Alcohol abuse    Anxiety associated with depression    Hyperlipidemia    Bilateral carotid artery stenosis    Decreased GFR    Lumbar radiculopathy    Lumbar spondylosis    Melena    Environmental and seasonal allergies    Memory problem    Iron deficiency anemia    Acute renal failure    Palliative care encounter    Goals of care, counseling/discussion    Acute metabolic encephalopathy    Aortic stenosis, severe    Anemia    ETOH abuse    Influenza A (H1N1)    NSTEMI (non-ST elevated myocardial infarction) (HCC)    Lower GI bleed    Chronic heart failure with preserved ejection fraction (HCC)    Coronary artery disease due to lipid rich plaque    Delirium    Vitamin D deficiency    Early satiety    Acute on chronic congestive heart failure (HCC)    Acute hypoxic respiratory failure    Pleural effusion, bilateral    Sepsis (HCC)    Gram-negative bacteremia    Gram-positive bacteremia    Severe aortic valve regurgitation    Moderate pulmonary hypertension (HCC)    Acute bacterial endocarditis       Patient alert and cooperative to use MDI: Yes    Home 
New OT orders received and chart reviewed. Unable to see pt for OT evaluation at this time due to:    Nursing recommends hold OT due to hypotension.    Will follow up later as pt's schedule allows. Thank you for this referral.    Melanie Bradley MS, OTR/L  
OT attempted 2x. Pt REBECCA for cardiac cath.    Will continue to follow.  
OT holding at this time 2/2 RRT for respiratory distress this am and now on bi-pap.    Will continue to follow.  
PT refuses to wear bipap tonight.  PT sats 98% on 6L NC.  Will continue to monitor.  
Patient became extremely SOB and increased WOB.   04/07/25 1646   NIV Type   NIV Started/Stopped On   Assessment   Pulse (!) 118   Respirations (!) 33   SpO2 90 %   Settings/Measurements   PIP Observed 16 cm H20   IPAP 16 cmH20   CPAP/EPAP 6 cmH2O   Vt (Measured) 617 mL   Rate Ordered 8   Insp Rise Time (%) 3 %   FiO2  100 %   Minute Volume (L/min) 17.2 Liters   Mask Leak (lpm) 75 lpm   Patient's Home Machine No       
Patient is not ready for night time BiPAP use, will notify RN when ready for use.   
Patient is not ready for night time BiPAP use. Pt requesting to use it at midnight.   
Patient placed on Bipap at __18/8 and 50%______  Will monitor throughout shift.  Emergency equipment at bedside.  Mask adjusted and call bell within reach.  
Patient placed on [place] High Flow at 10L. Sp02 93%. Patient in no distress and resting comfortably.  
Patient remains on Bipap and is resting comfortably with no signs of respiratory distress. Will continue to titrate as needed.       04/06/25 1720   Settings/Measurements   PIP Observed 16 cm H20   IPAP 16 cmH20   CPAP/EPAP 6 cmH2O   Vt (Measured) 458 mL   Rate Ordered 8   Insp Rise Time (%) 3 %   FiO2  (S)  55 %   Minute Volume (L/min) 12.2 Liters   Mask Leak (lpm) 58 lpm   Patient's Home Machine No   Alarm Settings   Alarms On Y   Low Pressure (cmH2O) 8 cmH2O   High Pressure (cmH2O) 40 cmH2O   Apnea (secs) 20 secs   RR Low (bpm) 8   RR High (bpm) 40 br/min       
Physical Therapy  Orders received, chart reviewed, and discussed patient status with RN. RN requesting to hold therapy this AM due to liable and low BP/MAP. Will hold evals and follow-up later today.   Diana Dutta PT, DPT  
Problem: Occupational Therapy - Adult  Goal: By Discharge: Performs self-care activities at highest level of function for planned discharge setting.  See evaluation for individualized goals.  Description: Occupational Therapy Goals:  Initiated 4/12/2025 to be met within 7-10 days.    1. Patient will demonstrate 4/5 UB strength in preparation for his efficient completion of his self care routine  2.  Patient will perform toilet hygiene with mod I  3.  Patient will perform bathing and dressing routine with set up  4. Patient will maneuver on and off commode with mod I      PLOF: he reports that he lives with his niece who can assist him if he needs it although she is currently on her honeymoon. He reports he was independent with his self care and doesn't use his shower seat (he feels it is too big for his shower)  Outcome: Completed       OCCUPATIONAL THERAPY RE-EVALUATION/DISCHARGE    Patient: Gio Khalil (64 y.o. male)  Date: 4/22/2025  Primary Diagnosis: Acute coronary syndrome ([facility]) [I24.9]  NSTEMI (non-ST elevated myocardial infarction) ([facility]) [I21.4]  Acute on chronic congestive heart failure, unspecified heart failure type ([facility]) [I50.9]  Procedure(s) (LRB):  Left and right heart cath / coronary angiography (N/A) 6 Days Post-Op   Precautions: Fall Risk, Aspiration Risk      ASSESSMENT AND RECOMMENDATIONS:  Pt supine in bed with 4L NC donned, alert, and agreeable to participate. Discussed POC/ goals with pt able to simulate toilet hygiene with modified independence and perform functional transfers in preparation for ADLs with modified independence. Pt able to tailor sit with BLE EOB, supervision for doffing/ re-donning socks. Patient with increased time needed for RLE. O2 maintained > 97% this session, no SOB noted. Based on the objective data described below, pt presents with no deficits that impede pt function with ADLs. At this time pt is safe to d/c home with family support from selfcare standpoint. Pt left 
Review of Systems    Physical Exam  Skin:         
SUBJECTIVE:   The patient is a 68 year old female coming today for discussion regarding her acute and chronic medical issues.  She does have sicca complex and has dry eyes and mouth at times.  She has noticed that she drove at night is now having a rash in that corners of her mouth.  She does wear dentures.    Patient also complains of a painful rash on her right side.  She states it began a few days ago and is getting better.  She denies any change in soaps or detergents.      MEDICATIONS:   Current Outpatient Medications   Medication Sig Dispense Refill   • ketoconazole (NIZORAL) 2 % cream Apply topically daily. 15 g 0   • erythromycin (ILOTYCIN) ophthalmic ointment Place into both eyes nightly. 3.5 g 1   • omeprazole (PriLOSEC) 40 MG capsule Take 1 capsule by mouth daily (before breakfast). 90 capsule 3   • metFORMIN (GLUCOPHAGE) 500 MG tablet Take 1 tablet by mouth in the morning and 1 tablet in the evening. Take with meals. 180 tablet 1   • montelukast (SINGULAIR) 10 MG tablet Take 1 tablet by mouth nightly. 90 tablet 1   • buPROPion XL (WELLBUTRIN XL) 300 MG 24 hr tablet Take 1 tablet by mouth daily. 90 tablet 1   • buPROPion XL (WELLBUTRIN XL) 150 MG 24 hr tablet Take 1 tablet by mouth daily. 90 tablet 1   • albuterol 108 (90 Base) MCG/ACT inhaler Inhale 2 puffs into the lungs every 6 hours as needed for Shortness of Breath or Wheezing. 3 each 0   • budesonide (RHINOCORT ALLERGY) 32 MCG/ACT nasal spray Spray 2 sprays in each nostril in the morning and 2 sprays in the evening. 3 each 3   • Accu-Chek Guide test strip Test blood sugar once daily. DX E11.9 100 strip 11   • atorvastatin (LIPITOR) 10 MG tablet Take 1 tablet by mouth daily. 90 tablet 2   • biest 0.62 MG/ML cream (estradiol-estriol 20:80 in natural base) Apply 1/2 g vaginally twice weekly.  You can increase to 1 g twice weekly if needed 30 g 5   • alendronate (FOSAMAX) 70 MG tablet Take 1 tablet by mouth 1 day a week. 12 tablet 3   • Accu-Chek 
FastClix Lancets Misc TEST BLOOD SUGARS ONCE DAILY 102 each PRN   • budesonide (Rhinocort Allergy) 32 MCG/ACT nasal spray Spray 2 sprays in each nostril 2 times daily. 25.8 mL 3   • diclofenac (VOLTAREN) 1 % gel Apply to affected areas as directed 50 g 3   • Blood Glucose Monitoring Suppl (Accu-Chek Guide) w/Device Kit 1 each daily. 1 kit 0   • acetaminophen (Tylenol) 325 MG tablet Take 2 tablets by mouth every 4 hours as needed for Pain. 30 tablet 0   • ipratropium-albuterol (DUONEB) 0.5-2.5 (3) MG/3ML nebulizer solution Take 3 mLs by nebulization every 6 hours as needed for Shortness of Breath. 360 mL 11   • Multiple Vitamin (DAILY VITAMIN PO)      • aspirin 81 MG tablet Take 1 tablet by mouth daily. 30 tablet 11     No current facility-administered medications for this visit.     Facility-Administered Medications Ordered in Other Visits   Medication   • barium sulfate (E-Z-PAQUE) 96 % suspension 176 mL   • barium sulfate (E-Z-HD) 98 % suspension 340 mL         ALLERGIES: Reviewed in Epic.    OBJECTIVE:  Visit Vitals  /82   Pulse 92   Ht 5' 5\" (1.651 m)   Wt 74.6 kg (164 lb 6.4 oz)   SpO2 96%   BMI 27.36 kg/m²     Examination of her skin on her right side of her chest reveals erythematous vesicular lesions that are fading.  She does have bloating of her corners of her mouth bilaterally.  She has slightly dry eyes.    ASSESSMENT:   1. Angular cheilitis    2. Sicca complex (CMD)    3. Herpes zoster without complication         PLAN:  She is given ketoconazole cream for her outer corners of her mouth.  She will use a barrier at night accurate this is healed.  She will update me if the rash on her right thorax does not completely clear.  We reviewed contact precautions for shingles.  She will continue to monitor her dry eye and mouth.  Orders Placed This Encounter   • ketoconazole (NIZORAL) 2 % cream           
antibiotics, ID consulted.  Repeat blood cultures negative x 2 days  Elevated lactic acid secondary to #1-improved  Bacteremia-blood cultures positive for E. coli continue IV antibiotics  Elevated troponin secondary to demand ischemia versus CAD, discontinue IV heparin.  Appreciate cardiology input, continue aspirin, Plavix, statin therapy  Acute on chronic hypoxic respiratory failure-continue BiPAP support, pulmonology consulted.  Elevated BNP secondary to CHF, echocardiogram from 4/7 shows an EF 40-45%.  Will diurese with IV Lasix, continue to monitor creatinine  Bilateral pleural effusions-diurese with IV Lasix.  Severe aortic stenosis  COPD with exacerbation-continue bronchodilator therapy and IV steroids.  Leukocytosis secondary to sepsis and steroids  Anemia of chronic disease-continue to monitor H&H, transfuse to keep hemoglobin greater than 7.  Hypokalemia-replating  DVT prophylaxis-SCD  DNR        Palliative care consulted      Anticipated Discharge and Disposition:    CHEIKH Champion MD  04/10/25      Case discussed with:  [x]Patient  [x]Family  [x]Nursing  []Case Management  DVT Prophylaxis:  []Lovenox  []Hep SQ  []SCDs  []Coumadin   [x]On Heparin gtt    Objective:   VS:   Vitals:    04/10/25 1157   BP:    Pulse: 75   Resp: 16   Temp:    SpO2: 95%        Intake/Output Summary (Last 24 hours) at 4/10/2025 1308  Last data filed at 4/10/2025 0859  Gross per 24 hour   Intake --   Output 2380 ml   Net -2380 ml       General:  Alert, cooperative, no acute distress    Pulmonary: Decreased breath sounds bases bilaterally  Cardiovascular: Regular rate and Rhythm.  GI:  Soft, Non distended, Non tender. + Bowel sounds.  Extremities:  No edema, cyanosis, clubbing. No calf tenderness.   Neurologic: Alert and oriented X 3. No acute neuro deficits.  Additional:    Medications:   Current Facility-Administered Medications   Medication Dose Route Frequency    [START ON 4/11/2025] methylPREDNISolone sodium succ 
evidenced by BMI, poor intake prior to admission    Nutrition Interventions:   Food and/or Nutrient Delivery: Continue Current Diet, Continue Oral Nutrition Supplement  Nutrition Education/Counseling: No recommendation at this time  Coordination of Nutrition Care: Continue to monitor while inpatient  Plan of Care discussed with: Pt    Goals:  Goals: Meet at least 75% of estimated needs, Maintain adequate nutrition status, by next RD assessment  Type of Goal: New goal  Previous Goal Met: Goal(s) Achieved    Nutrition Monitoring and Evaluation:   Behavioral-Environmental Outcomes: None Identified  Food/Nutrient Intake Outcomes: Food and Nutrient Intake, Supplement Intake  Physical Signs/Symptoms Outcomes: Biochemical Data, GI Status, Meal Time Behavior, Weight, Skin, Fluid Status or Edema    Discharge Planning:    Continue current diet, Continue Oral Nutrition Supplement     Joya Harris RD  Contact: [phone]    
   [phone]      Cardiac Enzymes @[id number](CPK:*,CK:*,CPKMB:*,CKRMB:*,CKMMB:*,CKMB:*,RCK3:*,CKMBT:*,CKMBPC:*,CKSMB:*,CKNDX:*,CKND1:*,SONJA:*,TROQR:*,TROPT:*,TROIQ:*,TROIP:*,GABE:*,TROPIT:*,TROPT:*,TRPOIT:*,ITNL:*,TNIPOC:*,BNP:*,BNPP:*,PBNP:*)@   Coagulation No results for input(s): \"INR\", \"APTT\" in the last 72 hours.    Invalid input(s): \"PTP\"    Lipid Panel Lab Results   Component Value Date/Time    CHOL 86 04/07/2025 02:00 AM    HDL 31 04/07/2025 02:00 AM    LDL 42 04/07/2025 02:00 AM    LDL 53 11/06/2023 12:00 AM    VLDL 13 04/07/2025 02:00 AM    VLDL 11 03/07/2022 12:00 AM      BNP No results found for: \"BNP\", \"BNPPOC\"   Liver Enzymes No results for input(s): \"TP\" in the last 72 hours.    Invalid input(s): \"ALB\", \"TBIL\", \"AP\", \"SGOT\", \"GPT\", \"DBIL\"   Thyroid Studies Lab Results   Component Value Date/Time    TSH 6.10 04/12/2025 02:29 AM          Signed By: MARSHALL SOSA MD     April 17, 2025      
IntraVENous, Q12H  metoprolol succinate (TOPROL XL) extended release tablet 25 mg, 25 mg, Oral, Daily  busPIRone (BUSPAR) tablet 5 mg, 5 mg, Oral, TID  potassium chloride (KLOR-CON M) extended release tablet 40 mEq, 40 mEq, Oral, PRN **OR** potassium bicarb-citric acid (EFFER-K) effervescent tablet 40 mEq, 40 mEq, Oral, PRN **OR** potassium chloride 10 mEq/100 mL IVPB (Peripheral Line), 10 mEq, IntraVENous, PRN  multivitamin 1 tablet, 1 tablet, Oral, Daily  aspirin chewable tablet 81 mg, 81 mg, Oral, Daily  pantoprazole (PROTONIX) tablet 40 mg, 40 mg, Oral, QAM AC  atorvastatin (LIPITOR) tablet 80 mg, 80 mg, Oral, Nightly  citalopram (CELEXA) tablet 10 mg, 10 mg, Oral, Daily  lidocaine 4 % external patch 1 patch, 1 patch, TransDERmal, Daily  pregabalin (LYRICA) capsule 150 mg, 150 mg, Oral, BID  metoprolol (LOPRESSOR) injection 2.5 mg, 2.5 mg, IntraVENous, Q6H PRN  nitroGLYCERIN (NITROSTAT) SL tablet 0.4 mg, 0.4 mg, SubLINGual, Q5 Min PRN  morphine (PF) injection 2 mg, 2 mg, IntraVENous, Q4H PRN  vitamin D (ERGOCALCIFEROL) capsule 50,000 Units, 50,000 Units, Oral, Weekly  albuterol (PROVENTIL) (2.5 MG/3ML) 0.083% nebulizer solution 2.5 mg, 2.5 mg, Nebulization, Q4H PRN      Assessment//Plan           Hospital Problems           Last Modified POA    * (Principal) NSTEMI (non-ST elevated myocardial infarction) ([facility]) 1/8/2025 Yes    Palliative care encounter 4/9/2025 Yes    Goals of care, counseling/discussion 4/9/2025 Yes    Acute on chronic congestive heart failure (HCC) 4/7/2025 Yes    Acute hypoxic respiratory failure 4/7/2025 Yes    Pleural effusion, bilateral 4/7/2025 Yes    Sepsis ([facility]) 4/7/2025 Yes    Gram-negative bacteremia 4/7/2025 Yes    Gram-positive bacteremia 4/7/2025 Yes    Severe aortic valve regurgitation 4/12/2025 Yes    Moderate pulmonary hypertension (HCC) 4/12/2025 Yes    Acute bacterial endocarditis 4/14/2025 Yes     Assessment:    Condition: In stable condition.  Unchanged.   (    #Enterococcus 
Oral Daily    clopidogrel (PLAVIX) tablet 75 mg  75 mg Oral Daily    lidocaine 4 % external patch 1 patch  1 patch TransDERmal Daily    pregabalin (LYRICA) capsule 150 mg  150 mg Oral BID    metoprolol (LOPRESSOR) injection 2.5 mg  2.5 mg IntraVENous Q6H PRN    nitroGLYCERIN (NITROSTAT) SL tablet 0.4 mg  0.4 mg SubLINGual Q5 Min PRN    morphine (PF) injection 2 mg  2 mg IntraVENous Q4H PRN    vitamin D (ERGOCALCIFEROL) capsule 50,000 Units  50,000 Units Oral Weekly    albuterol (PROVENTIL) (2.5 MG/3ML) 0.083% nebulizer solution 2.5 mg  2.5 mg Nebulization Q4H PRN         Intake/Output Summary (Last 24 hours) at 4/9/2025 0953  Last data filed at 4/9/2025 0644  Gross per 24 hour   Intake 836.43 ml   Output 1060 ml   Net -223.57 ml       Physical Exam:  General:  alert, appears stated age, and cooperativecachetic   Neck:  no JVD  Lungs:  poor air entry, diminished  BL   Heart:  RRR +diastolic murmur, faint SANA murmur appreciated   Abdomen:  abdomen is soft without significant tenderness, masses, organomegaly or guarding  Extremities:  extremities normal, atraumatic, no cyanosis or edema      Visit Vitals  BP (!) 118/53   Pulse 99   Temp 97.7 °F (36.5 °C) (Oral)   Resp 22   Ht 1.676 m (5' 5.98\")   Wt 51.7 kg (114 lb)   SpO2 94%   BMI 18.41 kg/m²       Data Review:     Labs: Results:       Chemistry Recent Labs     04/07/25  1527 04/08/25  0351 04/09/25  0124   * 134* 136   K 3.8 3.2* 3.7    103 104   CO2 23 25 26   BUN 22* 23* 30*   MG 2.5 2.7*  --       CBC w/Diff Recent Labs     04/08/25  0351 04/09/25  0124   WBC 14.4* 19.4*   RBC 2.56* 2.56*   HGB 7.2* 7.2*   HCT 22.2* 22.4*    278      Cardiac Enzymes Lab Results   Component Value Date/Time    TROPHS 3,779 04/06/2025 04:50 AM      Coagulation Recent Labs     04/08/25 2124 04/09/25  0449   APTT 58.9* 127.6*       Lipid Panel Lab Results   Component Value Date/Time    CHOL 86 04/07/2025 02:00 AM    HDL 31 04/07/2025 02:00 AM    LDL 42 04/07/2025 
2025 shows EF 40 to 45% with global hypokinesis  #Severe aortic valve regurgitation  #Peripheral arterial disease  #Hypertension  #Hyperlipidemia  #History of GI bleed).     Plan:   Consults: cardiology (CT surgery).   (    - Continue ampicillin and ceftriaxone  - CT surgery consulted for likely valve replacement during admission  - Continue Lasix 20 mg IV twice daily  - Hold Plavix for pending aortic valve replacement  - Continue home Lyrica, Protonix, Toprol succinate, Celexa, BuSpar, aspirin).       Case discussed with:  [x]Patient  []Family  [x]Nursing  []Case Management  DVT Prophylaxis:  []Lovenox  []Hep SQ  [x]SCDs  []Coumadin   []Heparin gtt   []Xarelto   []Eliqunish Richardson DO, MBA, MS  [facility]  Hospitalist    
Not Currently     Types: Marijuana (Weed)    Sexual activity: Not on file   Other Topics Concern    Not on file   Social History Narrative    Not on file     Social Drivers of Health     Financial Resource Strain: Low Risk  (2024)    Overall Financial Resource Strain (CARDIA)     Difficulty of Paying Living Expenses: Not hard at all   Food Insecurity: No Food Insecurity (2025)    Hunger Vital Sign     Worried About Running Out of Food in the Last Year: Never true     Ran Out of Food in the Last Year: Never true   Transportation Needs: No Transportation Needs (2025)    PRAPARE - Transportation     Lack of Transportation (Medical): No     Lack of Transportation (Non-Medical): No   Physical Activity: Not on file   Stress: Not on file   Social Connections: Not on file   Intimate Partner Violence: Not on file   Housing Stability: Low Risk  (2025)    Housing Stability Vital Sign     Unable to Pay for Housing in the Last Year: No     Number of Times Moved in the Last Year: 0     Homeless in the Last Year: No   Recent Concern: Housing Stability - High Risk (2025)    Housing Stability Vital Sign     Unable to Pay for Housing in the Last Year: No     Number of Times Moved in the Last Year: 2     Homeless in the Last Year: No     Social History     Tobacco Use   Smoking Status Some Days    Current packs/day: 1.00    Average packs/day: 1 pack/day for 43.3 years (43.3 ttl pk-yrs)    Types: Cigarettes    Start date: 1982    Passive exposure: Past   Smokeless Tobacco Never   Tobacco Comments    Patient has not smoked in 1 week from today 2025        Temp (24hrs), Av.8 °F (36.6 °C), Min:97.1 °F (36.2 °C), Max:98.4 °F (36.9 °C)    BP (!) 119/42   Pulse 88   Temp 98.4 °F (36.9 °C) (Oral)   Resp 23   Ht 1.676 m (5' 5.98\")   Wt 51.7 kg (114 lb)   SpO2 92%   BMI 18.41 kg/m²     ROS: unable to obtain due to patient factors    Physical Exam:    General:  Alert, cooperative, no distress, appears 
respiratory failure, improving.  Likely due to COPD versus CHF exacerbation  #Acute on chronic heart failure exacerbation.  TTE from April 2025 shows EF 40 to 45% with global hypokinesis  #Severe aortic valve regurgitation  #Peripheral arterial disease  #Hypertension  #Hyperlipidemia  #History of GI bleed).     Plan:   Consults: cardiology (CT surgery).   (    - Continue ampicillin and ceftriaxone  - CT surgery consulted for likely valve replacement during admission  - Continue Lasix 20 mg IV twice daily  - Hold Plavix for pending aortic valve replacement  - Continue home Lyrica, Protonix, Toprol succinate, Celexa, BuSpar, aspirin).       Case discussed with:  [x]Patient  [x]Family  [x]Nursing  [x]Case Management  DVT Prophylaxis:  []Lovenox  []Hep SQ  [x]SCDs  []Coumadin   []Heparin gtt   []Xarelto   []Eliquis    Ada Richardson DO, JONATHAN, MS [facility]  Hospitalist    
Basophils % 0.1 0.0 - 2.0 %    Immature Granulocytes % 1.1 (H) 0.0 - 0.5 %    Neutrophils Absolute 9.26 (H) 1.80 - 8.00 K/UL    Lymphocytes Absolute 2.39 0.90 - 3.60 K/UL    Monocytes Absolute 1.13 0.05 - 1.20 K/UL    Eosinophils Absolute 0.66 (H) 0.00 - 0.40 K/UL    Basophils Absolute 0.02 0.00 - 0.10 K/UL    Immature Granulocytes Absolute 0.15 (H) 0.00 - 0.04 K/UL    Differential Type AUTOMATED     Hepatic Function Panel    Collection Time: 04/15/25  9:51 AM   Result Value Ref Range    Total Protein 5.9 (L) 6.4 - 8.2 g/dL    Albumin 2.4 (L) 3.4 - 5.0 g/dL    Globulin 3.5 2.0 - 4.0 g/dL    Albumin/Globulin Ratio 0.7 (L) 0.8 - 1.7      Total Bilirubin 0.7 0.2 - 1.0 MG/DL    Bilirubin, Direct 0.1 0.0 - 0.2 MG/DL    Alk Phosphatase 58 45 - 117 U/L    AST 27 10 - 38 U/L    ALT 25 16 - 61 U/L       Signed By: Balta Fenton DO     April 15, 2025      Disclaimer: Sections of this note are dictated using utilizing voice recognition software.  Minor typographical errors may be present. If questions arise, please do not hesitate to contact me or call our department.      
Regular rate and rhythm, S1, S2 normal, faint but audible murmur over RUSB today.    Abdomen:   Soft, non-tender. Bowel sounds normal. No masses   Extremities: Extremities normal, atraumatic,        Skin: Skin color, texture, turgor normal. No rashes or lesions   Lymph nodes:       Not examined   Neurologic: CN II-XII tested, intact bilaterally. No focal motor deficits on exam.        Labs: Results:   Chemistry Recent Labs     04/20/25  0307 04/21/25  0041 04/22/25  0403   GLUCOSE 91 85 156*   * 136 134*   K 4.3 4.0 4.3    104 101   CO2 29 27 28   BUN 19* 25* 26*   CREATININE 1.15 1.05 1.15      CBC w/Diff Recent Labs     04/20/25 0307 04/21/25  0041 04/22/25  0403   WBC 9.0 8.8 4.7   RBC 2.54* 2.57* 2.33*   HGB 7.2* 7.5* 6.8*   HCT 23.6* 23.9* 21.5*    179 162        Microbiology Results       Procedure Component Value Units Date/Time    Culture, Blood 1 [[id number]] Collected: 04/11/25 1729    Order Status: Completed Specimen: Blood Updated: 04/17/25 0711     Special Requests NO SPECIAL REQUESTS        Culture NO GROWTH 6 DAYS       Culture, Blood, PCR ID Panel [[id number]] Collected: 04/09/25 0609    Order Status: Completed Specimen: Blood Updated: 04/10/25 1016     Accession Number [id number]     Enterococcus faecalis by PCR Not detected        Enterococcus faecium by PCR Not detected        Listeria monocytogenes by PCR Not detected        STAPHYLOCOCCUS Not detected        Staphylococcus Aureus Not detected        Staphylococcus epidermidis by PCR Not detected        Staphylococcus lugdunensis by PCR Not detected        STREPTOCOCCUS Not detected        Streptococcus agalactiae (Group B) Not detected        Strep pneumoniae Not detected        Strep pyogenes,(Grp. A) Not detected        Acinetobacter calcoac baumannii complex by PCR Not detected        Bacteroides fragilis by PCR Not detected        Enterobacteriaceae by PCR Not detected        Enterobacter cloacae complex by PCR Not 
25 Yes Nida Mckinney MD   vitamin D (ERGOCALCIFEROL) 1.25 MG (15112 UT) CAPS capsule Take 1 capsule by mouth once a week 25  Yes Mary Russell DNP   atorvastatin (LIPITOR) 40 MG tablet Take 1 tablet by mouth nightly 24  Yes Kamala Chakraborty, DO   B Complex Vitamins (VITAMIN B COMPLEX) CAPS Take 1 tablet by mouth daily 24  Yes Mary Russell DNP   citalopram (CELEXA) 10 MG tablet Take 1 tablet by mouth daily 24  Yes Mary Russell DNP   clopidogrel (PLAVIX) 75 MG tablet Take 1 tablet by mouth daily 9/3/24  Yes Nida Mckinney MD   aspirin 81 MG chewable tablet Take 1 tablet by mouth daily 22  Yes Automatic Reconciliation, Ar   diclofenac sodium (VOLTAREN) 1 % GEL Apply 4 g topically 4 times daily 24  Nida Mckinney MD     @[facility]@  No Known Allergies   Social History     Tobacco Use    Smoking status: Some Days     Current packs/day: 1.00     Average packs/day: 1 pack/day for 43.3 years (43.3 ttl pk-yrs)     Types: Cigarettes     Start date: 1982     Passive exposure: Past    Smokeless tobacco: Never    Tobacco comments:     Patient has not smoked in 1 week from today 2025   Substance Use Topics    Alcohol use: Yes     Alcohol/week: 14.0 standard drinks of alcohol      No family history on file.     Review of Systems:  Pertinent items are noted in HPI.  Review of Systems      Objective:   Vital Signs:    BP (!) 135/43   Pulse 71   Temp 97.4 °F (36.3 °C) (Oral)   Resp 16   Ht 1.676 m (5' 5.98\")   Wt 51.7 kg (114 lb)   SpO2 98%   BMI 18.41 kg/m²             Temp (24hrs), Av.6 °F (36.4 °C), Min:97.4 °F (36.3 °C), Max:97.7 °F (36.5 °C)       Intake/Output:   Last shift:      No intake/output data recorded.  Last 3 shifts:  1901 -  0700  In: -   Out: 3000 [Urine:3000]    Intake/Output Summary (Last 24 hours) at 2025 0938  Last data filed at 2025 0415  Gross per 24 hour   Intake -- 
Completed Specimen: Blood Updated: 04/11/25 0704     Special Requests NO SPECIAL REQUESTS        Gram Stain       ANAEROBIC BOTTLE Gram negative rods                  SMEAR CALLED TO AND CORRECTLY REPEATED BY: DR ZANDER VASQUEZ ER ON 06APR25 AT  1722 HRS TO 1396.                  AEROBIC BOTTLE Gram positive cocci IN PAIRS IN GROUPS                  SMEAR CALLED TO AND CORRECTLY REPEATED BY: AMBER WHITFIELD [facility] ON 06APR25 AT 2245 HRS TO 1396           Culture       Escherichia coli GROWING IN 1 OF 2 BOTTLES DRAWN SITE= LAC                  ENTEROCOCCUS GALLINARUM GROUP D VANCOMYCIN HARSHA NOT REPORTED DUE TO INTRINSIC RESISTANCE GROWING IN BOTH BOTTLES DRAWN SITE= LAC          Susceptibility        Escherichia coli      BACTERIAL SUSCEPTIBILITY PANEL HARSHA      amikacin <=2 ug/mL Sensitive      ampicillin >=32 ug/mL Resistant      ampicillin-sulbactam >=32 ug/mL Resistant      ceFAZolin <=4 ug/mL Sensitive      cefepime <=1 ug/mL Sensitive      cefOXitin <=4 ug/mL Sensitive      cefTAZidime <=1 ug/mL Sensitive      cefTRIAXone <=1 ug/mL Sensitive      ciprofloxacin <=0.25 ug/mL Sensitive      gentamicin <=1 ug/mL Sensitive      levofloxacin <=0.12 ug/mL Sensitive      meropenem <=0.25 ug/mL Sensitive      piperacillin-tazobactam <=4 ug/mL Sensitive      tobramycin <=1 ug/mL Sensitive      trimethoprim-sulfamethoxazole <=20 ug/mL Sensitive                       Susceptibility        Enterococcus gallinarum group d      BACTERIAL SUSCEPTIBILITY PANEL HARSHA      ampicillin <=2 ug/mL Sensitive      DAPTOmycin 2 ug/mL Sensitive  [1]       gentamicin-syn  Sensitive      streptomycin-syn  Sensitive                   [1]  (SENSITIVITIES PERFORMED BY E-TEST)                    Culture, Blood, PCR ID Panel [[id number]]  (Abnormal) Collected: 04/06/25 0415    Order Status: Completed Specimen: Blood Updated: 04/06/25 1709     Accession Number [id number]     Enterococcus faecalis by PCR Not detected        Enterococcus faecium by PCR Not 
Nightly Damaso Pickens DO   80 mg at 04/08/25 2119    citalopram (CELEXA) tablet 10 mg  10 mg Oral Daily Damaso Pickens, DO   10 mg at 04/09/25 0903    clopidogrel (PLAVIX) tablet 75 mg  75 mg Oral Daily Damaso Pickens, DO   75 mg at 04/09/25 0903    lidocaine 4 % external patch 1 patch  1 patch TransDERmal Daily Damaso Pcikens,    1 patch at 04/09/25 0905    pregabalin (LYRICA) capsule 150 mg  150 mg Oral BID Damaso Pickens, DO   150 mg at 04/09/25 0903    metoprolol (LOPRESSOR) injection 2.5 mg  2.5 mg IntraVENous Q6H PRN Damaso Pickens DO        nitroGLYCERIN (NITROSTAT) SL tablet 0.4 mg  0.4 mg SubLINGual Q5 Min PRN Damaso Pickens DO        morphine (PF) injection 2 mg  2 mg IntraVENous Q4H PRN Damaso Pickens DO        vitamin D (ERGOCALCIFEROL) capsule 50,000 Units  50,000 Units Oral Weekly Damaso Pickens DO        albuterol (PROVENTIL) (2.5 MG/3ML) 0.083% nebulizer solution 2.5 mg  2.5 mg Nebulization Q4H PRN Federico Champion MD   2.5 mg at 04/09/25 0556                 Imaging:  I have personally reviewed the patient’s radiographs and have reviewed the reports:    Echo (TTE) limited (PRN contrast/bubble/strain/3D)    Left Ventricle: Moderately reduced left ventricular systolic function   with a visually estimated EF of 40 - 45%. Left ventricle size is normal.   Normal wall thickness.    Image quality is technically difficult. Technically difficult study due   to patient's body habitus, limited Doppler study due to patient's   condition, limited study due to patient's ability to tolerate test and   procedure performed with the patient in a sitting position. Limited exam   due to high flow oxygen/movement.    Left Ventricle: Global hypokinesis present.    Left Atrium: Left atrial volume index is within the upper limits of   normal (16-34 mL/m2) mL/m2. LA Vol Index A/L 
arformoterol tartrate (BROVANA) nebulizer solution 15 mcg  15 mcg Nebulization BID RT Desmond Vaughn PA-C   15 mcg at 04/12/25 0727    And    budesonide (PULMICORT) nebulizer suspension 1 mg  1 mg Nebulization BID Desmond Vaughn PA-C   1 mg at 04/12/25 0727    pantoprazole (PROTONIX) tablet 40 mg  40 mg Oral QAM AC Damaso Pickens, DO   40 mg at 04/12/25 0544    ipratropium (ATROVENT) 0.02 % nebulizer solution 0.5 mg  0.5 mg Nebulization 4x Daily RT Damaso Pickens,    0.5 mg at 04/12/25 0727    atorvastatin (LIPITOR) tablet 80 mg  80 mg Oral Nightly Damaso Pickens, DO   80 mg at 04/11/25 2032    citalopram (CELEXA) tablet 10 mg  10 mg Oral Daily Damaso Pickens, DO   10 mg at 04/12/25 0819    clopidogrel (PLAVIX) tablet 75 mg  75 mg Oral Daily Damaso Pickens, DO   75 mg at 04/12/25 0818    lidocaine 4 % external patch 1 patch  1 patch TransDERmal Daily Damaso Pickens, DO   1 patch at 04/12/25 0823    pregabalin (LYRICA) capsule 150 mg  150 mg Oral BID Damaso Pickens, DO   150 mg at 04/12/25 0818    metoprolol (LOPRESSOR) injection 2.5 mg  2.5 mg IntraVENous Q6H PRN Damaso Pickens,         nitroGLYCERIN (NITROSTAT) SL tablet 0.4 mg  0.4 mg SubLINGual Q5 Min PRN Damaso Pickens,         morphine (PF) injection 2 mg  2 mg IntraVENous Q4H PRN Damaso Pickens, DO        vitamin D (ERGOCALCIFEROL) capsule 50,000 Units  50,000 Units Oral Weekly Damaso Pickens DO        albuterol (PROVENTIL) (2.5 MG/3ML) 0.083% nebulizer solution 2.5 mg  2.5 mg Nebulization Q4H PRN Federico Champion MD   2.5 mg at 04/09/25 0556                 Imaging:  I have personally reviewed the patient’s radiographs and have reviewed the reports:    XR CHEST PORTABLE  Narrative: EXAM:  XR CHEST PORTABLE    INDICATION:   hypoxic resp failure    COMPARISON:

## 2025-04-23 NOTE — PLAN OF CARE
Problem: Discharge Planning  Goal: Discharge to home or other facility with appropriate resources  4/13/2025 1710 by Aniya Valentin RN  Outcome: Progressing  Flowsheets (Taken 4/12/2025 2000 by Ronald Ramirez RN)  Discharge to home or other facility with appropriate resources:   Identify barriers to discharge with patient and caregiver   Arrange for needed discharge resources and transportation as appropriate   Identify discharge learning needs (meds, wound care, etc)  4/13/2025 1710 by Aniya Valentin RN  Outcome: Progressing     Problem: Safety - Adult  Goal: Free from fall injury  4/13/2025 1710 by Aniya Valentin RN  Outcome: Progressing  Flowsheets (Taken 4/13/2025 0051 by Ronald Ramirez RN)  Free From Fall Injury: Instruct family/caregiver on patient safety  4/13/2025 1710 by Aniya Valentin RN  Outcome: Progressing     Problem: Pain  Goal: Verbalizes/displays adequate comfort level or baseline comfort level  4/13/2025 1710 by Aniya Valentin RN  Outcome: Progressing  Flowsheets (Taken 4/13/2025 0052 by Ronald Ramirez RN)  Verbalizes/displays adequate comfort level or baseline comfort level: Assess pain using appropriate pain scale  4/13/2025 1710 by Aniya Valentin RN  Outcome: Progressing     Problem: Chronic Conditions and Co-morbidities  Goal: Patient's chronic conditions and co-morbidity symptoms are monitored and maintained or improved  4/13/2025 1710 by Aniya Valentin RN  Outcome: Progressing  Flowsheets (Taken 4/12/2025 2000 by Ronald Ramirez RN)  Care Plan - Patient's Chronic Conditions and Co-Morbidity Symptoms are Monitored and Maintained or Improved:   Monitor and assess patient's chronic conditions and comorbid symptoms for stability, deterioration, or improvement   Collaborate with multidisciplinary team to address chronic and comorbid conditions and prevent exacerbation or deterioration   Update acute care plan with appropriate goals if chronic or comorbid symptoms are 
  Problem: Discharge Planning  Goal: Discharge to home or other facility with appropriate resources  4/14/2025 0625 by Ronald Ramirez RN  Outcome: Progressing  Flowsheets (Taken 4/13/2025 2000)  Discharge to home or other facility with appropriate resources:   Identify barriers to discharge with patient and caregiver   Identify discharge learning needs (meds, wound care, etc)   Arrange for needed discharge resources and transportation as appropriate  Note: Nurse will help identify barriers to discharge  4/13/2025 1710 by Aniya Valentin RN  Outcome: Progressing  Flowsheets (Taken 4/12/2025 2000 by Ronald Ramirez RN)  Discharge to home or other facility with appropriate resources:   Identify barriers to discharge with patient and caregiver   Arrange for needed discharge resources and transportation as appropriate   Identify discharge learning needs (meds, wound care, etc)  4/13/2025 1710 by Aniya Valentin RN  Outcome: Progressing     Problem: Safety - Adult  Goal: Free from fall injury  4/14/2025 0625 by Ronald Ramirez RN  Outcome: Progressing  Flowsheets (Taken 4/14/2025 0624)  Free From Fall Injury: Instruct family/caregiver on patient safety  Note: Nurse will educate patient on calling when assistance is needed. Bed alarm on, bed in lowest position, 2x side rails up, call bell in reach, and bedside table in reach.   4/13/2025 1710 by Aniya Valentin RN  Outcome: Progressing  Flowsheets (Taken 4/13/2025 0051 by Ronald Ramirez RN)  Free From Fall Injury: Instruct family/caregiver on patient safety  4/13/2025 1710 by Aniya Valentin RN  Outcome: Progressing     Problem: Pain  Goal: Verbalizes/displays adequate comfort level or baseline comfort level  4/13/2025 1710 by Aniya Valentin RN  Outcome: Progressing  Flowsheets (Taken 4/13/2025 0052 by Ronald Ramirez RN)  Verbalizes/displays adequate comfort level or baseline comfort level: Assess pain using appropriate pain scale  4/13/2025 1710 by 
  Problem: Discharge Planning  Goal: Discharge to home or other facility with appropriate resources  4/15/2025 1634 by Cristiane Villagran RN  Outcome: Progressing  Flowsheets (Taken 4/15/2025 0700)  Discharge to home or other facility with appropriate resources: Identify barriers to discharge with patient and caregiver  Note: Patient did not have any questions regarding discharge at this time     4/15/2025 0428 by Deyanira Lira, RN  Outcome: Progressing  Flowsheets  Taken 4/15/2025 0428  Discharge to home or other facility with appropriate resources:   Identify barriers to discharge with patient and caregiver   Arrange for needed discharge resources and transportation as appropriate   Identify discharge learning needs (meds, wound care, etc)   Refer to discharge planning if patient needs post-hospital services based on physician order or complex needs related to functional status, cognitive ability or social support system  Taken 4/14/2025 1930  Discharge to home or other facility with appropriate resources: Identify barriers to discharge with patient and caregiver  Note: Pt needs to talk with cardiothoracic surgeon before plans are made r/t discharge     Problem: Safety - Adult  Goal: Free from fall injury  4/15/2025 1634 by Cristiane Villagran RN  Outcome: Progressing  Flowsheets (Taken 4/15/2025 0428 by Deyanira Lira, RN)  Free From Fall Injury:   Instruct family/caregiver on patient safety   Based on caregiver fall risk screen, instruct family/caregiver to ask for assistance with transferring infant if caregiver noted to have fall risk factors  Note: Patient room is clutter free  Patient bed in the lowest position   Patient personal items are within reach  Patient bed in the lowest position with wheels locked   4/15/2025 0428 by Deyanira Lira, RN  Outcome: Progressing  Flowsheets (Taken 4/15/2025 0428)  Free From Fall Injury:   Instruct family/caregiver on patient safety   Based on caregiver fall risk screen, 
  Problem: Discharge Planning  Goal: Discharge to home or other facility with appropriate resources  4/16/2025 2255 by Elaine Huff RN  Outcome: Progressing  Flowsheets (Taken 4/16/2025 2255)  Discharge to home or other facility with appropriate resources:   Identify discharge learning needs (meds, wound care, etc)   Arrange for needed discharge resources and transportation as appropriate   Identify barriers to discharge with patient and caregiver  Note: Involve patient in discharge plan  4/16/2025 1602 by Cristiane Villagran RN  Outcome: Progressing  Flowsheets (Taken 4/16/2025 0745)  Discharge to home or other facility with appropriate resources: Identify barriers to discharge with patient and caregiver  Note:  Patient did not have any questions regarding discharge at this time          Problem: Safety - Adult  Goal: Free from fall injury  4/16/2025 2255 by Elaine Huff RN  Outcome: Progressing  Flowsheets (Taken 4/16/2025 2255)  Free From Fall Injury:   Based on caregiver fall risk screen, instruct family/caregiver to ask for assistance with transferring infant if caregiver noted to have fall risk factors   Instruct family/caregiver on patient safety  Note: Place call light within reach  Place bed alarm on  4/16/2025 1602 by Cristiane Villagran RN  Outcome: Progressing  Flowsheets (Taken 4/16/2025 0026 by Talia Espinosa RN)  Free From Fall Injury: Based on caregiver fall risk screen, instruct family/caregiver to ask for assistance with transferring infant if caregiver noted to have fall risk factors  Note: Patient bed in the lowest position and locked  Patient call light is within reach  Patient personal items are within reach   Patient room is clutter free       Problem: Skin/Tissue Integrity  Goal: Skin integrity remains intact  Description: 1.  Monitor for areas of redness and/or skin breakdown  2.  Assess vascular access sites hourly  3.  Every 4-6 hours minimum:  Change oxygen saturation probe site  4.  Every 
  Problem: Discharge Planning  Goal: Discharge to home or other facility with appropriate resources  4/18/2025 0813 by Yue Chiang, RN  Outcome: Progressing  Flowsheets (Taken 4/18/2025 0813)  Discharge to home or other facility with appropriate resources:   Identify barriers to discharge with patient and caregiver   Identify discharge learning needs (meds, wound care, etc)   Arrange for needed discharge resources and transportation as appropriate  Note: Pt will d/c after cardiac procedure possibly 4/19. Pt aware. Pt will d/c home with niece once medically stable. Pt on IV antibiotics and IV lasix.  4/18/2025 0507 by Elaine Huff, RN  Outcome: Progressing  Flowsheets (Taken 4/18/2025 0507)  Discharge to home or other facility with appropriate resources:   Arrange for interpreters to assist at discharge as needed   Arrange for needed discharge resources and transportation as appropriate   Identify barriers to discharge with patient and caregiver  Note: Educate patient about discharge plan  Assess oxygen needs prior to discharge  Educate patient about oxygen requirements     Problem: Safety - Adult  Goal: Free from fall injury  4/18/2025 0813 by Yue Chiang, RN  Outcome: Progressing  Flowsheets (Taken 4/18/2025 0507 by Elaine Huff, RN)  Free From Fall Injury:   Instruct family/caregiver on patient safety   Based on caregiver fall risk screen, instruct family/caregiver to ask for assistance with transferring infant if caregiver noted to have fall risk factors  Note: Pt will remain free from falls. Pt has bed alarm and gripper socks on, call bell and bedside table within reach, and side rails x3. Will hourly round on pt to address 5 P's.     4/18/2025 0507 by Elaine Huff, RN  Outcome: Progressing  Flowsheets (Taken 4/18/2025 0507)  Free From Fall Injury:   Instruct family/caregiver on patient safety   Based on caregiver fall risk screen, instruct family/caregiver to ask for assistance with 
  Problem: Discharge Planning  Goal: Discharge to home or other facility with appropriate resources  4/19/2025 1626 by Cristiane Villagran RN  Outcome: Progressing  Flowsheets (Taken 4/19/2025 1626)  Discharge to home or other facility with appropriate resources:   Identify barriers to discharge with patient and caregiver   Arrange for needed discharge resources and transportation as appropriate  Note: Patient does not have any questions regarding discharge at this time   4/19/2025 1614 by Cristiane Villagran RN  Outcome: Progressing  Flowsheets (Taken 4/19/2025 1614)  Discharge to home or other facility with appropriate resources:   Identify barriers to discharge with patient and caregiver   Identify discharge learning needs (meds, wound care, etc)   Arrange for needed discharge resources and transportation as appropriate  4/19/2025 1611 by Cristiane Villagran RN  Outcome: Progressing  Flowsheets (Taken 4/19/2025 0817)  Discharge to home or other facility with appropriate resources: Identify barriers to discharge with patient and caregiver  Note: Patient does not have any questions regarding discharge plan at this time      Problem: Safety - Adult  Goal: Free from fall injury  4/19/2025 1626 by Cristiane Villagran RN  Outcome: Progressing  Flowsheets (Taken 4/19/2025 1626)  Free From Fall Injury: Instruct family/caregiver on patient safety  Note: Patient bed in the lowest position wheels locked    Patient call light in reach   Patient personal items are within reach   Patient room clutter free     4/19/2025 1611 by Cristiane Villagran RN  Outcome: Progressing  Flowsheets (Taken 4/18/2025 2320 by Lizette Guzmán, RN)  Free From Fall Injury: Instruct family/caregiver on patient safety  Note: Patient bed in the lowest position      Problem: Skin/Tissue Integrity  Goal: Skin integrity remains intact  Description: 1.  Monitor for areas of redness and/or skin breakdown  2.  Assess vascular access sites hourly  3.  Every 4-6 hours 
  Problem: Discharge Planning  Goal: Discharge to home or other facility with appropriate resources  4/20/2025 1037 by Charley Falcon RN  Outcome: Progressing  Flowsheets (Taken 4/20/2025 1037)  Discharge to home or other facility with appropriate resources: Identify barriers to discharge with patient and caregiver  Note: Prepare patient for procedure on 04/23. Maintain patient stability and ween patient off supplemental oxygen. Continue patient ambulation with observation.     Problem: Safety - Adult  Goal: Free from fall injury  4/20/2025 1037 by Charley Falcon, RN  Outcome: Progressing  Flowsheets (Taken 4/20/2025 1037)  Free From Fall Injury: Instruct family/caregiver on patient safety  Note: Nonskid socks on, bed in the lowest position, personal belongings on bedside table within reach, call bell within reach at the patients side.      Problem: Skin/Tissue Integrity  Goal: Skin integrity remains intact  Description: 1.  Monitor for areas of redness and/or skin breakdown  2.  Assess vascular access sites hourly  3.  Every 4-6 hours minimum:  Change oxygen saturation probe site  4.  Every 4-6 hours:  If on nasal continuous positive airway pressure, respiratory therapy assess nares and determine need for appliance change or resting period  4/20/2025 1037 by Charley Falcon, RN  Outcome: Progressing  Flowsheets (Taken 4/20/2025 1037)  Skin Integrity Remains Intact: Monitor for areas of redness and/or skin breakdown  Note: Assess for signs of skin breakdown. Utilize pillows for reposition per patient request. Patient is ambulatory.     Problem: Respiratory - Adult  Goal: Achieves optimal ventilation and oxygenation  4/20/2025 1037 by Charley Falcon, RN  Outcome: Progressing  Flowsheets (Taken 4/20/2025 1037)  Achieves optimal ventilation and oxygenation:   Assess for changes in respiratory status   Position to facilitate oxygenation and minimize respiratory effort  Note: Ween patient off of 
  Problem: Discharge Planning  Goal: Discharge to home or other facility with appropriate resources  4/22/2025 0414 by Kevin Romero, RN  Outcome: Progressing  Flowsheets  Taken 4/22/2025 0414  Discharge to home or other facility with appropriate resources: Identify discharge learning needs (meds, wound care, etc)  Taken 4/21/2025 2000  Discharge to home or other facility with appropriate resources:   Identify barriers to discharge with patient and caregiver   Arrange for needed discharge resources and transportation as appropriate   Identify discharge learning needs (meds, wound care, etc)  Note: Educated patient on the medications as they when they where given to him  4/21/2025 1613 by Yue Chiang, RN  Outcome: Progressing     Problem: Safety - Adult  Goal: Free from fall injury  4/22/2025 0414 by Kevin Romero, RN  Outcome: Progressing  Note: Made sure the patient's bed was locked and in the lowest position, his call bell was within reach and his bed alarm was on.  4/21/2025 1613 by Yue Chiang, RN  Outcome: Progressing  Flowsheets (Taken 4/20/2025 1037 by Charley Falcon, RN)  Free From Fall Injury: Instruct family/caregiver on patient safety  Note: Pt will remain free from falls. Pt has bed alarm and gripper socks on, call bell and bedside table within reach, and side rails x3. Will hourly round on pt to address 5 P's.        Problem: Skin/Tissue Integrity  Goal: Skin integrity remains intact  Description: 1.  Monitor for areas of redness and/or skin breakdown  2.  Assess vascular access sites hourly  3.  Every 4-6 hours minimum:  Change oxygen saturation probe site  4.  Every 4-6 hours:  If on nasal continuous positive airway pressure, respiratory therapy assess nares and determine need for appliance change or resting period  4/22/2025 0414 by Kevin Romero, RN  Outcome: Progressing  Flowsheets (Taken 4/21/2025 2000)  Skin Integrity Remains Intact: Monitor for areas of redness and/or 
  Problem: Discharge Planning  Goal: Discharge to home or other facility with appropriate resources  4/22/2025 1731 by Socorro Kramer RN  Outcome: Progressing  Flowsheets (Taken 4/22/2025 0830)  Discharge to home or other facility with appropriate resources: Arrange for needed discharge resources and transportation as appropriate  Note: Educating patient on coughing and deep breathing and incentive spirometer      Problem: Safety - Adult  Goal: Free from fall injury  4/22/2025 1731 by Socorro Kramer RN  Outcome: Progressing  Flowsheets (Taken 4/20/2025 1037 by Charley Falcon, RN)  Free From Fall Injury: Instruct family/caregiver on patient safety  Note: Patient is educated on using call bell when getting up and bed on lowest position      Problem: Skin/Tissue Integrity  Goal: Skin integrity remains intact  Description: 1.  Monitor for areas of redness and/or skin breakdown  2.  Assess vascular access sites hourly  3.  Every 4-6 hours minimum:  Change oxygen saturation probe site  4.  Every 4-6 hours:  If on nasal continuous positive airway pressure, respiratory therapy assess nares and determine need for appliance change or resting period  4/22/2025 1731 by Socorro Kramer RN  Outcome: Progressing  Flowsheets (Taken 4/22/2025 0830)  Skin Integrity Remains Intact: Monitor for areas of redness and/or skin breakdown  Note: Patient Is educated on turning and repositioning and pillows to relieve pressure     Problem: Nutrition Deficit:  Goal: Optimize nutritional status  4/22/2025 1731 by Socorro Kramer RN  Outcome: Progressing  Flowsheets (Taken 4/22/2025 0414 by Kevin Romero, RN)  Nutrient intake appropriate for improving, restoring, or maintaining nutritional needs: Monitor oral intake, labs, and treatment plans  Note: Monitoring patient intake      Problem: Respiratory - Adult  Goal: Achieves optimal ventilation and oxygenation  4/22/2025 1731 by Socorro Kramer RN  Outcome: Progressing  Flowsheets  Taken 
  Problem: Discharge Planning  Goal: Discharge to home or other facility with appropriate resources  4/22/2025 2226 by Elaine Huff, RN  Outcome: Progressing  Flowsheets (Taken 4/22/2025 2225)  Discharge to home or other facility with appropriate resources:   Arrange for needed discharge resources and transportation as appropriate   Identify barriers to discharge with patient and caregiver   Identify discharge learning needs (meds, wound care, etc)  Note: Involve patient in discharge plan daily  4/22/2025 1731 by Socorro Kramer RN  Outcome: Progressing  Flowsheets (Taken 4/22/2025 0830)  Discharge to home or other facility with appropriate resources: Arrange for needed discharge resources and transportation as appropriate  Note: Educating patient on coughing and deep breathing and incentive spirometer      Problem: Safety - Adult  Goal: Free from fall injury  4/22/2025 2226 by Elaine Huff RN  Outcome: Progressing  Flowsheets (Taken 4/22/2025 2226)  Free From Fall Injury:   Instruct family/caregiver on patient safety   Based on caregiver fall risk screen, instruct family/caregiver to ask for assistance with transferring infant if caregiver noted to have fall risk factors  Note: Place call light within reach  Place belongings in reach  4/22/2025 1731 by Socorro Kramer RN  Outcome: Progressing  Flowsheets (Taken 4/20/2025 1037 by Charley Falcon, RN)  Free From Fall Injury: Instruct family/caregiver on patient safety  Note: Patient is educated on using call bell when getting up and bed on lowest position      Problem: Skin/Tissue Integrity  Goal: Skin integrity remains intact  Description: 1.  Monitor for areas of redness and/or skin breakdown  2.  Assess vascular access sites hourly  3.  Every 4-6 hours minimum:  Change oxygen saturation probe site  4.  Every 4-6 hours:  If on nasal continuous positive airway pressure, respiratory therapy assess nares and determine need for appliance change or resting 
  Problem: Discharge Planning  Goal: Discharge to home or other facility with appropriate resources  4/23/2025 0157 by Elaine Huff RN  Outcome: Adequate for Discharge  4/22/2025 2226 by Elaine Huff RN  Outcome: Progressing  Flowsheets (Taken 4/22/2025 2225)  Discharge to home or other facility with appropriate resources:   Arrange for needed discharge resources and transportation as appropriate   Identify barriers to discharge with patient and caregiver   Identify discharge learning needs (meds, wound care, etc)  Note: Involve patient in discharge plan daily  4/22/2025 1731 by Socorro Kramer RN  Outcome: Progressing  Flowsheets (Taken 4/22/2025 0830)  Discharge to home or other facility with appropriate resources: Arrange for needed discharge resources and transportation as appropriate  Note: Educating patient on coughing and deep breathing and incentive spirometer      Problem: Safety - Adult  Goal: Free from fall injury  4/23/2025 0157 by Elaine Huff RN  Outcome: Adequate for Discharge  4/22/2025 2226 by Elaine Huff RN  Outcome: Progressing  Flowsheets (Taken 4/22/2025 2226)  Free From Fall Injury:   Instruct family/caregiver on patient safety   Based on caregiver fall risk screen, instruct family/caregiver to ask for assistance with transferring infant if caregiver noted to have fall risk factors  Note: Place call light within reach  Place belongings in reach  4/22/2025 1731 by Socorro Kramer RN  Outcome: Progressing  Flowsheets (Taken 4/20/2025 1037 by Charley Falcon, RN)  Free From Fall Injury: Instruct family/caregiver on patient safety  Note: Patient is educated on using call bell when getting up and bed on lowest position      Problem: Skin/Tissue Integrity  Goal: Skin integrity remains intact  Description: 1.  Monitor for areas of redness and/or skin breakdown  2.  Assess vascular access sites hourly  3.  Every 4-6 hours minimum:  Change oxygen saturation probe site  4.  Every 4-6 
  Problem: Discharge Planning  Goal: Discharge to home or other facility with appropriate resources  4/9/2025 1326 by Juana Olivo RN  Outcome: Progressing  Flowsheets (Taken 4/9/2025 1326)  Discharge to home or other facility with appropriate resources:   Identify barriers to discharge with patient and caregiver   Arrange for needed discharge resources and transportation as appropriate   Identify discharge learning needs (meds, wound care, etc)  Note: Barriers to discharge discussed with the interdisciplinary team during rounds.      Problem: Safety - Adult  Goal: Free from fall injury  4/9/2025 1326 by Juana Olivo RN  Outcome: Progressing  Flowsheets (Taken 4/9/2025 1326)  Free From Fall Injury:   Instruct family/caregiver on patient safety   Based on caregiver fall risk screen, instruct family/caregiver to ask for assistance with transferring infant if caregiver noted to have fall risk factors  Note: Patient educated on the importance of asking for assistance, if needing to get up. Fall precautions are in place: call light within reach, bed alarm on and bed locked and in lowest position.     Problem: Pain  Goal: Verbalizes/displays adequate comfort level or baseline comfort level  4/9/2025 1326 by Juana Olivo RN  Outcome: Progressing  Flowsheets (Taken 4/9/2025 1326)  Verbalizes/displays adequate comfort level or baseline comfort level:   Encourage patient to monitor pain and request assistance   Assess pain using appropriate pain scale   Administer analgesics based on type and severity of pain and evaluate response  Note: Patient will remain free from any pain. Educated on the 0-10 pain scale and will be assessed every 4 hours.        Problem: Skin/Tissue Integrity  Goal: Skin integrity remains intact  Outcome: Progressing  Description: 1.  Monitor for areas of redness and/or skin breakdown  2.  Assess vascular access sites hourly  3.  Every 4-6 hours minimum:  Change oxygen saturation probe site  4.  Every 
  Problem: Discharge Planning  Goal: Discharge to home or other facility with appropriate resources  4/9/2025 2328 by Elaine Huff RN  Outcome: Progressing  Flowsheets (Taken 4/9/2025 2328)  Discharge to home or other facility with appropriate resources:   Arrange for needed discharge resources and transportation as appropriate   Identify barriers to discharge with patient and caregiver   Arrange for interpreters to assist at discharge as needed  Note: Involve patient in the discharge plan  4/9/2025 1326 by Juana Olivo RN  Outcome: Progressing  Flowsheets (Taken 4/9/2025 1326)  Discharge to home or other facility with appropriate resources:   Identify barriers to discharge with patient and caregiver   Arrange for needed discharge resources and transportation as appropriate   Identify discharge learning needs (meds, wound care, etc)  Note: Barriers to discharge discussed with the interdisciplinary team during rounds.      Problem: Safety - Adult  Goal: Free from fall injury  4/9/2025 2328 by Elaine Huff RN  Outcome: Progressing  Flowsheets (Taken 4/9/2025 2328)  Free From Fall Injury:   Instruct family/caregiver on patient safety   Based on caregiver fall risk screen, instruct family/caregiver to ask for assistance with transferring infant if caregiver noted to have fall risk factors  Note: Place gripper socks on  Place bed alarm on  Place call light and personal belongings within reach  4/9/2025 1326 by Juana Olivo RN  Outcome: Progressing  Flowsheets (Taken 4/9/2025 1326)  Free From Fall Injury:   Instruct family/caregiver on patient safety   Based on caregiver fall risk screen, instruct family/caregiver to ask for assistance with transferring infant if caregiver noted to have fall risk factors  Note: Patient educated on the importance of asking for assistance, if needing to get up. Fall precautions are in place: call light within reach, bed alarm on and bed locked and in lowest position.     Problem: 
  Problem: Discharge Planning  Goal: Discharge to home or other facility with appropriate resources  Outcome: Progressing  Flowsheets  Taken 4/15/2025 0428  Discharge to home or other facility with appropriate resources:   Identify barriers to discharge with patient and caregiver   Arrange for needed discharge resources and transportation as appropriate   Identify discharge learning needs (meds, wound care, etc)   Refer to discharge planning if patient needs post-hospital services based on physician order or complex needs related to functional status, cognitive ability or social support system  Taken 4/14/2025 1930  Discharge to home or other facility with appropriate resources: Identify barriers to discharge with patient and caregiver  Note: Pt needs to talk with cardiothoracic surgeon before plans are made r/t discharge     Problem: Safety - Adult  Goal: Free from fall injury  Outcome: Progressing  Flowsheets (Taken 4/15/2025 0428)  Free From Fall Injury:   Instruct family/caregiver on patient safety   Based on caregiver fall risk screen, instruct family/caregiver to ask for assistance with transferring infant if caregiver noted to have fall risk factors  Note: Uses call bell each time he has a need or wants to go from the bed to the chair     Problem: Pain  Goal: Verbalizes/displays adequate comfort level or baseline comfort level  Outcome: Progressing  Flowsheets  Taken 4/15/2025 0428  Verbalizes/displays adequate comfort level or baseline comfort level:   Encourage patient to monitor pain and request assistance   Assess pain using appropriate pain scale   Administer analgesics based on type and severity of pain and evaluate response   Implement non-pharmacological measures as appropriate and evaluate response  Taken 4/15/2025 0400  Verbalizes/displays adequate comfort level or baseline comfort level:   Encourage patient to monitor pain and request assistance   Assess pain using appropriate pain scale   
  Problem: Discharge Planning  Goal: Discharge to home or other facility with appropriate resources  Outcome: Progressing  Flowsheets (Taken 4/10/2025 2318)  Discharge to home or other facility with appropriate resources:   Identify barriers to discharge with patient and caregiver   Arrange for needed discharge resources and transportation as appropriate   Identify discharge learning needs (meds, wound care, etc)   Refer to discharge planning if patient needs post-hospital services based on physician order or complex needs related to functional status, cognitive ability or social support system     Problem: Safety - Adult  Goal: Free from fall injury  Outcome: Progressing  Flowsheets (Taken 4/10/2025 2318)  Free From Fall Injury:   Instruct family/caregiver on patient safety   Based on caregiver fall risk screen, instruct family/caregiver to ask for assistance with transferring infant if caregiver noted to have fall risk factors     Problem: Pain  Goal: Verbalizes/displays adequate comfort level or baseline comfort level  Outcome: Progressing  Flowsheets (Taken 4/10/2025 2318)  Verbalizes/displays adequate comfort level or baseline comfort level:   Encourage patient to monitor pain and request assistance   Assess pain using appropriate pain scale   Administer analgesics based on type and severity of pain and evaluate response   Implement non-pharmacological measures as appropriate and evaluate response   Consider cultural and social influences on pain and pain management     Problem: Skin/Tissue Integrity  Goal: Skin integrity remains intact  Description: 1.  Monitor for areas of redness and/or skin breakdown  2.  Assess vascular access sites hourly  3.  Every 4-6 hours minimum:  Change oxygen saturation probe site  4.  Every 4-6 hours:  If on nasal continuous positive airway pressure, respiratory therapy assess nares and determine need for appliance change or resting period  Outcome: Progressing  Flowsheets (Taken 
  Problem: Discharge Planning  Goal: Discharge to home or other facility with appropriate resources  Outcome: Progressing  Flowsheets (Taken 4/16/2025 0745)  Discharge to home or other facility with appropriate resources: Identify barriers to discharge with patient and caregiver  Note:  Patient did not have any questions regarding discharge at this time          Problem: Safety - Adult  Goal: Free from fall injury  Outcome: Progressing  Flowsheets (Taken 4/16/2025 0026 by Talia Espinosa RN)  Free From Fall Injury: Based on caregiver fall risk screen, instruct family/caregiver to ask for assistance with transferring infant if caregiver noted to have fall risk factors  Note: Patient bed in the lowest position and locked  Patient call light is within reach  Patient personal items are within reach   Patient room is clutter free       Problem: Pain  Goal: Verbalizes/displays adequate comfort level or baseline comfort level  Outcome: Progressing  Flowsheets  Taken 4/16/2025 1500  Verbalizes/displays adequate comfort level or baseline comfort level: Encourage patient to monitor pain and request assistance  Taken 4/16/2025 0745  Verbalizes/displays adequate comfort level or baseline comfort level: Encourage patient to monitor pain and request assistance  Note: Patient assessed using the appropriate pain sale   Patient identified their pain a 0 out of 10        Problem: Skin/Tissue Integrity  Goal: Skin integrity remains intact  Description: 1.  Monitor for areas of redness and/or skin breakdown  2.  Assess vascular access sites hourly  3.  Every 4-6 hours minimum:  Change oxygen saturation probe site  4.  Every 4-6 hours:  If on nasal continuous positive airway pressure, respiratory therapy assess nares and determine need for appliance change or resting period  Outcome: Progressing  Flowsheets (Taken 4/16/2025 0745)  Skin Integrity Remains Intact: Monitor for areas of redness and/or skin breakdown  Note:  Patient skin 
  Problem: Discharge Planning  Goal: Discharge to home or other facility with appropriate resources  Outcome: Progressing  Flowsheets (Taken 4/18/2025 0507)  Discharge to home or other facility with appropriate resources:   Arrange for interpreters to assist at discharge as needed   Arrange for needed discharge resources and transportation as appropriate   Identify barriers to discharge with patient and caregiver  Note: Educate patient about discharge plan  Assess oxygen needs prior to discharge  Educate patient about oxygen requirements     Problem: Safety - Adult  Goal: Free from fall injury  Outcome: Progressing  Flowsheets (Taken 4/18/2025 0507)  Free From Fall Injury:   Instruct family/caregiver on patient safety   Based on caregiver fall risk screen, instruct family/caregiver to ask for assistance with transferring infant if caregiver noted to have fall risk factors  Note: Place call light within reach  Place personal belongings within reach     Problem: Skin/Tissue Integrity  Goal: Skin integrity remains intact  Description: 1.  Monitor for areas of redness and/or skin breakdown  2.  Assess vascular access sites hourly  3.  Every 4-6 hours minimum:  Change oxygen saturation probe site  4.  Every 4-6 hours:  If on nasal continuous positive airway pressure, respiratory therapy assess nares and determine need for appliance change or resting period  Outcome: Progressing  Flowsheets (Taken 4/18/2025 0507)  Skin Integrity Remains Intact:   Every 4-6 hours minimum:  Change oxygen saturation probe site   Turn and reposition as indicated   Every 4-6 hours:  If on nasal continuous positive airway pressure, assess nares and determine need for appliance change or resting period  Note: Educate patient about turning and repositioning     Problem: Nutrition Deficit:  Goal: Optimize nutritional status  Outcome: Progressing  Flowsheets (Taken 4/18/2025 0507)  Nutrient intake appropriate for improving, restoring, or 
  Problem: Discharge Planning  Goal: Discharge to home or other facility with appropriate resources  Outcome: Progressing  Flowsheets (Taken 4/7/2025 1854)  Discharge to home or other facility with appropriate resources: Identify barriers to discharge with patient and caregiver     Problem: Safety - Adult  Goal: Free from fall injury  Outcome: Progressing  Flowsheets (Taken 4/7/2025 1854)  Free From Fall Injury: Instruct family/caregiver on patient safety     Problem: Pain  Goal: Verbalizes/displays adequate comfort level or baseline comfort level  Outcome: Progressing  Flowsheets (Taken 4/7/2025 1854)  Verbalizes/displays adequate comfort level or baseline comfort level: Encourage patient to monitor pain and request assistance     Problem: ABCDS Injury Assessment  Goal: Absence of physical injury  Outcome: Progressing  Flowsheets (Taken 4/7/2025 0450 by Nay Stearns RN)  Absence of Physical Injury: Implement safety measures based on patient assessment     Problem: Chronic Conditions and Co-morbidities  Goal: Patient's chronic conditions and co-morbidity symptoms are monitored and maintained or improved  Outcome: Progressing  Flowsheets (Taken 4/7/2025 1854)  Care Plan - Patient's Chronic Conditions and Co-Morbidity Symptoms are Monitored and Maintained or Improved:   Monitor and assess patient's chronic conditions and comorbid symptoms for stability, deterioration, or improvement   Collaborate with multidisciplinary team to address chronic and comorbid conditions and prevent exacerbation or deterioration   Update acute care plan with appropriate goals if chronic or comorbid symptoms are exacerbated and prevent overall improvement and discharge     
  Problem: Discharge Planning  Goal: Discharge to home or other facility with appropriate resources  Outcome: Progressing  Flowsheets (Taken 4/9/2025 0039)  Discharge to home or other facility with appropriate resources:   Arrange for needed discharge resources and transportation as appropriate   Identify barriers to discharge with patient and caregiver   Identify discharge learning needs (meds, wound care, etc)  Note: Ensure to make sure to assess patient's oxygen needs prior to discharge     Problem: Safety - Adult  Goal: Free from fall injury  Outcome: Progressing  Flowsheets (Taken 4/9/2025 0039)  Free From Fall Injury:   Based on caregiver fall risk screen, instruct family/caregiver to ask for assistance with transferring infant if caregiver noted to have fall risk factors   Instruct family/caregiver on patient safety  Note: Place bed alarm on  Place call light within reach       Problem: Pain  Goal: Verbalizes/displays adequate comfort level or baseline comfort level  Outcome: Progressing  Flowsheets (Taken 4/9/2025 0039)  Verbalizes/displays adequate comfort level or baseline comfort level:   Administer analgesics based on type and severity of pain and evaluate response   Assess pain using appropriate pain scale   Encourage patient to monitor pain and request assistance   Implement non-pharmacological measures as appropriate and evaluate response  Note: Assess pain and administer medications if needed  Offer non-pharmacological methods for pain relief     Problem: Skin/Tissue Integrity  Goal: Skin integrity remains intact  Description: 1.  Monitor for areas of redness and/or skin breakdown  2.  Assess vascular access sites hourly  3.  Every 4-6 hours minimum:  Change oxygen saturation probe site  4.  Every 4-6 hours:  If on nasal continuous positive airway pressure, respiratory therapy assess nares and determine need for appliance change or resting period  Outcome: Progressing  Flowsheets (Taken 4/9/2025 
  Problem: Discharge Planning  Goal: Discharge to home or other facility with appropriate resources  Outcome: Progressing  Note: Patient anticipated discharge to niece's house.      Problem: Safety - Adult  Goal: Free from fall injury  Outcome: Progressing  Note: Pt. Educated on call bell when in need of help and assistance.  Pt. Verbalized understanding.  Bed alarm on.      Problem: Pain  Goal: Verbalizes/displays adequate comfort level or baseline comfort level  Outcome: Progressing  Flowsheets (Taken 4/11/2025 1613 by Beckie Bocanegra, RN)  Verbalizes/displays adequate comfort level or baseline comfort level:   Encourage patient to monitor pain and request assistance   Assess pain using appropriate pain scale  Note: Pt. Educated on pain medication availability per MAR.  Pt. Verbalized understanding. Pt. Denies pain     Problem: Skin/Tissue Integrity  Goal: Skin integrity remains intact  Description: 1.  Monitor for areas of redness and/or skin breakdown  2.  Assess vascular access sites hourly  3.  Every 4-6 hours minimum:  Change oxygen saturation probe site  4.  Every 4-6 hours:  If on nasal continuous positive airway pressure, respiratory therapy assess nares and determine need for appliance change or resting period  Outcome: Progressing  Flowsheets (Taken 4/11/2025 2010)  Skin Integrity Remains Intact: Monitor for areas of redness and/or skin breakdown  Note: Pt. Skin is clean dry and intact.  Will monitor for un blanchable redness and breakdown. .      Problem: Respiratory - Adult  Goal: Achieves optimal ventilation and oxygenation  Outcome: Progressing  Flowsheets (Taken 4/11/2025 1642)  Achieves optimal ventilation and oxygenation:   Assess for changes in respiratory status   Assess for changes in mentation and behavior   Encourage broncho-pulmonary hygiene including cough, deep breathe, incentive spirometry     
  Problem: Discharge Planning  Goal: Discharge to home or other facility with appropriate resources  Recent Flowsheet Documentation  Taken 4/12/2025 2000 by Ronald Ramirez RN  Discharge to home or other facility with appropriate resources:   Identify barriers to discharge with patient and caregiver   Arrange for needed discharge resources and transportation as appropriate   Identify discharge learning needs (meds, wound care, etc)     Problem: Safety - Adult  Goal: Free from fall injury  Outcome: Progressing  Flowsheets (Taken 4/13/2025 0051)  Free From Fall Injury: Instruct family/caregiver on patient safety  Note: Nurse will educate patient on calling when assistance is needed. Bed alarm on, bed in lowest position, 2x side rails up, call bell in reach, and bedside table in reach.      Problem: Pain  Goal: Verbalizes/displays adequate comfort level or baseline comfort level  Outcome: Progressing  Flowsheets (Taken 4/13/2025 0052)  Verbalizes/displays adequate comfort level or baseline comfort level: Assess pain using appropriate pain scale     Problem: Chronic Conditions and Co-morbidities  Goal: Patient's chronic conditions and co-morbidity symptoms are monitored and maintained or improved  Outcome: Progressing  Flowsheets (Taken 4/12/2025 2000)  Care Plan - Patient's Chronic Conditions and Co-Morbidity Symptoms are Monitored and Maintained or Improved:   Monitor and assess patient's chronic conditions and comorbid symptoms for stability, deterioration, or improvement   Collaborate with multidisciplinary team to address chronic and comorbid conditions and prevent exacerbation or deterioration   Update acute care plan with appropriate goals if chronic or comorbid symptoms are exacerbated and prevent overall improvement and discharge  Note: Nurse will monitor and assess patient's chronic conditions to help prevent exacerbation and deterioration. Nurse will treat with conditions with prescribed medications as 
  Problem: Nutrition Deficit:  Goal: Optimize nutritional status  4/15/2025 0803 by Joya Harris RD  Outcome: Progressing  Flowsheets (Taken 4/15/2025 0751)  Nutrient intake appropriate for improving, restoring, or maintaining nutritional needs:   Assess nutritional status and recommend course of action   Monitor oral intake, labs, and treatment plans   Recommend appropriate diets, oral nutritional supplements, and vitamin/mineral supplements  4/15/2025 0428 by Deyanira Lira, RN  Outcome: Progressing  Flowsheets (Taken 4/15/2025 0428)  Nutrient intake appropriate for improving, restoring, or maintaining nutritional needs:   Assess nutritional status and recommend course of action   Recommend appropriate diets, oral nutritional supplements, and vitamin/mineral supplements     
  Problem: Occupational Therapy - Adult  Goal: By Discharge: Performs self-care activities at highest level of function for planned discharge setting.  See evaluation for individualized goals.  Description: Occupational Therapy Goals:  Initiated 4/12/2025 to be met within 7-10 days.    1. Patient will demonstrate 4/5 UB strength in preparation for his efficient completion of his self care routine  2.  Patient will perform toilet hygiene with mod I  3.  Patient will perform bathing and dressing routine with set up  4. Patient will maneuver on and off commode with mod I      PLOF: he reports that he lives with his niece who can assist him if he needs it although she is currently on her honeymoon. He reports he was independent with his self care and doesn't use his shower seat (he feels it is too big for his shower)  Outcome: Progressing   OCCUPATIONAL THERAPY TREATMENT    Patient: Gio Khalil (64 y.o. male)  Date: 4/17/2025  Diagnosis: Acute coronary syndrome (HCC) [I24.9]  NSTEMI (non-ST elevated myocardial infarction) (HCC) [I21.4]  Acute on chronic congestive heart failure, unspecified heart failure type (HCC) [I50.9] NSTEMI (non-ST elevated myocardial infarction) (HCC)  Procedure(s) (LRB):  Left and right heart cath / coronary angiography (N/A) 1 Day Post-Op  Precautions: Fall Risk, Aspiration Risk,  ,  ,  ,  ,  ,  ,      Chart, occupational therapy assessment, plan of care, and goals were reviewed.  ASSESSMENT:  Pt presented sitting in reclining chair upon entry and agreeable. He was able to april his socks S/U @ chair level. STS transfer MOD I. Pt maneuvered in room and hallway Supervision without AD to improve functional activity tolerance needed for self cares. Pt returned to reclining chair MOD I, simulating toilet transfer. Pt left with all needs within reach and BLE's elevated. RN made aware.  Progression toward goals:  []          Improving appropriately and progressing toward goals  [x]          
  Problem: Occupational Therapy - Adult  Goal: By Discharge: Performs self-care activities at highest level of function for planned discharge setting.  See evaluation for individualized goals.  Description: Occupational Therapy Goals:  Initiated 4/12/2025 to be met within 7-10 days.    1. Patient will demonstrate 4/5 UB strength in preparation for his efficient completion of his self care routine  2.  Patient will perform toilet hygiene with mod I  3.  Patient will perform bathing and dressing routine with set up  4. Patient will maneuver on and off commode with mod I      PLOF: he reports that he lives with his niece who can assist him if he needs it although she is currently on her honeymoon. He reports he was independent with his self care and doesn't use his shower seat (he feels it is too big for his shower)  Outcome: Progressing   OCCUPATIONAL THERAPY TREATMENT    Patient: Gio Khalil (64 y.o. male)  Date: 4/18/2025  Diagnosis: Acute coronary syndrome (HCC) [I24.9]  NSTEMI (non-ST elevated myocardial infarction) ([facility]) [I21.4]  Acute on chronic congestive heart failure, unspecified heart failure type (HCC) [I50.9] NSTEMI (non-ST elevated myocardial infarction) (HCC)  Procedure(s) (LRB):  Left and right heart cath / coronary angiography (N/A) 2 Days Post-Op  Precautions: Fall Risk, Aspiration Risk,  ,  ,  ,  ,  ,  ,      Chart, occupational therapy assessment, plan of care, and goals were reviewed.  ASSESSMENT:  Pt presented supine in bed upon entry and agreeable. Pt came to EOB MOD I in prep for functional tasks. Pt requesting to perform functional mobility in hallways. He maneuvered in room and hallways Supervision without AD to improve functional activity tolerance for ADL carryover. Once back in room, pt stood at sink and performed oral and facial hygiene w/ Supervision without support. Pt agreeable to sit in chair at end of session. He was left with all needs within reach. RN made aware.  Progression 
  Problem: Occupational Therapy - Adult  Goal: By Discharge: Performs self-care activities at highest level of function for planned discharge setting.  See evaluation for individualized goals.  Description: Occupational Therapy Goals:  Initiated 4/12/2025 to be met within 7-10 days.    1. Patient will demonstrate 4/5 UB strength in preparation for his efficient completion of his self care routine  2.  Patient will perform toilet hygiene with mod I  3.  Patient will perform bathing and dressing routine with set up  4. Patient will maneuver on and off commode with mod I    PLOF: he reports that he lives with his niece who can assist him if he needs it although she is currently on her honeymoon. He reports he was independent with his self care and doesn't use his shower seat (he feels it is too big for his shower)  Outcome: Progressing   OCCUPATIONAL THERAPY TREATMENT    Patient: Gio Khalil (64 y.o. male)  Date: 4/15/2025  Diagnosis: Acute coronary syndrome (HCC) [I24.9]  NSTEMI (non-ST elevated myocardial infarction) (HCC) [I21.4]  Acute on chronic congestive heart failure, unspecified heart failure type (HCC) [I50.9] NSTEMI (non-ST elevated myocardial infarction) (HCC)      Precautions: Fall Risk    Chart, occupational therapy assessment, plan of care, and goals were reviewed.  ASSESSMENT:  Pt seen for am ADL retraining seated EOB. (See functional levels below) Pt educated on energy conservation techniques w/ADLs and benefits of shower chair. Pt on 0.5L O2 nc, SpO2% 88-95 throughout session. Reviewed importance OOB and encouraged OOB for all meals.     Progression toward goals:  [x]          Improving appropriately and progressing toward goals  []          Improving slowly and progressing toward goals  []          Not making progress toward goals and plan of care will be adjusted     PLAN:  Patient continues to benefit from skilled intervention to address the above impairments.  Continue treatment per established 
  Problem: Physical Therapy - Adult  Goal: By Discharge: Performs mobility at highest level of function for planned discharge setting.  See evaluation for individualized goals.  Description: Physical Therapy Goals:  Initiated 4/11/2025 to be met within 7-10 days.    1.  Patient will move from supine to sit and sit to supine , scoot up and down, and roll side to side in bed with modified independence.    2.  Patient will transfer from bed to chair and chair to bed with modified independence using the least restrictive device.  3.  Patient will perform sit to stand with modified independence.  4.  Patient will ambulate with modified independence for 30 feet with the least restrictive device.   5.  Patient will ascend/descend 3 stairs with handrail(s) with contact guard assistance.    PLOF: Indep with ambulation, no AD but only very short household distances. Lives with niece in 1[place], 3STE no rail. Niece helps with cooking, cleaning, shopping, and driving.       Outcome: Progressing    PHYSICAL THERAPY TREATMENT    Patient: Gio Khalil (64 y.o. male)  Date: 4/15/2025  Diagnosis: Acute coronary syndrome (HCC) [I24.9]  NSTEMI (non-ST elevated myocardial infarction) (HCC) [I21.4]  Acute on chronic congestive heart failure, unspecified heart failure type (HCC) [I50.9] NSTEMI (non-ST elevated myocardial infarction) (HCC)  Procedure(s) (LRB):  Left and right heart cath / coronary angiography (N/A)    Precautions: Fall Risk,  ,  ,  ,  ,  ,  ,      ASSESSMENT:  Patient resting in recliner upon entry on 1 L NC. He ambulates in hallway with SBA. Reports increased pain in left leg and has mild limp. Denies SOB with mobility. Patient is planning for open heart surgery and educated on sternal precautions. Patient verbalizes understanding and call bell left in reach at end of session.      Progression toward goals:   [x]      Improving appropriately and progressing toward goals  []      Improving slowly and progressing toward 
  Problem: Safety - Adult  Goal: Free from fall injury  4/16/2025 0026 by Talia Espinosa, RN  Outcome: Progressing  Flowsheets (Taken 4/16/2025 0026)  Free From Fall Injury: Based on caregiver fall risk screen, instruct family/caregiver to ask for assistance with transferring infant if caregiver noted to have fall risk factors  Note: Pt. Educated on call bell when in need of help and assistance.  Pt. Verbalized understanding. Bed alarm on.  Bed in a lowest position     Problem: Pain  Goal: Verbalizes/displays adequate comfort level or baseline comfort level  4/16/2025 0026 by Talia Espinosa, RN  Outcome: Progressing  Flowsheets (Taken 4/16/2025 0026)  Verbalizes/displays adequate comfort level or baseline comfort level:   Encourage patient to monitor pain and request assistance   Administer analgesics based on type and severity of pain and evaluate response  Note: Pt. Educated on pain medication availability.  Pt. Verbalized understanding.  Denies pain.     Problem: Skin/Tissue Integrity  Goal: Skin integrity remains intact  Description: 1.  Monitor for areas of redness and/or skin breakdown  2.  Assess vascular access sites hourly  3.  Every 4-6 hours minimum:  Change oxygen saturation probe site  4.  Every 4-6 hours:  If on nasal continuous positive airway pressure, respiratory therapy assess nares and determine need for appliance change or resting period  4/16/2025 0026 by Talia Espinosa RN  Outcome: Progressing  Flowsheets (Taken 4/16/2025 0026)  Skin Integrity Remains Intact: Monitor for areas of redness and/or skin breakdown  Note: Patient is encourage to turn frequently. Pillows are supported     
  Problem: Safety - Adult  Goal: Free from fall injury  4/7/2025 2255 by Lauro Schulz, RN  Outcome: Progressing  Flowsheets (Taken 4/7/2025 2255)  Free From Fall Injury: Instruct family/caregiver on patient safety  Note: Pt. Educated on call bell when in need of help and assistance.  Pt. Verbalized understanding able to use call bell.   4/7/2025 1854 by Lazara Toribio RN  Outcome: Progressing  Flowsheets (Taken 4/7/2025 1854)  Free From Fall Injury: Instruct family/caregiver on patient safety     Problem: Pain  Goal: Verbalizes/displays adequate comfort level or baseline comfort level  4/7/2025 2255 by Lauro Schulz RN  Outcome: Progressing  Flowsheets (Taken 4/7/2025 2255)  Verbalizes/displays adequate comfort level or baseline comfort level:   Encourage patient to monitor pain and request assistance   Administer analgesics based on type and severity of pain and evaluate response  Note:  Pt. Educated on pain meds availability.  Pt. Verbalized understanding.  4/7/2025 1854 by Lazara Toribio, RN  Outcome: Progressing  Flowsheets (Taken 4/7/2025 1854)  Verbalizes/displays adequate comfort level or baseline comfort level: Encourage patient to monitor pain and request assistance     
  Problem: Safety - Adult  Goal: Free from fall injury  Outcome: Progressing     Problem: Pain  Goal: Verbalizes/displays adequate comfort level or baseline comfort level  Outcome: Progressing  Flowsheets (Taken 4/7/2025 0450)  Verbalizes/displays adequate comfort level or baseline comfort level:   Encourage patient to monitor pain and request assistance   Consider cultural and social influences on pain and pain management     Problem: ABCDS Injury Assessment  Goal: Absence of physical injury  Outcome: Progressing  Flowsheets (Taken 4/7/2025 0450)  Absence of Physical Injury: Implement safety measures based on patient assessment     
  Problem: Safety - Adult  Goal: Free from fall injury  Outcome: Progressing  Flowsheets (Taken 4/18/2025 2320)  Free From Fall Injury: Instruct family/caregiver on patient safety     Problem: Pain  Goal: Verbalizes/displays adequate comfort level or baseline comfort level  Recent Flowsheet Documentation  Taken 4/18/2025 1940 by Lizette Guzmán RN  Verbalizes/displays adequate comfort level or baseline comfort level:   Assess pain using appropriate pain scale   Encourage patient to monitor pain and request assistance   Administer analgesics based on type and severity of pain and evaluate response     Problem: Chronic Conditions and Co-morbidities  Goal: Patient's chronic conditions and co-morbidity symptoms are monitored and maintained or improved  Recent Flowsheet Documentation  Taken 4/18/2025 2320 by Lizette Guzmán RN  Care Plan - Patient's Chronic Conditions and Co-Morbidity Symptoms are Monitored and Maintained or Improved:   Monitor and assess patient's chronic conditions and comorbid symptoms for stability, deterioration, or improvement   Collaborate with multidisciplinary team to address chronic and comorbid conditions and prevent exacerbation or deterioration   Update acute care plan with appropriate goals if chronic or comorbid symptoms are exacerbated and prevent overall improvement and discharge     Problem: Skin/Tissue Integrity  Goal: Skin integrity remains intact  Description: 1.  Monitor for areas of redness and/or skin breakdown  2.  Assess vascular access sites hourly  3.  Every 4-6 hours minimum:  Change oxygen saturation probe site  4.  Every 4-6 hours:  If on nasal continuous positive airway pressure, respiratory therapy assess nares and determine need for appliance change or resting period  Outcome: Progressing  Flowsheets (Taken 4/18/2025 2320)  Skin Integrity Remains Intact:   Monitor for areas of redness and/or skin breakdown   Turn and reposition as indicated   Positioning 
Occupational Therapy Goals:  Initiated 4/12/2025 to be met within 7-10 days.    1. Patient will demonstrate 4/5 UB strength in preparation for his efficient completion of his self care routine  2.  Patient will perform toilet hygiene with mod I  3.  Patient will perform bathing and dressing routine with set up  4. Patient will maneuver on and off commode with mod I      PLOF: he reports that he lives with his niece who can assist him if he needs it although she is currently on her honeymoon. He reports he was independent with his self care and doesn't use his shower seat (he feels it is too big for his shower)    OCCUPATIONAL THERAPY EVALUATION    Patient: Gio Khalil (64 y.o. male)  Date: 4/12/2025  Primary Diagnosis: Acute coronary syndrome ([facility]) [I24.9]  NSTEMI (non-ST elevated myocardial infarction) ([facility]) [I21.4]  Acute on chronic congestive heart failure, unspecified heart failure type ([facility]) [I50.9]       Precautions: Fall Risk    ASSESSMENT : this patient presents with significant UB weakness, decreased activity tolerance and decreased functional mobility. In addition he has poor insight into his abilities and need for assistance. He requires skilled OT intervention for ADL retraining, functional strengthening and functional mobility training as it relates to his self care routine completion    Recommend for next OT session: address above goals as he can tolerate      DEFICITS/IMPAIRMENTS:  Performance deficits / Impairments: Decreased functional mobility ;Decreased strength;Decreased endurance;Decreased high-level IADLs;Decreased ADL status    Patient will benefit from skilled intervention to address the above impairments.  Patient's rehabilitation potential/ .  Factors which may influence rehabilitation potential include:   []             None noted  []             Mental ability/status  [x]             Medical condition  []             Home/family situation and support systems  []             Safety 
Problem: Physical Therapy - Adult  Goal: By Discharge: Performs mobility at highest level of function for planned discharge setting.  See evaluation for individualized goals.  Description: Physical Therapy Goals:  Initiated 4/11/2025 to be met within 7-10 days.    1.  Patient will move from supine to sit and sit to supine , scoot up and down, and roll side to side in bed with modified independence.    2.  Patient will transfer from bed to chair and chair to bed with modified independence using the least restrictive device.  3.  Patient will perform sit to stand with modified independence.  4.  Patient will ambulate with modified independence for 30 feet with the least restrictive device.   5.  Patient will ascend/descend 3 stairs with handrail(s) with contact guard assistance.    PLOF: Indep with ambulation, no AD but only very short household distances. Lives with niece in 1SH, 3STE no rail. Niece helps with cooking, cleaning, shopping, and driving.     Outcome: Completed   PHYSICAL THERAPY TREATMENT/DISCHARGE    Patient: Gio Khalil (64 y.o. male)  Date: 4/17/2025  Diagnosis: Acute coronary syndrome (HCC) [I24.9]  NSTEMI (non-ST elevated myocardial infarction) (HCC) [I21.4]  Acute on chronic congestive heart failure, unspecified heart failure type (HCC) [I50.9] NSTEMI (non-ST elevated myocardial infarction) (HCC)  Procedure(s) (LRB):  Left and right heart cath / coronary angiography (N/A) 1 Day Post-Op  Precautions: Fall Risk, Aspiration Risk  ASSESSMENT:  Seated in recliner. Educated on sternal precautions in prep for cardiac surgery. On room air; O2 saturation 94%. Educated on benefits of OOB and amb with staff supervision throughout admission. Mod I for sit to stand. Amb 100ft with steady gait and no AD. Returned to seated in recliner. O2 saturation decreased to 88% with amb. Recovered to 90% with seated rest. Seated in recliner with BLE elevated. Educated on need for RN assistance with mobility; verbalized 
Problem: Physical Therapy - Adult  Goal: By Discharge: Performs mobility at highest level of function for planned discharge setting.  See evaluation for individualized goals.  Description: Physical Therapy Goals:  Initiated 4/11/2025 to be met within 7-10 days.    1.  Patient will move from supine to sit and sit to supine , scoot up and down, and roll side to side in bed with modified independence.    2.  Patient will transfer from bed to chair and chair to bed with modified independence using the least restrictive device.  3.  Patient will perform sit to stand with modified independence.  4.  Patient will ambulate with modified independence for 30 feet with the least restrictive device.   5.  Patient will ascend/descend 3 stairs with handrail(s) with contact guard assistance.    PLOF: Indep with ambulation, no AD but only very short household distances. Lives with niece in 1SH, 3STE no rail. Niece helps with cooking, cleaning, shopping, and driving.     Outcome: Progressing   PHYSICAL THERAPY EVALUATION    Patient: Gio Khalil (64 y.o. male)  Date: 4/11/2025  Primary Diagnosis: Acute coronary syndrome (HCC) [I24.9]  NSTEMI (non-ST elevated myocardial infarction) ([facility]) [I21.4]  Acute on chronic congestive heart failure, unspecified heart failure type ([facility]) [I50.9]       Precautions: Fall Risk    ASSESSMENT :  Patient presents with impairments listed below follow admission for sepsis 2/2 E Coli, A/C RF, NSTEMI, and COPD exacerbation. Patient had been on 30L HHF this AM, now currently on 7L with SpO2 around 95% at rest. Completes long sitting indep and supine>sit (HOB elevated) with Mod Indep. Completed seated exercises edge of bed with good tolerance. Sit<>stand CGA and bed>chair taking about 5 steps toward chair without AD CGA. Demonstrates steady ambulation though high guard. SpO2 remaining >92% throughout mobility on 7L NC. Instructed patient to sit in chair for 30-60 minutes and return to bed for rest, 
Supervision  Balance:  Balance  Sitting: Intact  Standing: Intact;High guard  Standing - Static: Good  Standing - Dynamic: Good   Ambulation/Gait Training:  Gait  Gait Training: Yes  Overall Level of Assistance: Supervision  Distance (ft): 75 Feet  Assistive Device: None  Interventions: Verbal cues  Base of Support: Narrowed  Speed/Kusum: Slow  Step Length: Right shortened;Left shortened  Gait Abnormalities: Decreased step clearance    Pain:  Intensity Pre-treatment: 0/10   Intensity Post-treatment: 0/10  Scale: Numeric Rating Scale    Activity Tolerance:   Activity Tolerance: Patient tolerated treatment well;Patient limited by endurance  Please refer to the flowsheet for vital signs taken during this treatment.  After treatment:   [x] Patient left in no apparent distress sitting up in chair  [] Patient left in no apparent distress in bed  [x] Call bell left within reach  [x] Nursing notified  [] Caregiver present  [] Bed/chair alarm activated  [x] No alarmAlert and oriented x4  [] SCDs applied      COMMUNICATION/EDUCATION:   Patient Education  Education Given To: Patient  Education Provided: Plan of Care;Transfer Training;Mobility Training;Fall Prevention Strategies;Energy Conservation  Education Provided Comments: proper IS use/frequency  Education Method: Verbal;Teach Back  Barriers to Learning: None  Education Outcome: Verbalized understanding;Continued education needed      Diana Dutta PT  Minutes: 24

## 2025-04-23 NOTE — TELEPHONE ENCOUNTER
Called patients tarik Choudhury([employer]) [phone] and inquired about the patient. She stated the patient has been transferred to [facility]. She stated the patient had two heart attacks one in December and 4/6. She stated he has a deteriorated aortic valve and he ended up having bacteria in his blood stream. So, now they are having to start all over at [facility] to try and figure out a treatment options. She thanked us for following up with her. NO further action needed at this time.

## 2025-04-24 ENCOUNTER — TELEPHONE (OUTPATIENT)
Age: 65
End: 2025-04-24

## 2025-04-24 NOTE — TELEPHONE ENCOUNTER
Wanda from [facility] dept would like a call back regarding patient     [phone] option 0

## 2025-04-25 NOTE — DISCHARGE SUMMARY
Discharge Summary    Patient: Gio Khalil MRN: [id number]  CSN: [id number]    YOB: 1960  Age: 64 y.o.  Sex: male    DOA: 4/6/2025 LOS:  LOS: 17 days        Disposition: [facility]    Discharge Date: 4/23/2025    Admission Diagnosis: Acute coronary syndrome (HCC) [I24.9]  NSTEMI (non-ST elevated myocardial infarction) (HCC) [I21.4]  Acute on chronic congestive heart failure, unspecified heart failure type (HCC) [I50.9]    Discharge Diagnosis:    #Enterococcus gallinarum group D/E. 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  Likely due to COPD versus CHF exacerbation  #Acute on chronic heart failure exacerbation.  TTE from April 2025 shows EF 40 to 45% with global hypokinesis  #Severe aortic valve regurgitation with aortic stenosis  #CAD. s/p LHC 12/16/2024 with findings as follows, dLM 30-40% stenosis, Prox/mid LAD 40-50% stenosis, Dominant RCA with mRCA 10-30% stenosis, pLCx 30% stenosis    Discharge Condition: Stable      PHYSICAL EXAM  Visit Vitals  BP (!) 149/51   Pulse 80   Temp 97.8 °F (36.6 °C) (Axillary)   Resp 16   Ht 1.651 m (5' 5\")   Wt 54 kg (119 lb)   SpO2 96%   BMI 19.80 kg/m²       General: Alert, cooperative, no acute distress    HEENT: PERRLA, EOMI. Anicteric sclerae.  Lungs:  CTA Bilaterally. No Wheezing/Rales. On nasal cannula  Heart:             Regular rate and Rhythm.  Abdomen: Soft, Non distended, Non tender. + Bowel sounds.  Extremities: No edema.  Psych:   Good insight. Not anxious or agitated.  Neurologic:  AA, oriented X 3. Moves all ext                                 Hospital Course:   64 year old male presented with cough and shortness of breath.  Patient was initially found to have CHF and COPD exacerbation.  Cardiology was consulted.  Patient was diuresed with IV Lasix.  During hospitalization patient was also found to have E. coli and Enterococcus bacteremia.  Patient was

## 2025-04-28 ENCOUNTER — TELEPHONE (OUTPATIENT)
Facility: CLINIC | Age: 65
End: 2025-04-28

## 2025-04-28 DIAGNOSIS — I10 HYPERTENSION, UNSPECIFIED TYPE: Primary | ICD-10-CM

## 2025-04-28 NOTE — TELEPHONE ENCOUNTER
Lab orders for upcoming appt;    Future Appointments   Date Time Provider Department Center   5/7/2025 11:30 AM Mary Russell DNP [facility] BS E[facility] DEP   5/22/2025  9:00 AM Tj Thurston MD [facility] NEUR BS AMB   6/9/2025  8:00 AM Todd Peña MD [facility] BS AMB     Return in about 6 months (around 5/7/2025) for Follow up, Lab not fasting (BMP).

## 2025-05-05 ENCOUNTER — TELEPHONE (OUTPATIENT)
Age: 65
End: 2025-05-05

## 2025-05-28 RX ORDER — TRAZODONE HYDROCHLORIDE 50 MG/1
50 TABLET ORAL
COMMUNITY
Start: 2025-05-09 | End: 2025-06-02 | Stop reason: SDUPTHER

## 2025-05-28 RX ORDER — CEFTRIAXONE 1 G/1
2 INJECTION, POWDER, FOR SOLUTION INTRAMUSCULAR; INTRAVENOUS EVERY 12 HOURS
COMMUNITY
Start: 2025-05-01 | End: 2025-06-10

## 2025-05-28 RX ORDER — SODIUM CHLORIDE 0.9 % (FLUSH) 0.9 %
10 SYRINGE (ML) INJECTION PRN
COMMUNITY
Start: 2025-05-09 | End: 2026-05-08

## 2025-05-28 RX ORDER — AMPICILLIN INJECTION 1 G/G
2 POWDER, FOR SOLUTION INTRAMUSCULAR; INTRAVENOUS EVERY 4 HOURS
COMMUNITY
Start: 2025-05-01 | End: 2025-06-10

## 2025-05-28 RX ORDER — SPIRONOLACTONE 25 MG/1
25 TABLET ORAL DAILY
COMMUNITY
Start: 2025-05-10 | End: 2025-06-02 | Stop reason: SDUPTHER

## 2025-05-28 RX ORDER — BUMETANIDE 0.5 MG/1
0.5 TABLET ORAL DAILY
COMMUNITY
Start: 2025-05-09 | End: 2025-06-02 | Stop reason: SDUPTHER

## 2025-05-28 RX ORDER — DAPAGLIFLOZIN 10 MG/1
10 TABLET, FILM COATED ORAL EVERY MORNING
COMMUNITY
Start: 2025-05-09 | End: 2025-06-02 | Stop reason: SDUPTHER

## 2025-05-28 RX ORDER — HEPARIN SODIUM (PORCINE) LOCK FLUSH IV SOLN 100 UNIT/ML 100 UNIT/ML
300 SOLUTION INTRAVENOUS PRN
COMMUNITY
Start: 2025-05-09 | End: 2026-05-08

## 2025-05-28 RX ORDER — METOPROLOL SUCCINATE 25 MG/1
12.5 TABLET, EXTENDED RELEASE ORAL NIGHTLY
COMMUNITY
Start: 2025-05-09 | End: 2025-06-02 | Stop reason: SDUPTHER

## 2025-05-28 RX ORDER — AMIODARONE HYDROCHLORIDE 200 MG/1
200 TABLET ORAL DAILY
COMMUNITY
Start: 2025-05-10 | End: 2025-06-02 | Stop reason: SDUPTHER

## 2025-06-02 ENCOUNTER — OFFICE VISIT (OUTPATIENT)
Facility: CLINIC | Age: 65
End: 2025-06-02
Payer: MEDICAID

## 2025-06-02 VITALS
WEIGHT: 113.8 LBS | OXYGEN SATURATION: 94 % | RESPIRATION RATE: 17 BRPM | HEIGHT: 65 IN | SYSTOLIC BLOOD PRESSURE: 118 MMHG | TEMPERATURE: 98.3 F | HEART RATE: 84 BPM | DIASTOLIC BLOOD PRESSURE: 65 MMHG | BODY MASS INDEX: 18.96 KG/M2

## 2025-06-02 DIAGNOSIS — I35.1 SEVERE AORTIC VALVE REGURGITATION: ICD-10-CM

## 2025-06-02 DIAGNOSIS — I73.9 PAD (PERIPHERAL ARTERY DISEASE): ICD-10-CM

## 2025-06-02 DIAGNOSIS — I50.32 CHRONIC HEART FAILURE WITH PRESERVED EJECTION FRACTION (HCC): Primary | ICD-10-CM

## 2025-06-02 DIAGNOSIS — J81.1 CHRONIC PULMONARY EDEMA: ICD-10-CM

## 2025-06-02 DIAGNOSIS — I10 HYPERTENSION, UNSPECIFIED TYPE: ICD-10-CM

## 2025-06-02 DIAGNOSIS — J43.9 PULMONARY EMPHYSEMA, UNSPECIFIED EMPHYSEMA TYPE (HCC): ICD-10-CM

## 2025-06-02 DIAGNOSIS — I33.0 ACUTE BACTERIAL ENDOCARDITIS: ICD-10-CM

## 2025-06-02 DIAGNOSIS — R41.3 MEMORY PROBLEM: ICD-10-CM

## 2025-06-02 DIAGNOSIS — D17.1 LIPOMA OF TORSO: ICD-10-CM

## 2025-06-02 DIAGNOSIS — F41.8 ANXIETY ASSOCIATED WITH DEPRESSION: ICD-10-CM

## 2025-06-02 DIAGNOSIS — G47.9 SLEEP DISTURBANCE: ICD-10-CM

## 2025-06-02 PROBLEM — F32.1 CURRENT MODERATE EPISODE OF MAJOR DEPRESSIVE DISORDER (HCC): Status: ACTIVE | Noted: 2025-06-02

## 2025-06-02 PROCEDURE — 3074F SYST BP LT 130 MM HG: CPT | Performed by: NURSE PRACTITIONER

## 2025-06-02 PROCEDURE — 3078F DIAST BP <80 MM HG: CPT | Performed by: NURSE PRACTITIONER

## 2025-06-02 PROCEDURE — 99214 OFFICE O/P EST MOD 30 MIN: CPT | Performed by: NURSE PRACTITIONER

## 2025-06-02 RX ORDER — SPIRONOLACTONE 25 MG/1
25 TABLET ORAL DAILY
Qty: 30 TABLET | Refills: 0 | Status: SHIPPED | OUTPATIENT
Start: 2025-06-02

## 2025-06-02 RX ORDER — DAPAGLIFLOZIN 10 MG/1
10 TABLET, FILM COATED ORAL EVERY MORNING
Qty: 30 TABLET | Refills: 0 | Status: SHIPPED | OUTPATIENT
Start: 2025-06-02

## 2025-06-02 RX ORDER — AMIODARONE HYDROCHLORIDE 200 MG/1
200 TABLET ORAL DAILY
Qty: 30 TABLET | Refills: 0 | Status: SHIPPED | OUTPATIENT
Start: 2025-06-02 | End: 2025-07-02

## 2025-06-02 RX ORDER — TRAZODONE HYDROCHLORIDE 50 MG/1
75 TABLET ORAL
Qty: 135 TABLET | Refills: 1 | Status: SHIPPED | OUTPATIENT
Start: 2025-06-02

## 2025-06-02 RX ORDER — TIOTROPIUM BROMIDE INHALATION SPRAY 1.56 UG/1
2 SPRAY, METERED RESPIRATORY (INHALATION) DAILY
Qty: 4 G | Refills: 3 | Status: SHIPPED | OUTPATIENT
Start: 2025-06-02

## 2025-06-02 RX ORDER — ATORVASTATIN CALCIUM 40 MG/1
40 TABLET, FILM COATED ORAL NIGHTLY
Qty: 90 TABLET | Refills: 3 | Status: SHIPPED | OUTPATIENT
Start: 2025-06-02

## 2025-06-02 RX ORDER — BUMETANIDE 0.5 MG/1
0.5 TABLET ORAL DAILY
Qty: 30 TABLET | Refills: 0 | Status: SHIPPED | OUTPATIENT
Start: 2025-06-02

## 2025-06-02 RX ORDER — ASPIRIN 81 MG/1
81 TABLET, CHEWABLE ORAL DAILY
Qty: 30 TABLET | Refills: 0 | Status: SHIPPED | OUTPATIENT
Start: 2025-06-02

## 2025-06-02 RX ORDER — CLOPIDOGREL BISULFATE 75 MG/1
75 TABLET ORAL DAILY
Qty: 30 TABLET | Refills: 0 | Status: SHIPPED | OUTPATIENT
Start: 2025-06-02

## 2025-06-02 RX ORDER — CITALOPRAM HYDROBROMIDE 10 MG/1
10 TABLET ORAL DAILY
Qty: 90 TABLET | Refills: 1 | Status: SHIPPED | OUTPATIENT
Start: 2025-06-02

## 2025-06-02 RX ORDER — METOPROLOL SUCCINATE 25 MG/1
12.5 TABLET, EXTENDED RELEASE ORAL NIGHTLY
Qty: 30 TABLET | Refills: 0 | Status: SHIPPED | OUTPATIENT
Start: 2025-06-02

## 2025-06-02 RX ORDER — PHENOL 1.4 %
1 AEROSOL, SPRAY (ML) MUCOUS MEMBRANE NIGHTLY
COMMUNITY
Start: 2025-06-02

## 2025-06-02 NOTE — PROGRESS NOTES
Gio Khalil presents today for   Chief Complaint   Patient presents with    Follow-up           \"Have you been to the ER, urgent care clinic since your last visit?  Hospitalized since your last visit?\"    YES - When: approximately 1 months ago.  Where and Why: SNG; Acute hypoxic respiratory failure.    “Have you seen or consulted any other health care providers outside of [facility] since your last visit?”    YES - When: approximately 1 months ago.  Where and Why: Cardio; f/u.             
MG per tablet; Take 1 tablet by mouth in the morning and 1 tablet in the evening. Indications: chronic heart failure., Disp-60 tablet, R-0Normal  -     spironolactone (ALDACTONE) 25 MG tablet; Take 1 tablet by mouth daily, Disp-30 tablet, R-0Normal  -     aspirin 81 MG chewable tablet; Take 1 tablet by mouth daily, Disp-30 tablet, R-0Normal  -     atorvastatin (LIPITOR) 40 MG tablet; Take 1 tablet by mouth nightly, Disp-90 tablet, R-3Normal  -     clopidogrel (PLAVIX) 75 MG tablet; Take 1 tablet by mouth daily, Disp-30 tablet, R-0Normal  4. Chronic pulmonary edema  -     Amb External Referral To Pulmonology  5. Pulmonary emphysema, unspecified emphysema type (HCC)  Assessment & Plan:  - He has been diagnosed with COPD but is not currently on any medication for it.  - A prescription for Spiriva, two puffs daily, will be provided.  - A referral to pulmonology will be made for further evaluation.  - He is advised to follow up with pulmonology for ongoing management of his COPD.  - He is advised to use his incentive spirometer regularly to prevent pneumonia.   Orders:  -     tiotropium (SPIRIVA RESPIMAT) 1.25 MCG/ACT AERS inhaler; Inhale 2 puffs into the lungs daily, Disp-4 g, R-3Normal  -     Amb External Referral To Pulmonology  6. Hypertension, unspecified type  Assessment & Plan:  - Amlodipine and lisinopril have been discontinued by hospitalist.  7. 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  - He is currently on antibiotics administered through a PICC line: ampicillin every 4 hours and ceftriaxone every 12 hours.  - He has an appointment with infectious disease on Friday, 06/06/2025, and a cardiothoracic postop follow-up on Friday, 05/16/2025.  - He is advised to continue with his current antibiotic regimen and attend his scheduled follow-up appointments.    - He has a GI infection that needs to

## 2025-06-05 RX ORDER — TRAMADOL HYDROCHLORIDE 50 MG/1
50 TABLET ORAL EVERY 8 HOURS PRN
COMMUNITY
Start: 2025-05-09

## 2025-06-09 ENCOUNTER — OFFICE VISIT (OUTPATIENT)
Age: 65
End: 2025-06-09
Payer: MEDICAID

## 2025-06-09 VITALS
DIASTOLIC BLOOD PRESSURE: 65 MMHG | TEMPERATURE: 97.5 F | HEIGHT: 66 IN | WEIGHT: 115 LBS | RESPIRATION RATE: 16 BRPM | OXYGEN SATURATION: 94 % | BODY MASS INDEX: 18.48 KG/M2 | HEART RATE: 85 BPM | SYSTOLIC BLOOD PRESSURE: 119 MMHG

## 2025-06-09 DIAGNOSIS — G47.33 OSA (OBSTRUCTIVE SLEEP APNEA): Primary | ICD-10-CM

## 2025-06-09 DIAGNOSIS — I50.32 CHRONIC HEART FAILURE WITH PRESERVED EJECTION FRACTION (HCC): ICD-10-CM

## 2025-06-09 DIAGNOSIS — I27.20 MODERATE PULMONARY HYPERTENSION (HCC): ICD-10-CM

## 2025-06-09 DIAGNOSIS — J44.9 CHRONIC OBSTRUCTIVE PULMONARY DISEASE, UNSPECIFIED COPD TYPE (HCC): ICD-10-CM

## 2025-06-09 PROCEDURE — 3074F SYST BP LT 130 MM HG: CPT | Performed by: INTERNAL MEDICINE

## 2025-06-09 PROCEDURE — 99204 OFFICE O/P NEW MOD 45 MIN: CPT | Performed by: INTERNAL MEDICINE

## 2025-06-09 PROCEDURE — 3078F DIAST BP <80 MM HG: CPT | Performed by: INTERNAL MEDICINE

## 2025-06-09 ASSESSMENT — PATIENT HEALTH QUESTIONNAIRE - PHQ9
SUM OF ALL RESPONSES TO PHQ QUESTIONS 1-9: 14
4. FEELING TIRED OR HAVING LITTLE ENERGY: MORE THAN HALF THE DAYS
9. THOUGHTS THAT YOU WOULD BE BETTER OFF DEAD, OR OF HURTING YOURSELF: NOT AT ALL
SUM OF ALL RESPONSES TO PHQ QUESTIONS 1-9: 14
2. FEELING DOWN, DEPRESSED OR HOPELESS: NEARLY EVERY DAY
3. TROUBLE FALLING OR STAYING ASLEEP: MORE THAN HALF THE DAYS
SUM OF ALL RESPONSES TO PHQ QUESTIONS 1-9: 14
SUM OF ALL RESPONSES TO PHQ QUESTIONS 1-9: 14
1. LITTLE INTEREST OR PLEASURE IN DOING THINGS: NOT AT ALL
6. FEELING BAD ABOUT YOURSELF - OR THAT YOU ARE A FAILURE OR HAVE LET YOURSELF OR YOUR FAMILY DOWN: NOT AT ALL
10. IF YOU CHECKED OFF ANY PROBLEMS, HOW DIFFICULT HAVE THESE PROBLEMS MADE IT FOR YOU TO DO YOUR WORK, TAKE CARE OF THINGS AT HOME, OR GET ALONG WITH OTHER PEOPLE: SOMEWHAT DIFFICULT
5. POOR APPETITE OR OVEREATING: MORE THAN HALF THE DAYS
7. TROUBLE CONCENTRATING ON THINGS, SUCH AS READING THE NEWSPAPER OR WATCHING TELEVISION: NEARLY EVERY DAY
8. MOVING OR SPEAKING SO SLOWLY THAT OTHER PEOPLE COULD HAVE NOTICED. OR THE OPPOSITE, BEING SO FIGETY OR RESTLESS THAT YOU HAVE BEEN MOVING AROUND A LOT MORE THAN USUAL: MORE THAN HALF THE DAYS

## 2025-06-09 ASSESSMENT — SLEEP AND FATIGUE QUESTIONNAIRES
ESS TOTAL SCORE: 8
HOW LIKELY ARE YOU TO NOD OFF OR FALL ASLEEP WHILE SITTING AND READING: MODERATE CHANCE OF DOZING
HOW LIKELY ARE YOU TO NOD OFF OR FALL ASLEEP WHILE SITTING QUIETLY AFTER LUNCH WITHOUT ALCOHOL: WOULD NEVER DOZE
HOW LIKELY ARE YOU TO NOD OFF OR FALL ASLEEP WHILE LYING DOWN TO REST IN THE AFTERNOON WHEN CIRCUMSTANCES PERMIT: HIGH CHANCE OF DOZING
HOW LIKELY ARE YOU TO NOD OFF OR FALL ASLEEP WHILE SITTING AND TALKING TO SOMEONE: WOULD NEVER DOZE
HOW LIKELY ARE YOU TO NOD OFF OR FALL ASLEEP WHILE WATCHING TV: HIGH CHANCE OF DOZING
HOW LIKELY ARE YOU TO NOD OFF OR FALL ASLEEP WHILE SITTING INACTIVE IN A PUBLIC PLACE: WOULD NEVER DOZE
HOW LIKELY ARE YOU TO NOD OFF OR FALL ASLEEP IN A CAR, WHILE STOPPED FOR A FEW MINUTES IN TRAFFIC: WOULD NEVER DOZE
HOW LIKELY ARE YOU TO NOD OFF OR FALL ASLEEP WHEN YOU ARE A PASSENGER IN A CAR FOR AN HOUR WITHOUT A BREAK: WOULD NEVER DOZE

## 2025-06-09 NOTE — PATIENT INSTRUCTIONS
until you feel sleepy.  Avoid taking medicine right before bed that may keep you awake or make you feel hyper or energized. Your doctor can tell you if your medicine may do this and if you can take it earlier in the day.  If you can't sleep   Imagine yourself in a peaceful, pleasant scene. Focus on the details and feelings of being in a place that is relaxing.  Get up and do a quiet or boring activity until you feel sleepy.  Avoid drinking any liquids before going to bed to help prevent waking up often to use the bathroom.  Where can you learn more?  Go to https://www.ShareHow[employer].net/patientEd and enter J942 to learn more about \"Learning About Sleeping Well.\"  Current as of: July 31, 2024  Content Version: 14.5  © 2024-2025 [employer].   Care instructions adapted under license by [employer]. If you have questions about a medical condition or this instruction, always ask your healthcare professional. [employer] disclaims any warranty or liability for your use of this information.

## 2025-06-09 NOTE — PROGRESS NOTES
Gio GEORGE Khalil presents today for   Chief Complaint   Patient presents with    Sleep Problem       Is someone accompanying this pt? Yes, niece    Is the patient using any DME equipment during OV? no    -DME Company n/a    Have you ever had a sleep study done before? no    Depression Screenin/9/2025     8:15 AM   PHQ-9    Little interest or pleasure in doing things 0   Feeling down, depressed, or hopeless 3   Trouble falling or staying asleep, or sleeping too much 2   Feeling tired or having little energy 2   Poor appetite or overeating 2   Feeling bad about yourself - or that you are a failure or have let yourself or your family down 0   Trouble concentrating on things, such as reading the newspaper or watching television 3   Moving or speaking so slowly that other people could have noticed. Or the opposite - being so fidgety or restless that you have been moving around a lot more than usual 2   Thoughts that you would be better off dead, or of hurting yourself in some way 0   PHQ-2 Score 3   PHQ-9 Total Score 14   If you checked off any problems, how difficult have these problems made it for you to do your work, take care of things at home, or get along with other people? 1        [place] Sleepiness Scale:      2025     8:19 AM   Sleep Medicine   Sitting and reading 2   Watching TV 3   Sitting, inactive in a public place (e.g. a theatre or a meeting) 0   As a passenger in a car for an hour without a break 0   Lying down to rest in the afternoon when circumstances permit 3   Sitting and talking to someone 0   Sitting quietly after a lunch without alcohol 0   In a car, while stopped for a few minutes in traffic 0   [place] Sleepiness Score 8   Neck (Inches) 12.5       Stop-Ban/9/2025     8:00 AM   STOP-BANG QUESTIONNAIRE   Are you a loud and/or regular snorer? 0   Do you often feel tired or groggy upon awakening or do you awaken with a headache? 1   Have you been observed to gasp or stop 
Daily consumption of alcohol 03/07/2022    Dyslipidemia 08/08/2023    Elevated LFTs 03/09/2022    Fracture of thoracic transverse process, closed, initial encounter ([facility]) 01/24/2024    Hepatitis C test positive 03/09/2022    Hyperkalemia 03/09/2022    Marijuana smoker 03/07/2022    Mental subnormality 03/07/2022    PAD (peripheral artery disease)     Primary hypertension 03/07/2022    PVD (peripheral vascular disease) 07/22/2022       Past Surgical History:   Procedure Laterality Date    CARDIAC PROCEDURE N/A 12/16/2024    Left heart cath / coronary angiography performed by Lex Andrade MD at [facility] CARDIAC SURGERY    CARDIAC PROCEDURE N/A 4/16/2025    Left and right heart cath / coronary angiography performed by Lex Andrade MD at [facility] CARDIAC CATH LAB    CATARACT REMOVAL Right     4 years ago    INVASIVE VASCULAR N/A 12/18/2024    Tunnel dialysis catheter insertion performed by Shereen Barrett MD at [facility] CARDIAC SURGERY    VASCULAR SURGERY Left 06/01/2023    Stents       Social History     Socioeconomic History    Marital status: Single     Spouse name: None    Number of children: None    Years of education: None    Highest education level: None   Tobacco Use    Smoking status: Former     Current packs/day: 1.00     Average packs/day: 1 pack/day for 43.4 years (43.4 ttl pk-yrs)     Types: Cigarettes     Start date: 1/1/1982     Passive exposure: Past    Smokeless tobacco: Never    Tobacco comments:     Patient has not smoked in 1 week from today 1/7/2025   Vaping Use    Vaping status: Never Used   Substance and Sexual Activity    Alcohol use: Yes     Alcohol/week: 14.0 standard drinks of alcohol    Drug use: Not Currently     Types: Marijuana (Weed)     Social Drivers of Health     Financial Resource Strain: Low Risk  (11/7/2024)    Overall Financial Resource Strain (CARDIA)     Difficulty of Paying Living Expenses: Not hard at all   Food Insecurity: No Food Insecurity (4/8/2025)    Hunger Vital Sign

## 2025-06-10 PROBLEM — R78.81 GRAM-NEGATIVE BACTEREMIA: Status: RESOLVED | Noted: 2025-04-07 | Resolved: 2025-06-10

## 2025-06-10 PROBLEM — Z71.89 GOALS OF CARE, COUNSELING/DISCUSSION: Status: RESOLVED | Noted: 2024-12-12 | Resolved: 2025-06-10

## 2025-06-10 PROBLEM — N17.9 ACUTE RENAL FAILURE: Status: RESOLVED | Noted: 2024-12-11 | Resolved: 2025-06-10

## 2025-06-10 PROBLEM — J10.1 INFLUENZA A (H1N1): Status: RESOLVED | Noted: 2024-12-12 | Resolved: 2025-06-10

## 2025-06-10 PROBLEM — I35.8 ENDOCARDITIS OF AORTIC VALVE: Status: ACTIVE | Noted: 2025-06-10

## 2025-06-10 PROBLEM — I35.8 ENDOCARDITIS OF AORTIC VALVE: Status: RESOLVED | Noted: 2025-06-10 | Resolved: 2025-06-10

## 2025-06-10 PROBLEM — F10.10 ALCOHOL ABUSE: Status: RESOLVED | Noted: 2023-08-08 | Resolved: 2025-06-10

## 2025-06-10 PROBLEM — Z51.5 PALLIATIVE CARE ENCOUNTER: Status: RESOLVED | Noted: 2024-12-12 | Resolved: 2025-06-10

## 2025-06-10 PROBLEM — G47.9 SLEEP DISTURBANCE: Status: ACTIVE | Noted: 2025-06-10

## 2025-06-10 PROBLEM — A41.9 SEPSIS (HCC): Status: RESOLVED | Noted: 2025-04-07 | Resolved: 2025-06-10

## 2025-06-10 PROBLEM — J96.01 ACUTE HYPOXIC RESPIRATORY FAILURE (HCC): Status: RESOLVED | Noted: 2025-04-07 | Resolved: 2025-06-10

## 2025-06-10 PROBLEM — I70.90 ATHEROSCLEROSIS: Status: RESOLVED | Noted: 2023-08-08 | Resolved: 2025-06-10

## 2025-06-10 PROBLEM — R41.0 DELIRIUM: Status: RESOLVED | Noted: 2024-12-18 | Resolved: 2025-06-10

## 2025-06-10 PROBLEM — J90 PLEURAL EFFUSION, BILATERAL: Status: RESOLVED | Noted: 2025-04-07 | Resolved: 2025-06-10

## 2025-06-10 PROBLEM — D17.1 LIPOMA OF TORSO: Status: ACTIVE | Noted: 2025-06-10

## 2025-06-10 PROBLEM — J81.1 CHRONIC PULMONARY EDEMA: Status: ACTIVE | Noted: 2025-06-10

## 2025-06-10 PROBLEM — R78.81 GRAM-POSITIVE BACTEREMIA: Status: RESOLVED | Noted: 2025-04-07 | Resolved: 2025-06-10

## 2025-06-10 PROBLEM — I50.9 ACUTE ON CHRONIC CONGESTIVE HEART FAILURE (HCC): Status: RESOLVED | Noted: 2025-04-07 | Resolved: 2025-06-10

## 2025-06-10 PROBLEM — I35.0 AORTIC STENOSIS, SEVERE: Status: RESOLVED | Noted: 2024-12-12 | Resolved: 2025-06-10

## 2025-06-10 PROBLEM — J43.9 PULMONARY EMPHYSEMA (HCC): Status: ACTIVE | Noted: 2025-06-10

## 2025-06-10 PROBLEM — D64.9 ANEMIA: Status: RESOLVED | Noted: 2024-12-12 | Resolved: 2025-06-10

## 2025-06-10 PROBLEM — F32.1 CURRENT MODERATE EPISODE OF MAJOR DEPRESSIVE DISORDER (HCC): Status: RESOLVED | Noted: 2025-06-02 | Resolved: 2025-06-10

## 2025-06-10 NOTE — ASSESSMENT & PLAN NOTE
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    - He is currently on amiodarone 200 mg once daily, bumetanide 0.5 mg one tablet daily, Farxiga 10 mg every morning, metoprolol XL 12.5 mg once daily, spironolactone 25 mg daily, aspirin 81 mg daily, atorvastatin 40 mg daily, and clopidogrel.

## 2025-06-10 NOTE — ASSESSMENT & PLAN NOTE
- He has a lipoma on his back that is currently not causing significant issues.  - The lipoma is not recommended for surgical removal at this time due to his current health status.  - Surgical removal of the lipoma can be considered in the future if it becomes problematic.  - He is advised to monitor the lipoma and report any changes or concerns.

## 2025-06-10 NOTE — ASSESSMENT & PLAN NOTE
Refilled cardiac meds until pt sees cardio for followup  Made pt and niece aware, I will not be refilling his cardiac meds, must get from cardio.

## 2025-06-10 NOTE — ASSESSMENT & PLAN NOTE
- He has been diagnosed with COPD but is not currently on any medication for it.  - A prescription for Spiriva, two puffs daily, will be provided.  - A referral to pulmonology will be made for further evaluation.  - He is advised to follow up with pulmonology for ongoing management of his COPD.  - He is advised to use his incentive spirometer regularly to prevent pneumonia.

## 2025-06-10 NOTE — ASSESSMENT & PLAN NOTE
- He is currently on amiodarone 200 mg once daily, bumetanide 0.5 mg one tablet daily, Farxiga 10 mg every morning, metoprolol XL 12.5 mg once daily, spironolactone 25 mg daily, aspirin 81 mg daily, atorvastatin 40 mg daily, and clopidogrel.

## 2025-06-10 NOTE — ASSESSMENT & PLAN NOTE
- A refill of Celexa will be provided for his depression, with the option to increase the dosage to two tablets after one to two weeks if the current dosage proves ineffective.     11/7/2024:   Well-controlled, continue current medications  Continue Celexa as prescribed

## 2025-06-10 NOTE — ASSESSMENT & PLAN NOTE
According to the niece, neuropsych dx pt with dementia.   - There is a mention of dementia per history, but it needs to be confirmed by Dr. Rudolph.  - The diagnosis of dementia has not been formally documented by Dr. Rudolph.  - He is advised to follow up with Dr. Rudolph to confirm the diagnosis and ensure it is documented in his medical history.  - He is advised to reach out to Dr. Rudolph to obtain a formal diagnosis for disability purposes    11/7/2024:  According to patient and his niece, patient has been experiencing memory issues such as forgetting recent conversations and events.    Placed order for neuropsych eval.    Needs to contact patient's insurance and obtain a list of neuropsych's within the network.  Nipablo will then call this office with the name, address, phone number and I will update the referral.    If they are unable to get in with a neuropsych, I am willing to write a referral to neurology instead    There is family history of dementia.

## 2025-06-10 NOTE — ASSESSMENT & PLAN NOTE
- Discontinue trazodone 50 mg   - Increase Trazodone from 50-75mg nightly to maintain sleep  - Take melatonin 10 mg concurrently to help get to sleep.

## 2025-06-10 NOTE — ASSESSMENT & PLAN NOTE
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  - He is currently on antibiotics administered through a PICC line: ampicillin every 4 hours and ceftriaxone every 12 hours.  - He has an appointment with infectious disease on Friday, 06/06/2025, and a cardiothoracic postop follow-up on Friday, 05/16/2025.  - He is advised to continue with his current antibiotic regimen and attend his scheduled follow-up appointments.    - He has a GI infection that needs to be addressed after clearing the bloodstream infection.  - The GI infection has not yet been fully diagnosed or treated.  - A GI appointment will be scheduled once the infection is under control.  - He is advised to follow up with GI specialists once his current infection is managed.

## 2025-06-28 DIAGNOSIS — I35.1 SEVERE AORTIC VALVE REGURGITATION: ICD-10-CM

## 2025-06-28 DIAGNOSIS — I73.9 PAD (PERIPHERAL ARTERY DISEASE): ICD-10-CM

## 2025-06-28 DIAGNOSIS — I50.32 CHRONIC HEART FAILURE WITH PRESERVED EJECTION FRACTION (HCC): ICD-10-CM

## 2025-06-30 DIAGNOSIS — J43.9 PULMONARY EMPHYSEMA, UNSPECIFIED EMPHYSEMA TYPE (HCC): ICD-10-CM

## 2025-06-30 RX ORDER — ASPIRIN 81 MG/1
81 TABLET, CHEWABLE ORAL DAILY
Qty: 30 TABLET | Refills: 0 | OUTPATIENT
Start: 2025-06-30

## 2025-06-30 RX ORDER — AMIODARONE HYDROCHLORIDE 200 MG/1
200 TABLET ORAL DAILY
Qty: 30 TABLET | Refills: 0 | OUTPATIENT
Start: 2025-06-30

## 2025-06-30 RX ORDER — SACUBITRIL AND VALSARTAN 24; 26 MG/1; MG/1
TABLET, FILM COATED ORAL
Qty: 60 TABLET | Refills: 0 | OUTPATIENT
Start: 2025-06-30

## 2025-06-30 RX ORDER — CLOPIDOGREL BISULFATE 75 MG/1
75 TABLET ORAL DAILY
Qty: 30 TABLET | Refills: 0 | OUTPATIENT
Start: 2025-06-30

## 2025-06-30 RX ORDER — BUMETANIDE 0.5 MG/1
0.5 TABLET ORAL DAILY
Qty: 30 TABLET | Refills: 0 | OUTPATIENT
Start: 2025-06-30

## 2025-06-30 RX ORDER — DAPAGLIFLOZIN 10 MG/1
10 TABLET, FILM COATED ORAL EVERY MORNING
Qty: 30 TABLET | Refills: 0 | OUTPATIENT
Start: 2025-06-30

## 2025-06-30 RX ORDER — SPIRONOLACTONE 25 MG/1
25 TABLET ORAL DAILY
Qty: 30 TABLET | Refills: 0 | OUTPATIENT
Start: 2025-06-30

## 2025-06-30 NOTE — TELEPHONE ENCOUNTER
Isela an occupational therapy assistant with [facility] called requesting script to be sent to different pharmacy Isela stated that the pharmacy where the script was sent the first is out of stock         tiotropium (SPIRIVA RESPIMAT) 1.25 MCG/ACT AERS inhaler   [id number] - [place], [place] - [address] - P [phone] - F [phone] [[id number]]

## 2025-07-01 RX ORDER — TIOTROPIUM BROMIDE INHALATION SPRAY 1.56 UG/1
2 SPRAY, METERED RESPIRATORY (INHALATION) DAILY
Qty: 4 G | Refills: 3 | Status: SHIPPED | OUTPATIENT
Start: 2025-07-01

## 2025-07-03 ENCOUNTER — TELEPHONE (OUTPATIENT)
Facility: CLINIC | Age: 65
End: 2025-07-03

## 2025-07-05 ENCOUNTER — PATIENT MESSAGE (OUTPATIENT)
Facility: CLINIC | Age: 65
End: 2025-07-05

## 2025-07-05 DIAGNOSIS — I73.9 PAD (PERIPHERAL ARTERY DISEASE): ICD-10-CM

## 2025-07-05 DIAGNOSIS — F10.10 ALCOHOL ABUSE: ICD-10-CM

## 2025-07-05 DIAGNOSIS — I50.32 CHRONIC HEART FAILURE WITH PRESERVED EJECTION FRACTION (HCC): ICD-10-CM

## 2025-07-05 DIAGNOSIS — I35.1 SEVERE AORTIC VALVE REGURGITATION: ICD-10-CM

## 2025-07-05 DIAGNOSIS — J43.9 PULMONARY EMPHYSEMA, UNSPECIFIED EMPHYSEMA TYPE (HCC): ICD-10-CM

## 2025-07-05 RX ORDER — AMIODARONE HYDROCHLORIDE 200 MG/1
200 TABLET ORAL DAILY
Qty: 30 TABLET | Refills: 0 | Status: CANCELLED | OUTPATIENT
Start: 2025-07-05 | End: 2025-08-04

## 2025-07-05 RX ORDER — TIOTROPIUM BROMIDE INHALATION SPRAY 1.56 UG/1
2 SPRAY, METERED RESPIRATORY (INHALATION) DAILY
Qty: 4 G | Refills: 3 | Status: CANCELLED | OUTPATIENT
Start: 2025-07-05

## 2025-07-05 RX ORDER — PHENOL 1.4 %
1 AEROSOL, SPRAY (ML) MUCOUS MEMBRANE NIGHTLY
Status: CANCELLED | OUTPATIENT
Start: 2025-07-05

## 2025-07-05 RX ORDER — CLOPIDOGREL BISULFATE 75 MG/1
75 TABLET ORAL DAILY
Qty: 30 TABLET | Refills: 0 | Status: CANCELLED | OUTPATIENT
Start: 2025-07-05

## 2025-07-05 RX ORDER — BUMETANIDE 0.5 MG/1
0.5 TABLET ORAL DAILY
Qty: 30 TABLET | Refills: 0 | Status: CANCELLED | OUTPATIENT
Start: 2025-07-05

## 2025-07-05 RX ORDER — VITAMIN B COMPLEX
1 CAPSULE ORAL DAILY
Qty: 90 CAPSULE | Refills: 1 | Status: CANCELLED | OUTPATIENT
Start: 2025-07-05

## 2025-07-05 RX ORDER — SPIRONOLACTONE 25 MG/1
25 TABLET ORAL DAILY
Qty: 30 TABLET | Refills: 0 | Status: CANCELLED | OUTPATIENT
Start: 2025-07-05

## 2025-07-05 RX ORDER — ASPIRIN 81 MG/1
81 TABLET, CHEWABLE ORAL DAILY
Qty: 30 TABLET | Refills: 0 | Status: CANCELLED | OUTPATIENT
Start: 2025-07-05

## 2025-07-07 ENCOUNTER — TELEPHONE (OUTPATIENT)
Facility: CLINIC | Age: 65
End: 2025-07-07

## 2025-07-07 RX ORDER — AMIODARONE HYDROCHLORIDE 200 MG/1
200 TABLET ORAL DAILY
Qty: 30 TABLET | Refills: 0 | OUTPATIENT
Start: 2025-07-07 | End: 2025-08-06

## 2025-07-07 RX ORDER — DAPAGLIFLOZIN 10 MG/1
10 TABLET, FILM COATED ORAL EVERY MORNING
Qty: 30 TABLET | Refills: 0 | OUTPATIENT
Start: 2025-07-07

## 2025-07-07 RX ORDER — ASPIRIN 81 MG/1
81 TABLET, CHEWABLE ORAL DAILY
Qty: 30 TABLET | Refills: 0 | OUTPATIENT
Start: 2025-07-07

## 2025-07-07 RX ORDER — VITAMIN B COMPLEX
1 CAPSULE ORAL DAILY
Qty: 90 CAPSULE | Refills: 1 | Status: SHIPPED | OUTPATIENT
Start: 2025-07-07

## 2025-07-07 RX ORDER — PHENOL 1.4 %
1 AEROSOL, SPRAY (ML) MUCOUS MEMBRANE NIGHTLY
Qty: 90 TABLET | Refills: 1 | Status: SHIPPED | OUTPATIENT
Start: 2025-07-07

## 2025-07-07 RX ORDER — CLOPIDOGREL BISULFATE 75 MG/1
75 TABLET ORAL DAILY
Qty: 30 TABLET | Refills: 0 | OUTPATIENT
Start: 2025-07-07

## 2025-07-07 RX ORDER — BUMETANIDE 0.5 MG/1
0.5 TABLET ORAL DAILY
Qty: 30 TABLET | Refills: 0 | OUTPATIENT
Start: 2025-07-07

## 2025-07-07 RX ORDER — TIOTROPIUM BROMIDE INHALATION SPRAY 1.56 UG/1
2 SPRAY, METERED RESPIRATORY (INHALATION) DAILY
Qty: 4 G | Refills: 3 | Status: SHIPPED | OUTPATIENT
Start: 2025-07-07

## 2025-07-07 RX ORDER — SPIRONOLACTONE 25 MG/1
25 TABLET ORAL DAILY
Qty: 30 TABLET | Refills: 0 | OUTPATIENT
Start: 2025-07-07

## 2025-07-07 NOTE — TELEPHONE ENCOUNTER
PCP: Mary Russell DNP    Refill Request     LAST OFFICE VISIT: 6/2/2025      Medication: amiodarone (CORDARONE) 200 MG tablet     bumetanide (BUMEX) 0.5 MG tablet     spironolactone (ALDACTONE) 25 MG tablet     dapagliflozin (FARXIGA) 10 MG tablet     aspirin 81 MG chewable tablet     clopidogrel (PLAVIX) 75 MG tablet   Dispense:       Pharmacy   [address] [phone] - F [phone] [[id number]]     Future Appointments   Date Time Provider Department Center   7/25/2025  8:30 AM Tj Thurston MD HR [facility] NEUR BS [facility]   9/10/2025  8:30 AM Todd Peña MD [facility] BS [facility]   12/5/2025  8:30 AM [facility]

## 2025-07-08 NOTE — TELEPHONE ENCOUNTER
Spoke with Shaniqua Pts tarik. Advised her the cardiac medications need to be handled by Pts cardiologist.     Pts tarik verbalized understanding

## 2025-07-14 ENCOUNTER — HOSPITAL ENCOUNTER (OUTPATIENT)
Facility: HOSPITAL | Age: 65
Setting detail: SPECIMEN
Discharge: HOME | End: 2025-07-17

## 2025-07-14 LAB — SENTARA SPECIMEN COLLECTION: NORMAL

## 2025-07-14 PROCEDURE — 99001 SPECIMEN HANDLING PT-LAB: CPT

## 2025-07-29 ENCOUNTER — TELEPHONE (OUTPATIENT)
Facility: CLINIC | Age: 65
End: 2025-07-29

## 2025-07-29 DIAGNOSIS — I35.1 SEVERE AORTIC VALVE REGURGITATION: ICD-10-CM

## 2025-07-29 DIAGNOSIS — I73.9 PAD (PERIPHERAL ARTERY DISEASE): ICD-10-CM

## 2025-07-29 DIAGNOSIS — I50.32 CHRONIC HEART FAILURE WITH PRESERVED EJECTION FRACTION (HCC): ICD-10-CM

## 2025-07-29 RX ORDER — ASPIRIN 81 MG/1
81 TABLET, CHEWABLE ORAL DAILY
Qty: 90 TABLET | Refills: 1 | Status: SHIPPED | OUTPATIENT
Start: 2025-07-29

## 2025-07-29 NOTE — TELEPHONE ENCOUNTER
Diane from [facility] called to see if there is a medical diagnosis for the patient to receive Ensure nutritional shakes and have them covered through [employer].   Diane mentioned that the patient has had some weight loss but would like to speak to clinical staff.     # [phone] ext [phone]

## 2025-07-29 NOTE — TELEPHONE ENCOUNTER
Last Office visit:  6/2/2025    Last Filled: 6/2/2025    Follow up visit:    Future Appointments   Date Time Provider Department Center   9/10/2025  8:30 AM Todd Peña MD [facility] BS [facility]   12/5/2025  8:30 AM [facility] Russell D[facility]

## 2025-07-31 ENCOUNTER — TELEPHONE (OUTPATIENT)
Facility: CLINIC | Age: 65
End: 2025-07-31

## 2025-07-31 NOTE — TELEPHONE ENCOUNTER
Pts niece Shaniqua Kei on HIPAA called in states [facility] needs orders for Pt to get the boost protein drink.     She states [facility] came out to evaluate Pt and states he was malnourished. And rather than them buying the drink asked if PCP can send an order.     Please advise. Call back req.

## 2025-08-01 ENCOUNTER — HOSPITAL ENCOUNTER (OUTPATIENT)
Facility: HOSPITAL | Age: 65
Setting detail: SPECIMEN
Discharge: HOME | End: 2025-08-04

## 2025-08-01 LAB — SENTARA SPECIMEN COLLECTION: NORMAL

## 2025-08-01 PROCEDURE — 99001 SPECIMEN HANDLING PT-LAB: CPT

## 2025-08-22 ENCOUNTER — PATIENT MESSAGE (OUTPATIENT)
Facility: CLINIC | Age: 65
End: 2025-08-22

## 2025-09-04 ENCOUNTER — CLINICAL DOCUMENTATION (OUTPATIENT)
Age: 65
End: 2025-09-04

## (undated) DEVICE — GDWIRE ANGIO SUP STF 0.035IN --

## (undated) DEVICE — CATHETER ANGIOPLSTY L130CM BAL L250MM DIA5MM GWIRE 0.035IN

## (undated) DEVICE — TOWEL,OR,DSP,ST,BLUE,STD,4/PK,20PK/CS: Brand: MEDLINE

## (undated) DEVICE — SUTURE REMOVAL TRAY: Brand: MEDLINE INDUSTRIES, INC.

## (undated) DEVICE — CATHETER DIAG 5FR L100CM LUMN ID0.047IN JL3.5 CRV 0 SIDE H

## (undated) DEVICE — Device: Brand: QUICK-CROSS SUPPORT CATHETER

## (undated) DEVICE — KIT MIC INTR PERC 4F 60CM SMTH -- VSI

## (undated) DEVICE — RADIFOCUS GLIDECATH: Brand: GLIDECATH

## (undated) DEVICE — RADIFOCUS GLIDEWIRE: Brand: GLIDEWIRE

## (undated) DEVICE — COVER US PRB W15XL120CM W/ GEL RUBBERBAND TAPE STRP FLD GEN

## (undated) DEVICE — ANGIOGRAPHY KIT CUST VASC

## (undated) DEVICE — CATHETER ANGIO 5FR L100CM GRY S STL NYL JR4 3 SEG BRAID L

## (undated) DEVICE — GAUZE,SPONGE,4"X4",16PLY,STRL,LF,10/TRAY: Brand: MEDLINE

## (undated) DEVICE — SET FLD ADMIN 3 W STPCOCK FIX FEM L BOR 1IN

## (undated) DEVICE — CATHETER HAD L36CM INSRT L19CM PALINDROME

## (undated) DEVICE — PERCLOSE PROGLIDE™ SUTURE-MEDIATED CLOSURE SYSTEM: Brand: PERCLOSE PROGLIDE™

## (undated) DEVICE — RADIFOCUS GLIDEWIRE ADVANTAGE GUIDEWIRE: Brand: GLIDEWIRE ADVANTAGE

## (undated) DEVICE — CATHETER ANGIOPLSTY L130CM BAL L250MM DIA4MM GWIRE 0.035IN

## (undated) DEVICE — CATHETER THRMDIL 6FR 110CM BAL TORQ CTRL

## (undated) DEVICE — Device

## (undated) DEVICE — CATHETER ANGIO JL4 0.045 INX5 FRX100 CM THRULUMEN EXPO

## (undated) DEVICE — DEVICE INFL L13IN 40ATM 30ML BASIXTOUCH

## (undated) DEVICE — INTRODUCER SHTH 4FR CANN L11CM DIL TIP 25MM RED TUNGSTEN

## (undated) DEVICE — LUER-LOK 360°: Brand: CONNECTA, LUER-LOK

## (undated) DEVICE — BAND COMPR L24CM REG CLR PLAS HEMSTAT EXT HK AND LOOP RETEN

## (undated) DEVICE — DRAPE,ANGIO,BRACH,STERILE,38X44: Brand: MEDLINE

## (undated) DEVICE — GLIDESHEATH SLENDER STAINLESS STEEL KIT: Brand: GLIDESHEATH SLENDER

## (undated) DEVICE — SUTURE PROL SZ 2-0 L36IN NONABSORBABLE BLU V-7 L26MM 1/2 8977H

## (undated) DEVICE — 4FR MICROPUNCTURE KIT

## (undated) DEVICE — EXTENSION SET, MALE LUER LOCK ADAPTER

## (undated) DEVICE — PROCEDURE KIT FLUID MGMT 10 FR CUST MAINFOLD

## (undated) DEVICE — PADS  DEFIB  ADULT

## (undated) DEVICE — DECANTER BAG 9": Brand: MEDLINE INDUSTRIES, INC.

## (undated) DEVICE — PRESSURE MONITORING SET: Brand: TRUWAVE

## (undated) DEVICE — PTA BALLOON DILATATION CATHETER: Brand: CHARGER™

## (undated) DEVICE — SYR ART 700 CLEAR MARK 7 -- ARTERION

## (undated) DEVICE — SUPPORT WRST COMPR W/ HK MBRACE

## (undated) DEVICE — BAG WST COLL CLR DISP PVC W/ ROTICULATING LUER L BOR TBNG

## (undated) DEVICE — INTRODUCER SHTH 7FR CANN L11CM DIL TIP 35MM ORNG TUNGSTEN

## (undated) DEVICE — CATHETER ANGIO 4FR L65CM 0.035IN VIS OMNI FLUSH-0 SFT TIP

## (undated) DEVICE — GUIDEWIRE VASC L180CM DIA0.035IN TIP L7CM PTFE S STL STR

## (undated) DEVICE — CATHETER ANGIO INFIN 038IN AMPLATZ 534545T

## (undated) DEVICE — ADAPTER ANGIO CLR BODY POLYCARB AIRLESS ROT M 2ND FEM LUER

## (undated) DEVICE — PTA BALLOON DILATATION CATHETER: Brand: STERLING™

## (undated) DEVICE — PACK PROCEDURE SURG VASC CATH 161 MMC LF

## (undated) DEVICE — DESTINATION PERIPHERAL GUIDING SHEATH: Brand: DESTINATION

## (undated) DEVICE — COPE MANDRIL WIRE GUIDE STAINLESS STEEL: Brand: COPE

## (undated) DEVICE — CATHETER DIAG AD 5FR L100CM COR NYL JUDKINS R 5 DILATED

## (undated) DEVICE — CATHETER ANGIO 5FR L100CM GRY S STL NYL JL3.5 3 SEG BRAID L

## (undated) DEVICE — CATHETER ANGIO 4FR L40CM 0.035IN KMP ANG SEL SFT RADPQ TIP

## (undated) DEVICE — SUTURE MONOCRYL SZ 4-0 L18IN ABSRB UD L19MM PS-2 3/8 CIR PRIM Y496G

## (undated) DEVICE — GUIDEWIRE VASC L150CM DIA0.035IN FLX TIP L7CM PTFE STR FIX

## (undated) DEVICE — GUIDEWIRE VASC L260CM DIA0.035IN RAD 3MM J TIP L7CM PTFE